# Patient Record
Sex: MALE | Race: WHITE | Employment: OTHER | URBAN - METROPOLITAN AREA
[De-identification: names, ages, dates, MRNs, and addresses within clinical notes are randomized per-mention and may not be internally consistent; named-entity substitution may affect disease eponyms.]

---

## 2017-01-25 ENCOUNTER — TRANSFERRED RECORDS (OUTPATIENT)
Dept: HEALTH INFORMATION MANAGEMENT | Facility: CLINIC | Age: 72
End: 2017-01-25

## 2017-02-24 ENCOUNTER — TRANSFERRED RECORDS (OUTPATIENT)
Dept: HEALTH INFORMATION MANAGEMENT | Facility: CLINIC | Age: 72
End: 2017-02-24

## 2017-03-07 DIAGNOSIS — G47.00 PERSISTENT DISORDER OF INITIATING OR MAINTAINING SLEEP: ICD-10-CM

## 2017-03-08 RX ORDER — ZOLPIDEM TARTRATE 10 MG/1
TABLET ORAL
Qty: 90 TABLET | Refills: 0 | Status: SHIPPED | OUTPATIENT
Start: 2017-03-08 | End: 2017-03-09

## 2017-03-08 NOTE — TELEPHONE ENCOUNTER
Zolpidem 10 mg      Last Written Prescription Date:  6/13/16  Last Fill Quantity: 90,   # refills: 1  [facility] or [facility] prescribing provider: 12/12/16  Future Office visit:       Routing refill request to provider for review/approval because:  Drug not on the [facility] or [facility] refill protocol or controlled substance

## 2017-03-09 DIAGNOSIS — G47.00 PERSISTENT DISORDER OF INITIATING OR MAINTAINING SLEEP: ICD-10-CM

## 2017-03-09 DIAGNOSIS — E03.9 HYPOTHYROIDISM, UNSPECIFIED TYPE: ICD-10-CM

## 2017-03-10 RX ORDER — LEVOTHYROXINE SODIUM 125 UG/1
125 TABLET ORAL DAILY
Qty: 90 TABLET | Refills: 3 | Status: SHIPPED | OUTPATIENT
Start: 2017-03-10 | End: 2018-03-11

## 2017-03-10 NOTE — TELEPHONE ENCOUNTER
Edi Kim is calling to let us know that he saw [facility] Cardiologist for an episode.   Heart is fine, Carotids are fine.  Patient had a CT scan of head and neck( to rule out stroke) -brain is fine.  Creatinine done - 1.0 was on 2/24/17. Dr Lucia told him to return to do f/u creatinine as 12/12/16 creatinine was 1.34.  Patient is wondering if he still needs to f/u for creatinine?   Could you also remove the Ambien Rx from the .  RN's aren't allowed. Shira refaxed the 3/8/17 Rx.    Triage: Ok to mychart Dr Lucias instructions.    He also is wondering why he only got 30 pills from latest levothyroxine Rx.  I called pharmacy and they said his insurance is mandating this. Pharmacist will look into it and call patient.   [facility]  [facility] LARA will refax now.

## 2017-03-13 RX ORDER — ZOLPIDEM TARTRATE 10 MG/1
TABLET ORAL
Qty: 90 TABLET | Refills: 1 | Status: ON HOLD | OUTPATIENT
Start: 2017-03-13 | End: 2017-11-16

## 2017-07-06 ENCOUNTER — TELEPHONE (OUTPATIENT)
Dept: FAMILY MEDICINE | Facility: CLINIC | Age: 72
End: 2017-07-06

## 2017-07-06 NOTE — TELEPHONE ENCOUNTER
Patient denies having trouble breathing.     Patient called reporting Coughing    RESPIRATORY SYMPTOMS      Duration: a couple months    Description  nasal congestion, rhinorrhea and cough    Severity: mild    Accompanying signs and symptoms: Hoarse after all     History (predisposing factors):  none    Precipitating or alleviating factors: None    Therapies tried and outcome:  Antihistamine (allegra & benadryl), albuterol (someone else's)       Recent Medication changes: none    Home Care information given: drink plenty of fluids, shower before bed  Advised: Follow up with clinic if: does not care to see any other provider besides Dr. Lucia.Writer told the patient to be seen by a provider before the 18th it does not need to be at this clinic.  Follow up with Emergent Care if: fever, trouble breathing, blue lips, feeling of suffocation.    References used: Telephone Triage Protocols for Nurses fifth edition pg 164      Please advise as appropriate with further recommendations as appropriate.    Anna Harmon, RN  Triage RN  [facility]

## 2017-07-18 ENCOUNTER — OFFICE VISIT (OUTPATIENT)
Dept: FAMILY MEDICINE | Facility: CLINIC | Age: 72
End: 2017-07-18
Payer: COMMERCIAL

## 2017-07-18 VITALS
HEART RATE: 70 BPM | DIASTOLIC BLOOD PRESSURE: 80 MMHG | SYSTOLIC BLOOD PRESSURE: 124 MMHG | TEMPERATURE: 97.9 F | WEIGHT: 215.2 LBS | HEIGHT: 75 IN | OXYGEN SATURATION: 94 % | BODY MASS INDEX: 26.76 KG/M2

## 2017-07-18 DIAGNOSIS — F41.9 ANXIETY: ICD-10-CM

## 2017-07-18 DIAGNOSIS — E78.5 HYPERLIPIDEMIA LDL GOAL <100: Primary | ICD-10-CM

## 2017-07-18 DIAGNOSIS — G89.29 CHRONIC LEFT SHOULDER PAIN: ICD-10-CM

## 2017-07-18 DIAGNOSIS — M25.512 CHRONIC LEFT SHOULDER PAIN: ICD-10-CM

## 2017-07-18 DIAGNOSIS — R05.9 COUGH: ICD-10-CM

## 2017-07-18 DIAGNOSIS — I10 HYPERTENSION GOAL BP (BLOOD PRESSURE) < 140/90: ICD-10-CM

## 2017-07-18 PROCEDURE — 99214 OFFICE O/P EST MOD 30 MIN: CPT | Performed by: FAMILY MEDICINE

## 2017-07-18 RX ORDER — ATORVASTATIN CALCIUM 40 MG/1
TABLET, FILM COATED ORAL
Qty: 90 TABLET | Refills: 1 | Status: SHIPPED | OUTPATIENT
Start: 2017-07-18 | End: 2017-12-05

## 2017-07-18 RX ORDER — LORAZEPAM 2 MG/1
TABLET ORAL
Qty: 270 TABLET | Refills: 1 | Status: ON HOLD | OUTPATIENT
Start: 2017-07-18 | End: 2017-11-16

## 2017-07-18 RX ORDER — OXYCODONE HYDROCHLORIDE 5 MG/1
5 TABLET ORAL EVERY 4 HOURS PRN
Qty: 30 TABLET | Refills: 0 | Status: SHIPPED | OUTPATIENT
Start: 2017-07-18 | End: 2017-11-11

## 2017-07-18 RX ORDER — ALBUTEROL SULFATE 90 UG/1
2 AEROSOL, METERED RESPIRATORY (INHALATION) 4 TIMES DAILY
Qty: 1 INHALER | Refills: 1 | Status: SHIPPED | OUTPATIENT
Start: 2017-07-18 | End: 2017-11-11

## 2017-07-18 NOTE — NURSING NOTE
"Chief Complaint   Patient presents with     Medication Follow-up       Initial /80 (BP Location: Left arm, Patient Position: Chair, Cuff Size: Adult Regular)  Pulse 70  Temp 97.9  F (36.6  C) (Tympanic)  Ht 6' 3\" (1.905 m)  Wt 215 lb 3.2 oz (97.6 kg)  SpO2 94%  BMI 26.9 kg/m2 Estimated body mass index is 26.9 kg/(m^2) as calculated from the following:    Height as of this encounter: 6' 3\" (1.905 m).    Weight as of this encounter: 215 lb 3.2 oz (97.6 kg).  Medication Reconciliation: complete     Ana Randolph      "

## 2017-07-18 NOTE — PROGRESS NOTES
SUBJECTIVE:                                                    Edi Villafana is a 72 year old male who presents to clinic today for the following health issues:      Medication Followup of  Lipitor and Ativan     Taking Medication as prescribed: yes    Side Effects:  None    Medication Helping Symptoms:  yes       Hyperlipidemia Follow-Up      Rate your low fat/cholesterol diet?: good    Taking statin?  Yes, no muscle aches from statin    Other lipid medications/supplements?:  none    Anxiety Follow-Up    Status since last visit: No change    Other associated symptoms:None    Complicating factors:   Significant life event: No   Current substance abuse: None  Depression symptoms: No  PRINCESS-7 SCORE 6/13/2016 7/12/2016   Total Score 9 4       GAD7                  Problem list and histories reviewed & adjusted, as indicated.  Additional history: as documented    Patient Active Problem List   Diagnosis     Hypothyroidism     Hyperlipidemia LDL goal <100     Persistent insomnia     Hypertension goal BP (blood pressure) < 140/90     Anxiety     Hypotestosteronism     Trochanteric bursitis     Depression with anxiety     Left shoulder pain     Screening for prostate cancer     Past Surgical History:   Procedure Laterality Date     ABDOMEN SURGERY  1973    large benign tumor removed with thrombophlebitis post op     APPENDECTOMY  1960     CATARACT IOL, RT/LT      bilateral     EYE SURGERY  [id number], 2007    eyelid surgery, 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, trabeculoplasty       Social History   Substance Use Topics     Smoking status: Never Smoker     Smokeless tobacco: Never Used     Alcohol use Yes     Family History   Problem Relation Age of Onset     Hypertension Mother      Lipids Mother      HEART DISEASE Mother      Psychotic Disorder Mother      severe depression     HEART DISEASE Father      CANCER Sister      ovarian     Colon Cancer Paternal Grandfather              Reviewed and updated as needed this visit by  [facility]  M[facility]       Reviewed and updated as needed this visit by Provider         ROS:  Constitutional, HEENT, cardiovascular, pulmonary, gi and gu systems are negative, except as otherwise noted.  CONSTITUTIONAL:NEGATIVE for fever, chills, change in weight  PSYCHIATRIC: NEGATIVE for changes in mood or affect  RESP: cough      OBJECTIVE:                                                    /80 (BP Location: Left arm, Patient Position: Chair, Cuff Size: Adult Regular)  Pulse 70  Temp 97.9  F (36.6  C) (Tympanic)  Ht 6' 3\" (1.905 m)  Wt 215 lb 3.2 oz (97.6 kg)  SpO2 94%  BMI 26.9 kg/m2  Body mass index is 26.9 kg/(m^2).  GENERAL APPEARANCE: healthy, alert and no distress  RESP:CTA         ASSESSMENT/PLAN:                                                        ICD-10-CM    1. Hyperlipidemia LDL goal <100 E78.5 Lipid Profile     atorvastatin (LIPITOR) 40 MG tablet   2. Hypertension goal BP (blood pressure) < 140/90 I10 UA with Microscopic     CBC with platelets differential     Comprehensive metabolic panel   3. Anxiety F41.9 LORazepam (ATIVAN) 2 MG tablet   4. Chronic left shoulder pain M25.512 oxyCODONE (ROXICODONE) 5 MG IR tablet    G89.29    5. Cough R05 albuterol (PROAIR HFA/PROVENTIL HFA/VENTOLIN HFA) 108 (90 BASE) MCG/ACT Inhaler       There are no Patient Instructions on file for this visit.    Enrique Lucia MD  [facility]   "

## 2017-07-18 NOTE — MR AVS SNAPSHOT
After Visit Summary   7/18/2017    Edi Villafana    MRN: [id number]           Patient Information     Date Of Birth          1945        Visit Information        Provider Department      7/18/2017 3:30 PM Enrique Lucia MD [facility]        Today's Diagnoses     Hyperlipidemia LDL goal <100    -  1    Hypertension goal BP (blood pressure) < 140/90        Anxiety        Chronic left shoulder pain        Cough          Care Instructions    We'll treat the patient symptomatically.  I would expect his cough to clear.  I refilled his lorazepam and albuterol.  I also refilled his oxycodone for his chronic left shoulder pain.  Lab tests were ordered.  Those will be reviewed and follow-up will be set up pending results of those tests.  His atorvastatin was also refilled.  He will follow-up at a minimum in December for his complete physical.  We will see him sooner if his labs dictate that.  Otherwise follow-up will be as needed.          Follow-ups after your visit        Who to contact     If you have questions or need follow up information about today's clinic visit or your schedule please contact [facility] directly at [phone].  Normal or non-critical lab and imaging results will be communicated to you by MyChart, letter or phone within 4 business days after the clinic has received the results. If you do not hear from us within 7 days, please contact the clinic through [employer] or phone. If you have a critical or abnormal lab result, we will notify you by phone as soon as possible.  Submit refill requests through [employer] or call your pharmacy and they will forward the refill request to us. Please allow 3 business days for your refill to be completed.          Additional Information About Your Visit        MyChart Information     [employer] gives you secure access to your electronic health record. If you see a primary care provider,  "you can also send messages to your care team and make appointments. If you have questions, please call your primary care clinic.  If you do not have a primary care provider, please call [phone] and they will assist you.        Care EveryWhere ID     This is your Care EveryWhere ID. This could be used by other organizations to access your [facility] medical records  [id number]        Your Vitals Were     Pulse Temperature Height Pulse Oximetry BMI (Body Mass Index)       70 97.9  F (36.6  C) (Tympanic) 6' 3\" (1.905 m) 94% 26.9 kg/m2        Blood Pressure from Last 3 Encounters:   07/18/17 124/80   12/12/16 110/70   10/03/16 136/84    Weight from Last 3 Encounters:   07/18/17 215 lb 3.2 oz (97.6 kg)   12/12/16 211 lb (95.7 kg)   10/03/16 214 lb (97.1 kg)                 Today's Medication Changes          These changes are accurate as of: 7/18/17 11:59 PM.  If you have any questions, ask your nurse or doctor.               Start taking these medicines.        Dose/Directions    albuterol 108 (90 BASE) MCG/ACT Inhaler   Commonly known as:  PROAIR HFA/PROVENTIL HFA/VENTOLIN HFA   Used for:  Cough   Started by:  Enrique Lucia MD        Dose:  2 puff   Inhale 2 puffs into the lungs 4 times daily   Quantity:  1 Inhaler   Refills:  1         These medicines have changed or have updated prescriptions.        Dose/Directions    atorvastatin 40 MG tablet   Commonly known as:  LIPITOR   This may have changed:  See the new instructions.   Used for:  Hyperlipidemia LDL goal <100   Changed by:  Enrique Lucia MD        TAKE 1 TABLET (40 MG) BY MOUTH DAILY   Quantity:  90 tablet   Refills:  1       LORazepam 2 MG tablet   Commonly known as:  ATIVAN   This may have changed:  See the new instructions.   Used for:  Anxiety   Changed by:  Enrique Lucia MD        TAKE 1 TABLET BY MOUTH EVERY 8 HOURS AS NEEDED FOR ANXIETY.   Quantity:  270 tablet   Refills:  1            Where to get your medicines    "   These medications were sent to [place] [id number] - [place], [place] - [address]  [address], [place] [address]     Phone:  [phone]     atorvastatin 40 MG tablet         Some of these will need a paper prescription and others can be bought over the counter.  Ask your nurse if you have questions.     Bring a paper prescription for each of these medications     albuterol 108 (90 BASE) MCG/ACT Inhaler    LORazepam 2 MG tablet    oxyCODONE 5 MG IR tablet                Primary Care Provider Office Phone # Fax #    Enrique Lucia [phone]       [facility] [address]  [place] [address]        Equal Access to Services     CONSUELO ANDERSON : Hadii aad ku hadasho Soomaali, waaxda luqadaha, qaybta kaalmada adeegyada, waxay jaimein hayjacquelyn majano . So [facility] [phone].    ATENCIÓN: Si habla español, tiene a kidd disposición servicios gratuitos de asistencia lingüística. Llame al [phone].    We comply with applicable federal civil rights laws and [place] laws. We do not discriminate on the basis of race, color, national origin, age, disability sex, sexual orientation or gender identity.            Thank you!     Thank you for choosing [facility]  for your care. Our goal is always to provide you with excellent care. Hearing back from our patients is one way we can continue to improve our services. Please take a few minutes to complete the written survey that you may receive in the mail after your visit with us. Thank you!             Your Updated Medication List - Protect others around you: Learn how to safely use, store and throw away your medicines at www.disposemymeds.org.          This list is accurate as of: 7/18/17 11:59 PM.  Always use your most recent med list.                   Brand Name Dispense Instructions for use Diagnosis    albuterol 108 (90 BASE) MCG/ACT Inhaler    PROAIR HFA/PROVENTIL  HFA/VENTOLIN HFA    1 Inhaler    Inhale 2 puffs into the lungs 4 times daily    Cough       aspirin 325 MG tablet      Take 0.5 mg by mouth daily.        atorvastatin 40 MG tablet    LIPITOR    90 tablet    TAKE 1 TABLET (40 MG) BY MOUTH DAILY    Hyperlipidemia LDL goal <100       guaiFENesin-codeine 100-10 MG/5ML Soln solution    ROBITUSSIN AC    240 mL    Take 5 mLs by mouth every 4 hours as needed for cough    Lower resp. tract infection       levothyroxine 125 MCG tablet    SYNTHROID/LEVOTHROID    90 tablet    Take 1 tablet (125 mcg) by mouth daily    Hypothyroidism, unspecified type       LORazepam 2 MG tablet    ATIVAN    270 tablet    TAKE 1 TABLET BY MOUTH EVERY 8 HOURS AS NEEDED FOR ANXIETY.    Anxiety       losartan 50 MG tablet    COZAAR    90 tablet    Take 0.5 tablets (25 mg) by mouth daily    Essential hypertension with goal blood pressure less than 140/90       MULTIVITAMIN PO      Take 1 tablet by mouth.        oxyCODONE 5 MG IR tablet    ROXICODONE    30 tablet    Take 1 tablet (5 mg) by mouth every 4 hours as needed for moderate to severe pain    Chronic left shoulder pain       TESTOSTERONE 2 MG/GM CREAM     40 g    15% cream and apply 0.5 ml topically daily    Hypotestosteronism       triamcinolone 0.1 % cream    KENALOG    45 g    Apply sparingly to affected area three times daily as needed    Intrinsic eczema       vitamin D3 2000 UNITS Caps      Take 1 capsule by mouth.        zolpidem 10 MG tablet    AMBIEN    90 tablet    TAKE 1 TAB BY MOUTH NIGHTLY AS NEEDED FOR SLEEP    Persistent disorder of initiating or maintaining sleep

## 2017-07-19 ENCOUNTER — TELEPHONE (OUTPATIENT)
Dept: FAMILY MEDICINE | Facility: CLINIC | Age: 72
End: 2017-07-19

## 2017-07-19 DIAGNOSIS — E78.5 HYPERLIPIDEMIA LDL GOAL <100: ICD-10-CM

## 2017-07-19 DIAGNOSIS — I10 HYPERTENSION GOAL BP (BLOOD PRESSURE) < 140/90: ICD-10-CM

## 2017-07-19 LAB
ALBUMIN SERPL-MCNC: 4.2 G/DL (ref 3.4–5)
ALBUMIN UR-MCNC: NEGATIVE MG/DL
ALP SERPL-CCNC: 95 U/L (ref 40–150)
ALT SERPL W P-5'-P-CCNC: 47 U/L (ref 0–70)
ANION GAP SERPL CALCULATED.3IONS-SCNC: 12 MMOL/L (ref 3–14)
APPEARANCE UR: CLEAR
AST SERPL W P-5'-P-CCNC: 48 U/L (ref 0–45)
BASOPHILS # BLD AUTO: 0 10E9/L (ref 0–0.2)
BASOPHILS NFR BLD AUTO: 0.3 %
BILIRUB SERPL-MCNC: 0.9 MG/DL (ref 0.2–1.3)
BILIRUB UR QL STRIP: NEGATIVE
BUN SERPL-MCNC: 7 MG/DL (ref 7–30)
CALCIUM SERPL-MCNC: 9.3 MG/DL (ref 8.5–10.1)
CHLORIDE SERPL-SCNC: 101 MMOL/L (ref 94–109)
CHOLEST SERPL-MCNC: 150 MG/DL
CO2 SERPL-SCNC: 22 MMOL/L (ref 20–32)
COLOR UR AUTO: YELLOW
CREAT SERPL-MCNC: 1.04 MG/DL (ref 0.66–1.25)
DIFFERENTIAL METHOD BLD: NORMAL
EOSINOPHIL # BLD AUTO: 0.1 10E9/L (ref 0–0.7)
EOSINOPHIL NFR BLD AUTO: 1.4 %
ERYTHROCYTE [DISTWIDTH] IN BLOOD BY AUTOMATED COUNT: 12.8 % (ref 10–15)
GFR SERPL CREATININE-BSD FRML MDRD: 70 ML/MIN/1.7M2
GLUCOSE SERPL-MCNC: 89 MG/DL (ref 70–99)
GLUCOSE UR STRIP-MCNC: NEGATIVE MG/DL
HCT VFR BLD AUTO: 48.6 % (ref 40–53)
HDLC SERPL-MCNC: 56 MG/DL
HGB BLD-MCNC: 17 G/DL (ref 13.3–17.7)
HGB UR QL STRIP: NEGATIVE
KETONES UR STRIP-MCNC: NEGATIVE MG/DL
LDLC SERPL CALC-MCNC: 67 MG/DL
LEUKOCYTE ESTERASE UR QL STRIP: NEGATIVE
LYMPHOCYTES # BLD AUTO: 1.6 10E9/L (ref 0.8–5.3)
LYMPHOCYTES NFR BLD AUTO: 22.3 %
MCH RBC QN AUTO: 32.8 PG (ref 26.5–33)
MCHC RBC AUTO-ENTMCNC: 35 G/DL (ref 31.5–36.5)
MCV RBC AUTO: 94 FL (ref 78–100)
MONOCYTES # BLD AUTO: 0.7 10E9/L (ref 0–1.3)
MONOCYTES NFR BLD AUTO: 9.2 %
NEUTROPHILS # BLD AUTO: 4.9 10E9/L (ref 1.6–8.3)
NEUTROPHILS NFR BLD AUTO: 66.8 %
NITRATE UR QL: NEGATIVE
NON-SQ EPI CELLS #/AREA URNS LPF: NORMAL /LPF
NONHDLC SERPL-MCNC: 94 MG/DL
PH UR STRIP: 6.5 PH (ref 5–7)
PLATELET # BLD AUTO: 228 10E9/L (ref 150–450)
POTASSIUM SERPL-SCNC: 4.3 MMOL/L (ref 3.4–5.3)
PROT SERPL-MCNC: 7.5 G/DL (ref 6.8–8.8)
RBC # BLD AUTO: 5.19 10E12/L (ref 4.4–5.9)
RBC #/AREA URNS AUTO: NORMAL /HPF (ref 0–2)
SODIUM SERPL-SCNC: 135 MMOL/L (ref 133–144)
SP GR UR STRIP: <=1.005 (ref 1–1.03)
TRIGL SERPL-MCNC: 135 MG/DL
URN SPEC COLLECT METH UR: NORMAL
UROBILINOGEN UR STRIP-ACNC: 0.2 EU/DL (ref 0.2–1)
WBC # BLD AUTO: 7.3 10E9/L (ref 4–11)
WBC #/AREA URNS AUTO: NORMAL /HPF (ref 0–2)

## 2017-07-19 PROCEDURE — 36415 COLL VENOUS BLD VENIPUNCTURE: CPT | Performed by: FAMILY MEDICINE

## 2017-07-19 PROCEDURE — 85025 COMPLETE CBC W/AUTO DIFF WBC: CPT | Performed by: FAMILY MEDICINE

## 2017-07-19 PROCEDURE — 80053 COMPREHEN METABOLIC PANEL: CPT | Performed by: FAMILY MEDICINE

## 2017-07-19 PROCEDURE — 80061 LIPID PANEL: CPT | Performed by: FAMILY MEDICINE

## 2017-07-19 PROCEDURE — 81001 URINALYSIS AUTO W/SCOPE: CPT | Performed by: FAMILY MEDICINE

## 2017-07-19 NOTE — TELEPHONE ENCOUNTER
Reason for Call:  Other     Detailed comments: Pt would like his recent lab results mailed too him from the results on 07/19/2017. Please contact him once this is available.     Phone Number Patient can be reached at: Cell number on file:    Telephone Information:   Mobile [phone]       Best Time:     Can we leave a detailed message on this number? YES    Call taken on 7/19/2017 at 11:51 AM by Maya Ortiz

## 2017-07-19 NOTE — TELEPHONE ENCOUNTER
Left voice message informing patient of normal CBC and UA. Lipid and CMP are in process. Nurse will mail lab results when reviewed by provider.

## 2017-07-19 NOTE — LETTER
[facility]  [address]  [address]  [place] [id number] [phone]                                                                                                           Edi Villafana  [address]  [place] [address]    July 20, 2017      Dear Edi,    The results of your recent tests were reviewed and are enclosed.     Results for orders placed or performed in visit on 07/19/17   UA with Microscopic   Result Value Ref Range    Color Urine Yellow     Appearance Urine Clear     Glucose Urine Negative NEG mg/dL    Bilirubin Urine Negative NEG    Ketones Urine Negative NEG mg/dL    Specific Gravity Urine <=1.005 1.003 - 1.035    pH Urine 6.5 5.0 - 7.0 pH    Protein Albumin Urine Negative NEG mg/dL    Urobilinogen Urine 0.2 0.2 - 1.0 EU/dL    Nitrite Urine Negative NEG    Blood Urine Negative NEG    Leukocyte Esterase Urine Negative NEG    Source Midstream Urine     WBC Urine O - 2 0 - 2 /HPF    RBC Urine O - 2 0 - 2 /HPF    Squamous Epithelial /LPF Urine Few FEW /LPF   CBC with platelets differential   Result Value Ref Range    WBC 7.3 4.0 - 11.0 10e9/L    RBC Count 5.19 4.4 - 5.9 10e12/L    Hemoglobin 17.0 13.3 - 17.7 g/dL    Hematocrit 48.6 40.0 - 53.0 %    MCV 94 78 - 100 fl    MCH 32.8 26.5 - 33.0 pg    MCHC 35.0 31.5 - 36.5 g/dL    RDW 12.8 10.0 - 15.0 %    Platelet Count 228 150 - 450 10e9/L    Diff Method Automated Method     % Neutrophils 66.8 %    % Lymphocytes 22.3 %    % Monocytes 9.2 %    % Eosinophils 1.4 %    % Basophils 0.3 %    Absolute Neutrophil 4.9 1.6 - 8.3 10e9/L    Absolute Lymphocytes 1.6 0.8 - 5.3 10e9/L    Absolute Monocytes 0.7 0.0 - 1.3 10e9/L    Absolute Eosinophils 0.1 0.0 - 0.7 10e9/L    Absolute Basophils 0.0 0.0 - 0.2 10e9/L   Comprehensive metabolic panel   Result Value Ref Range    Sodium 135 133 - 144 mmol/L    Potassium 4.3 3.4 - 5.3 mmol/L    Chloride 101 94 - 109 mmol/L    Carbon Dioxide 22 20 - 32  mmol/L    Anion Gap 12 3 - 14 mmol/L    Glucose 89 70 - 99 mg/dL    Urea Nitrogen 7 7 - 30 mg/dL    Creatinine 1.04 0.66 - 1.25 mg/dL    GFR Estimate 70 >60 mL/min/1.7m2    GFR Estimate If Black 85 >60 mL/min/1.7m2    Calcium 9.3 8.5 - 10.1 mg/dL    Bilirubin Total 0.9 0.2 - 1.3 mg/dL    Albumin 4.2 3.4 - 5.0 g/dL    Protein Total 7.5 6.8 - 8.8 g/dL    Alkaline Phosphatase 95 40 - 150 U/L    ALT 47 0 - 70 U/L    AST 48 (H) 0 - 45 U/L   Lipid Profile   Result Value Ref Range    Cholesterol 150 <200 mg/dL    Triglycerides 135 <150 mg/dL    HDL Cholesterol 56 >39 mg/dL    LDL Cholesterol Calculated 67 <100 mg/dL    Non HDL Cholesterol 94 <130 mg/dL         Thank you for choosing [facility].  We appreciate the opportunity to serve you and look forward to supporting your healthcare needs in the future.    If you have any questions or concerns, please call me or my staff at [phone].      Sincerely,    Enrique Lucia MD

## 2017-07-20 NOTE — PATIENT INSTRUCTIONS
We'll treat the patient symptomatically.  I would expect his cough to clear.  I refilled his lorazepam and albuterol.  I also refilled his oxycodone for his chronic left shoulder pain.  Lab tests were ordered.  Those will be reviewed and follow-up will be set up pending results of those tests.  His atorvastatin was also refilled.  He will follow-up at a minimum in December for his complete physical.  We will see him sooner if his labs dictate that.  Otherwise follow-up will be as needed.

## 2017-07-21 ENCOUNTER — TELEPHONE (OUTPATIENT)
Dept: FAMILY MEDICINE | Facility: CLINIC | Age: 72
End: 2017-07-21

## 2017-07-21 NOTE — TELEPHONE ENCOUNTER
Albuterol inhaler cost paid $ 7 before. Copay is now $42. Patient is calling to ask what his options are.     Called pharmacy, they say there is not a cheaper alternative. Reaching out to manager for prescription assistance program for patients. Will call patient Monday once I know the information to tell him.    Routing back to triage as a reminder to myself.     Anna Harmon RN  07/21/17  4:35 PM

## 2017-07-24 NOTE — TELEPHONE ENCOUNTER
Patient Contact    Attempt # 1    Was call answered?  No.  Left message on voicemail with information to call me back.    [7/24/2017 9:51 AM] Ashley Brooks:   That's what I'm here for.  Have him call [phone]. My assistant Sharda will get some basic info from him to see what we may be able to help with.     Called and left the above information for the patient and to call us back if he has any further questions.     Anna Harmon RN  07/24/17  12:37 PM

## 2017-08-17 ENCOUNTER — TELEPHONE (OUTPATIENT)
Dept: FAMILY MEDICINE | Facility: CLINIC | Age: 72
End: 2017-08-17

## 2017-08-17 NOTE — TELEPHONE ENCOUNTER
MAYELAI~ Today, Aug 17, 2017 I spoke with name, he/she is in need of financial assistance for medication.    We reviewed the Prescription Assistance Program for manfacturer brand name assistance programs, gross income, insurance and Rx list.    I will complete an application for Proventil HFA through [employer].    Ashley Brooks  Prescription

## 2017-11-08 ENCOUNTER — TELEPHONE (OUTPATIENT)
Dept: FAMILY MEDICINE | Facility: CLINIC | Age: 72
End: 2017-11-08

## 2017-11-08 NOTE — TELEPHONE ENCOUNTER
AR~ I spoke with Edi back in August to review his medications for  assistance programs.  The only active program was for his Proventil HFA/Albuterol inhaler.    Edi has been reminded multiple times via phone calls and letters to return the required documents to the Proventil HFA application.  Edi has since declined assistance stating he does not want to release his income information.    I have closed Edi's case.    Ashley Brooks  Prescription

## 2017-11-10 ENCOUNTER — TELEPHONE (OUTPATIENT)
Dept: FAMILY MEDICINE | Facility: CLINIC | Age: 72
End: 2017-11-10

## 2017-11-10 NOTE — TELEPHONE ENCOUNTER
Patient had one fall in the middle of the night a few months ago, was really scary for me, went to cardiologist at [facility] and they did a test and did a head CT, and a head MRI and it was normal.     Called patient. Could not identify the clear symptoms he is having. Says that a friend recommended he go to the emergency room. Sleeping all day. Patient is slurring while on the phone while speaking to the writer.     Not clear what he needs or what symptoms he is having. Writer advised that the patient should be seen if he feels he needs to get checked out.     Anna Harmon RN

## 2017-11-10 NOTE — TELEPHONE ENCOUNTER
Reason for call:  Patient reporting a symptom  Symptom or request: sick  Duration (how long have symptoms been present): 3 weeks do not want to see a doctor  Have you been treated for this before? No  Additional comments: please call patient  Phone Number patient can be reached at:  Home number on file [phone] (home)  Best Time:  any  Can we leave a detailed message on this number:  YES  Call taken on 11/10/2017 at 1:14 PM by ODETTE JOSHUA

## 2017-11-11 ENCOUNTER — HOSPITAL ENCOUNTER (INPATIENT)
Facility: CLINIC | Age: 72
LOS: 5 days | Discharge: NURSING, REHAB OR LONG-TERM CARE | DRG: 439 | End: 2017-11-16
Attending: EMERGENCY MEDICINE | Admitting: INTERNAL MEDICINE
Payer: MEDICARE

## 2017-11-11 ENCOUNTER — OFFICE VISIT (OUTPATIENT)
Dept: URGENT CARE | Facility: URGENT CARE | Age: 72
End: 2017-11-11
Payer: COMMERCIAL

## 2017-11-11 ENCOUNTER — APPOINTMENT (OUTPATIENT)
Dept: ULTRASOUND IMAGING | Facility: CLINIC | Age: 72
DRG: 439 | End: 2017-11-11
Attending: EMERGENCY MEDICINE
Payer: MEDICARE

## 2017-11-11 VITALS
SYSTOLIC BLOOD PRESSURE: 138 MMHG | DIASTOLIC BLOOD PRESSURE: 89 MMHG | OXYGEN SATURATION: 96 % | HEART RATE: 97 BPM | TEMPERATURE: 96.7 F

## 2017-11-11 DIAGNOSIS — G47.00 PERSISTENT DISORDER OF INITIATING OR MAINTAINING SLEEP: ICD-10-CM

## 2017-11-11 DIAGNOSIS — R63.0 ANOREXIA: ICD-10-CM

## 2017-11-11 DIAGNOSIS — F41.9 ANXIETY: ICD-10-CM

## 2017-11-11 DIAGNOSIS — R53.1 WEAKNESS: Primary | ICD-10-CM

## 2017-11-11 DIAGNOSIS — I10 HYPERTENSION GOAL BP (BLOOD PRESSURE) < 140/90: Primary | ICD-10-CM

## 2017-11-11 DIAGNOSIS — R53.81 MALAISE AND FATIGUE: ICD-10-CM

## 2017-11-11 DIAGNOSIS — R25.1 SHAKINESS: ICD-10-CM

## 2017-11-11 DIAGNOSIS — R53.83 MALAISE AND FATIGUE: ICD-10-CM

## 2017-11-11 DIAGNOSIS — F10.939 ALCOHOL WITHDRAWAL SYNDROME WITH COMPLICATION (H): ICD-10-CM

## 2017-11-11 PROBLEM — K85.90 PANCREATITIS: Status: ACTIVE | Noted: 2017-11-11

## 2017-11-11 LAB
ALBUMIN SERPL-MCNC: 4.2 G/DL (ref 3.4–5)
ALBUMIN UR-MCNC: NEGATIVE MG/DL
ALP SERPL-CCNC: 107 U/L (ref 40–150)
ALT SERPL W P-5'-P-CCNC: 111 U/L (ref 0–70)
ANION GAP SERPL CALCULATED.3IONS-SCNC: 14 MMOL/L (ref 3–14)
APPEARANCE UR: CLEAR
AST SERPL W P-5'-P-CCNC: 127 U/L (ref 0–45)
BASOPHILS # BLD AUTO: 0 10E9/L (ref 0–0.2)
BASOPHILS NFR BLD AUTO: 0.5 %
BILIRUB DIRECT SERPL-MCNC: 0.7 MG/DL (ref 0–0.2)
BILIRUB SERPL-MCNC: 1.8 MG/DL (ref 0.2–1.3)
BILIRUB UR QL STRIP: NEGATIVE
BUN SERPL-MCNC: 20 MG/DL (ref 7–30)
CALCIUM SERPL-MCNC: 9.2 MG/DL (ref 8.5–10.1)
CHLORIDE SERPL-SCNC: 89 MMOL/L (ref 94–109)
CO2 SERPL-SCNC: 22 MMOL/L (ref 20–32)
COLOR UR AUTO: YELLOW
CREAT SERPL-MCNC: 1.19 MG/DL (ref 0.66–1.25)
DIFFERENTIAL METHOD BLD: NORMAL
EOSINOPHIL # BLD AUTO: 0 10E9/L (ref 0–0.7)
EOSINOPHIL NFR BLD AUTO: 0.2 %
ERYTHROCYTE [DISTWIDTH] IN BLOOD BY AUTOMATED COUNT: 12.6 % (ref 10–15)
ETHANOL SERPL-MCNC: <0.01 G/DL
GFR SERPL CREATININE-BSD FRML MDRD: 60 ML/MIN/1.7M2
GLUCOSE BLDC GLUCOMTR-MCNC: 139 MG/DL (ref 70–99)
GLUCOSE SERPL-MCNC: 141 MG/DL (ref 70–99)
GLUCOSE UR STRIP-MCNC: 50 MG/DL
HCT VFR BLD AUTO: 47.2 % (ref 40–53)
HGB BLD-MCNC: 17 G/DL (ref 13.3–17.7)
HGB UR QL STRIP: NEGATIVE
HYALINE CASTS #/AREA URNS LPF: 7 /LPF (ref 0–2)
IMM GRANULOCYTES # BLD: 0 10E9/L (ref 0–0.4)
IMM GRANULOCYTES NFR BLD: 0.4 %
INR PPP: 1 (ref 0.86–1.14)
KETONES UR STRIP-MCNC: 20 MG/DL
LEUKOCYTE ESTERASE UR QL STRIP: NEGATIVE
LIPASE SERPL-CCNC: 2477 U/L (ref 73–393)
LYMPHOCYTES # BLD AUTO: 1 10E9/L (ref 0.8–5.3)
LYMPHOCYTES NFR BLD AUTO: 11.6 %
MAGNESIUM SERPL-MCNC: 1.7 MG/DL (ref 1.6–2.3)
MCH RBC QN AUTO: 32.2 PG (ref 26.5–33)
MCHC RBC AUTO-ENTMCNC: 36 G/DL (ref 31.5–36.5)
MCV RBC AUTO: 89 FL (ref 78–100)
MONOCYTES # BLD AUTO: 0.9 10E9/L (ref 0–1.3)
MONOCYTES NFR BLD AUTO: 10.4 %
MUCOUS THREADS #/AREA URNS LPF: PRESENT /LPF
NEUTROPHILS # BLD AUTO: 6.5 10E9/L (ref 1.6–8.3)
NEUTROPHILS NFR BLD AUTO: 76.9 %
NITRATE UR QL: NEGATIVE
NRBC # BLD AUTO: 0 10*3/UL
NRBC BLD AUTO-RTO: 0 /100
PH UR STRIP: 6 PH (ref 5–7)
PLATELET # BLD AUTO: 208 10E9/L (ref 150–450)
POTASSIUM SERPL-SCNC: 3.8 MMOL/L (ref 3.4–5.3)
PROT SERPL-MCNC: 7.9 G/DL (ref 6.8–8.8)
RBC # BLD AUTO: 5.28 10E12/L (ref 4.4–5.9)
RBC #/AREA URNS AUTO: 1 /HPF (ref 0–2)
SODIUM SERPL-SCNC: 125 MMOL/L (ref 133–144)
SODIUM SERPL-SCNC: 127 MMOL/L (ref 133–144)
SOURCE: ABNORMAL
SP GR UR STRIP: 1.02 (ref 1–1.03)
T4 FREE SERPL-MCNC: 0.82 NG/DL (ref 0.76–1.46)
TROPONIN I SERPL-MCNC: <0.015 UG/L (ref 0–0.04)
TSH SERPL DL<=0.005 MIU/L-ACNC: 47.06 MU/L (ref 0.4–4)
UROBILINOGEN UR STRIP-MCNC: 2 MG/DL (ref 0–2)
WBC # BLD AUTO: 8.4 10E9/L (ref 4–11)
WBC #/AREA URNS AUTO: 2 /HPF (ref 0–2)

## 2017-11-11 PROCEDURE — 25000128 H RX IP 250 OP 636: Performed by: INTERNAL MEDICINE

## 2017-11-11 PROCEDURE — 25000132 ZZH RX MED GY IP 250 OP 250 PS 637: Mod: GY | Performed by: EMERGENCY MEDICINE

## 2017-11-11 PROCEDURE — 80320 DRUG SCREEN QUANTALCOHOLS: CPT | Performed by: EMERGENCY MEDICINE

## 2017-11-11 PROCEDURE — 80076 HEPATIC FUNCTION PANEL: CPT | Performed by: EMERGENCY MEDICINE

## 2017-11-11 PROCEDURE — 81001 URINALYSIS AUTO W/SCOPE: CPT | Performed by: INTERNAL MEDICINE

## 2017-11-11 PROCEDURE — 85025 COMPLETE CBC W/AUTO DIFF WBC: CPT | Performed by: EMERGENCY MEDICINE

## 2017-11-11 PROCEDURE — 84484 ASSAY OF TROPONIN QUANT: CPT | Performed by: EMERGENCY MEDICINE

## 2017-11-11 PROCEDURE — 25000125 ZZHC RX 250: Performed by: INTERNAL MEDICINE

## 2017-11-11 PROCEDURE — 83690 ASSAY OF LIPASE: CPT | Performed by: EMERGENCY MEDICINE

## 2017-11-11 PROCEDURE — HZ2ZZZZ DETOXIFICATION SERVICES FOR SUBSTANCE ABUSE TREATMENT: ICD-10-PCS | Performed by: EMERGENCY MEDICINE

## 2017-11-11 PROCEDURE — 99222 1ST HOSP IP/OBS MODERATE 55: CPT | Mod: AI | Performed by: INTERNAL MEDICINE

## 2017-11-11 PROCEDURE — 99285 EMERGENCY DEPT VISIT HI MDM: CPT | Mod: 25

## 2017-11-11 PROCEDURE — 84439 ASSAY OF FREE THYROXINE: CPT | Performed by: EMERGENCY MEDICINE

## 2017-11-11 PROCEDURE — 99207 ZZC CDG-MDM COMPONENT: MEETS LOW - DOWN CODED: CPT | Performed by: INTERNAL MEDICINE

## 2017-11-11 PROCEDURE — 36415 COLL VENOUS BLD VENIPUNCTURE: CPT | Performed by: INTERNAL MEDICINE

## 2017-11-11 PROCEDURE — 25000128 H RX IP 250 OP 636: Performed by: EMERGENCY MEDICINE

## 2017-11-11 PROCEDURE — 83735 ASSAY OF MAGNESIUM: CPT | Performed by: EMERGENCY MEDICINE

## 2017-11-11 PROCEDURE — 85610 PROTHROMBIN TIME: CPT | Performed by: EMERGENCY MEDICINE

## 2017-11-11 PROCEDURE — 80048 BASIC METABOLIC PNL TOTAL CA: CPT | Performed by: EMERGENCY MEDICINE

## 2017-11-11 PROCEDURE — 76700 US EXAM ABDOM COMPLETE: CPT

## 2017-11-11 PROCEDURE — A9270 NON-COVERED ITEM OR SERVICE: HCPCS | Mod: GY | Performed by: INTERNAL MEDICINE

## 2017-11-11 PROCEDURE — 12000007 ZZH R&B INTERMEDIATE

## 2017-11-11 PROCEDURE — A9270 NON-COVERED ITEM OR SERVICE: HCPCS | Mod: GY | Performed by: EMERGENCY MEDICINE

## 2017-11-11 PROCEDURE — 25000132 ZZH RX MED GY IP 250 OP 250 PS 637: Mod: GY | Performed by: INTERNAL MEDICINE

## 2017-11-11 PROCEDURE — 93005 ELECTROCARDIOGRAM TRACING: CPT

## 2017-11-11 PROCEDURE — 99213 OFFICE O/P EST LOW 20 MIN: CPT | Performed by: PHYSICIAN ASSISTANT

## 2017-11-11 PROCEDURE — 00000146 ZZHCL STATISTIC GLUCOSE BY METER IP

## 2017-11-11 PROCEDURE — 96361 HYDRATE IV INFUSION ADD-ON: CPT

## 2017-11-11 PROCEDURE — 84443 ASSAY THYROID STIM HORMONE: CPT | Performed by: EMERGENCY MEDICINE

## 2017-11-11 PROCEDURE — 96360 HYDRATION IV INFUSION INIT: CPT

## 2017-11-11 PROCEDURE — 84295 ASSAY OF SERUM SODIUM: CPT | Performed by: INTERNAL MEDICINE

## 2017-11-11 RX ORDER — SODIUM CHLORIDE 9 MG/ML
INJECTION, SOLUTION INTRAVENOUS CONTINUOUS
Status: DISCONTINUED | OUTPATIENT
Start: 2017-11-11 | End: 2017-11-12

## 2017-11-11 RX ORDER — ONDANSETRON 4 MG/1
4 TABLET, ORALLY DISINTEGRATING ORAL EVERY 6 HOURS PRN
Status: DISCONTINUED | OUTPATIENT
Start: 2017-11-11 | End: 2017-11-16 | Stop reason: HOSPADM

## 2017-11-11 RX ORDER — NALOXONE HYDROCHLORIDE 0.4 MG/ML
.1-.4 INJECTION, SOLUTION INTRAMUSCULAR; INTRAVENOUS; SUBCUTANEOUS
Status: DISCONTINUED | OUTPATIENT
Start: 2017-11-11 | End: 2017-11-16 | Stop reason: HOSPADM

## 2017-11-11 RX ORDER — LORAZEPAM 2 MG/ML
1-2 INJECTION INTRAMUSCULAR EVERY 30 MIN PRN
Status: DISCONTINUED | OUTPATIENT
Start: 2017-11-11 | End: 2017-11-15

## 2017-11-11 RX ORDER — ONDANSETRON 2 MG/ML
4 INJECTION INTRAMUSCULAR; INTRAVENOUS EVERY 6 HOURS PRN
Status: DISCONTINUED | OUTPATIENT
Start: 2017-11-11 | End: 2017-11-16 | Stop reason: HOSPADM

## 2017-11-11 RX ORDER — LORAZEPAM 0.5 MG/1
0.5 TABLET ORAL ONCE
Status: COMPLETED | OUTPATIENT
Start: 2017-11-11 | End: 2017-11-11

## 2017-11-11 RX ORDER — ZOLPIDEM TARTRATE 5 MG/1
5 TABLET ORAL
Status: DISCONTINUED | OUTPATIENT
Start: 2017-11-11 | End: 2017-11-11

## 2017-11-11 RX ORDER — HYDRALAZINE HYDROCHLORIDE 20 MG/ML
10 INJECTION INTRAMUSCULAR; INTRAVENOUS EVERY 4 HOURS PRN
Status: DISCONTINUED | OUTPATIENT
Start: 2017-11-11 | End: 2017-11-16 | Stop reason: HOSPADM

## 2017-11-11 RX ORDER — HYDROMORPHONE HCL/0.9% NACL/PF 0.2MG/0.2
0.2 SYRINGE (ML) INTRAVENOUS
Status: DISCONTINUED | OUTPATIENT
Start: 2017-11-11 | End: 2017-11-12

## 2017-11-11 RX ORDER — LORAZEPAM 1 MG/1
1-2 TABLET ORAL EVERY 30 MIN PRN
Status: DISCONTINUED | OUTPATIENT
Start: 2017-11-11 | End: 2017-11-15

## 2017-11-11 RX ADMIN — SODIUM CHLORIDE: 9 INJECTION, SOLUTION INTRAVENOUS at 20:31

## 2017-11-11 RX ADMIN — LORAZEPAM 1 MG: 1 TABLET ORAL at 22:40

## 2017-11-11 RX ADMIN — THIAMINE HYDROCHLORIDE: 100 INJECTION, SOLUTION INTRAMUSCULAR; INTRAVENOUS at 20:31

## 2017-11-11 RX ADMIN — LORAZEPAM 0.5 MG: 0.5 TABLET ORAL at 15:39

## 2017-11-11 RX ADMIN — SODIUM CHLORIDE 1000 ML: 9 INJECTION, SOLUTION INTRAVENOUS at 15:39

## 2017-11-11 RX ADMIN — LORAZEPAM 2 MG: 1 TABLET ORAL at 20:32

## 2017-11-11 ASSESSMENT — ENCOUNTER SYMPTOMS
MYALGIAS: 0
COUGH: 1
DIZZINESS: 0
WEAKNESS: 1
FEVER: 0
APPETITE CHANGE: 1
HEADACHES: 0
TREMORS: 1
ABDOMINAL PAIN: 0

## 2017-11-11 ASSESSMENT — ACTIVITIES OF DAILY LIVING (ADL): ADLS_ACUITY_SCORE: 17

## 2017-11-11 NOTE — IP AVS SNAPSHOT
` `     [place] [id number] [facility]: [phone]            Medication Administration Report for Edi Villafana as of 11/16/17 1100   Legend:    Given Hold Not Given Due Canceled Entry Other Actions    Time Time (Time) Time  Time-Action       Inactive    Active    Linked        Medications 11/10/17 11/11/17 11/12/17 11/13/17 11/14/17 11/15/17 11/16/17    artificial saliva (BIOTENE MT) spray 1 spray  Dose: 1 spray Freq: EVERY 6 HOURS PRN Route: MT  PRN Reason: dry mouth  Start: 11/12/17 0911      1132 (1 spray)-Given       1657 (1 spray)-Given               hydrALAZINE (APRESOLINE) injection 10 mg  Dose: 10 mg Freq: EVERY 4 HOURS PRN Route: IV  PRN Reason: high blood pressure  PRN Comment: give for SBP > 180  Start: 11/11/17 1927   Admin Instructions: For ordered doses up to 40 mg, give IV Push undiluted over 1 minute.               levothyroxine (SYNTHROID/LEVOTHROID) tablet 125 mcg  Dose: 125 mcg Freq: DAILY Route: PO  Start: 11/12/17 1030   Admin Instructions: Separate oral administration of iron- or calcium-containing products and levothyroxine by at least 4 hours.       1110 (125 mcg)-Given        0813 (125 mcg)-Given        0849 (125 mcg)-Given        0754 (125 mcg)-Given               0819 (125 mcg)-Given           LORazepam (ATIVAN) tablet 0.5-1 mg  Dose: 0.5-1 mg Freq: EVERY 8 HOURS PRN Route: PO  PRN Reason: anxiety  Start: 11/15/17 1216         1447 (0.5 mg)-Given       1847 (0.5 mg)-Given [C]        0233 (1 mg)-Given       1024 (1 mg)-Given           losartan (COZAAR) tablet 25 mg  Dose: 25 mg Freq: DAILY Route: PO  Start: 11/12/17 1300      1343 (25 mg)-Given        0813 (25 mg)-Given        0848 (25 mg)-Given        0754 (25 mg)-Given               0819 (25 mg)-Given           naloxone (NARCAN) injection 0.1-0.4 mg  Dose: 0.1-0.4 mg Freq: EVERY 2 MIN PRN Route: IV  PRN Reason: opioid reversal  Start: 11/11/17 1915   Admin Instructions: For respiratory rate LESS than or EQUAL to 8.  Partial  reversal dose:  0.1 mg titrated q 2 minutes for Analgesia Side Effects Monitoring Sedation Level of 3 (frequently drowsy, arousable, drifts to sleep during conversation).Full reversal dose:  0.4 mg bolus for Analgesia Side Effects Monitoring Sedation Level of 4 (somnolent, minimal or no response to stimulation).  Give IV Push undiluted up to 2mg. Give each 0.4mg over 15 seconds in emergency situations. For non-emergent situations further dilute in 9mL of NS to facilitate titration of response.               ondansetron (ZOFRAN-ODT) ODT tab 4 mg  Dose: 4 mg Freq: EVERY 6 HOURS PRN Route: PO  PRN Reasons: nausea,vomiting  Start: 11/11/17 1917   Admin Instructions: This is Step 1 of nausea and vomiting management.  If nausea not resolved in 15 minutes, go to Step 2 prochlorperazine (COMPAZINE). Do not push through foil backing. Peel back foil and gently remove. Place on tongue immediately. Administration with liquid unnecessary              Or  ondansetron (ZOFRAN) injection 4 mg  Dose: 4 mg Freq: EVERY 6 HOURS PRN Route: IV  PRN Reasons: nausea,vomiting  Start: 11/11/17 1917   Admin Instructions: This is Step 1 of nausea and vomiting management.  If nausea not resolved in 15 minutes, go to Step 2 prochlorperazine (COMPAZINE).  Irritant. For ordered doses up to 4 mg, give IV Push undiluted over 2-5 minutes.                 Dose: 5 mg Freq: EVERY 8 HOURS PRN Route: PO  PRN Reason: moderate to severe pain  Start: 11/15/17 0945   End: 11/16/17 0700         1334 (5 mg)-Given       2155 (5 mg)-Given        0700-Med Discontinued      Completed Medications  Medications 11/10/17 11/11/17 11/12/17 11/13/17 11/14/17 11/15/17 11/16/17         Freq: EVERY 24 HOURS Route: IV  Last Dose: Stopped (11/16/17 0525)  Start: 11/11/17 1930   End: 11/16/17 0525   Admin Instructions: To avoid fluid overload, do not run maintenance IV during vitamin infusion.<br><br>FOLIC ACID NOT AVAILABLE/BACKORDERED      2031 ( )-New Bag        2023 (  )-New Bag        2025 ( )-New Bag        0006 ( )-Rate/Dose Verify       2015 ( )-New Bag        0522 ( )-Rate/Dose Verify       1948 ( )-New Bag        0525-Stopped          Discontinued Medications  Medications 11/10/17 11/11/17 11/12/17 11/13/17 11/14/17 11/15/17 11/16/17         Dose: 1-2 mg Freq: EVERY 30 MIN PRN Route: PO  PRN Reason: other  PRN Comment: per CIWA-Ar score  Start: 11/11/17 1917   End: 11/15/17 1216   Admin Instructions: Oral dosing is the preferred route of administration.  Dose according to CIWA-Ar score:    For CIWA-Ar Score LESS THAN OR EQUAL TO 7:  ~ NO LORazepam (ATIVAN) is to be administered and  ~ repeat CIWA-Ar scale in 4 hours and PRN    For CIWA-Ar Score 8-12:   ~ give LORazepam (ATIVAN) 1 mg PO or IV and  ~ repeat CIWA-Ar scale in 1 hour     For CIWA-Ar Score 13-15:  ~ give LORazepam (ATIVAN) 2 mg PO or IV   ~ and repeat CIWA-Ar scale in 1 hour    For CIWA-Ar Score GREATER THAN OR EQUAL TO 16:   ~ give LORazepam (ATIVAN) 2 mg PO or IV and   ~ repeat CIWA-Ar scale in 30 minutes    Doses should be withheld for nystagmus, sedation, ataxia, dysarthria or respiratory rate less than 12. If dose is being held more than once, provider must be notified.      2032 (2 mg)-Given [C]       2240 (1 mg)-Given [C]                      1137 (1 mg)-Given       1555 (1 mg)-Given [C]                      0622 (1 mg)-Given       1007 (1 mg)-Given       1611 (1 mg)-Given [C]       2036 (1 mg)-Given [C]       2155 (1 mg)-Given [C]        0424 (1 mg)-Given       1005 (2 mg)-Given       1914 (1 mg)-Given [C]        0027 (1 mg)-Given [C]       0419 (1 mg)-Given       1106 (1 mg)-Given       1216-Med Discontinued       Or    Dose: 1-2 mg Freq: EVERY 30 MIN PRN Route: IV  PRN Reason: other  PRN Comment: per CIWA-Ar score  Start: 11/11/17 1917   End: 11/15/17 1216   Admin Instructions: Oral dosing is the preferred route of administration.    Dose according to CIWA-Ar Score:    For CIWA-Ar Score LESS THAN OR  EQUAL TO 7:   ~ NO LORazepam (ATIVAN) is to be administered  and  ~ repeat CIWA-Ar scale in 4 hours and PRN    For CIWA-Ar Score 8-12:  ~ give LORazepam (ATIVAN) 1 mg PO or IV and  ~ repeat CIWA-AR scale in 1 hour     For CIWA-Ar Score 13-15:  ~ give LORazepam (ATIVAN) 2 mg PO or IV and   ~ repeat CIWA-Ar scale in 1 hour    For CIWA-Ar Score GREATER THAN OR EQUAL TO 16:  ~ give LORazepam (ATIVAN) 2 mg PO or IV and  ~ repeat CIWA-Ar scale in 30 minutes    Doses should be withheld for nystagmus, sedation, ataxia, dysarthria or respiratory rate less than 12. If dose is being held more than once, provider must be notified.  For IV PUSH: Dilute with equal volume of NS. For ordered doses up to 4 mg give IV Push. Administer each 2mg over 1-5 minutes.                     0125 (2 mg)-Given       0613 (1 mg)-Given                     2235 (1 mg)-Given        0201 (1 mg)-Given                                                                                      1216-Med Discontinued          Dose: 5 mg Freq: EVERY 4 HOURS PRN Route: PO  PRN Reason: moderate to severe pain  Start: 11/14/17 1447   End: 11/15/17 0942        1503 (5 mg)-Given       1911 (5 mg)-Given        0012 (5 mg)-Given       0419 (5 mg)-Given       0942-Med Discontinued          Dose: 5 mg Freq: EVERY 6 HOURS PRN Route: PO  PRN Reason: moderate to severe pain  Start: 11/14/17 1030   End: 11/14/17 1447        1447-Med Discontinued           Dose: 5 mg Freq: EVERY 4 HOURS PRN Route: PO  PRN Reason: moderate to severe pain  Start: 11/12/17 1918   End: 11/14/17 1024      2230 (5 mg)-Given        0201 (5 mg)-Given       (0617)-Not Given [C]       1007 (5 mg)-Given       1605 (5 mg)-Given       2004 (5 mg)-Given        0018 (5 mg)-Given       0424 (5 mg)-Given       1005 (5 mg)-Given       1024-Med Discontinued      Medications 11/10/17 11/11/17 11/12/17 11/13/17 11/14/17 11/15/17 11/16/17

## 2017-11-11 NOTE — IP AVS SNAPSHOT
MRN:[id number]                      After Visit Summary   11/11/2017    Edi Villafana    MRN: [id number]           Thank you!     Thank you for choosing [facility] for your care. Our goal is always to provide you with excellent care. Hearing back from our patients is one way we can continue to improve our services. Please take a few minutes to complete the written survey that you may receive in the mail after you visit. If you would like to speak to someone directly about your visit please contact Patient Relations at [phone]. Thank you!          Patient Information     Date Of Birth          1945        Designated Caregiver       Most Recent Value    Caregiver    Will someone help with your care after discharge? no      About your hospital stay     You were admitted on:  November 11, 2017 You last received care in the:  [place] [address] [facility]    You were discharged on:  November 16, 2017       Who to Call     For medical emergencies, please call 911.  For non-urgent questions about your medical care, please call your primary care provider or clinic, [phone]          Attending Provider     Provider Specialty    Nayely Short MD Emergency Medicine    Edin Kang,  Internal Medicine       Primary Care Provider Office Phone # Fax #    Enrique Lucia [phone]      After Care Instructions     Activity - Up ad yecenia           Advance Diet as Tolerated       Follow this diet upon discharge: low fat diet            General info for SNF       Length of Stay Estimate: Short Term Care: Estimated # of Days <30  Condition at Discharge: Improving  Level of care:skilled   Rehabilitation Potential: Good  Admission H&P remains valid and up-to-date: Yes  Recent Chemotherapy: N/A  Use Nursing Home Standing Orders: Yes            Mantoux instructions       Give two-step Mantoux (PPD) Per Facility Policy Yes                  Follow-up  Appointments     Follow Up and recommended labs and tests       Follow up with primary MD 1 week after leaving rehab center to discuss anxiety treatment options  Repeat your TSH (thyroid level) at your next visit with your primary MD.                  Additional Services     Occupational Therapy Adult Consult       Evaluate and treat as clinically indicated.    Reason:  weakness            Physical Therapy Adult Consult       Evaluate and treat as clinically indicated.    Reason:  weakness                  Pending Results     No orders found from 11/9/2017 to 11/12/2017.            Statement of Approval     Ordered          11/16/17 1044  I have reviewed and agree with all the recommendations and orders detailed in this document.  EFFECTIVE NOW     Approved and electronically signed by:  Emma Hazel MD             Admission Information     Date & Time Provider Department Dept. Phone    11/11/2017 Edin Kang, DO [place] [address] [facility] [phone]      Your Vitals Were     Blood Pressure Pulse Temperature Respirations Weight Pulse Oximetry    138/86 (BP Location: Left arm) 62 97.6  F (36.4  C) (Oral) 16 95.3 kg (210 lb) 92%    BMI (Body Mass Index)                   26.25 kg/m2           Data Market[employer] Information     [employer] gives you secure access to your electronic health record. If you see a primary care provider, you can also send messages to your care team and make appointments. If you have questions, please call your primary care clinic.  If you do not have a primary care provider, please call [phone] and they will assist you.        Care EveryWhere ID     This is your Care EveryWhere ID. This could be used by other organizations to access your [place] medical records  [id number]        Equal Access to Services     CONSUELO ANDERSON : Stuart Ariza, maureen acosta, abebe fan. [facility] [phone].    ATENCIÓN: Si  erica boswell, tiene a kidd disposición servicios gratuitos de asistencia lingüística. Agus isaac [phone].    We comply with applicable federal civil rights laws and [place] laws. We do not discriminate on the basis of race, color, national origin, age, disability, sex, sexual orientation, or gender identity.               Review of your medicines      CONTINUE these medicines which may have CHANGED, or have new prescriptions. If we are uncertain of the size of tablets/capsules you have at home, strength may be listed as something that might have changed.        Dose / Directions    aspirin 325 MG tablet   This may have changed:  how much to take   Used for:  Hypertension goal BP (blood pressure) < 140/90        Dose:  325 mg   Take 1 tablet (325 mg) by mouth daily   Quantity:  120 tablet   Refills:  0         CONTINUE these medicines which have NOT CHANGED        Dose / Directions    atorvastatin 40 MG tablet   Commonly known as:  LIPITOR   Used for:  Hyperlipidemia LDL goal <100        TAKE 1 TABLET (40 MG) BY MOUTH DAILY   Quantity:  90 tablet   Refills:  1       levothyroxine 125 MCG tablet   Commonly known as:  SYNTHROID/LEVOTHROID   Used for:  Hypothyroidism, unspecified type        Dose:  125 mcg   Take 1 tablet (125 mcg) by mouth daily   Quantity:  90 tablet   Refills:  3       LORazepam 2 MG tablet   Commonly known as:  ATIVAN   Used for:  Anxiety        TAKE 1 TABLET BY MOUTH EVERY 8 HOURS AS NEEDED FOR ANXIETY.   Quantity:  270 tablet   Refills:  1       losartan 50 MG tablet   Commonly known as:  COZAAR   Used for:  Essential hypertension with goal blood pressure less than 140/90        Dose:  25 mg   Take 0.5 tablets (25 mg) by mouth daily   Quantity:  90 tablet   Refills:  1       MULTIVITAMIN PO        Dose:  1 tablet   Take 1 tablet by mouth daily   Refills:  0       vitamin D3 2000 UNITS Caps        Dose:  1 capsule   Take 1 capsule by mouth daily   Refills:  0       zolpidem 10 MG tablet    Commonly known as:  AMBIEN   Used for:  Persistent disorder of initiating or maintaining sleep        TAKE 1 TAB BY MOUTH NIGHTLY AS NEEDED FOR SLEEP   Quantity:  90 tablet   Refills:  1            Where to get your medicines      Some of these will need a paper prescription and others can be bought over the counter. Ask your nurse if you have questions.     You don't need a prescription for these medications     aspirin 325 MG tablet                Protect others around you: Learn how to safely use, store and throw away your medicines at www.disposemymeds.org.             Medication List: This is a list of all your medications and when to take them. Check marks below indicate your daily home schedule. Keep this list as a reference.      Medications           Morning Afternoon Evening Bedtime As Needed    aspirin 325 MG tablet   Take 1 tablet (325 mg) by mouth daily            Next dose 11/17/2017 am                       atorvastatin 40 MG tablet   Commonly known as:  LIPITOR   TAKE 1 TABLET (40 MG) BY MOUTH DAILY                        Next dose 11/16/2017 bedtime           levothyroxine 125 MCG tablet   Commonly known as:  SYNTHROID/LEVOTHROID   Take 1 tablet (125 mcg) by mouth daily   Last time this was given:  125 mcg on 11/16/2017  8:19 AM            Next dose 11/17/2017 am                       LORazepam 2 MG tablet   Commonly known as:  ATIVAN   TAKE 1 TABLET BY MOUTH EVERY 8 HOURS AS NEEDED FOR ANXIETY.   Last time this was given:  1 mg on 11/16/2017 10:24 AM                            Pt had 1mg at 10:15 PO on 11/16/2017       losartan 50 MG tablet   Commonly known as:  COZAAR   Take 0.5 tablets (25 mg) by mouth daily   Last time this was given:  25 mg on 11/16/2017  8:19 AM            Next dose 11/17/2017 am                       MULTIVITAMIN PO   Take 1 tablet by mouth daily            Next dose 11/17/2017 am                       vitamin D3 2000 UNITS Caps   Take 1 capsule by mouth daily             Next dose 11/17/2017 am                       zolpidem 10 MG tablet   Commonly known as:  AMBIEN   TAKE 1 TAB BY MOUTH NIGHTLY AS NEEDED FOR SLEEP                            As needed for sleep                 More Information        Discharge Instructions for Acute Pancreatitis  You have been diagnosed with acute pancreatitis. Your pancreas is inflamed or swollen. The pancreas is an organ that makes digestive juices and hormones. Gallstones are a common cause of pancreatitis. These hard stones form in the gallbladder. The gallbladder shares a tube with the pancreas into the small intestine. If gallstones block this tube, fluid can t leave the pancreas. The fluid backs up and causes redness and swelling (inflammation). There are other causes of pancreatitis. Make sure you understand the cause of your pancreatitis. Then you can try to stop it from happening again.  Immediate home care    Find someone to drive you to appointments. Acute pancreatitis is a serious condition, and you should never drive if you are experiencing symptoms.    Stop drinking if your illness was caused by alcohol.    Ask your healthcare provider about alcohol abuse programs and support groups such as Alcoholics Anonymous.    Ask your provider about prescription medicines that can help you stop drinking.    Tell your provider about the alcohol withdrawal symptoms you have when you stop drinking. This is very important. You may need close medical supervision and special medicines when you stop drinking. This will depend on your alcohol withdrawal history.     Take your medicines exactly as directed. Don t skip doses.    Eat a low-fat diet. Ask your provider for menus and other diet information.    Learn to take your own pulse. Keep a record of your results. Ask your provider which readings mean that you need medical attention.  Ongoing care    Tell your provider about any medicines you are taking. Some medicines can cause this  condition.    Before starting any new medicine, ask your provider if it will harm your pancreas. This includes any new over-the-counter medicines, vitamins, or herbal supplements.      Tell your provider if you lose weight without dieting.    Be aware of symptoms that may mean your pancreatitis has come back. These symptoms include belly pain, nausea and vomiting, and fever.    Keep all follow-up appointments with your provider. Problems can often show up later.  Follow-up  Follow up with your healthcare provider, or as advised.  When to call your provider  Call your healthcare provider right away if you have any of the following:    Fever of 100.4 F (38.0 C) or higher, or as advised by your provider    Severe pain from your upper belly to your back    Nausea and vomiting    Feely dizzy or lightheaded    Yellowing of your skin or eyes (jaundice)    Bruises on your belly or back    Belly swelling and tenderness    Rapid pulse    Shallow, fast breathing   Date Last Reviewed: 8/1/2016 2000-2017 [employer]. [address] [place] [address]. All rights reserved. This information is not intended as a substitute for professional medical care. Always follow your healthcare professional's instructions.                Alcohol Abuse  Alcoholic drinks are harmful when you have too many of them. There is no set number of drinks that defines too much. Drinking that disrupts your life or your health is called alcohol abuse. Alcohol abuse can hurt your relationships with others. You may lose friends, a spouse, or even your job. You may be abusing alcohol if any of the following are true for you:    Duties at home or with  suffer because of drinking.    Duties at work or in school suffer because of drinking.    You have missed work or school because of drinking.    You use alcohol while driving or operating machinery.    You have legal problems such as arrests due to drinking.    You keep  "drinking even though it causes serious problems in your life.  Health effects  Alcohol abuse causes health problems. Sometimes this can happen after only drinking a  little.\" There is no set number of drinks or amount of alcohol that defines too much. The more you drink at one time, and the more often you drink determine both the short-term and long-term health effects. It affects all parts of your body and your health, including your:    Brain. Alcohol is a central nervous system depressant. It can damage parts of the brain that affect your balance, memory, thinking, and emotions. It can cause memory loss, blackouts, depression, agitation, sleep cycle changes, and seizures. These changes may or may not be reversible.    Heart and vascular system. Alcohol affects multiple areas. It can damage heart muscle causing cardiomyopathy, which is a weakening and stretching of the heart muscle. This can lead to trouble breathing, an irregular heartbeat, atrial fibrillation, leg swelling, and heart failure. Alcohol use makes the blood vessels stiffen causing high blood pressure. All of these problems increase your risk of having heart attacks or strokes.    Liver. Alcohol causes fat to build up in the liver, affecting its normal function. This increases the risk for hepatitis, leading to abdominal pain, appetite loss, jaundice, bleeding problems, liver fibrosis, and cirrhosis. This, in turn, can affect your ability to fight off infections, and can cause diabetes. The liver changes prevent it from removing toxins in your blood that can cause encephalopathy, which may show with confusion, altered level of consciousness, personality changes, memory loss, seizures, coma, and death.    Pancreas. Alcohol can cause inflammation of the pancreas, or pancreatitis. This can cause abdominal pain, fever, and diabetes.    Immune system. Alcohol weakens your immune system in a number of ways. It suppresses your immune system making it harder " to fight infections and colds. It also increases the chance of getting pneumonia and tuberculosis.    Cancer. Alcohol is a risk factor for developing cancer of the mouth, esophagus, pharynx, larynx, liver, and breast.    Sexual function. Alcohol can lead to sexual problems.  Home care  The following guidelines will help you deal with alcohol abuse:    Admit you have a problem with alcohol.    Ask for help from your healthcare provider and trusted family members or close friends.    Get help from people trained in dealing with alcohol abuse. This may be individual counseling or group therapy, or it may be a supervised alcohol treatment program.    Join a self-help group for alcohol abuse such as Alcoholics Anonymous (AA).    Avoid people who abuse alcohol or tempt you to drink.  Follow-up care  Follow up as advised by the healthcare provider, or as advised. Contact these groups to get help:    Alcoholics Anonymous (AA): Go to www.aa.org or check the phone book for meetings near you.    National Alcohol and Substance Abuse Information Center (NASAIC): 918.306.8729 www.addictioncare[place]ake.[employer]    National [place] on Alcoholism and Drug Dependence (NCADD): 981-YHL-PRWM (757-8592) www.ncadd.org  Call 911  Call 911 if any of these occur:    Trouble breathing or slow irregular breathing    Chest pain    Sudden weakness on one side of your body or sudden trouble speaking    Heavy bleeding or vomiting blood    Very drowsy or trouble awakening    Fainting or loss of consciousness    Rapid heart rate    Seizure  When to seek medical care  Call your healthcare provider right away if any of these occur:     Confusion    Hallucinations (seeing, hearing, or feeling things that aren t there)    Pain in your upper abdomen that gets worse    Repeated vomiting or black or tarry stools    Severe shakiness  Date Last Reviewed: 6/1/2016 2000-2017 [employer]. [address] [place] [address], [place] [address]. All rights  reserved. This information is not intended as a substitute for professional medical care. Always follow your healthcare professional's instructions.

## 2017-11-11 NOTE — ED PROVIDER NOTES
"  History     Chief Complaint:  Generalized Weakness      HPI   Edi Villafana is a 72 year old male who presents to the emergency department today for evaluation of generalized weakness. The patient reports a month of generalized weakness and feeling sick from \"head to toe\". He reports having coughing spells over the past 6 months that occur around every 2 weeks. He states that he feels he can no longer take care of himself and wants to feel safe as he no longer feels comfortable at home due to his difficulties balancing and taking care of himself. The patient reports that he does not have any appetite and had one episode of nausea and vomiting a few weeks ago. He denies any urinary or bowel symptoms. The patient reports that he drinks 2 servings of vodka daily and denies having any symptoms of withdrawal in the past. He reports that he has been tremulous this week, which is new for him. The patient denies any fevers, specific pain, abdominal pain, chest pain, headaches, leg swelling, dizziness or recent loss of consciousness. Of note, the patient last saw his primary care provider in June of 2017. He was seen at the [facility] last winter due to a fall and loss of consciousness and had no issues with his heart at that time. He had an MRI at the time and had no signs of stroke. This was attributed to his hypertension and he has not had any issues with this recently. The patient's friend reports that he is concerned that the patient has been depressed and stated that he does not want to continue living at one point. The friend believes that the patient drinks more than twice daily and the patient asked the friend to buy him alcohol recently, which has never happened in the past. The friend purchased the patient one bottle of vodka 3 days ago and the bottle is now empty. The friend also reports that the patient's girlfriend recently left him, which may be contributing to his symptoms.     Allergies:  Ace " Inhibitors  Ancef [Cefazolin Sodium]  Atenolol  Compazine  Sulfa Drugs  Tramadol     Medications:    LORazepam (ATIVAN) 2 MG tablet  atorvastatin (LIPITOR) 40 MG tablet  zolpidem (AMBIEN) 10 MG tablet  levothyroxine (SYNTHROID/LEVOTHROID) 125 MCG tablet  losartan (COZAAR) 50 MG tablet  aspirin 325 MG tablet  albuterol (PROAIR HFA/PROVENTIL HFA/VENTOLIN HFA) 108 (90 BASE) MCG/ACT Inhaler    Past Medical History:    Decreased libido   Diverticulosis of colon (without mention of hemorrhage)   Generalized anxiety disorder   Insomnia, unspecified   Other and unspecified hyperlipidemia   Other specified congenital anomaly of kidney   Other testicular hypofunction   Subjective visual disturbance, unspecified   Unspecified cataract   Unspecified essential hypertension   Unspecified hypothyroidism     Past Surgical History:    Abdomen surgery  Appendectomy  Bilateral cataract  Eye surgery    Family History:    Hypertension  Lipids  Heart disease  Severe depression  Ovarian cancer    Social History:  The patient was accompanied to the ED by his friend.  Smoking Status: Never Smoker  Smokeless Tobacco: Never Used  Alcohol Use: Yes   Marital Status:       Review of Systems   Constitutional: Positive for appetite change. Negative for fever.   Respiratory: Positive for cough.    Cardiovascular: Negative for chest pain and leg swelling.   Gastrointestinal: Negative for abdominal pain.   Musculoskeletal: Positive for gait problem. Negative for myalgias.   Neurological: Positive for tremors and weakness. Negative for dizziness and headaches.   All other systems reviewed and are negative.    Physical Exam   Patient Vitals for the past 24 hrs:   BP Temp Temp src Pulse Heart Rate Resp SpO2 Weight   11/11/17 1851 148/82 - - - - - - -   11/11/17 1847 149/76 96.7  F (35.9  C) Oral - 83 16 98 % -   11/11/17 1800 133/85 - - - - - 96 % -   11/11/17 1745 (!) 133/105 - - - 82 - 95 % -   11/11/17 1715 138/80 - - - 95 - 96 % -   11/11/17  1700 134/84 - - - - - 97 % -   11/11/17 1645 132/84 - - - - - 96 % -   11/11/17 1630 132/85 - - - 77 - 98 % -   11/11/17 1615 121/70 - - - 81 - 96 % -   11/11/17 1600 121/77 - - - - - 95 % -   11/11/17 1545 121/75 - - - - - 96 % -   11/11/17 1458 141/80 97.8  F (36.6  C) - - - - - -   11/11/17 1451 - - Oral 93 - 15 95 % 95.3 kg (210 lb)      The patient's orthostatic vitals were monitored here in the emergency department and found to be 121/77 with a pulse of 80 while lying, 117/69 with a pulse of 98 while sitting, and 93/66 with a pulse of 97 while standing.     Physical Exam  General: Patient is alert and interactive when I enter the room  Head:  The scalp, face, and head appear normal  Eyes:  Conjunctivae are normal  ENT:    The nose is normal    Pinnae are normal    External acoustic canals are normal  Neck:  Trachea midline  CV:  Pulses are normal, RRR.    Resp:  No respiratory distress, CTAB   Abdomen:      Soft, non-tender, non-distended  Musc:  Normal muscular tone    No major joint effusions    No asymmetric leg swelling    Good capillary refill noted  Skin:  No rash or lesions noted  Neuro:  Speech is normal and fluent. Face is symmetric.     Moving all extremities well. Tremulous. Tongue fasciculations.  Psych: Awake. Alert. Flat affect. Appropriate interactions.    Emergency Department Course     ECG:  Indication: Generalized Weakness  Completed at 1519.  Read at 1521.   Normal sinus rhythm  Left anterior fascicular block  Inferior infarct, age undetermined  Abnormal ECG  Rate 81 bpm. GA interval 170. QRS duration 108. QT/QTc 404/469. P-R-T axes 31 -50 11.     Imaging:  Radiology findings were communicated with the patient who voiced understanding of the findings.    Abdomen US, complete   IMPRESSION:    1. Fatty liver.  2. No acute abnormality identified. No gallstones. Extrahepatic bile  ducts not visualized in this exam.  Report per radiology      Laboratory:  Laboratory findings were communicated  with the patient who voiced understanding of the findings.    Glucose by meter: 139 (H)   CBC: AWNL. (WBC 8.4, HGB 17.0, )    BMP:  (L), Chloride 89 (L), Glucose 141 (H), GFR 60 (L) o/w WNL (Creatinine 1.19)  Magnesium: 1.7   Troponin (Collected 1452): <0.015   Hepatic Panel: Bilirubin direct 0.7 (H), Bilirubin total 1.8 (H), Albumin 4.2, Protein Total 7.9, Alkphos 107, ALT 11 (H),  (H)  Alcohol ethyl: <0.01   Lipase: 2477 (H)     Interventions:  1539: NS Bolus 1,000mL IV  1539: Ativan 0.5mg PO     Emergency Department Course:  Nursing notes and vitals reviewed.  1504: I performed an exam of the patient as documented above.   EKG obtained in the ED, see results above.    The patient was sent for a Abdomen US, complete while in the emergency department, results above.    IV was inserted and blood was drawn for laboratory testing, results above.   The patient provided a urine sample here in the emergency department. This was sent for laboratory testing, findings above.   1632: Patient rechecked and updated.    1657: I spoke with Dr. Galvan of the hospitliast service regarding patient's presentation, findings, and plan of care.   Findings and plan explained to the Patient who consents to admission. Discussed the patient with Dr. Galvan, who will admit the patient to a Cardiac Telemetry bed for further monitoring, evaluation, and treatment.   I personally reviewed the laboratory and imaging results with the patient and answered all related questions prior to admit.     Impression & Plan      Medical Decision Making:  Edi Villafana is a 72 year old male with a past medical history of hypertension who presents with generalized weaknes, fatigue and decreased PO. Vitas revealed mild hypertension but were otherwise unremarkable. Exam revealed a tremulous appearing male with dry mucous membranes but no pain on examination. Differential for him included alcohol withdrawal, infection, pancreatitis,  cholelialithis, ACS, depression, among others. I suspect a component of his symptoms is alcohol withdrawal. His friend endorses that he has been drinking more than he lets on as he recently gave him a bottle of vodka and he finished it within 48 to 72 hours. I did get him 0.5 mg of Ativan PO as he has no vomiting. Electrolytes were for the most part normal with mild hyponatremia at 125, which I suspect is hyponatremia. I did give him 1 liter of IV fluids. His lipase was quite elevated a 2500 and his bilirubin was 1.8 with elevation of his AST, which I suspect is alcoholic hepatitis, however he could have gallstones or pancreatitis, so I ordered ultrasound, which the hospitalist will follow up with. He has no pain on examination, so I think it is much less likely to be his gallbladder and more likely to be alcohol pancreatitis. Patient was comfortable being admitted. He has no active suicidal thoughts, so I do not think he needs to be on a hold. Patient will be admitted to Cardiac Tele for alcohol withdrawal and pancreatitis. Patient admitted.     Diagnosis:    ICD-10-CM    1. Alcohol withdrawal syndrome with complication (H) F10.239        Disposition:  Admitted to Cardiac Telemetry  Scribe Disclosure:  I, Priti eTrell, am serving as a scribe at 3:15 PM on 11/11/2017 to document services personally performed by Nayely Short MD based on my observations and the provider's statements to me.  11/11/2017   [facility] EMERGENCY DEPARTMENT       Nayely Short MD  11/11/17 2572

## 2017-11-11 NOTE — IP AVS SNAPSHOT
"[place] [address] MEDICAL SURGICAL: [phone]                                              INTERAGENCY TRANSFER FORM - PHYSICIAN ORDERS   2017                    Hospital Admission Date: 2017  MAURICE NORRIS   : 1945  Sex: Male        Attending Provider: Edin Kang DO     Allergies:  Ace Inhibitors, Ancef [Cefazolin Sodium], Atenolol, Compazine, Sulfa Drugs, Tramadol    Infection:  None   Service:  GENERAL MEDI    Ht:  1.905 m (6' 3\")   Wt:  95.3 kg (210 lb)   Admission Wt:  95.3 kg (210 lb)    BMI:  26.25 kg/m 2   BSA:  2.25 m 2            Patient PCP Information     Provider PCP Type    Enrique Lucia MD General      ED Clinical Impression     Diagnosis Description Comment Added By Time Added    Alcohol withdrawal syndrome with complication (H) [F10.239] Alcohol withdrawal syndrome with complication (H) [F10.239]  Nayely Short MD 2017 11:16 PM      Hospital Problems as of 2017              Priority Class Noted POA    Pancreatitis Medium  2017 Yes      Non-Hospital Problems as of 2017              Priority Class Noted    Hypothyroidism Medium  10/17/2012    Hyperlipidemia LDL goal <100 Medium  10/17/2012    Persistent insomnia Medium  10/17/2012    Hypertension goal BP (blood pressure) < 140/90 Medium  10/17/2012    Anxiety Medium  10/17/2012    Hypotestosteronism Medium  10/23/2013    Trochanteric bursitis Medium  2013    Depression with anxiety Medium  2015    Left shoulder pain Medium  2015    Screening for prostate cancer Medium  2016      Code Status History     Date Active Date Inactive Code Status Order ID Comments User Context    2017  1:28 AM  Full Code [id number]  Charles Rodarte MD Outpatient    2017  7:18 PM 2017  1:28 AM Full Code [id number]  Edin Kang DO Inpatient    2015 11:11 AM 2017  7:18 PM Full Code [id number]  Enrique Lucia MD Outpatient       "   Medication Review      CONTINUE these medications which may have CHANGED, or have new prescriptions. If we are uncertain of the size of tablets/capsules you have at home, strength may be listed as something that might have changed.        Dose / Directions Comments    aspirin 325 MG tablet   This may have changed:  how much to take   Used for:  Hypertension goal BP (blood pressure) < 140/90        Dose:  325 mg   Take 1 tablet (325 mg) by mouth daily   Quantity:  120 tablet   Refills:  0          CONTINUE these medications which have NOT CHANGED        Dose / Directions Comments    atorvastatin 40 MG tablet   Commonly known as:  LIPITOR   Used for:  Hyperlipidemia LDL goal <100        TAKE 1 TABLET (40 MG) BY MOUTH DAILY   Quantity:  90 tablet   Refills:  1        levothyroxine 125 MCG tablet   Commonly known as:  SYNTHROID/LEVOTHROID   Used for:  Hypothyroidism, unspecified type        Dose:  125 mcg   Take 1 tablet (125 mcg) by mouth daily   Quantity:  90 tablet   Refills:  3        LORazepam 2 MG tablet   Commonly known as:  ATIVAN   Used for:  Anxiety        TAKE 1 TABLET BY MOUTH EVERY 8 HOURS AS NEEDED FOR ANXIETY.   Quantity:  270 tablet   Refills:  1        losartan 50 MG tablet   Commonly known as:  COZAAR   Used for:  Essential hypertension with goal blood pressure less than 140/90        Dose:  25 mg   Take 0.5 tablets (25 mg) by mouth daily   Quantity:  90 tablet   Refills:  1        MULTIVITAMIN PO        Dose:  1 tablet   Take 1 tablet by mouth daily   Refills:  0        vitamin D3 2000 UNITS Caps        Dose:  1 capsule   Take 1 capsule by mouth daily   Refills:  0        zolpidem 10 MG tablet   Commonly known as:  AMBIEN   Used for:  Persistent disorder of initiating or maintaining sleep        TAKE 1 TAB BY MOUTH NIGHTLY AS NEEDED FOR SLEEP   Quantity:  90 tablet   Refills:  1    This request is for a new prescription for a controlled substance as required by Federal/State law..                After Care     Activity - Up ad yecenia           Advance Diet as Tolerated       Follow this diet upon discharge: low fat diet       General info for SNF       Length of Stay Estimate: Short Term Care: Estimated # of Days <30  Condition at Discharge: Improving  Level of care:skilled   Rehabilitation Potential: Good  Admission H&P remains valid and up-to-date: Yes  Recent Chemotherapy: N/A  Use Nursing Home Standing Orders: Yes       Mantoux instructions       Give two-step Mantoux (PPD) Per Facility Policy Yes             Referrals     Occupational Therapy Adult Consult       Evaluate and treat as clinically indicated.    Reason:  weakness       Physical Therapy Adult Consult       Evaluate and treat as clinically indicated.    Reason:  weakness             Follow-Up Appointment Instructions     Future Labs/Procedures    Follow Up and recommended labs and tests     Comments:    Follow up with primary MD 1 week after leaving rehab center to discuss anxiety treatment options  Repeat your TSH (thyroid level) at your next visit with your primary MD.      Follow-Up Appointment Instructions     Follow Up and recommended labs and tests       Follow up with primary MD 1 week after leaving rehab center to discuss anxiety treatment options  Repeat your TSH (thyroid level) at your next visit with your primary MD.             Statement of Approval     Ordered          11/16/17 1044  I have reviewed and agree with all the recommendations and orders detailed in this document.  EFFECTIVE NOW     Approved and electronically signed by:  Emma Hazel MD

## 2017-11-11 NOTE — IP AVS SNAPSHOT
"` `           [place] [address] MEDICAL SURGICAL: [phone]                                              INTERAGENCY TRANSFER FORM - NURSING   2017                    Hospital Admission Date: 2017  MAURICE NORRIS   : 1945  Sex: Male        Attending Provider: Edin Kang DO     Allergies:  Ace Inhibitors, Ancef [Cefazolin Sodium], Atenolol, Compazine, Sulfa Drugs, Tramadol    Infection:  None   Service:  GENERAL MEDI    Ht:  1.905 m (6' 3\")   Wt:  95.3 kg (210 lb)   Admission Wt:  95.3 kg (210 lb)    BMI:  26.25 kg/m 2   BSA:  2.25 m 2            Patient PCP Information     Provider PCP Type    Enrique Lucia MD General      Current Code Status     Date Active Code Status Order ID Comments User Context       Prior      Code Status History     Date Active Date Inactive Code Status Order ID Comments User Context    2017  1:28 AM  Full Code [id number]  Charles Rodarte MD Outpatient    2017  7:18 PM 2017  1:28 AM Full Code [id number]  Edin Kang DO Inpatient    2015 11:11 AM 2017  7:18 PM Full Code [id number]  Enrique Lucia MD Outpatient      Advance Directives        Does patient have a scanned Advance Directive/ACP document in EPIC?           No        Hospital Problems as of 2017              Priority Class Noted POA    Pancreatitis Medium  2017 Yes      Non-Hospital Problems as of 2017              Priority Class Noted    Hypothyroidism Medium  10/17/2012    Hyperlipidemia LDL goal <100 Medium  10/17/2012    Persistent insomnia Medium  10/17/2012    Hypertension goal BP (blood pressure) < 140/90 Medium  10/17/2012    Anxiety Medium  10/17/2012    Hypotestosteronism Medium  10/23/2013    Trochanteric bursitis Medium  2013    Depression with anxiety Medium  2015    Left shoulder pain Medium  2015    Screening for prostate cancer Medium  2016      Immunizations     Name Date      HEPA " 01/01/93     HepB 01/01/93     Influenza (H1N1) 12/30/09     Influenza (IIV3) 09/18/15     Influenza (IIV3) 09/01/14     Influenza (IIV3) 09/09/13     Influenza (IIV3) 09/04/12     Influenza (IIV3) 09/08/10     Influenza Vaccine IM 3yrs+ 4 Valent IIV4 10/20/16     Pneumococcal (PCV 13) 12/12/15     Pneumococcal 23 valent 01/05/11     Pneumococcal 23 valent 01/01/92     TD (ADULT, 7+) 01/01/04     TDAP Vaccine (Adacel) 05/13/14     Zoster vaccine, live 02/11/10          END      ASSESSMENT     Discharge Profile Flowsheet     EXPECTED DISCHARGE     Patient's communication style  spoken language (English or Bilingual) 11/11/17 2016    Expected Discharge Date  -- (home/alone) 11/12/17 0818   FINAL RESOURCES      DISCHARGE NEEDS ASSESSMENT     Resources List  Transitional Care 11/13/17 1422    Patient/family verbalizes understanding of discharge plan recommendations?  Yes 11/13/17 1422   PAS Number  [id number] 11/16/17 1054    Medical Team notified of plan?  yes 11/13/17 1422   SKIN      Anticipated Changes Related to Illness  inability to care for self 11/13/17 1422   Inspection of bony prominences  Full 11/16/17 1057    Equipment Currently Used at Home  none 11/13/17 1029   Inspection under devices  Full 11/16/17 1057    Transportation Available  family or friend will provide 11/13/17 1422   Skin WDL  ex 11/16/17 1057    # of Referrals Placed by CTS  Post Acute Facilities 11/13/17 1422   Skin Integrity  scab(s);scar(s) 11/16/17 1057    GASTROINTESTINAL (ADULT,PEDIATRIC,OB)     Full except areas not inspected   Buttock, left;Buttock, right;Sacrum;Coccyx 11/14/17 0804    GI WDL  WDL 11/16/17 1057   SAFETY      Last Bowel Movement  11/13/17 11/16/17 1057   Safety WDL  WDL 11/16/17 1057    Passing flatus  yes 11/16/17 1057   All Alarms  alarm(s) activated and audible 11/16/17 1057    COMMUNICATION ASSESSMENT                        Assessment WDL (Within Defined Limits) Definitions           Safety WDL     Effective:  "09/28/15    Row Information: <b>WDL Definition:</b> Bed in low position, wheels locked; call light in reach; upper side rails up x 2; ID band on<br> <font color=\"gray\"><i>Item=AS safety wdl>>List=AS safety wdl>>Version=F14</i></font>      Skin WDL     Effective: 09/28/15    Row Information: <b>WDL Definition:</b> Warm; dry; intact; elastic; without discoloration; pressure points without redness<br> <font color=\"gray\"><i>Item=AS skin wdl>>List=AS skin wdl>>Version=F14</i></font>      Vitals     Vital Signs Flowsheet     VITAL SIGNS     Response to Interventions  Decrease in pain 11/15/17 2243    Temp  97.6  F (36.4  C) 11/16/17 0818   ANALGESIA SIDE EFFECTS MONITORING      Temp src  Oral 11/16/17 0818   Side Effects Monitoring: Respiratory Quality  R 11/16/17 0818    Resp  16 11/16/17 0818   Side Effects Monitoring: Respiratory Depth  N 11/16/17 0818    Pulse  62 11/14/17 1115   Side Effects Monitoring: Sedation Level  1 11/15/17 2243    Heart Rate  68 11/16/17 0818   HEIGHT AND WEIGHT      Pulse/Heart Rate Source  Monitor 11/16/17 0818   Weight  95.3 kg (210 lb) 11/11/17 1452    BP  138/86 11/16/17 0818   LYING ORTHOSTATIC BP      BP Location  Left arm 11/16/17 0818   Lying Orthostatic BP  121/77 11/11/17 1607    OXYGEN THERAPY     Lying Orthostatic Pulse  80 bpm 11/11/17 1607    SpO2  92 % 11/16/17 0818   SITTING ORTHOSTATIC BP      O2 Device  None (Room air) 11/16/17 0818   Sitting Orthostatic BP  117/69 11/11/17 1607    PAIN/COMFORT     Sitting Orthostatic Pulse  98 bpm 11/11/17 1607    Patient Currently in Pain  yes 11/16/17 0818   STANDING ORTHOSTATIC BP      Preferred Pain Scale  number (Numeric Rating Pain Scale) 11/16/17 0818   Standing Orthostatic BP  93/66 11/11/17 1607    Patient's Stated Pain Goal  No pain 11/16/17 0818   Standing Orthostatic Pulse  97 bpm 11/11/17 1607    0-10 Pain Scale  6 11/16/17 0241   POSITIONING      Word Pain Scale  0 11/15/17 0511   Body Position  independently positioning " 11/16/17 1057    Pain Location  Abdomen 11/16/17 0818   Head of Bed (HOB)  HOB lowered 11/15/17 1005    Pain Orientation  Other (Comment) (diffuse) 11/16/17 0818   DAILY CARE      Pain Descriptors  Aching 11/16/17 0818   Activity Management  activity adjusted per tolerance;activity encouraged;ambulated in room;ambulated to bathroom 11/16/17 1057    Pain Intervention(s)  Other (Comment) (pt does not have pain meds  ordered, d/cd by MD this am) 11/16/17 0818   Activity Assistance Provided  assistance, 1 person 11/16/17 1057            Patient Lines/Drains/Airways Status    Active LINES/DRAINS/AIRWAYS     Name: Placement date: Placement time: Site: Days: Last dressing change:    Peripheral IV 11/11/17 Right Lower forearm 11/11/17   1700   Lower forearm   4             Patient Lines/Drains/Airways Status    Active PICC/CVC     None            Intake/Output Detail Report     Date Intake     Output Net    Shift P.O. I.V. IV Piggyback Total Urine Total       Noc 11/14/17 2300 - 11/15/17 0659 -- -- -- -- -- -- 0    Day 11/15/17 0700 - 11/15/17 1459 1400 -- -- 1400 -- -- 1400    Priscilla 11/15/17 1500 - 11/15/17 2259 300 -- -- 300 -- -- 300    Noc 11/15/17 2300 - 11/16/17 0659 -- -- -- -- -- -- 0    Day 11/16/17 0700 - 11/16/17 1459 -- -- -- -- -- -- 0      Last Void/BM       Most Recent Value    Urine Occurrence 1 at 11/14/2017 2151    Stool Occurrence       Case Management/Discharge Planning     Case Management/Discharge Planning Flowsheet     REFERRAL INFORMATION     Reaction To Health Status  accepting 11/13/17 1422    Did the Initial Social Work Assessment result in a Social Work Case?  Yes 11/13/17 1418   Understanding Of Condition And Treatment  distorted comprehension 11/13/17 1422    Admission Type  inpatient 11/13/17 1418   EXPECTED DISCHARGE      Arrived From  home or self-care 11/13/17 1418   Expected Discharge Date  -- (home/alone) 11/12/17 0818    Referral Source  physician 11/13/17 1418   DISCHARGE PLANNING       New Steerage to  Clinics?  No 11/13/17 1418   Patient/family verbalizes understanding of discharge plan recommendations?  Yes 11/13/17 1422    # of Referrals Placed by CTS  Post Acute Facilities 11/13/17 1422   Medical Team notified of plan?  yes 11/13/17 1422    Post Acute Facilities  TCU 11/13/17 1422   Anticipated Changes Related to Illness  inability to care for self 11/13/17 1422    Reason For Consult  discharge planning 11/13/17 1418   Transportation Available  family or friend will provide 11/13/17 1422    CTS Assigned to Case  Brenda Silva 11/13/17 1418   PATIENT PLACEMENT INFORMATION      Primary Care Clinic Name  [facility] 11/13/17 1422   Did the patient choose [facility]?  No 11/13/17 1422    Primary Care MD Name  Enrique Lucia 11/13/17 1422   Placement Choice Reason  location 11/13/17 1422    LIVING ENVIRONMENT     Facility 1 Name  [facility] 11/13/17 1422    Lives With  alone 11/13/17 1029   Facility 2 Name  [facility] 11/13/17 1422    Living Arrangements  house 11/13/17 1029   Facility 3 Name  [facility] 11/13/17 1422    Provides Primary Care For  no one 11/11/17 2016   FINAL RESOURCES      Quality Of Family Relationships  non-existent 11/13/17 1422   Equipment Currently Used at Home  none 11/13/17 1029    Able to Return to Prior Living Arrangements  yes 11/13/17 1422   Resources List  Transitional Care 11/13/17 1422    HOME SAFETY     PAS Number  [id number] 11/16/17 1054    Patient Feels Safe Living in Home?  yes 11/13/17 1422   ABUSE RISK SCREEN      ASSESSMENT OF FAMILY/SOCIAL SUPPORT     QUESTION TO PATIENT:  Has a member of your family or a partner(now or in the past) intimidated, hurt, manipulated, or controlled you in any way?  no 11/11/17 2016    Marital Status  Single 11/13/17 1422   QUESTION TO PATIENT: Do you feel safe going back to the place where you are living?  yes 11/11/17 2016    Description of Support System  Uninvolved 11/13/17 1422   OBSERVATION: Is there reason to believe there  has been maltreatment of a vulnerable adult (ie. Physical/Sexual/Emotional abuse, self neglect, lack of adequate food, shelter, medical care, or financial exploitation)?  no 11/11/17 2016    COPING/STRESS     (R) MENTAL HEALTH SUICIDE RISK      Major Change/Loss/Stressor  denies 11/11/17 2019   Are you depressed or being treated for depression?  No 11/11/17 2016    Patient Personal Strengths  expressive of needs;expressive of emotions 11/13/17 1422   HOMICIDE RISK      Sources Of Support  friend(s) 11/13/17 1422   Feels Like Hurting Others  no 11/11/17 2016

## 2017-11-11 NOTE — IP AVS SNAPSHOT
` `     [place] [address] MEDICAL SURGICAL: [phone]                 INTERAGENCY TRANSFER FORM - NOTES (H&P, Discharge Summary, Consults, Procedures, Therapies)   2017                    Hospital Admission Date: 2017  EDI NORRIS   : 1945  Sex: Male        Patient PCP Information     Provider PCP Type    Enirque Lucia MD General         History & Physicals      H&P by Edin Kang DO at 2017  7:37 PM     Author:  Edin Kang DO Service:  Hospitalist Author Type:  Physician    Filed:  2017  7:37 PM Date of Service:  2017  7:37 PM Creation Time:  2017  7:19 PM    Status:  Signed :  Edin Kang DO (Physician)         [facility]  Hospitalist Admission Note    Name: Edi Norris      MRN: [id number]  YOB: 1945    Age: 72 year old  Date of admission: 2017  Primary care provider: Enrique Lucia            Assessment and Plan:   Edi Norris is a 72 year old male with a history of HTN, Hyperlipidemia, hypothyroidism who presents with falls.    1. Acute pancreatitis.  Likely due to alcohol abuse.  NPO.  IV fluids.  Pain meds PRN.    2.  Alcohol withdrawals.  Appears to be in active withdrawals at this time.  Ativan withdrawal protocol.  IV fluids.  IV Vitamins.    3.  Alcohol abuse.  Chem dep consult.    4.  HTN.  Hold Cozaar while NPO.  IV Hydralazine PRN.    5.  Hyperlipidemia.  Lipitor.    6.  Depression.  He does not feel need to speak with Psych at this point.  Initially will be on Ativan for alcohol withdrawal protocol.    7.  Hyponatremia.  Suspect due to dehydration from poor fluid intact recently, and ongoing alcohol abuse.  Check TSH.  IV fluids.  Recheck Na this evening.      8.  Hypothyroidism.  Hold Synthroid while NPO.  Check TSH.    9.  Weakness.  PT consult.    Code status: Full  Admit to Inpatient  Prophylaxis: PCD's            Chief Complaint:   Weakness         History of  "Present Illness:   Edi Villafana is a 72 year old male who presents with weakness.  Has felt weak for the last month.  Has  no appetite.  Poor food and fluid intake.  Urinating less than usual.  Only mild occasional abdominal pain.  Mostly a \"quesy\" sensation fairly often.  Still able to drink \"a couple\" alcoholic beverages per day of distilled liquor.  Under a lot of stress recently.  Feels depressed.  No fevers or chills.  Nothing he has done has helped with abdominal pain or appetite.  Trying to eat makes symptoms worse.  No other complaints.          Past Medical History:[KR1.1]     Past Medical History:   Diagnosis Date     Decreased libido      Diverticulosis of colon (without mention of hemorrhage)      Generalized anxiety disorder      Insomnia, unspecified      Other and unspecified hyperlipidemia      Other specified congenital anomaly of kidney      Other testicular hypofunction      Subjective visual disturbance, unspecified      Unspecified cataract      Unspecified essential hypertension      Unspecified hypothyroidism[KR1.2]              Past Surgical History:[KR1.1]     Past Surgical History:   Procedure Laterality Date     ABDOMEN SURGERY  1973    large benign tumor removed with thrombophlebitis post op     APPENDECTOMY  1960     CATARACT IOL, RT/LT      bilateral     EYE SURGERY  [id number], 2007    eyelid surgery, 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 Auth Provider   LORazepam (ATIVAN) 2 MG tablet TAKE 1 TABLET BY MOUTH EVERY 8 HOURS AS NEEDED FOR ANXIETY. 11/11/2017 at 0800 Yes Enrique Lucia MD   atorvastatin (LIPITOR) 40 MG tablet TAKE 1 TABLET (40 MG) BY MOUTH DAILY 11/10/2017 at Unknown time Yes Enrique Lucia MD   zolpidem (AMBIEN) 10 MG tablet TAKE 1 TAB BY MOUTH NIGHTLY AS NEEDED FOR SLEEP 11/10/2017 at Unknown time Yes Enrique Lucia MD   levothyroxine (SYNTHROID/LEVOTHROID) 125 MCG tablet Take 1 tablet (125 mcg) by mouth daily 11/10/2017 at Unknown time Yes Enrique Lucia MD   losartan (COZAAR) 50 MG tablet Take 0.5 tablets (25 mg) by mouth daily Past Week at Unknown time Yes Enrique Lucia MD   aspirin 325 MG tablet Take 0.5 mg by mouth daily. Past Week at Unknown time Yes Reported, Patient   Cholecalciferol (VITAMIN D3) 2000 UNITS CAPS Take 1 capsule by mouth daily  Past Week at Unknown time Yes Reported, Patient   Multiple Vitamin (MULTIVITAMIN OR) Take 1 tablet by mouth daily  Past Month at Unknown time Yes Reported, Patient             Review of Systems:   A Comprehensive greater than 10 system review of systems was carried out.  Pertinent positives and negatives are noted above.  Otherwise negative for contributory information.           Physical Exam:[KR1.1]   Blood pressure 148/82, pulse 93, temperature 96.7  F (35.9  C), temperature source Oral, resp. rate 16, weight 95.3 kg (210 lb), SpO2 98 %.  Wt Readings from Last 1 Encounters:   11/11/17 95.3 kg (210 lb)[KR1.2]     Exam:  GENERAL: No apparent distress. Awake, alert, and fully oriented.  HEENT: Normocephalic, atraumatic. Extraocular movements intact.  CARDIOVASCULAR: Regular rate and rhythm without murmurs or rubs. No S3.  PULMONARY: Clear to auscultation bilaterally.  ABDOMINAL: Soft, non-tender, non-distended. Bowel sounds normoactive.   EXTREMITIES: No cyanosis or clubbing. No appreciable edema.  NEUROLOGICAL: CN 2-12  grossly intact, no focal neurological deficits.  DERMATOLOGICAL: No rash, ulcer, bruising, nor jaundice.          Data:     Laboratory:[KR1.1]    Recent Labs  Lab 11/11/17  1452   WBC 8.4   HGB 17.0   HCT 47.2   MCV 89          Recent Labs  Lab 11/11/17  1452   *   POTASSIUM 3.8   CHLORIDE 89*   CO2 22   ANIONGAP 14   *   BUN 20   CR 1.19   GFRESTIMATED 60*   GFRESTBLACK 73   CECY 9.2[KR1.3]     No results for input(s): CULT in the last 168 hours.[KR1.2]    Imaging:[KR1.1]  Recent Results (from the past 24 hour(s))   Abdomen US, complete    Narrative    US ABDOMEN COMPLETE 11/11/2017 6:27 PM    CLINICAL INFORMATION: Pain.    FINDINGS:  Liver:  Fatty infiltration. No focal liver lesions.    Biliary system:  No biliary duct dilatation in the intrahepatic ducts.  The common hepatic and common bile duct are not visualized.    Gallbladder:  Normal. No gallstones. No gallbladder wall thickening or  tenderness during scanning over the gallbladder.    Pancreas:  Visualized portions are normal.    Kidneys:  Right kidney appears normal 13.5 cm in length. 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    Spleen:  Obscured by bowel gas.    Visualized proximal aorta and IVC within normal limits.      Impression    IMPRESSION:    1. Fatty liver.  2. No acute abnormality identified. No gallstones. Extrahepatic bile  ducts not visualized in this exam.[KR1.2]            Revision History        User Key Date/Time User Provider Type Action    > KR1.3 11/11/2017  7:37 PM Edin Kang, DO Physician Sign     KR1.2 11/11/2017  7:20 PM Edin Kang, DO Physician      KR1.1 11/11/2017  7:19 PM Edin Kang, DO Physician                   Discharge Summaries     No notes of this type exist for this encounter.         Consult Notes      Consults by Brenda Silva LSW at 11/13/2017  3:11 PM     Author:  Brenda Silva LSW Service:  (none) Author Type:      Filed:  11/13/2017  3:11  "PM Date of Service:  11/13/2017  3:11 PM Creation Time:  11/13/2017  3:03 PM    Status:  Signed :  Brenda Silva LSW ()     Consult Orders:    1. Social Work IP Consult [[id number]] ordered by Charles Rodarte MD at 11/13/17 0906                Care Transition Initial Assessment -   Reason For Consult: discharge planning  Met with: Patient    Active Problems:    Pancreatitis         DATA  Lives With: alone  Living Arrangements: house  Description of Support System: Uninvolved  Resources List: Transitional Care   Quality Of Family Relationships: non-existent  Transportation Available: family or friend will provide    ASSESSMENT  Cognitive Status:  Appears alert and oriented.  Concerns to be addressed: SW consult to assist with dc planning needs.  SW met with pt and he states that he is \"scared to return home\" because he is feeling weak and sick. Pt agrees to TCU and feels that a short stay in TCU would be beneficial prior to returning home.  Pt lives in [place] and is agreeable to  sending TCU referrals to [facility], [facility], [facility], [facility]. Anticipate possible DC Tuesday 11/14/17. TCU referrals sent.       PLAN  Patient given options and choices for discharge: Yes .  Patient/family is agreeable to the plan? Yes    Patient Goals and Preferences: Home .  Patient anticipates discharging to:  TCU .[LH1.1]             Revision History        User Key Date/Time User Provider Type Action    > LH1.1 11/13/2017  3:11 PM Brenda Silva LSW  Sign            Consults by Tj Young LP at 11/13/2017  2:35 PM     Author:  Tj Young LP Service:  Health Psychology Author Type:  Psychologist    Filed:  11/13/2017  2:37 PM Date of Service:  11/13/2017  2:35 PM Creation Time:  11/13/2017  2:02 PM    Status:  Signed :  Tj Young LP (Psychologist)     Consult Orders:    1. Psychiatry IP Consult: depression, " "anxiety, ETOH abuse; Consultant may enter orders: Yes; Patient to be seen: Routine; Call back #:  [[phone]] ordered by Charles Rodarte MD at 17                Patient is a 72 year old single male admitted due to acute pancreatitis and a recent history of falls. Psychiatry consult was requested by Dr. Rodarte due to concerns regarding anxiety and depression.  Consult lasted 60 minutes and included direct meeting with patient and pre and post discussion with nursing staff.    History    Patient's chart was reviewed for history and physical exam results.  He is originally from [place] and has two sisters, one of whom  when she was 32.  His other sister still lives in [place], and he does not have a relationship with her.  Both parents are .  He was never  and has no children.  He has one nephew who lives in [place], but he does not maintain a relationship with him and and his family.  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    He indicates that there is a family history of depression and that he has suffered from depressed mood beginning in the .  He states, \"I have tried all the SSRIs an none of them seemed to help me.  He is currently on Ativan, prn, which he feels has been helpful but less so over time.  He acknowledges drinking two to three shots of alcohol daily but does not feel he has an issue with alcohol.  In fact, he appears to become agitated when this topic is broached with him. During the course of the interview, he did state, \"I am done drinking.\"  He realizes the relationship between his drinking and development of pancreatitis and indicates he does not " "wish to shorten his life.    He has a very limited support network.  He has limited contact with his family.  He belongs to a book club [facility] meets once monthly.  He states he has some contact with members of the group at other times but is not close to any of them. He lives in a house and had a roommate who moved out.  He states he likes living with another person and is hopeful that his landlord will find someone to move in with him.      Strengths include his overall intelligence and desire to make some positive changes in his life.  A major liability appears his tendency to push others away and difficulty accepting direct feedback regarding his behavior and possible consequences as a result.    Mental Status Exam    Patient was oriented in all spheres.  His memory, both recent and remote is intact.  He was able to effectively attend during the assessment and concentrated on what was being discussed.  His language and overall fund of knowledge are intact.  His mood was initially somewhat antagonistic but he seemed to warm up as the conversation continues.  He acknowledged feelings of anxiety and mild depression due to concern regarding future planning.  He indicated that he was feeling \"tense\" today and was hoping to continue the conversation at a future time.  I indicated that another consultant would be here tomorrow.  At first, he was hesitant to see someone else, but later noted it could be valuable to discuss future planning with another mental health professional.    Diagnostic Impression    Generalized Anxiety Disorder  Dysthymic Disorder    Disposition    Patient denies any desire to harm himself.  He affirmatively expressed the knowledge that he needs to stop drinking and stated that he would due to its impact on his health.    It appears that going to a transitional care facility for further rehabilitation is an appropriate recommendation.  He also is interested in resources he could avail himself of " once he returns home.  He was also mildly interested in continuing in some form of counseling/therapy once he returns home.  He did not appear interested in any trials of SSRIs but feels the Ativan has been helpful.    He also requested a follow up meeting if possible tomorrow to further explore long term options.  After his initial reluctance to share information he became more forthcoming over time.  Therefore, another opportunity to talk with someone regarding future options would be useful for him.   may also be helpful in appraising him of community resources.[HD1.1]       Revision History        User Key Date/Time User Provider Type Action    > HD1.1 11/13/2017  2:37 PM Tj Young, JOSH Psychologist Sign            Consults by Zuleyka Negron LADC at 11/12/2017  9:28 AM     Author:  Zuelyka Negron LADC Service:  Chemical Dependency Author Type:  Counselor    Filed:  11/12/2017  9:28 AM Date of Service:  11/12/2017  9:28 AM Creation Time:  11/12/2017  9:28 AM    Status:  Signed :  Zuleyka Negron LADC (Counselor)     Consult Orders:    1. Chemical Dependency IP Consult [[id number]] ordered by Edin Kang DO at 11/11/17 1926                CD consult acknowledged. Per EMR, patient has Medicare and would need to seek chem dep services from [facility] program as they are only Medicare approved facility for chem dep treatment services. Social work can assist with resources and referral.[BC1.1]     Revision History        User Key Date/Time User Provider Type Action    > BC1.1 11/12/2017  9:28 AM Zuleyka Negron LADC Counselor Sign                     Progress Notes - Physician (Notes from 11/13/17 through 11/16/17)      Progress Notes by Chaparro Quinn at 11/16/2017 10:53 AM     Author:  Chaparro Quinn Service:  (none) Author Type:      Filed:  11/16/2017 10:53 AM Date of Service:  11/16/2017 10:53 AM Creation Time:  11/16/2017 10:53 AM     Status:  Signed :  Chaparro Quinn ()         Your information has been submitted on November 16th, 2017 at 10:53:31 AM CST. The confirmation number is [id number][BB1.1]       Revision History        User Key Date/Time User Provider Type Action    > BB1.1 11/16/2017 10:53 AM Chaparro Quinn  Sign            Progress Notes by Charles Rodarte MD at 11/15/2017 12:06 PM     Author:  Charles Rodarte MD Service:  Hospitalist Author Type:  Physician    Filed:  11/16/2017  1:44 AM Date of Service:  11/15/2017 12:06 PM Creation Time:  11/15/2017 12:06 PM    Status:  Addendum :  Charles Rodarte MD (Physician)           [facility]  Hospitalist Progress Note  Charles Rodarte MD 11/15/2017    Reason for Stay (Diagnosis): ETOH pancreatitis, withdrawal         Assessment and Plan:      Summary of Stay: Edi Villafana is a 72 year old male with a history of HTN, Hyperlipidemia, hypothyroidism who presented with acute pancreatitis, ETOH withdrawals.        Symptoms continue to improve.  Less tremor today, pain improved.  titrating off oxycodone today (dose decreased to q8h prn; would stop tomorrow)      Pt appears to have a significant amount of denial revolving around his ETOH abuse and I suspect not being completely honest about his ETOH consumption, minimizing his use and the effects it is having on him.  becomes irritated when discussing with him his need to stop drinking      1. Acute pancreatitis.  Likely due to alcohol abuse.  improved.  ADAT.  Pain meds PRN; decrease to qh prn today and would stop tomrrow[SL1.1]  I told the patient we would be weaning off the oxycodone today[SL1.2].  Would not discharge on any narcotic pain meds      2.  Alcohol withdrawals. Much improved/resolved.  Being treated with CIWA - will stop and use ativan prn anxiety sx only      3.  Alcohol abuse.  Chem dep consult noted.  SW consult.  Seems to have poor  "insight/denial (or not willing to admit) the amount and effect of his alcohol use, and becomes defensive and irritated when trying to point out the labs (ETOH pancreatitis, ETOH hepatitis), exam findings (ETOH withdrawal tremors) and imaging findings (fatty liver) that all indicate alcoholism      4.  HTN.  resumed cozaar.  IV Hydralazine PRN.      5.  Hyperlipidemia.  Lipitor.      6.  Depression.  psych consult appreciated      7.  Hyponatremia.  Suspect due to dehydration from poor fluid intact recently, and ongoing alcohol abuse.          8.  Hypothyroidism.  TSH very elevated.  Pt admits to missing doses of Synthroid.  Suspect poor compliance.  Reiterated with him importance of taking meds every day.  Should have repeat TSH in 4-6 weeks      9.  Weakness.  secondary to ETOH use.  PT consult.  Pt does not feel he is able to go home after hospital stay and requesting a TCU.  SW consult placed[SL1.1]    Addendum:  Nurses note from 11/15 AM reads \"FYI. Pos blood cultures. Gram neg rods.  drawn 11/12. right arm.\".  This is incorrect.  This patient never had blood cx drawn this admission    ADDENDUM:  D/c summary and orders done for anticipated discharge to TCU on 11/16/17[SL1.3].  He will need a hard script signed for Ambien and Ativan (both chronic meds) prior to discharge[SL1.4]      Code status: Full  Admit to Inpatient  Prophylaxis: PCD's  Dispo:  To TCU when bed found        Interval History (Subjective):      abd pain improved.  Tolerating low fat diet.  Withdrawal tremor much improved                  Physical Exam:      Last Vital Signs:  /82 (BP Location: Left arm)  Pulse 62  Temp 97.7  F (36.5  C) (Oral)  Resp 18  Wt 95.3 kg (210 lb)  SpO2 96%  BMI 26.25 kg/m2[SL1.1]      Intake/Output Summary (Last 24 hours) at 11/15/17 1212  Last data filed at 11/14/17 1850   Gross per 24 hour   Intake              450 ml   Output                0 ml   Net              450 ml[SL1.5]       Constitutional: " Awake, alert, cooperative, no apparent distress.  Seen with nurse in room   Respiratory: Clear to auscultation bilaterally, no crackles or wheezing   Cardiovascular: Regular rate and rhythm, normal S1 and S2, and no murmur noted   Abdomen: Normal bowel sounds, soft, non-distended, non-tender   Skin: No rashes, no cyanosis, dry to touch   Neuro: Alert and oriented x3, no weakness, numbness, memory loss   Extremities: No edema, normal range of motion   Other(s):        All other systems: Negative          Medications:      All current medications were reviewed with changes reflected in problem list.         Data:      All new lab and imaging data was reviewed.   Labs:[SL1.1]    Recent Labs  Lab 11/12/17  0649 11/11/17  1452   WBC 5.6 8.4   HGB 14.3 17.0   HCT 40.5 47.2   MCV 92 89    208[SL1.5]      Imaging:[SL1.1]   No results found for this or any previous visit (from the past 24 hour(s)).[SL1.5]      Revision History        User Key Date/Time User Provider Type Action    > SL1.2 11/16/2017  1:44 AM Charles Rodarte MD Physician Addend     SL1.4 11/16/2017  1:29 AM Charles Rodarte MD Physician Addend     SL1.3 11/16/2017  1:24 AM Charles Rodarte MD Physician Addend     SL1.5 11/15/2017 12:12 PM Charles Rodarte MD Physician Sign     SL1.1 11/15/2017 12:06 PM Charles Rodarte MD Physician             Progress Notes by Brenda Silva LSW at 11/15/2017  2:33 PM     Author:  Brenda Silva LSW Service:  (none) Author Type:      Filed:  11/15/2017  2:36 PM Date of Service:  11/15/2017  2:33 PM Creation Time:  11/15/2017  2:33 PM    Status:  Signed :  Brenda Silva LSW ()         SWS:  SW notified that pt has been declined at [facility] but has been accepted at [facility] and can dc tomorrow. Anticipate pt will dc around 1:00pm and friend will transport.[LH1.1]       Revision History        User Key Date/Time User Provider Type Action     > LH1.1 11/15/2017  2:36 PM Brenda Silva LSW  Sign            Progress Notes by Brenda Silva LSW at 11/14/2017  2:37 PM     Author:  Brenda Silva LSW Service:  (none) Author Type:      Filed:  11/14/2017  2:40 PM Date of Service:  11/14/2017  2:37 PM Creation Time:  11/14/2017  2:37 PM    Status:  Signed :  Brenda Silva LSW ()         SWS:  SW following to assist with dc planning.  Pt declined at [facility], [facility] and [facility] and [facility].  [facility] gave referral to [facility] facility in [place] and they accepted. SW spoke with pt who declines facility in [place] and requests that SW send TCU referral to [place].  SW sent referrals to [facility] and [facility] on [address].[LH1.1]       Revision History        User Key Date/Time User Provider Type Action    > LH1.1 11/14/2017  2:40 PM Brenda Silva LSW  Sign            Progress Notes by Charles Rodarte MD at 11/14/2017 12:31 PM     Author:  Charles Rodarte MD Service:  Hospitalist Author Type:  Physician    Filed:  11/14/2017 12:35 PM Date of Service:  11/14/2017 12:31 PM Creation Time:  11/14/2017 12:31 PM    Status:  Signed :  Charles Rodarte MD (Physician)           [facility]  Hospitalist Progress Note  Charles Rodarte MD 11/14/2017    Reason for Stay (Diagnosis): ETOH withdrawal         Assessment and Plan:      Summary of Stay: Edi Villafana is a 72 year old male with a history of HTN, Hyperlipidemia, hypothyroidism who presented with acute pancreatitis, ETOH withdrawals.     Symptoms appear to be improving.  Less tremor today, pain improved.       Pt appears to have a significant amount of denial revolving around his ETOH abuse and I suspect not being completely honest about his ETOH consumption, minimizing his use and the effects it is having on him.         1. Acute pancreatitis.  Likely due to alcohol abuse.  improved.  ADAT.  Pain meds PRN; decrease to q6h prn.      2.  Alcohol withdrawals.  Continue CIWA.  Tremors without hallucinations.  receiving intermittent ativan      3.  Alcohol abuse.  Chem dep consult noted.  SW consult.  Seems to have poor insight/denial (or not willing to admit) the amount and effect of his alcohol use, and becomes defensive and irritated when trying to point out the labs (ETOH pancreatitis, ETOH hepatitis), exam findings (ETOH withdrawal tremors) and imaging findings (fatty liver) that all indicate alcoholism      4.  HTN.  resumed cozaar.  IV Hydralazine PRN.      5.  Hyperlipidemia.  Lipitor.      6.  Depression.  psych consult appreciated      7.  Hyponatremia.  Suspect due to dehydration from poor fluid intact recently, and ongoing alcohol abuse.          8.  Hypothyroidism.  TSH very elevated.  Pt admits to missing doses of Synthroid.  Suspect poor compliance.  Reiterated with him importance of taking meds every day.  Should have repeat TSH in 4-6 weeks      9.  Weakness.  secondary to ETOH use.  PT consult.  Pt does not feel he is able to go home after hospital stay and requesting a TCU.  SW consult placed      Code status: Full  Admit to Inpatient  Prophylaxis: PCD's  Dispo:  Tomorrow expected        Interval History (Subjective):      Tremors improved.  No hallucinations.  Still receiving some Ativan for withdrawal sx                  Physical Exam:      Last Vital Signs:  /90 (BP Location: Left arm)  Pulse 62  Temp 98.1  F (36.7  C) (Oral)  Resp 18  Wt 95.3 kg (210 lb)  SpO2 95%  BMI 26.25 kg/m2[SL1.1]      Intake/Output Summary (Last 24 hours) at 11/14/17 1234  Last data filed at 11/14/17 0635   Gross per 24 hour   Intake             1120 ml   Output                0 ml   Net             1120 ml[SL1.2]       Constitutional: Awake, alert, cooperative, no apparent distress.  Mild tremor, improved   Respiratory:  Clear to auscultation bilaterally, no crackles or wheezing   Cardiovascular: Regular rate and rhythm, normal S1 and S2, and no murmur noted   Abdomen: Normal bowel sounds, soft, non-distended, non-tender   Skin: No rashes, no cyanosis, dry to touch   Neuro: Alert and oriented x3, no weakness, numbness, memory loss   Extremities: No edema, normal range of motion   Other(s):        All other systems: Negative          Medications:      All current medications were reviewed with changes reflected in problem list.         Data:      All new lab and imaging data was reviewed.   Labs:[SL1.1]    Recent Labs  Lab 11/12/17  0649   WBC 5.6   HGB 14.3   HCT 40.5   MCV 92   [SL1.2]      Imaging:[SL1.1]   No results found for this or any previous visit (from the past 24 hour(s)).[SL1.3]      Revision History        User Key Date/Time User Provider Type Action    > SL1.3 11/14/2017 12:35 PM Charles Rodarte MD Physician Sign     SL1.2 11/14/2017 12:34 PM Charles Rodarte MD Physician      SL1.1 11/14/2017 12:31 PM Charles Rodarte MD Physician             Progress Notes by Charles Rodarte MD at 11/13/2017  2:34 PM     Author:  Charles Rodarte MD Service:  Hospitalist Author Type:  Physician    Filed:  11/13/2017  2:43 PM Date of Service:  11/13/2017  2:34 PM Creation Time:  11/13/2017  2:34 PM    Status:  Signed :  Charles Rodarte MD (Physician)           [facility]  Hospitalist Progress Note  Charles Rodarte MD 11/13/2017    Reason for Stay (Diagnosis): ETOH abuse, withdrawals, pancreatitis, hepatitis         Assessment and Plan:      Summary of Stay: Edi Villafana is a 72 year old male with a history of HTN, Hyperlipidemia, hypothyroidism who presented with acute pancreatitis, ETOH withdrawals.    Symptoms appear to be improving.  Less tremor today, pain improved.      Pt appears to have a significant amount of denial revolving around his ETOH abuse and I suspect  not being completely honest about his ETOH consumption, minimizing his use and the effects it is having on him.       1. Acute pancreatitis.  Likely due to alcohol abuse.  improved.  ADAT today.  Pain meds PRN.      2.  Alcohol withdrawals.  Continue CIWA.  Tremors without hallucinations.        3.  Alcohol abuse.  Chem dep consult noted.  SW consult.  Seems to have poor insight/denial (or not willing to admit) the amount and effect of his alcohol use, and becomes defensive and irritated when trying to point out the labs (ETOH pancreatitis, ETOH hepatitis), exam findings (ETOH withdrawal tremors) and imaging findings (fatty liver) that all indicate alcoholism      4.  HTN.  resume cozaar.  IV Hydralazine PRN.      5.  Hyperlipidemia.  Lipitor.      6.  Depression.  requesting psych consult.  Will order      7.  Hyponatremia.  Suspect due to dehydration from poor fluid intact recently, and ongoing alcohol abuse.          8.  Hypothyroidism.  TSH very elevated.  Pt admits to missing doses of Synthroid.  Suspect poor compliance.  Reiterated with him importance of taking meds every day.  Should have repeat TSH in 4-6 weeks      9.  Weakness.  secondary to ETOH use      Code status: Full  Admit to Inpatient  Prophylaxis: PCD's        Interval History (Subjective):      Angry with this provider - he feels I was too arrogant when talking with him about his ETOH abuse.  He does not feel that his ETOH use is a big problem for him and he that he can easily quit drinking.  Appears to be minimizing his drinking.  Tremor improved.  No hallucinations                  Physical Exam:      Last Vital Signs:  /84  Pulse 93  Temp 97.5  F (36.4  C) (Oral)  Resp 18  Wt 95.3 kg (210 lb)  SpO2 95%  BMI 26.25 kg/m2[SL1.1]      Intake/Output Summary (Last 24 hours) at 11/13/17 1443  Last data filed at 11/12/17 1740   Gross per 24 hour   Intake              287 ml   Output              425 ml   Net             -138 ml[SL1.2]        Constitutional: Awake, alert, cooperative, no apparent distress.  Nurse Nanda present during entire visit with patient today.  Tremor improved.     Respiratory: Clear to auscultation bilaterally, no crackles or wheezing   Cardiovascular: Regular rate and rhythm, normal S1 and S2, and no murmur noted   Abdomen: Normal bowel sounds, soft, non-distended, non-tender   Skin: No rashes, no cyanosis, dry to touch   Neuro: Alert and oriented x3, no weakness, numbness, memory loss.  Mild tremor   Extremities: No edema, normal range of motion   Other(s):        All other systems: Negative          Medications:      All current medications were reviewed with changes reflected in problem list.         Data:      All new lab and imaging data was reviewed.   Labs:[SL1.1]    Recent Labs  Lab 11/12/17  0649 11/11/17  1452   WBC 5.6 8.4   HGB 14.3 17.0   HCT 40.5 47.2   MCV 92 89    208[SL1.2]      Imaging:[SL1.1]   No results found for this or any previous visit (from the past 24 hour(s)).[SL1.2]      Revision History        User Key Date/Time User Provider Type Action    > SL1.2 11/13/2017  2:43 PM Charles Rodarte MD Physician Sign     SL1.1 11/13/2017  2:34 PM Charles Rodarte MD Physician             Progress Notes by Sallie Chaudhary PT at 11/13/2017 10:43 AM     Author:  Sallie Chaudhary PT Service:  Acute IP Rehab Author Type:  Physical Therapist    Filed:  11/13/2017 10:44 AM Date of Service:  11/13/2017 10:43 AM Creation Time:  11/13/2017 10:43 AM    Status:  Signed :  Sallie Chaudhary PT (Physical Therapist)            11/13/17 1000   Signing Clinician's Name / Credentials   Signing clinician's name / credentials Sallie Chaudhary DPT   [place]onson Balance Scale (ALEXANDREA CHAVEZ, MARLON S, BENY NICK, JACINDA, B: MEASURING BALANCE IN THE ELDERLY: VALIDATION OF AN INSTRUMENT. CAN. J. PUB. HEALTH, JULY/AUGUST SUPPLEMENT 2:S7-11, 1992.)   Sit To Stand 3   Standing Unsupported 3   Sitting Unsupported 4    Stand to Sit 3   Transfers 3   Standing with Eyes Closed 3   Standing Unsupported, Feet Together 3   Reach Forward With Outstretched Arm 3   Retrieve Object From Floor 3   Turning to Look Behind 3   Turn 360 Degrees 1   Placing Alternate Foot on Stool (4-6 inches) 2   Unsupported Tandem Stand (Demonstrate to Subject) 3   One Leg Stand 2   Total Score (A score of 45 or less has been correlated with an increased risk of falls)   Total Score (out of 56) 39[SW1.1]        Revision History        User Key Date/Time User Provider Type Action    > SW1.1 11/13/2017 10:44 AM Sallie Chaudhary PT Physical Therapist Sign            Progress Notes by Sallie Chaudhary PT at 11/13/2017 10:40 AM     Author:  Sallie Chaudhary PT Service:  Acute IP Rehab Author Type:  Physical Therapist    Filed:  11/13/2017 10:40 AM Date of Service:  11/13/2017 10:40 AM Creation Time:  11/13/2017 10:40 AM    Status:  Signed :  Sallie Chaudhary PT (Physical Therapist)          11/13/17 1025   Quick Adds   Type of Visit Initial PT Evaluation   Living Environment   Lives With alone   Living Arrangements house   Home Accessibility stairs to enter home;stairs within home   Number of Stairs to Enter Home 2   Number of Stairs Within Home 14   Living Environment Comment Pt states he lives alone. He states he has family in the area but no one to provide assistance or support.    Self-Care   Usual Activity Tolerance moderate   Current Activity Tolerance moderate   Regular Exercise no   Equipment Currently Used at Home none   Functional Level Prior   Ambulation 0-->independent   Transferring 0-->independent   Toileting 0-->independent   Bathing 0-->independent   Dressing 0-->independent   Eating 0-->independent   Communication 0-->understands/communicates without difficulty   Swallowing 0-->swallows foods/liquids without difficulty   Cognition 0 - no cognition issues reported   Fall history within last six months yes   Number of times patient has  fallen within last six months 4  (pt states multiple/frequent falls)   Which of the above functional risks had a recent onset or change? transferring;ambulation;fall history   General Information   Onset of Illness/Injury or Date of Surgery - Date 11/11/17   Referring Physician Dr. Guy Londono   Patient/Family Goals Statement Pt states TCU, states he doesn't feel safe going home alone   Pertinent History of Current Problem (include personal factors and/or comorbidities that impact the POC) Pt is 73 yo male admitted with complaints of generalized weakness over the past month and was found to have acute pancreatitis and alocohol withdrawls.  Pt reports history of recent frequent falls over the past month. PMH includes acute pancreatitis, hypertension, hyperlipidemia, hypothyroidism, and depression.    Precautions/Limitations fall precautions   Weight-Bearing Status - LLE full weight-bearing   Weight-Bearing Status - RLE full weight-bearing   Cognitive Status Examination   Orientation orientation to person, place and time   Level of Consciousness alert;confused   Follows Commands and Answers Questions 100% of the time;able to follow multistep instructions   Personal Safety and Judgment intact   Memory impaired   Pain Assessment   Patient Currently in Pain Yes, see Vital Sign flowsheet   Posture    Posture Forward head position;Protracted shoulders   Range of Motion (ROM)   ROM Comment Generally WFL   Strength   Strength Comments Generally 5/5   Bed Mobility   Bed Mobility Comments Pt performs with SBA   Transfer Skills   Transfer Comments Pt transfers with SBA   Gait   Gait Comments Pt ambulates 10ft in room with CGA and unsteady gait   Balance   Balance Comments good seated, fair standing   General Therapy Interventions   Planned Therapy Interventions balance training;bed mobility training;gait training;neuromuscular re-education;strengthening;transfer training;home program guidelines;progressive activity/exercise  "  Clinical Impression   Criteria for Skilled Therapeutic Intervention yes, treatment indicated   PT Diagnosis Difficulty with gait   Influenced by the following impairments Pt presents with impaired balance and history of falls, decreased functional capacity, confusion and impaired safety.    Functional limitations due to impairments Pt demonstrates increased difficulty with bed mobility, transfers, and amulation   Clinical Presentation Stable/Uncomplicated   Clinical Presentation Rationale clinical observation   Clinical Decision Making (Complexity) Low complexity   Therapy Frequency` daily   Predicted Duration of Therapy Intervention (days/wks) 3 days   Anticipated Discharge Disposition Transitional Care Facility;Other (see comments)   Risk & Benefits of therapy have been explained Yes   Patient, Family & other staff in agreement with plan of care Yes   [facility] AM-PAC TM \"6 Clicks\"   2016, Trustees of [facility], under license to [employer].  All rights reserved.   6 Clicks Short Forms Basic Mobility Inpatient Short Form   [facility] AM-PAC  \"6 Clicks\" V.2 Basic Mobility Inpatient Short Form   1. Turning from your back to your side while in a flat bed without using bedrails? 3 - A Little   2. Moving from lying on your back to sitting on the side of a flat bed without using bedrails? 3 - A Little   3. Moving to and from a bed to a chair (including a wheelchair)? 3 - A Little   4. Standing up from a chair using your arms (e.g., wheelchair, or bedside chair)? 3 - A Little   5. To walk in hospital room? 3 - A Little   6. Climbing 3-5 steps with a railing? 3 - A Little   Basic Mobility Raw Score (Score out of 24.Lower scores equate to lower levels of function) 18   Total Evaluation Time   Total Evaluation Time (Minutes) 15[SW1.1]        Revision History        User Key Date/Time User Provider Type Action    > SW1.1 11/13/2017 10:40 AM Sallie Chaudhary, PT Physical Therapist Sign                "   Procedure Notes     No notes of this type exist for this encounter.         Progress Notes - Therapies (Notes from 11/13/17 through 11/16/17)      Progress Notes by Sallie Chaudhary PT at 11/13/2017 10:43 AM     Author:  Sallie Chaudhary PT Service:  Acute IP Rehab Author Type:  Physical Therapist    Filed:  11/13/2017 10:44 AM Date of Service:  11/13/2017 10:43 AM Creation Time:  11/13/2017 10:43 AM    Status:  Signed :  Sallie Chaudhary PT (Physical Therapist)            11/13/17 1000   Signing Clinician's Name / Credentials   Signing clinician's name / credentials Sallie Chaudhary DPT   Le[place] Balance Scale (LECHUGA K, MARLON S, BNEY NICK, JACINDA, B: MEASURING BALANCE IN THE ELDERLY: VALIDATION OF AN INSTRUMENT. CAN. J. PUB. HEALTH, JULY/AUGUST SUPPLEMENT 2:S7-11, 1992.)   Sit To Stand 3   Standing Unsupported 3   Sitting Unsupported 4   Stand to Sit 3   Transfers 3   Standing with Eyes Closed 3   Standing Unsupported, Feet Together 3   Reach Forward With Outstretched Arm 3   Retrieve Object From Floor 3   Turning to Look Behind 3   Turn 360 Degrees 1   Placing Alternate Foot on Stool (4-6 inches) 2   Unsupported Tandem Stand (Demonstrate to Subject) 3   One Leg Stand 2   Total Score (A score of 45 or less has been correlated with an increased risk of falls)   Total Score (out of 56) 39[SW1.1]        Revision History        User Key Date/Time User Provider Type Action    > SW1.1 11/13/2017 10:44 AM Sallie Chaudhary PT Physical Therapist Sign            Progress Notes by Sallie Chaudhary PT at 11/13/2017 10:40 AM     Author:  Sallie Chaudhary PT Service:  Acute IP Rehab Author Type:  Physical Therapist    Filed:  11/13/2017 10:40 AM Date of Service:  11/13/2017 10:40 AM Creation Time:  11/13/2017 10:40 AM    Status:  Signed :  Slalie Chaudhary PT (Physical Therapist)          11/13/17 1025   Quick Adds   Type of Visit Initial PT Evaluation   Living Environment   Lives With alone   Living  Arrangements house   Home Accessibility stairs to enter home;stairs within home   Number of Stairs to Enter Home 2   Number of Stairs Within Home 14   Living Environment Comment Pt states he lives alone. He states he has family in the area but no one to provide assistance or support.    Self-Care   Usual Activity Tolerance moderate   Current Activity Tolerance moderate   Regular Exercise no   Equipment Currently Used at Home none   Functional Level Prior   Ambulation 0-->independent   Transferring 0-->independent   Toileting 0-->independent   Bathing 0-->independent   Dressing 0-->independent   Eating 0-->independent   Communication 0-->understands/communicates without difficulty   Swallowing 0-->swallows foods/liquids without difficulty   Cognition 0 - no cognition issues reported   Fall history within last six months yes   Number of times patient has fallen within last six months 4  (pt states multiple/frequent falls)   Which of the above functional risks had a recent onset or change? transferring;ambulation;fall history   General Information   Onset of Illness/Injury or Date of Surgery - Date 11/11/17   Referring Physician Dr. Guy Londono   Patient/Family Goals Statement Pt states TCU, states he doesn't feel safe going home alone   Pertinent History of Current Problem (include personal factors and/or comorbidities that impact the POC) Pt is 71 yo male admitted with complaints of generalized weakness over the past month and was found to have acute pancreatitis and alocohol withdrawls.  Pt reports history of recent frequent falls over the past month. PMH includes acute pancreatitis, hypertension, hyperlipidemia, hypothyroidism, and depression.    Precautions/Limitations fall precautions   Weight-Bearing Status - LLE full weight-bearing   Weight-Bearing Status - RLE full weight-bearing   Cognitive Status Examination   Orientation orientation to person, place and time   Level of Consciousness alert;confused  "  Follows Commands and Answers Questions 100% of the time;able to follow multistep instructions   Personal Safety and Judgment intact   Memory impaired   Pain Assessment   Patient Currently in Pain Yes, see Vital Sign flowsheet   Posture    Posture Forward head position;Protracted shoulders   Range of Motion (ROM)   ROM Comment Generally WFL   Strength   Strength Comments Generally 5/5   Bed Mobility   Bed Mobility Comments Pt performs with SBA   Transfer Skills   Transfer Comments Pt transfers with SBA   Gait   Gait Comments Pt ambulates 10ft in room with CGA and unsteady gait   Balance   Balance Comments good seated, fair standing   General Therapy Interventions   Planned Therapy Interventions balance training;bed mobility training;gait training;neuromuscular re-education;strengthening;transfer training;home program guidelines;progressive activity/exercise   Clinical Impression   Criteria for Skilled Therapeutic Intervention yes, treatment indicated   PT Diagnosis Difficulty with gait   Influenced by the following impairments Pt presents with impaired balance and history of falls, decreased functional capacity, confusion and impaired safety.    Functional limitations due to impairments Pt demonstrates increased difficulty with bed mobility, transfers, and amulation   Clinical Presentation Stable/Uncomplicated   Clinical Presentation Rationale clinical observation   Clinical Decision Making (Complexity) Low complexity   Therapy Frequency` daily   Predicted Duration of Therapy Intervention (days/wks) 3 days   Anticipated Discharge Disposition Transitional Care Facility;Other (see comments)   Risk & Benefits of therapy have been explained Yes   Patient, Family & other staff in agreement with plan of care Yes   [facility] AM-PAC TM \"6 Clicks\"   2016, Trustees of [facility], under license to [employer].  All rights reserved.   6 Clicks Short Forms Basic Mobility Inpatient Short Form   [facility] " "AM-PAC  \"6 Clicks\" V.2 Basic Mobility Inpatient Short Form   1. Turning from your back to your side while in a flat bed without using bedrails? 3 - A Little   2. Moving from lying on your back to sitting on the side of a flat bed without using bedrails? 3 - A Little   3. Moving to and from a bed to a chair (including a wheelchair)? 3 - A Little   4. Standing up from a chair using your arms (e.g., wheelchair, or bedside chair)? 3 - A Little   5. To walk in hospital room? 3 - A Little   6. Climbing 3-5 steps with a railing? 3 - A Little   Basic Mobility Raw Score (Score out of 24.Lower scores equate to lower levels of function) 18   Total Evaluation Time   Total Evaluation Time (Minutes) 15[SW1.1]        Revision History        User Key Date/Time User Provider Type Action    > SW1.1 11/13/2017 10:40 Sallie Barrera, PT Physical Therapist Sign            "

## 2017-11-11 NOTE — MR AVS SNAPSHOT
After Visit Summary   11/11/2017    Edi Villafana    MRN: [id number]           Patient Information     Date Of Birth          1945        Visit Information        Provider Department      11/11/2017 1:45 PM Santo Mo PA-C [facility]        Today's Diagnoses     Weakness    -  1    Shakiness        Anorexia        Malaise and fatigue           Follow-ups after your visit        Who to contact     If you have questions or need follow up information about today's clinic visit or your schedule please contact [facility] directly at [phone].  Normal or non-critical lab and imaging results will be communicated to you by [employer], letter or phone within 4 business days after the clinic has received the results. If you do not hear from us within 7 days, please contact the clinic through [employer] or phone. If you have a critical or abnormal lab result, we will notify you by phone as soon as possible.  Submit refill requests through [employer] or call your pharmacy and they will forward the refill request to us. Please allow 3 business days for your refill to be completed.          Additional Information About Your Visit        MyChart Information     [employer] gives you secure access to your electronic health record. If you see a primary care provider, you can also send messages to your care team and make appointments. If you have questions, please call your primary care clinic.  If you do not have a primary care provider, please call [phone] and they will assist you.        Care EveryWhere ID     This is your Care EveryWhere ID. This could be used by other organizations to access your [place] medical records  [id number]        Your Vitals Were     Pulse Temperature Pulse Oximetry             97 96.7  F (35.9  C) (Oral) 96%          Blood Pressure from Last 3 Encounters:   11/11/17 138/89   07/18/17 124/80   12/12/16 110/70    Weight  from Last 3 Encounters:   07/18/17 215 lb 3.2 oz (97.6 kg)   12/12/16 211 lb (95.7 kg)   10/03/16 214 lb (97.1 kg)              Today, you had the following     No orders found for display         Today's Medication Changes          These changes are accurate as of: 11/11/17  2:22 PM.  If you have any questions, ask your nurse or doctor.               Stop taking these medicines if you haven't already. Please contact your care team if you have questions.     triamcinolone 0.1 % cream   Commonly known as:  KENALOG   Stopped by:  Santo Mo, GURMEET                    Primary Care Provider Office Phone # Fax #    Enrique John Lucia [phone] [address] [facility] [address]        Equal Access to Services     [facility] : Hadii jose ramon cabrales hadasho Sofrederick, waaxda luqadaha, qaybta kaalmada adekevinyada, abebe majano . So [facility] [phone].    ATENCIÓN: Si habla español, tiene a kidd disposición servicios gratuitos de asistencia lingüística. Agus al [phone].    We comply with applicable federal civil rights laws and [place] laws. We do not discriminate on the basis of race, color, national origin, age, disability, sex, sexual orientation, or gender identity.            Thank you!     Thank you for choosing [facility]  for your care. Our goal is always to provide you with excellent care. Hearing back from our patients is one way we can continue to improve our services. Please take a few minutes to complete the written survey that you may receive in the mail after your visit with us. Thank you!             Your Updated Medication List - Protect others around you: Learn how to safely use, store and throw away your medicines at www.disposemymeds.org.          This list is accurate as of: 11/11/17  2:22 PM.  Always use your most recent med list.                   Brand Name Dispense Instructions for use Diagnosis    albuterol 108  (90 BASE) MCG/ACT Inhaler    PROAIR HFA/PROVENTIL HFA/VENTOLIN HFA    1 Inhaler    Inhale 2 puffs into the lungs 4 times daily    Cough       aspirin 325 MG tablet      Take 0.5 mg by mouth daily.        atorvastatin 40 MG tablet    LIPITOR    90 tablet    TAKE 1 TABLET (40 MG) BY MOUTH DAILY    Hyperlipidemia LDL goal <100       guaiFENesin-codeine 100-10 MG/5ML Soln solution    ROBITUSSIN AC    240 mL    Take 5 mLs by mouth every 4 hours as needed for cough    Lower resp. tract infection       levothyroxine 125 MCG tablet    SYNTHROID/LEVOTHROID    90 tablet    Take 1 tablet (125 mcg) by mouth daily    Hypothyroidism, unspecified type       LORazepam 2 MG tablet    ATIVAN    270 tablet    TAKE 1 TABLET BY MOUTH EVERY 8 HOURS AS NEEDED FOR ANXIETY.    Anxiety       losartan 50 MG tablet    COZAAR    90 tablet    Take 0.5 tablets (25 mg) by mouth daily    Essential hypertension with goal blood pressure less than 140/90       MULTIVITAMIN PO      Take 1 tablet by mouth.        oxyCODONE IR 5 MG tablet    ROXICODONE    30 tablet    Take 1 tablet (5 mg) by mouth every 4 hours as needed for moderate to severe pain    Chronic left shoulder pain       TESTOSTERONE 2 MG/GM CREAM     40 g    15% cream and apply 0.5 ml topically daily    Hypotestosteronism       vitamin D3 2000 UNITS Caps      Take 1 capsule by mouth.        zolpidem 10 MG tablet    AMBIEN    90 tablet    TAKE 1 TAB BY MOUTH NIGHTLY AS NEEDED FOR SLEEP    Persistent disorder of initiating or maintaining sleep

## 2017-11-11 NOTE — IP AVS SNAPSHOT
` ` Patient Information     Patient Name Sex     Edi Villafana ([id number]) Male 1945       Room Bed    0337 0337-01      Patient Demographics     Address Phone E-mail Address    [address] [facility] [address]  [place] [address] [phone] (Home)  [phone] (Work)  [phone] (Mobile) *Preferred* [email or web address]      Patient Ethnicity & Race     Ethnic Group Patient Race    American White      Emergency Contact(s)     Name Relation Home Work Mobile    Aryan Skinner Friend [phone]    Mikel Jones Relative   [phone]    Srinivas Vital Friend   [phone]      Documents on File        Status Date Received Description       Documents for the Patient    Insurance Card  () 06     Privacy Notice - [place] Received 06     Face Sheet  06     External Medication Information Consent Accepted 10/17/12     Patient ID Received 16     Consent for Services - Hospital/Clinic  ()      Insurance Card Received 16 medicare    Consent for Services - Hospital/Clinic Received but Refused Research () 10/17/12     Immunization Record   Influenza,  [facility],  12    Consent for EHR Access  13 Copied from existing Consent for services - C/HOD collected on 10/17/2012    [facility] Specified Other       Consent for Services - Hospital/Clinic Received () 10/23/13     Consent to Communicate   Consent to Communicate    Consent for Services - Hospital/Clinic Received () 12/15/14     Consent for Services/Privacy Notice - Hospital/Clinic Received but Refused Insurance Consent and Research () 16     Insurance Card Received 16 BCBS    Insurance Card Received 17 BCBS    Consent for Services/Privacy Notice - Hospital/Clinic Received 17     Care Everywhere Prospective Auth Received 17     Patient ID Received 17     Insurance Card Received (Deleted) 10/17/12 BCBS    Insurance Card Received (Deleted)  07/02/15 bcbs    Insurance Card Received (Deleted) 12/12/15 BCBS       Documents for the Encounter    CMS IM for Patient Signature Received 11/13/17     Discharge Attachment   ACUTE PANCREATITIS, DISCHARGE INSTRUCTIONS FOR (ENGLISH)    Discharge Attachment   ALCOHOL ABUSE (ENGLISH)    Discharge Attachment  (Deleted)  ALCOHOL ABUSE (ENGLISH)      Admission Information     Attending Provider Admitting Provider Admission Type Admission Date/Time    Edin Kang, DO Edin Kang, DO Emergency 11/11/17  1445    Discharge Date Hospital Service Auth/Cert Status Service Area     General [facility]    Unit Room/Bed Admission Status        3 MEDICAL SURGICAL 0337/0337-01 Admission (Confirmed)       Admission     Complaint    Pancreatitis      Hospital Account     Name Acct ID Class Status Primary Coverage    Edi Villafana [id number] Inpatient Open MEDICARE - MEDICARE FOR HB SUPPLEMENT            Guarantor Account (for Hospital Account #[id number])     Name Relation to Pt Service Area Active? Acct Type    Edi Villafana  FCS Yes Personal/Family    Address Phone          [address]  [place] [address] [phone](H)              Coverage Information (for Hospital Account #[id number])     1. MEDICARE/MEDICARE FOR HB SUPPLEMENT     F/O Payor/Plan Precert #    MEDICARE/MEDICARE FOR HB SUPPLEMENT     Subscriber Subscriber #    Edi Villafana [id number]    Address Phone    [employer] CLAIM[employer]  [address]  [place] [address] [phone]          2. BCBS/BCBS PLATINUM BLUE     F/O Payor/Plan Precert #    BCBS/BCBS PLATINUM BLUE     Subscriber Subscriber #    Edi Villafana [id number]    Address Phone    [address] [address]  [place], [place] [id number] [phone]

## 2017-11-11 NOTE — PHARMACY-ADMISSION MEDICATION HISTORY
Admission medication history interview status for this patient is complete. See [facility] admission navigator for allergy information, prior to admission medications and immunization status.     Medication history interview source(s):Patient  Medication history resources (including written lists, pill bottles, clinic record):None    Changes made to PTA medication list:  Added: none  Deleted: ventolin MDI, oxycodone, testosterone cream  Changed: none    Actions taken by pharmacist (provider contacted, etc):None     Additional medication history information:None    Medication reconciliation/reorder completed by provider prior to medication history? No    For patients on insulin therapy: no (Yes/No)   Lantus/levemir/NPH/Mix 70/30 dose: ___ in AM/PM or twice daily   Sliding scale Novolog Y/N   If Yes, do you have a baseline novolog pre-meal dose: ______units with meals   Patients eat three meals a day: Y/N ---  How many episodes of hypoglycemia (low blood glucose) do you have weekly: ---   How many missed doses do you have a week: ---  How many times do you check your blood glucose per day: ---  Any Barriers to therapy: cost of medications/comfortable with giving injections (if applicable)/ comfortable and confident with current diabetes regimen ---      Prior to Admission medications    Medication Sig Last Dose Taking? Auth Provider   LORazepam (ATIVAN) 2 MG tablet TAKE 1 TABLET BY MOUTH EVERY 8 HOURS AS NEEDED FOR ANXIETY. 11/11/2017 at 0800 Yes Enrique Lucia MD   atorvastatin (LIPITOR) 40 MG tablet TAKE 1 TABLET (40 MG) BY MOUTH DAILY 11/10/2017 at Unknown time Yes Enrique Lucia MD   zolpidem (AMBIEN) 10 MG tablet TAKE 1 TAB BY MOUTH NIGHTLY AS NEEDED FOR SLEEP 11/10/2017 at Unknown time Yes Enrique Lucia MD   levothyroxine (SYNTHROID/LEVOTHROID) 125 MCG tablet Take 1 tablet (125 mcg) by mouth daily 11/10/2017 at Unknown time Yes Enrique Lucia MD   losartan (COZAAR) 50 MG tablet  Take 0.5 tablets (25 mg) by mouth daily Past Week at Unknown time Yes Enrique Lucia MD   aspirin 325 MG tablet Take 0.5 mg by mouth daily. Past Week at Unknown time Yes Reported, Patient   Cholecalciferol (VITAMIN D3) 2000 UNITS CAPS Take 1 capsule by mouth daily  Past Week at Unknown time Yes Reported, Patient   Multiple Vitamin (MULTIVITAMIN OR) Take 1 tablet by mouth daily  Past Month at Unknown time Yes Reported, Patient

## 2017-11-11 NOTE — NURSING NOTE
"Chief Complaint   Patient presents with     Urgent Care     no appetite, feel off balance, fatigue, coughing fits and shaky - pt reports he tripped over a wire and fell earlier today.        Initial /89  Pulse 97  Temp 96.7  F (35.9  C) (Oral)  SpO2 96% Estimated body mass index is 26.9 kg/(m^2) as calculated from the following:    Height as of 7/18/17: 6' 3\" (1.905 m).    Weight as of 7/18/17: 215 lb 3.2 oz (97.6 kg).  Medication Reconciliation: complete    "

## 2017-11-11 NOTE — ED NOTES
"Patient presents for vague complaints. Patient reports he is weak, fatigue and shaky ongoing for about three weeks. Also reports that he can't eat because \"even looking at food makes me feel funny and sick all over\". When asked to elaborate, pt unable. Denies pain. Denies fever, nausea, vomiting, diarrhea, constipation and urinary troubles. Patient visibly shaky.   Patient also has had cough for 6 months.   "

## 2017-11-11 NOTE — ED NOTES
"[facility]  ED Nurse Handoff Report    Edi Villafana is a 72 year old male   ED Chief complaint: Generalized Weakness  . ED Diagnosis:   Final diagnoses:   None     Allergies:   Allergies   Allergen Reactions     Ace Inhibitors Cough     Ancef [Cefazolin Sodium]      Atenolol Other (See Comments)     Low BP     Compazine      Sulfa Drugs      Tramadol Fatigue     Side effects       Code Status: Full Code  Activity level - Baseline/Home:  Independent. Activity Level - Current:   Stand with Assist. Lift room needed: No. Bariatric: No   Needed: No   Isolation: No. Infection: Not Applicable.     Vital Signs:   Vitals:    11/11/17 1451 11/11/17 1458   BP:  141/80   Pulse: 93    Resp: 15    Temp:  97.8  F (36.6  C)   TempSrc: Oral    SpO2: 95%    Weight: 95.3 kg (210 lb)        Cardiac Rhythm:  ,      Pain level:    Patient confused: No. Patient Falls Risk: Yes.   Elimination Status:has not voided  Patient Report - Initial Complaint: weakness. Focused Assessment:  Edi Villafana is a 72 year old male who presents to the emergency department today for evaluation of generalized weakness. The patient reports a month of generalized weakness and feeling sick from \"head to toe\". He reports having coughing spells over the past 6 months that occur around every 2 weeks. He states that he feels he can no longer take care of himself and wants to feel safe as he no longer feels comfortable at home due to his difficulties balancing and taking care of himself. The patient reports that he does not have any appetite and had one episode of nausea and vomiting a few weeks ago. He denies any urinary or bowel symptoms. The patient reports that he drinks 2 servings of vodka daily and denies having any symptoms of withdrawal in the past. He reports that he has been tremulous this week, which is new for him. The patient denies any fevers, specific pain, abdominal pain, chest pain, headaches, leg swelling, dizziness or " recent loss of consciousness. Of note, the patient last saw his primary care provider in June of 2017. He was seen at the [facility] last winter due to a fall and loss of consciousness and had no issues with his heart at that time. He had an MRI at the time and had no signs of stroke. This was attributed to his hypertension and he has not had any issues with this recently. The patient's friend reports that he is concerned that the patient has been depressed and stated that he does not want to continue living at one point. The friend believes that the patient drinks more than twice daily and the patient asked the friend to buy him alcohol recently, which has never happened in the past. The friend purchased the patient one bottle of vodka 3 days ago and the bottle is now empty. The friend also reports that the patient's girlfriend recently left him, which may be contributing to his symptoms.      Tests Performed: lab. Abnormal Results  Hepatic panel Resulted Abnormal Result -   Bilirubin Direct: 0.7 mg/dL [Ref Range: 0.0 - 0.2]  Bilirubin Total: 1.8 mg/dL [Ref Range: 0.2 - 1.3]  Albumin: 4.2 g/dL [Ref Range: 3.4 - 5.0]  Protein Total: 7.9 g/dL [Ref Range: 6.8 - 8.8]  Alkaline Phosphatase: 107 U/L [Ref Range: 40 - 150]  ALT: 111 U/L [Ref Range: 0 - 70]  AST: 127 U/L [Ref Range: 0 - 45]  Collected: 11/11/2017 14:52  Last updated: 11/11/2017 16:31     : .   Basic metabolic panel Resulted Abnormal Result -   Sodium: 125 mmol/L [Ref Range: 133 - 144]  Potassium: 3.8 mmol/L [Ref Range: 3.4 - 5.3]  Chloride: 89 mmol/L [Ref Range: 94 - 109]  Carbon Dioxide: 22 mmol/L [Ref Range: 20 - 32]  Anion Gap: 14 mmol/L [Ref Range: 3 - 14]  Glucose: 141 mg/dL [Ref Range: 70 - 99]  Urea Nitrogen: 20 mg/dL [Ref Range: 7 - 30]  Creatinine: 1.19 mg/dL [Ref Range: 0.66 - 1.25]  GFR Estimate: 60 mL/min/1.7m2 [Ref Range: >60] (Non  GFR Calc)  GFR Estimate If Black: 73 mL/min/1.7m2 [Ref Range: >60] ( GFR  Calc)  Calcium: 9.2 mg/dL [Ref Range: 8.5 - 10.1]  Collected: 11/11/2017 14:52  Last updated: 11/11/2017 15:56 FI     15:56 Magnesium Resulted Magnesium: 1.7 mg/dL [Ref Range: 1.6 - 2.3]  Collected: 11/11/2017 14:52  Last updated: 11/11/2017 15:56 FI    15:56 Troponin I Resulted Troponin I ES: <0.015 ug/L [Ref Range: 0.000 - 0.045] (The 99th percentile for upper reference range is 0.045 ug/L. Troponin values   in the range of 0.045 - 0.120 ug/L may be associated with risks of adverse   clinical events.   )       CBC with platelets differential Resulted WBC: 8.4 10e9/L [Ref Range: 4.0 - 11.0]  RBC Count: 5.28 10e12/L [Ref Range: 4.4 - 5.9]  Hemoglobin: 17.0 g/dL [Ref Range: 13.3 - 17.7]  Hematocrit: 47.2 % [Ref Range: 40.0 - 53.0]  MCV: 89 fl [Ref Range: 78 - 100]  MCH: 32.2 pg [Ref Range: 26.5 - 33.0]  MCHC: 36.0 g/dL [Ref Range: 31.5 - 36.5]  RDW: 12.6 % [Ref Range: 10.0 - 15.0]  Platelet Count: 208 10e9/L [Ref Range: 150 - 450]  Diff Method: Automated Method  % Neutrophils: 76.9 %  % Lymphocytes: 11.6 %  % Monocytes: 10.4 %  % Eosinophils: 0.2 %  % Basophils: 0.5 %  % Immature Granulocytes: 0.4 %  Nucleated RBCs: 0 /100 [Ref Range: 0]  Absolute Neutrophil: 6.5 10e9/L [Ref Range: 1.6 - 8.3]  Absolute Lymphocytes: 1.0 10e9/L [Ref Range: 0.8 - 5.3]  Absolute Monocytes: 0.9 10e9/L [Ref Range: 0.0 - 1.3]  Absolute Eosinophils: 0.0 10e9/L [Ref Range: 0.0 - 0.7]  Absolute Basophils: 0.0 10e9/L [Ref Range: 0.0 - 0.2]  Abs Immature Granulocytes: 0.0 10e9/L [Ref Range: 0 - 0.4]  Absolute Nucleated RBC: 0.0  Collected: 11/11/2017 14:52  Last updated: 11/11/2017 15:23     Glucose by meter Resulted Abnormal Result -   Glucose: 139 mg/dL [Ref Range: 70 - 99]  Collected: 11/11/2017 14:56  Last updated: 11/11/2017 15:00       Treatments provided: ativan  Family Comments: recent breakup. Friend at bedside  OBS brochure/video discussed/provided to patient:  N/A  ED Medications:   Medications   0.9% sodium chloride BOLUS (1,000 mLs  Intravenous New Bag 11/11/17 1539)   LORazepam (ATIVAN) tablet 0.5 mg (0.5 mg Oral Given 11/11/17 1539)     Drips infusing:  No  For the majority of the shift, the patient's behavior Yellow. Interventions performed: patient gets frustrated when asked more than a few questions at once. MD finally got patient to admit he has been drinking daily. Not participating in [facility] assessment.      Severe Sepsis OR Septic Shock Diagnosis Present: No      ED Nurse Name/Phone Number: Spring Kendell,   5:15 PM    RECEIVING UNIT ED HANDOFF REVIEW    Above ED Nurse Handoff Report was reviewed: yes  Reviewed by: Danni Chatman on November 11, 2017 at 5:58 PM

## 2017-11-11 NOTE — PROGRESS NOTES
SUBJECTIVE:   Edi Villafana is a 72 year old male presenting with a chief complaint of having weakness, fatigue, dizziness.  Onset of symptoms was a week ago or more, but the last 4 days have been even worsen .  Course of illness is worsening.    Severity moderately severe  Current and Associated symptoms: fatigue, dizziness  Treatment measures tried include trying to drink water, but he is not eating at all, he feels weak, malaise and shaky.  Predisposing factors include has not fell good for a few days.    Past Medical History:   Diagnosis Date     Decreased libido      Diverticulosis of colon (without mention of hemorrhage)      Generalized anxiety disorder      Insomnia, unspecified      Other and unspecified hyperlipidemia      Other specified congenital anomaly of kidney      Other testicular hypofunction      Subjective visual disturbance, unspecified      Unspecified cataract      Unspecified essential hypertension      Unspecified hypothyroidism         Allergies   Allergen Reactions     Ace Inhibitors Cough     Ancef [Cefazolin Sodium]      Atenolol Other (See Comments)     Low BP     Compazine      Sulfa Drugs      Tramadol Fatigue     Side effects         Social History   Substance Use Topics     Smoking status: Never Smoker     Smokeless tobacco: Never Used     Alcohol use Yes       ROS:  CONSTITUTIONAL:POSITIVE  for weak, fatigue  INTEGUMENTARY/SKIN: Positive for dry skin  ENT/MOUTH: NEGATIVE for ear, mouth and throat problems  RESP:NEGATIVE for significant cough or SOB  CV: NEGATIVE for chest pain, palpitations or peripheral edema  GI: Positive for loss as appetite, not eating  : normal menstrual cycles  MUSCULOSKELETAL: NEGATIVE for significant arthralgias or myalgia  NEURO: positive for dizziness, lightheadedness, weakness    OBJECTIVE  :/89  Pulse 97  Temp 96.7  F (35.9  C) (Oral)  SpO2 96%  GENERAL APPEARANCE: healthy, alert and no distress  EYES: EOMI,  PERRL, conjunctiva clear  HENT:  Positive for dry mouth  NECK: supple, nontender, no lymphadenopathy  RESP: lungs clear to auscultation - no rales, rhonchi or wheezes  CV: regular rates and rhythm, normal S1 S2, no murmur noted  ABDOMEN:  soft, nontender, no HSM or masses and bowel sounds normal  MS: Positive for shakiness upper and lower extremities  NEURO: Positive for shakiness, weakness, lightheadedness  SKIN: no suspicious lesions or rashes    ASSESSMENT/PLAN:      ICD-10-CM    1. Weakness R53.1    2. Shakiness R25.1    3. Anorexia R63.0    4. Malaise and fatigue R53.81     R53.83      Patient warrants being seen in the ED  He feels like hes very sick and does not feel like he can go home  He may need admission  See orders in Epic

## 2017-11-11 NOTE — IP AVS SNAPSHOT
[place] [address] [facility]    [address]    [facility] [address]    Phone:  [phone]    Fax:  [phone]                                       After Visit Summary   11/11/2017    Edi Villafana    MRN: [id number]           After Visit Summary Signature Page     I have received my discharge instructions, and my questions have been answered. I have discussed any challenges I see with this plan with the nurse or doctor.    ..........................................................................................................................................  Patient/Patient Representative Signature      ..........................................................................................................................................  Patient Representative Print Name and Relationship to Patient    ..................................................               ................................................  Date                                            Time    ..........................................................................................................................................  Reviewed by Signature/Title    ...................................................              ..............................................  Date                                                            Time

## 2017-11-11 NOTE — IP AVS SNAPSHOT
"          [place] [address] [facility]: [phone]                                              INTERAGENCY TRANSFER FORM - LAB / IMAGING / EKG / EMG RESULTS   2017                    Hospital Admission Date: 2017  MAURICE RODRIGUEZVENKATESH   : 1945  Sex: Male        Attending Provider: Edin Kang DO     Allergies:  Ace Inhibitors, Ancef [Cefazolin Sodium], Atenolol, Compazine, Sulfa Drugs, Tramadol    Infection:  None   Service:  GENERAL MEDI    Ht:  1.905 m (6' 3\")   Wt:  95.3 kg (210 lb)   Admission Wt:  95.3 kg (210 lb)    BMI:  26.25 kg/m 2   BSA:  2.25 m 2            Patient PCP Information     Provider PCP Type    Enrique Lucia MD General      Unresulted Labs     None      Encounter-Level Documents:     There are no encounter-level documents.      Order-Level Documents:     There are no order-level documents.      "

## 2017-11-12 LAB
ALBUMIN SERPL-MCNC: 3.3 G/DL (ref 3.4–5)
ALP SERPL-CCNC: 79 U/L (ref 40–150)
ALT SERPL W P-5'-P-CCNC: 79 U/L (ref 0–70)
ANION GAP SERPL CALCULATED.3IONS-SCNC: 8 MMOL/L (ref 3–14)
AST SERPL W P-5'-P-CCNC: 82 U/L (ref 0–45)
BILIRUB SERPL-MCNC: 1.7 MG/DL (ref 0.2–1.3)
BUN SERPL-MCNC: 16 MG/DL (ref 7–30)
CALCIUM SERPL-MCNC: 8 MG/DL (ref 8.5–10.1)
CHLORIDE SERPL-SCNC: 96 MMOL/L (ref 94–109)
CO2 SERPL-SCNC: 26 MMOL/L (ref 20–32)
CREAT SERPL-MCNC: 1.14 MG/DL (ref 0.66–1.25)
ERYTHROCYTE [DISTWIDTH] IN BLOOD BY AUTOMATED COUNT: 12.9 % (ref 10–15)
GFR SERPL CREATININE-BSD FRML MDRD: 63 ML/MIN/1.7M2
GLUCOSE SERPL-MCNC: 83 MG/DL (ref 70–99)
HCT VFR BLD AUTO: 40.5 % (ref 40–53)
HGB BLD-MCNC: 14.3 G/DL (ref 13.3–17.7)
LIPASE SERPL-CCNC: 2767 U/L (ref 73–393)
MCH RBC QN AUTO: 32.5 PG (ref 26.5–33)
MCHC RBC AUTO-ENTMCNC: 35.3 G/DL (ref 31.5–36.5)
MCV RBC AUTO: 92 FL (ref 78–100)
PLATELET # BLD AUTO: 156 10E9/L (ref 150–450)
POTASSIUM SERPL-SCNC: 3.7 MMOL/L (ref 3.4–5.3)
PROT SERPL-MCNC: 6 G/DL (ref 6.8–8.8)
RBC # BLD AUTO: 4.4 10E12/L (ref 4.4–5.9)
SODIUM SERPL-SCNC: 130 MMOL/L (ref 133–144)
WBC # BLD AUTO: 5.6 10E9/L (ref 4–11)

## 2017-11-12 PROCEDURE — 83690 ASSAY OF LIPASE: CPT | Performed by: INTERNAL MEDICINE

## 2017-11-12 PROCEDURE — 99207 ZZC CDG-MDM COMPONENT: MEETS LOW - DOWN CODED: CPT | Performed by: INTERNAL MEDICINE

## 2017-11-12 PROCEDURE — 25000128 H RX IP 250 OP 636: Performed by: INTERNAL MEDICINE

## 2017-11-12 PROCEDURE — 25000125 ZZHC RX 250: Performed by: INTERNAL MEDICINE

## 2017-11-12 PROCEDURE — A9270 NON-COVERED ITEM OR SERVICE: HCPCS | Mod: GY | Performed by: INTERNAL MEDICINE

## 2017-11-12 PROCEDURE — 36415 COLL VENOUS BLD VENIPUNCTURE: CPT | Performed by: INTERNAL MEDICINE

## 2017-11-12 PROCEDURE — 25000132 ZZH RX MED GY IP 250 OP 250 PS 637: Mod: GY | Performed by: INTERNAL MEDICINE

## 2017-11-12 PROCEDURE — 85027 COMPLETE CBC AUTOMATED: CPT | Performed by: INTERNAL MEDICINE

## 2017-11-12 PROCEDURE — 80053 COMPREHEN METABOLIC PANEL: CPT | Performed by: INTERNAL MEDICINE

## 2017-11-12 PROCEDURE — 99232 SBSQ HOSP IP/OBS MODERATE 35: CPT | Performed by: INTERNAL MEDICINE

## 2017-11-12 PROCEDURE — 12000000 ZZH R&B MED SURG/OB

## 2017-11-12 RX ORDER — OXYCODONE HYDROCHLORIDE 5 MG/1
5 TABLET ORAL EVERY 4 HOURS PRN
Status: DISCONTINUED | OUTPATIENT
Start: 2017-11-12 | End: 2017-11-14

## 2017-11-12 RX ORDER — OXYCODONE HYDROCHLORIDE 5 MG/1
5-10 TABLET ORAL
Status: DISCONTINUED | OUTPATIENT
Start: 2017-11-12 | End: 2017-11-12

## 2017-11-12 RX ORDER — LEVOTHYROXINE SODIUM 125 UG/1
125 TABLET ORAL DAILY
Status: DISCONTINUED | OUTPATIENT
Start: 2017-11-12 | End: 2017-11-16 | Stop reason: HOSPADM

## 2017-11-12 RX ORDER — LOSARTAN POTASSIUM 25 MG/1
25 TABLET ORAL DAILY
Status: DISCONTINUED | OUTPATIENT
Start: 2017-11-12 | End: 2017-11-16 | Stop reason: HOSPADM

## 2017-11-12 RX ORDER — SODIUM CHLORIDE 9 MG/ML
INJECTION, SOLUTION INTRAVENOUS CONTINUOUS
Status: ACTIVE | OUTPATIENT
Start: 2017-11-12 | End: 2017-11-13

## 2017-11-12 RX ORDER — TRAMADOL HYDROCHLORIDE 50 MG/1
50 TABLET ORAL EVERY 6 HOURS PRN
Status: DISCONTINUED | OUTPATIENT
Start: 2017-11-12 | End: 2017-11-12

## 2017-11-12 RX ADMIN — THIAMINE HYDROCHLORIDE: 100 INJECTION, SOLUTION INTRAMUSCULAR; INTRAVENOUS at 20:23

## 2017-11-12 RX ADMIN — SODIUM CHLORIDE: 9 INJECTION, SOLUTION INTRAVENOUS at 05:56

## 2017-11-12 RX ADMIN — SODIUM CHLORIDE: 9 INJECTION, SOLUTION INTRAVENOUS at 15:04

## 2017-11-12 RX ADMIN — LOSARTAN POTASSIUM 25 MG: 25 TABLET, FILM COATED ORAL at 13:43

## 2017-11-12 RX ADMIN — Medication 1 SPRAY: at 16:57

## 2017-11-12 RX ADMIN — SODIUM CHLORIDE: 9 INJECTION, SOLUTION INTRAVENOUS at 11:11

## 2017-11-12 RX ADMIN — LORAZEPAM 1 MG: 2 INJECTION INTRAMUSCULAR at 06:13

## 2017-11-12 RX ADMIN — LORAZEPAM 1 MG: 2 INJECTION INTRAMUSCULAR at 22:35

## 2017-11-12 RX ADMIN — LORAZEPAM 1 MG: 1 TABLET ORAL at 15:55

## 2017-11-12 RX ADMIN — Medication 1 SPRAY: at 11:32

## 2017-11-12 RX ADMIN — LEVOTHYROXINE SODIUM 125 MCG: 125 TABLET ORAL at 11:10

## 2017-11-12 RX ADMIN — LORAZEPAM 2 MG: 2 INJECTION INTRAMUSCULAR at 01:25

## 2017-11-12 RX ADMIN — LORAZEPAM 1 MG: 1 TABLET ORAL at 11:37

## 2017-11-12 RX ADMIN — OXYCODONE HYDROCHLORIDE 5 MG: 5 TABLET ORAL at 22:30

## 2017-11-12 ASSESSMENT — ACTIVITIES OF DAILY LIVING (ADL)
ADLS_ACUITY_SCORE: 18
ADLS_ACUITY_SCORE: 17

## 2017-11-12 NOTE — H&P
"[facility]  Hospitalist Admission Note    Name: Edi Villafana      MRN: [id number]  YOB: 1945    Age: 72 year old  Date of admission: 11/11/2017  Primary care provider: Enrique Lucia            Assessment and Plan:   Edi Villafana is a 72 year old male with a history of HTN, Hyperlipidemia, hypothyroidism who presents with falls.    1. Acute pancreatitis.  Likely due to alcohol abuse.  NPO.  IV fluids.  Pain meds PRN.    2.  Alcohol withdrawals.  Appears to be in active withdrawals at this time.  Ativan withdrawal protocol.  IV fluids.  IV Vitamins.    3.  Alcohol abuse.  Chem dep consult.    4.  HTN.  Hold Cozaar while NPO.  IV Hydralazine PRN.    5.  Hyperlipidemia.  Lipitor.    6.  Depression.  He does not feel need to speak with Psych at this point.  Initially will be on Ativan for alcohol withdrawal protocol.    7.  Hyponatremia.  Suspect due to dehydration from poor fluid intact recently, and ongoing alcohol abuse.  Check TSH.  IV fluids.  Recheck Na this evening.      8.  Hypothyroidism.  Hold Synthroid while NPO.  Check TSH.    9.  Weakness.  PT consult.    Code status: Full  Admit to Inpatient  Prophylaxis: PCD's            Chief Complaint:   Weakness         History of Present Illness:   Edi Villafana is a 72 year old male who presents with weakness.  Has felt weak for the last month.  Has  no appetite.  Poor food and fluid intake.  Urinating less than usual.  Only mild occasional abdominal pain.  Mostly a \"quesy\" sensation fairly often.  Still able to drink \"a couple\" alcoholic beverages per day of distilled liquor.  Under a lot of stress recently.  Feels depressed.  No fevers or chills.  Nothing he has done has helped with abdominal pain or appetite.  Trying to eat makes symptoms worse.  No other complaints.          Past Medical History:     Past Medical History:   Diagnosis Date     Decreased libido      Diverticulosis of colon (without mention of hemorrhage)  "     Generalized anxiety disorder      Insomnia, unspecified      Other and unspecified hyperlipidemia      Other specified congenital anomaly of kidney      Other testicular hypofunction      Subjective visual disturbance, unspecified      Unspecified cataract      Unspecified essential hypertension      Unspecified hypothyroidism              Past Surgical History:     Past Surgical History:   Procedure Laterality Date     ABDOMEN SURGERY  1973    large benign tumor removed with thrombophlebitis post op     APPENDECTOMY  1960     CATARACT IOL, RT/LT      bilateral     EYE SURGERY  [id number], 2007    eyelid surgery, 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 Auth Provider   LORazepam (ATIVAN) 2 MG tablet TAKE 1 TABLET BY MOUTH EVERY 8 HOURS AS NEEDED FOR ANXIETY. 11/11/2017 at 0800 Yes Enrique Lucia MD   atorvastatin (LIPITOR) 40 MG tablet TAKE 1 TABLET (40 MG) BY MOUTH DAILY 11/10/2017 at Unknown time Yes Enrique Lucia MD   zolpidem (AMBIEN) 10 MG tablet TAKE 1 TAB BY MOUTH NIGHTLY AS NEEDED FOR SLEEP 11/10/2017 at Unknown time Yes Enrique Lucia MD   levothyroxine (SYNTHROID/LEVOTHROID) 125 MCG tablet Take 1 tablet (125 mcg) by mouth daily 11/10/2017 at Unknown time Yes Enrique Lucia MD   losartan (COZAAR) 50 MG tablet Take 0.5 tablets (25 mg) by mouth daily Past Week at Unknown time Yes Enrique Lucia MD   aspirin 325 MG tablet Take 0.5 mg  by mouth daily. Past Week at Unknown time Yes Reported, Patient   Cholecalciferol (VITAMIN D3) 2000 UNITS CAPS Take 1 capsule by mouth daily  Past Week at Unknown time Yes Reported, Patient   Multiple Vitamin (MULTIVITAMIN OR) Take 1 tablet by mouth daily  Past Month at Unknown time Yes Reported, Patient             Review of Systems:   A Comprehensive greater than 10 system review of systems was carried out.  Pertinent positives and negatives are noted above.  Otherwise negative for contributory information.           Physical Exam:   Blood pressure 148/82, pulse 93, temperature 96.7  F (35.9  C), temperature source Oral, resp. rate 16, weight 95.3 kg (210 lb), SpO2 98 %.  Wt Readings from Last 1 Encounters:   11/11/17 95.3 kg (210 lb)     Exam:  GENERAL: No apparent distress. Awake, alert, and fully oriented.  HEENT: Normocephalic, atraumatic. Extraocular movements intact.  CARDIOVASCULAR: Regular rate and rhythm without murmurs or rubs. No S3.  PULMONARY: Clear to auscultation bilaterally.  ABDOMINAL: Soft, non-tender, non-distended. Bowel sounds normoactive.   EXTREMITIES: No cyanosis or clubbing. No appreciable edema.  NEUROLOGICAL: CN 2-12 grossly intact, no focal neurological deficits.  DERMATOLOGICAL: No rash, ulcer, bruising, nor jaundice.          Data:     Laboratory:    Recent Labs  Lab 11/11/17  1452   WBC 8.4   HGB 17.0   HCT 47.2   MCV 89          Recent Labs  Lab 11/11/17  1452   *   POTASSIUM 3.8   CHLORIDE 89*   CO2 22   ANIONGAP 14   *   BUN 20   CR 1.19   GFRESTIMATED 60*   GFRESTBLACK 73   CECY 9.2     No results for input(s): CULT in the last 168 hours.    Imaging:  Recent Results (from the past 24 hour(s))   Abdomen US, complete    Narrative    US ABDOMEN COMPLETE 11/11/2017 6:27 PM    CLINICAL INFORMATION: Pain.    FINDINGS:  Liver:  Fatty infiltration. No focal liver lesions.    Biliary system:  No biliary duct dilatation in the intrahepatic ducts.  The common hepatic and  common bile duct are not visualized.    Gallbladder:  Normal. No gallstones. No gallbladder wall thickening or  tenderness during scanning over the gallbladder.    Pancreas:  Visualized portions are normal.    Kidneys:  Right kidney appears normal 13.5 cm in length. 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    Spleen:  Obscured by bowel gas.    Visualized proximal aorta and IVC within normal limits.      Impression    IMPRESSION:    1. Fatty liver.  2. No acute abnormality identified. No gallstones. Extrahepatic bile  ducts not visualized in this exam.

## 2017-11-12 NOTE — CONSULTS
CD consult acknowledged. Per EMR, patient has Medicare and would need to seek chem dep services from [facility] program as they are only Medicare approved facility for chem dep treatment services. Social work can assist with resources and referral.

## 2017-11-12 NOTE — PLAN OF CARE
VSS. Denies pain or any other issue. Anxious, on CIWA. Scoring 6-13. 5 mg given this shift. Strict NPO maintained per MD order for pancreatitis. Infuvite at 100 mls/hour.

## 2017-11-12 NOTE — PLAN OF CARE
Problem: Patient Care Overview  Goal: Plan of Care/Patient Progress Review  Outcome: Improving     VSS, afebrile. Pt denies pain. A/Ox4, but forgetful. Alarms on for safety. A1, weak. Up to chair in afternoon. Voiding once this shift, no BM this shift. On CIWA, PRN Ativan x1 per protocol. IVF infusing at 75ml/hr. Diet changed to clears, tolerating well. Hoarse voice, ordered magic mouth spray, rec'd once PRN.

## 2017-11-12 NOTE — PROGRESS NOTES
[facility]  Hospitalist Progress Note  Charles Rodarte MD 11/12/2017    Reason for Stay (Diagnosis): pancreatitis, etoh withdrawal         Assessment and Plan:      Summary of Stay: Edi Villafana is a 72 year old male with a history of HTN, Hyperlipidemia, hypothyroidism who presents with falls.     1. Acute pancreatitis.  Likely due to alcohol abuse.  improved.  Start clears.  IV fluids.  Pain meds PRN.     2.  Alcohol withdrawals.  Continue CIWA.  Tremors without hallucinations.  IV fluids.  IV Vitamins.     3.  Alcohol abuse.  Chem dep consult.  Seems to have poor insight/denial (or not willing to admit) the amount and effect of his alcohol use     4.  HTN.  resume cozaar.  IV Hydralazine PRN.     5.  Hyperlipidemia.  Lipitor.     6.  Depression.  He does not feel need to speak with Psych at this point.  Initially will be on Ativan for alcohol withdrawal protocol.     7.  Hyponatremia.  Suspect due to dehydration from poor fluid intact recently, and ongoing alcohol abuse.         8.  Hypothyroidism.  TSH very elevated.  Pt admits to missing doses of Synthroid.  Suspect poor compliance.  Reiterated with him importance of taking meds every day.  Should have repeat TSH in 4-6 weeks     9.  Weakness.  secondary to ETOH use     Code status: Full  Admit to Inpatient  Prophylaxis: PCD's        Interval History (Subjective):      + tremors.  Seems to be minimizing ETOH use.  Hoarse voice - started yesterday.  No abd pain                  Physical Exam:      Last Vital Signs:  /84 (BP Location: Left arm)  Pulse 93  Temp 97.6  F (36.4  C) (Oral)  Resp 16  Wt 95.3 kg (210 lb)  SpO2 97%  BMI 26.25 kg/m2      Intake/Output Summary (Last 24 hours) at 11/12/17 1302  Last data filed at 11/12/17 1111   Gross per 24 hour   Intake             1603 ml   Output                0 ml   Net             1603 ml       Constitutional: Awake, alert, cooperative, no apparent distress.  Voice hoarse.  + tremor    Respiratory: Clear to auscultation bilaterally, no crackles or wheezing   Cardiovascular: Regular rate and rhythm, normal S1 and S2, and no murmur noted   Abdomen: Normal bowel sounds, soft, non-distended, non-tender   Skin: No rashes, no cyanosis, dry to touch   Neuro: Alert and oriented x3, no weakness, numbness, memory loss   Extremities: No edema, normal range of motion   Other(s):        All other systems: Negative          Medications:      All current medications were reviewed with changes reflected in problem list.         Data:      All new lab and imaging data was reviewed.   Labs:    Recent Labs  Lab 11/12/17  0649   WBC 5.6   HGB 14.3   HCT 40.5   MCV 92         Imaging:   Recent Results (from the past 24 hour(s))   Abdomen US, complete    Narrative    US ABDOMEN COMPLETE 11/11/2017 6:27 PM    CLINICAL INFORMATION: Pain.    FINDINGS:  Liver:  Fatty infiltration. No focal liver lesions.    Biliary system:  No biliary duct dilatation in the intrahepatic ducts.  The common hepatic and common bile duct are not visualized.    Gallbladder:  Normal. No gallstones. No gallbladder wall thickening or  tenderness during scanning over the gallbladder.    Pancreas:  Visualized portions are normal.    Kidneys:  Right kidney appears normal 13.5 cm in length. 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    Spleen:  Obscured by bowel gas.    Visualized proximal aorta and IVC within normal limits.      Impression    IMPRESSION:    1. Fatty liver.  2. No acute abnormality identified. No gallstones. Extrahepatic bile  ducts not visualized in this exam.    ERI ARAUJO MD

## 2017-11-13 ENCOUNTER — APPOINTMENT (OUTPATIENT)
Dept: PHYSICAL THERAPY | Facility: CLINIC | Age: 72
DRG: 439 | End: 2017-11-13
Payer: MEDICARE

## 2017-11-13 LAB — INTERPRETATION ECG - MUSE: NORMAL

## 2017-11-13 PROCEDURE — 12000000 ZZH R&B MED SURG/OB

## 2017-11-13 PROCEDURE — 25000132 ZZH RX MED GY IP 250 OP 250 PS 637: Mod: GY | Performed by: INTERNAL MEDICINE

## 2017-11-13 PROCEDURE — 25000125 ZZHC RX 250: Performed by: INTERNAL MEDICINE

## 2017-11-13 PROCEDURE — 99207 ZZC CDG-MDM COMPONENT: MEETS LOW - DOWN CODED: CPT | Performed by: INTERNAL MEDICINE

## 2017-11-13 PROCEDURE — 40000193 ZZH STATISTIC PT WARD VISIT

## 2017-11-13 PROCEDURE — 90791 PSYCH DIAGNOSTIC EVALUATION: CPT | Performed by: PSYCHOLOGIST

## 2017-11-13 PROCEDURE — 97116 GAIT TRAINING THERAPY: CPT | Mod: GP

## 2017-11-13 PROCEDURE — 97161 PT EVAL LOW COMPLEX 20 MIN: CPT | Mod: GP

## 2017-11-13 PROCEDURE — 99232 SBSQ HOSP IP/OBS MODERATE 35: CPT | Performed by: INTERNAL MEDICINE

## 2017-11-13 PROCEDURE — 25000128 H RX IP 250 OP 636: Performed by: INTERNAL MEDICINE

## 2017-11-13 PROCEDURE — 97112 NEUROMUSCULAR REEDUCATION: CPT | Mod: GP

## 2017-11-13 PROCEDURE — A9270 NON-COVERED ITEM OR SERVICE: HCPCS | Mod: GY | Performed by: INTERNAL MEDICINE

## 2017-11-13 PROCEDURE — 97750 PHYSICAL PERFORMANCE TEST: CPT | Mod: GP

## 2017-11-13 RX ADMIN — LORAZEPAM 1 MG: 1 TABLET ORAL at 21:55

## 2017-11-13 RX ADMIN — LOSARTAN POTASSIUM 25 MG: 25 TABLET, FILM COATED ORAL at 08:13

## 2017-11-13 RX ADMIN — LEVOTHYROXINE SODIUM 125 MCG: 125 TABLET ORAL at 08:13

## 2017-11-13 RX ADMIN — LORAZEPAM 1 MG: 1 TABLET ORAL at 20:36

## 2017-11-13 RX ADMIN — OXYCODONE HYDROCHLORIDE 5 MG: 5 TABLET ORAL at 20:04

## 2017-11-13 RX ADMIN — THIAMINE HYDROCHLORIDE: 100 INJECTION, SOLUTION INTRAMUSCULAR; INTRAVENOUS at 20:25

## 2017-11-13 RX ADMIN — LORAZEPAM 1 MG: 1 TABLET ORAL at 16:11

## 2017-11-13 RX ADMIN — LORAZEPAM 1 MG: 1 TABLET ORAL at 10:07

## 2017-11-13 RX ADMIN — OXYCODONE HYDROCHLORIDE 5 MG: 5 TABLET ORAL at 16:05

## 2017-11-13 RX ADMIN — OXYCODONE HYDROCHLORIDE 5 MG: 5 TABLET ORAL at 10:07

## 2017-11-13 RX ADMIN — LORAZEPAM 1 MG: 1 TABLET ORAL at 06:22

## 2017-11-13 RX ADMIN — OXYCODONE HYDROCHLORIDE 5 MG: 5 TABLET ORAL at 02:01

## 2017-11-13 RX ADMIN — LORAZEPAM 1 MG: 2 INJECTION INTRAMUSCULAR at 02:01

## 2017-11-13 ASSESSMENT — ACTIVITIES OF DAILY LIVING (ADL)
ADLS_ACUITY_SCORE: 11
ADLS_ACUITY_SCORE: 11
ADLS_ACUITY_SCORE: 17
ADLS_ACUITY_SCORE: 17
ADLS_ACUITY_SCORE: 11
ADLS_ACUITY_SCORE: 17

## 2017-11-13 ASSESSMENT — PAIN DESCRIPTION - DESCRIPTORS
DESCRIPTORS: ACHING
DESCRIPTORS: ACHING

## 2017-11-13 NOTE — PROGRESS NOTES
11/13/17 1000   Signing Clinician's Name / Credentials   Signing clinician's name / credentials Sallie Chaudhary DPT   Monson Balance Scale (ALEXANDREA CHAVEZ, MARLON S, BENY NICK, MARCO ANTONIO MONACO: MEASURING BALANCE IN THE ELDERLY: VALIDATION OF AN INSTRUMENT. CAN. J. PUB. HEALTH, JULY/AUGUST SUPPLEMENT 2:S7-11, 1992.)   Sit To Stand 3   Standing Unsupported 3   Sitting Unsupported 4   Stand to Sit 3   Transfers 3   Standing with Eyes Closed 3   Standing Unsupported, Feet Together 3   Reach Forward With Outstretched Arm 3   Retrieve Object From Floor 3   Turning to Look Behind 3   Turn 360 Degrees 1   Placing Alternate Foot on Stool (4-6 inches) 2   Unsupported Tandem Stand (Demonstrate to Subject) 3   One Leg Stand 2   Total Score (A score of 45 or less has been correlated with an increased risk of falls)   Total Score (out of 56) 39

## 2017-11-13 NOTE — PLAN OF CARE
Problem: Patient Care Overview  Goal: Plan of Care/Patient Progress Review  Outcome: Improving     VSS, afebrile. Pt denies pain. A/Ox4, but forgetful. Alarms on for safety. A1, weak. Up to chair. Ambulated in halls with PT. Voiding well, last BM yesterday. On CIWA, PRN Ativan x1 per protocol. C/o abdominal pain 5/10, rec'd Oxycodone x1 PRN with relief. Pt sleeping after with little motivation for activity/ambulation. PIV saline locked. Diet changed to fulls, continues to request clears. Hoarse voice. Psych and PT consulted.

## 2017-11-13 NOTE — PLAN OF CARE
Problem: Patient Care Overview  Goal: Plan of Care/Patient Progress Review  Discharge Planner PT      PT: Orders received, chart reviewed, eval completed, and treatment initiated. Pt is 73 yo male admitted with complaints of generalized weakness over the past month and was found to have acute pancreatitis and alocohol withdrawls.  Pt reports history of recent frequent falls over the past month. PMH includes acute pancreatitis, hypertension, hyperlipidemia, hypothyroidism, and depression. Pt reports living alone in a two story home and states he does not feel safe returning home without assistance and reports none having any family/friends available/willing to assist.      Patient plan for discharge: Pt states TCU  Current status: Pt requires CGA/SBA for bed mobility and sit to/from stand transfers. Pt ambulates 300ft with close CGA and demonstrating scissoring and path deviations with frequent small losses of balance in busier areas and when changing direction. Pt has one large loss of balance requiring mod/min A to correct while walking straight. LECHUGA score of 39/56 indicating increased risk of falls.   Barriers to return to prior living situation: Pt lives alone, recent history of frequent falls, high falls risk, decreased endurance  Recommendations for discharge: TCU   Rationale for recommendations: Pt would benefit from continued PT intervention to address functional limitations and restrictions to improve safety and independence prior to returning home.        Entered by: Sallie Chaudhary 11/13/2017 10:53 AM

## 2017-11-13 NOTE — CONSULTS
"Patient is a 72 year old single male admitted due to acute pancreatitis and a recent history of falls. Psychiatry consult was requested by Dr. Rodarte due to concerns regarding anxiety and depression.  Consult lasted 60 minutes and included direct meeting with patient and pre and post discussion with nursing staff.    History    Patient's chart was reviewed for history and physical exam results.  He is originally from [place] and has two sisters, one of whom  when she was 32.  His other sister still lives in [place], and he does not have a relationship with her.  Both parents are .  He was never  and has no children.  He has one nephew who lives in [place], but he does not maintain a relationship with him and and his family.  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    He indicates that there is a family history of depression and that he has suffered from depressed mood beginning in the .  He states, \"I have tried all the SSRIs an none of them seemed to help me.  He is currently on Ativan, prn, which he feels has been helpful but less so over time.  He acknowledges drinking two to three shots of alcohol daily but does not feel he has an issue with alcohol.  In fact, he appears to become agitated when this topic is broached with him. During the course of the interview, he did state, \"I am done drinking.\"  He realizes the relationship between his drinking and development of pancreatitis and indicates he does not wish to shorten his life.    He has a very limited support network.  He has limited contact with his family.  He belongs to a book club [facility] meets once monthly.  He states he has some " "contact with members of the group at other times but is not close to any of them. He lives in a house and had a roommate who moved out.  He states he likes living with another person and is hopeful that his landlord will find someone to move in with him.      Strengths include his overall intelligence and desire to make some positive changes in his life.  A major liability appears his tendency to push others away and difficulty accepting direct feedback regarding his behavior and possible consequences as a result.    Mental Status Exam    Patient was oriented in all spheres.  His memory, both recent and remote is intact.  He was able to effectively attend during the assessment and concentrated on what was being discussed.  His language and overall fund of knowledge are intact.  His mood was initially somewhat antagonistic but he seemed to warm up as the conversation continues.  He acknowledged feelings of anxiety and mild depression due to concern regarding future planning.  He indicated that he was feeling \"tense\" today and was hoping to continue the conversation at a future time.  I indicated that another consultant would be here tomorrow.  At first, he was hesitant to see someone else, but later noted it could be valuable to discuss future planning with another mental health professional.    Diagnostic Impression    Generalized Anxiety Disorder  Dysthymic Disorder    Disposition    Patient denies any desire to harm himself.  He affirmatively expressed the knowledge that he needs to stop drinking and stated that he would due to its impact on his health.    It appears that going to a transitional care facility for further rehabilitation is an appropriate recommendation.  He also is interested in resources he could avail himself of once he returns home.  He was also mildly interested in continuing in some form of counseling/therapy once he returns home.  He did not appear interested in any trials of SSRIs but feels " the Ativan has been helpful.    He also requested a follow up meeting if possible tomorrow to further explore long term options.  After his initial reluctance to share information he became more forthcoming over time.  Therefore, another opportunity to talk with someone regarding future options would be useful for him.   may also be helpful in appraising him of community resources.

## 2017-11-13 NOTE — PROGRESS NOTES
11/13/17 1025   Quick Adds   Type of Visit Initial PT Evaluation   Living Environment   Lives With alone   Living Arrangements house   Home Accessibility stairs to enter home;stairs within home   Number of Stairs to Enter Home 2   Number of Stairs Within Home 14   Living Environment Comment Pt states he lives alone. He states he has family in the area but no one to provide assistance or support.    Self-Care   Usual Activity Tolerance moderate   Current Activity Tolerance moderate   Regular Exercise no   Equipment Currently Used at Home none   Functional Level Prior   Ambulation 0-->independent   Transferring 0-->independent   Toileting 0-->independent   Bathing 0-->independent   Dressing 0-->independent   Eating 0-->independent   Communication 0-->understands/communicates without difficulty   Swallowing 0-->swallows foods/liquids without difficulty   Cognition 0 - no cognition issues reported   Fall history within last six months yes   Number of times patient has fallen within last six months 4  (pt states multiple/frequent falls)   Which of the above functional risks had a recent onset or change? transferring;ambulation;fall history   General Information   Onset of Illness/Injury or Date of Surgery - Date 11/11/17   Referring Physician Dr. Guy Londono   Patient/Family Goals Statement Pt states TCU, states he doesn't feel safe going home alone   Pertinent History of Current Problem (include personal factors and/or comorbidities that impact the POC) Pt is 71 yo male admitted with complaints of generalized weakness over the past month and was found to have acute pancreatitis and alocohol withdrawls.  Pt reports history of recent frequent falls over the past month. PMH includes acute pancreatitis, hypertension, hyperlipidemia, hypothyroidism, and depression.    Precautions/Limitations fall precautions   Weight-Bearing Status - LLE full weight-bearing   Weight-Bearing Status - RLE full weight-bearing   Cognitive  "Status Examination   Orientation orientation to person, place and time   Level of Consciousness alert;confused   Follows Commands and Answers Questions 100% of the time;able to follow multistep instructions   Personal Safety and Judgment intact   Memory impaired   Pain Assessment   Patient Currently in Pain Yes, see Vital Sign flowsheet   Posture    Posture Forward head position;Protracted shoulders   Range of Motion (ROM)   ROM Comment Generally WFL   Strength   Strength Comments Generally 5/5   Bed Mobility   Bed Mobility Comments Pt performs with SBA   Transfer Skills   Transfer Comments Pt transfers with SBA   Gait   Gait Comments Pt ambulates 10ft in room with CGA and unsteady gait   Balance   Balance Comments good seated, fair standing   General Therapy Interventions   Planned Therapy Interventions balance training;bed mobility training;gait training;neuromuscular re-education;strengthening;transfer training;home program guidelines;progressive activity/exercise   Clinical Impression   Criteria for Skilled Therapeutic Intervention yes, treatment indicated   PT Diagnosis Difficulty with gait   Influenced by the following impairments Pt presents with impaired balance and history of falls, decreased functional capacity, confusion and impaired safety.    Functional limitations due to impairments Pt demonstrates increased difficulty with bed mobility, transfers, and amulation   Clinical Presentation Stable/Uncomplicated   Clinical Presentation Rationale clinical observation   Clinical Decision Making (Complexity) Low complexity   Therapy Frequency` daily   Predicted Duration of Therapy Intervention (days/wks) 3 days   Anticipated Discharge Disposition Transitional Care Facility;Other (see comments)   Risk & Benefits of therapy have been explained Yes   Patient, Family & other staff in agreement with plan of care Yes   [facility] AM-PAC TM \"6 Clicks\"   2016, Trustees of [facility], under license to " "[employer].  All rights reserved.   6 Clicks Short Forms Basic Mobility Inpatient Short Form   [facility] AM-PAC  \"6 Clicks\" V.2 Basic Mobility Inpatient Short Form   1. Turning from your back to your side while in a flat bed without using bedrails? 3 - A Little   2. Moving from lying on your back to sitting on the side of a flat bed without using bedrails? 3 - A Little   3. Moving to and from a bed to a chair (including a wheelchair)? 3 - A Little   4. Standing up from a chair using your arms (e.g., wheelchair, or bedside chair)? 3 - A Little   5. To walk in hospital room? 3 - A Little   6. Climbing 3-5 steps with a railing? 3 - A Little   Basic Mobility Raw Score (Score out of 24.Lower scores equate to lower levels of function) 18   Total Evaluation Time   Total Evaluation Time (Minutes) 15     "

## 2017-11-13 NOTE — CONSULTS
"Care Transition Initial Assessment - SW  Reason For Consult: discharge planning  Met with: Patient    Active Problems:    Pancreatitis         DATA  Lives With: alone  Living Arrangements: house  Description of Support System: Uninvolved  Resources List: Transitional Care   Quality Of Family Relationships: non-existent  Transportation Available: family or friend will provide    ASSESSMENT  Cognitive Status:  Appears alert and oriented.  Concerns to be addressed: SW consult to assist with dc planning needs.  SW met with pt and he states that he is \"scared to return home\" because he is feeling weak and sick. Pt agrees to TCU and feels that a short stay in TCU would be beneficial prior to returning home.  Pt lives in [place] and is agreeable to  sending TCU referrals to [facility], [facility], [facility], [facility]. Anticipate possible DC Tuesday 11/14/17. TCU referrals sent.       PLAN  Patient given options and choices for discharge: Yes .  Patient/family is agreeable to the plan? Yes    Patient Goals and Preferences: Home .  Patient anticipates discharging to:  TCU .          "

## 2017-11-13 NOTE — PLAN OF CARE
VSS. Denies pain or any other issue. Anxious, on CIWA. Scoring 5-8. 3 mg given this shift. Infuvite at 100 mls/hour. Tolerating clear diet well. Pain in left shoulder controlled with po oxycodone. Spoke to patient about anxiety and fear of going home, as well as life style modification and alcohol abstinence. Placed a sticky note to MD to have Psych eval, and SW matthieual.

## 2017-11-13 NOTE — PROGRESS NOTES
Paged re patient with pain and requesting alternative to tramadol as it previously has made him very queasy.  Has tolerated hydrocodone before.  Has acute pancreatitis and opiates indicated.  -oxycodone 5mg prn ordered, monitor

## 2017-11-13 NOTE — PROGRESS NOTES
[facility]  Hospitalist Progress Note  Charles Rodarte MD 11/13/2017    Reason for Stay (Diagnosis): ETOH abuse, withdrawals, pancreatitis, hepatitis         Assessment and Plan:      Summary of Stay: Edi Villafana is a 72 year old male with a history of HTN, Hyperlipidemia, hypothyroidism who presented with acute pancreatitis, ETOH withdrawals.    Symptoms appear to be improving.  Less tremor today, pain improved.      Pt appears to have a significant amount of denial revolving around his ETOH abuse and I suspect not being completely honest about his ETOH consumption, minimizing his use and the effects it is having on him.       1. Acute pancreatitis.  Likely due to alcohol abuse.  improved.  ADAT today.  Pain meds PRN.      2.  Alcohol withdrawals.  Continue CIWA.  Tremors without hallucinations.        3.  Alcohol abuse.  Chem dep consult noted.  SW consult.  Seems to have poor insight/denial (or not willing to admit) the amount and effect of his alcohol use, and becomes defensive and irritated when trying to point out the labs (ETOH pancreatitis, ETOH hepatitis), exam findings (ETOH withdrawal tremors) and imaging findings (fatty liver) that all indicate alcoholism      4.  HTN.  resume cozaar.  IV Hydralazine PRN.      5.  Hyperlipidemia.  Lipitor.      6.  Depression.  requesting psych consult.  Will order      7.  Hyponatremia.  Suspect due to dehydration from poor fluid intact recently, and ongoing alcohol abuse.          8.  Hypothyroidism.  TSH very elevated.  Pt admits to missing doses of Synthroid.  Suspect poor compliance.  Reiterated with him importance of taking meds every day.  Should have repeat TSH in 4-6 weeks      9.  Weakness.  secondary to ETOH use      Code status: Full  Admit to Inpatient  Prophylaxis: PCD's        Interval History (Subjective):      Angry with this provider - he feels I was too arrogant when talking with him about his ETOH abuse.  He does not feel that  his ETOH use is a big problem for him and he that he can easily quit drinking.  Appears to be minimizing his drinking.  Tremor improved.  No hallucinations                  Physical Exam:      Last Vital Signs:  /84  Pulse 93  Temp 97.5  F (36.4  C) (Oral)  Resp 18  Wt 95.3 kg (210 lb)  SpO2 95%  BMI 26.25 kg/m2      Intake/Output Summary (Last 24 hours) at 11/13/17 1443  Last data filed at 11/12/17 1740   Gross per 24 hour   Intake              287 ml   Output              425 ml   Net             -138 ml       Constitutional: Awake, alert, cooperative, no apparent distress.  Nurse Nanda present during entire visit with patient today.  Tremor improved.     Respiratory: Clear to auscultation bilaterally, no crackles or wheezing   Cardiovascular: Regular rate and rhythm, normal S1 and S2, and no murmur noted   Abdomen: Normal bowel sounds, soft, non-distended, non-tender   Skin: No rashes, no cyanosis, dry to touch   Neuro: Alert and oriented x3, no weakness, numbness, memory loss.  Mild tremor   Extremities: No edema, normal range of motion   Other(s):        All other systems: Negative          Medications:      All current medications were reviewed with changes reflected in problem list.         Data:      All new lab and imaging data was reviewed.   Labs:    Recent Labs  Lab 11/12/17  0649 11/11/17  1452   WBC 5.6 8.4   HGB 14.3 17.0   HCT 40.5 47.2   MCV 92 89    208      Imaging:   No results found for this or any previous visit (from the past 24 hour(s)).

## 2017-11-14 ENCOUNTER — APPOINTMENT (OUTPATIENT)
Dept: PHYSICAL THERAPY | Facility: CLINIC | Age: 72
DRG: 439 | End: 2017-11-14
Payer: MEDICARE

## 2017-11-14 PROCEDURE — 25000128 H RX IP 250 OP 636: Performed by: INTERNAL MEDICINE

## 2017-11-14 PROCEDURE — 99232 SBSQ HOSP IP/OBS MODERATE 35: CPT | Performed by: INTERNAL MEDICINE

## 2017-11-14 PROCEDURE — 25000132 ZZH RX MED GY IP 250 OP 250 PS 637: Mod: GY | Performed by: INTERNAL MEDICINE

## 2017-11-14 PROCEDURE — 97530 THERAPEUTIC ACTIVITIES: CPT | Mod: GP | Performed by: PHYSICAL THERAPY ASSISTANT

## 2017-11-14 PROCEDURE — 12000000 ZZH R&B MED SURG/OB

## 2017-11-14 PROCEDURE — A9270 NON-COVERED ITEM OR SERVICE: HCPCS | Mod: GY | Performed by: INTERNAL MEDICINE

## 2017-11-14 PROCEDURE — 25000125 ZZHC RX 250: Performed by: INTERNAL MEDICINE

## 2017-11-14 PROCEDURE — 99207 ZZC CDG-MDM COMPONENT: MEETS LOW - DOWN CODED: CPT | Performed by: INTERNAL MEDICINE

## 2017-11-14 PROCEDURE — 40000193 ZZH STATISTIC PT WARD VISIT: Performed by: PHYSICAL THERAPY ASSISTANT

## 2017-11-14 PROCEDURE — 97116 GAIT TRAINING THERAPY: CPT | Mod: GP | Performed by: PHYSICAL THERAPY ASSISTANT

## 2017-11-14 RX ORDER — OXYCODONE HYDROCHLORIDE 5 MG/1
5 TABLET ORAL EVERY 4 HOURS PRN
Status: DISCONTINUED | OUTPATIENT
Start: 2017-11-14 | End: 2017-11-15

## 2017-11-14 RX ORDER — OXYCODONE HYDROCHLORIDE 5 MG/1
5 TABLET ORAL EVERY 6 HOURS PRN
Status: DISCONTINUED | OUTPATIENT
Start: 2017-11-14 | End: 2017-11-14

## 2017-11-14 RX ADMIN — OXYCODONE HYDROCHLORIDE 5 MG: 5 TABLET ORAL at 19:11

## 2017-11-14 RX ADMIN — OXYCODONE HYDROCHLORIDE 5 MG: 5 TABLET ORAL at 00:18

## 2017-11-14 RX ADMIN — LORAZEPAM 1 MG: 1 TABLET ORAL at 19:14

## 2017-11-14 RX ADMIN — LOSARTAN POTASSIUM 25 MG: 25 TABLET, FILM COATED ORAL at 08:48

## 2017-11-14 RX ADMIN — LORAZEPAM 2 MG: 1 TABLET ORAL at 10:05

## 2017-11-14 RX ADMIN — OXYCODONE HYDROCHLORIDE 5 MG: 5 TABLET ORAL at 10:05

## 2017-11-14 RX ADMIN — THIAMINE HYDROCHLORIDE: 100 INJECTION, SOLUTION INTRAMUSCULAR; INTRAVENOUS at 20:15

## 2017-11-14 RX ADMIN — LEVOTHYROXINE SODIUM 125 MCG: 125 TABLET ORAL at 08:49

## 2017-11-14 RX ADMIN — OXYCODONE HYDROCHLORIDE 5 MG: 5 TABLET ORAL at 15:03

## 2017-11-14 RX ADMIN — OXYCODONE HYDROCHLORIDE 5 MG: 5 TABLET ORAL at 04:24

## 2017-11-14 RX ADMIN — LORAZEPAM 1 MG: 1 TABLET ORAL at 04:24

## 2017-11-14 ASSESSMENT — ACTIVITIES OF DAILY LIVING (ADL)
ADLS_ACUITY_SCORE: 11
ADLS_ACUITY_SCORE: 10
ADLS_ACUITY_SCORE: 11
ADLS_ACUITY_SCORE: 11

## 2017-11-14 NOTE — PROGRESS NOTES
SWS:  SW following to assist with dc planning.  Pt declined at [facility], [facility], [facility] and [facility] and [facility].  [facility] gave referral to sister facility in [place] and they accepted. SW spoke with pt who declines facility in [place] and requests that SW send TCU referral to [place].  SW sent referrals to [facility] and [facility] [address].

## 2017-11-14 NOTE — PLAN OF CARE
Problem: Pain, Acute (Adult)  Goal: Identify Related Risk Factors and Signs and Symptoms  Related risk factors and signs and symptoms are identified upon initiation of [employer] Clinical Practice Guideline (CPG).  Outcome: Improving  Pt admitted for pancreatitis. ETOH w/d.   CIWA 6,8 on shift.   Orientation: Alert and oriented x4.   VSS.   IVF: Vitamin bag  Diet: full liquid, pt reported feeling full after supper and nauseous.  Will hold off on advancing diet.   Tele:  none  LS: clear and equal bilaterally.   GI: Passing gas. LBM 11/12/2017  : Denies urinary symptoms. No apparent problems  Skin: warm, dry and intact  Activity: SBA1  Pain: Moderate pain to abd controlled with oxycodone and repositioning.

## 2017-11-14 NOTE — PLAN OF CARE
Problem: Patient Care Overview  Goal: Plan of Care/Patient Progress Review  A&Ox4, up with SBA, CIWA scores 9,5,9,8, with 3 mg of Ativan per protocol, IVF  infuvite infusing @100ml/hr, prn Oxycodone x2 for abdominal pain, full liquid diet, ambulated at hallway, Psych  And Chem dep following, will continue with POC.

## 2017-11-14 NOTE — PROGRESS NOTES
[facility]  Hospitalist Progress Note  Charles Rodarte MD 11/14/2017    Reason for Stay (Diagnosis): ETOH withdrawal         Assessment and Plan:      Summary of Stay: Edi Villafana is a 72 year old male with a history of HTN, Hyperlipidemia, hypothyroidism who presented with acute pancreatitis, ETOH withdrawals.     Symptoms appear to be improving.  Less tremor today, pain improved.       Pt appears to have a significant amount of denial revolving around his ETOH abuse and I suspect not being completely honest about his ETOH consumption, minimizing his use and the effects it is having on him.        1. Acute pancreatitis.  Likely due to alcohol abuse.  improved.  ADAT.  Pain meds PRN; decrease to q6h prn.      2.  Alcohol withdrawals.  Continue CIWA.  Tremors without hallucinations.  receiving intermittent ativan      3.  Alcohol abuse.  Chem dep consult noted.  SW consult.  Seems to have poor insight/denial (or not willing to admit) the amount and effect of his alcohol use, and becomes defensive and irritated when trying to point out the labs (ETOH pancreatitis, ETOH hepatitis), exam findings (ETOH withdrawal tremors) and imaging findings (fatty liver) that all indicate alcoholism      4.  HTN.  resumed cozaar.  IV Hydralazine PRN.      5.  Hyperlipidemia.  Lipitor.      6.  Depression.  psych consult appreciated      7.  Hyponatremia.  Suspect due to dehydration from poor fluid intact recently, and ongoing alcohol abuse.          8.  Hypothyroidism.  TSH very elevated.  Pt admits to missing doses of Synthroid.  Suspect poor compliance.  Reiterated with him importance of taking meds every day.  Should have repeat TSH in 4-6 weeks      9.  Weakness.  secondary to ETOH use.  PT consult.  Pt does not feel he is able to go home after hospital stay and requesting a TCU.  SW consult placed      Code status: Full  Admit to Inpatient  Prophylaxis: PCD's  Dispo:  Tomorrow expected        Interval  History (Subjective):      Tremors improved.  No hallucinations.  Still receiving some Ativan for withdrawal sx                  Physical Exam:      Last Vital Signs:  /90 (BP Location: Left arm)  Pulse 62  Temp 98.1  F (36.7  C) (Oral)  Resp 18  Wt 95.3 kg (210 lb)  SpO2 95%  BMI 26.25 kg/m2      Intake/Output Summary (Last 24 hours) at 11/14/17 1234  Last data filed at 11/14/17 0635   Gross per 24 hour   Intake             1120 ml   Output                0 ml   Net             1120 ml       Constitutional: Awake, alert, cooperative, no apparent distress.  Mild tremor, improved   Respiratory: Clear to auscultation bilaterally, no crackles or wheezing   Cardiovascular: Regular rate and rhythm, normal S1 and S2, and no murmur noted   Abdomen: Normal bowel sounds, soft, non-distended, non-tender   Skin: No rashes, no cyanosis, dry to touch   Neuro: Alert and oriented x3, no weakness, numbness, memory loss   Extremities: No edema, normal range of motion   Other(s):        All other systems: Negative          Medications:      All current medications were reviewed with changes reflected in problem list.         Data:      All new lab and imaging data was reviewed.   Labs:    Recent Labs  Lab 11/12/17  0649   WBC 5.6   HGB 14.3   HCT 40.5   MCV 92         Imaging:   No results found for this or any previous visit (from the past 24 hour(s)).

## 2017-11-15 ENCOUNTER — APPOINTMENT (OUTPATIENT)
Dept: PHYSICAL THERAPY | Facility: CLINIC | Age: 72
DRG: 439 | End: 2017-11-15
Payer: MEDICARE

## 2017-11-15 PROCEDURE — 99232 SBSQ HOSP IP/OBS MODERATE 35: CPT | Performed by: INTERNAL MEDICINE

## 2017-11-15 PROCEDURE — A9270 NON-COVERED ITEM OR SERVICE: HCPCS | Mod: GY | Performed by: INTERNAL MEDICINE

## 2017-11-15 PROCEDURE — 97530 THERAPEUTIC ACTIVITIES: CPT | Mod: GP | Performed by: PHYSICAL THERAPY ASSISTANT

## 2017-11-15 PROCEDURE — 25000125 ZZHC RX 250: Performed by: INTERNAL MEDICINE

## 2017-11-15 PROCEDURE — 25000128 H RX IP 250 OP 636: Performed by: INTERNAL MEDICINE

## 2017-11-15 PROCEDURE — 12000000 ZZH R&B MED SURG/OB

## 2017-11-15 PROCEDURE — 97116 GAIT TRAINING THERAPY: CPT | Mod: GP | Performed by: PHYSICAL THERAPY ASSISTANT

## 2017-11-15 PROCEDURE — 40000193 ZZH STATISTIC PT WARD VISIT: Performed by: PHYSICAL THERAPY ASSISTANT

## 2017-11-15 PROCEDURE — 25000132 ZZH RX MED GY IP 250 OP 250 PS 637: Mod: GY | Performed by: INTERNAL MEDICINE

## 2017-11-15 RX ORDER — OXYCODONE HYDROCHLORIDE 5 MG/1
5 TABLET ORAL EVERY 8 HOURS PRN
Status: DISPENSED | OUTPATIENT
Start: 2017-11-15 | End: 2017-11-16

## 2017-11-15 RX ORDER — LORAZEPAM 0.5 MG/1
.5-1 TABLET ORAL EVERY 8 HOURS PRN
Status: DISCONTINUED | OUTPATIENT
Start: 2017-11-15 | End: 2017-11-16 | Stop reason: HOSPADM

## 2017-11-15 RX ADMIN — LEVOTHYROXINE SODIUM 125 MCG: 125 TABLET ORAL at 07:54

## 2017-11-15 RX ADMIN — LORAZEPAM 1 MG: 1 TABLET ORAL at 11:06

## 2017-11-15 RX ADMIN — THIAMINE HYDROCHLORIDE: 100 INJECTION, SOLUTION INTRAMUSCULAR; INTRAVENOUS at 19:48

## 2017-11-15 RX ADMIN — OXYCODONE HYDROCHLORIDE 5 MG: 5 TABLET ORAL at 21:55

## 2017-11-15 RX ADMIN — LORAZEPAM 0.5 MG: 0.5 TABLET ORAL at 18:47

## 2017-11-15 RX ADMIN — LORAZEPAM 1 MG: 1 TABLET ORAL at 00:27

## 2017-11-15 RX ADMIN — OXYCODONE HYDROCHLORIDE 5 MG: 5 TABLET ORAL at 00:12

## 2017-11-15 RX ADMIN — OXYCODONE HYDROCHLORIDE 5 MG: 5 TABLET ORAL at 04:19

## 2017-11-15 RX ADMIN — LORAZEPAM 0.5 MG: 0.5 TABLET ORAL at 14:47

## 2017-11-15 RX ADMIN — OXYCODONE HYDROCHLORIDE 5 MG: 5 TABLET ORAL at 13:34

## 2017-11-15 RX ADMIN — LOSARTAN POTASSIUM 25 MG: 25 TABLET, FILM COATED ORAL at 07:54

## 2017-11-15 RX ADMIN — LORAZEPAM 1 MG: 1 TABLET ORAL at 04:19

## 2017-11-15 ASSESSMENT — ACTIVITIES OF DAILY LIVING (ADL)
ADLS_ACUITY_SCORE: 11

## 2017-11-15 NOTE — PLAN OF CARE
Problem: Patient Care Overview  Goal: Plan of Care/Patient Progress Review  Discharge Planner PT   Patient plan for discharge: going to [place] tomorrow  Current status: needs gait belt  And close SBA at all times based on his LECHUGA balance test and his 5 LOB with PT this date, 1 required Mod A to prevent falling. Is not fully aware of his balance deficits.  Would benefit from AD but refusing.  Barriers to return to prior living situation: safety  Recommendations for discharge: .PT- Per plan established by the Physical Therapist, according to functional mobility the discharge recommendation is TCU    Rationale for recommendations: may regain mobilty and safety with extra time TCU level Pt will provide.       Entered by: Erica Santamaria 11/15/2017 3:30 PM

## 2017-11-15 NOTE — PROGRESS NOTES
SWS:  TESHA notified that pt has been declined at [facility] but has been accepted at [facility] and can dc tomorrow. Anticipate pt will dc around 1:00pm and friend will transport.

## 2017-11-15 NOTE — PROGRESS NOTES
[facility]  Hospitalist Progress Note  Charles Rodarte MD 11/15/2017    Reason for Stay (Diagnosis): ETOH pancreatitis, withdrawal         Assessment and Plan:      Summary of Stay: Edi Villafana is a 72 year old male with a history of HTN, Hyperlipidemia, hypothyroidism who presented with acute pancreatitis, ETOH withdrawals.        Symptoms continue to improve.  Less tremor today, pain improved.  titrating off oxycodone today (dose decreased to q8h prn; would stop tomorrow)      Pt appears to have a significant amount of denial revolving around his ETOH abuse and I suspect not being completely honest about his ETOH consumption, minimizing his use and the effects it is having on him.  becomes irritated when discussing with him his need to stop drinking      1. Acute pancreatitis.  Likely due to alcohol abuse.  improved.  ADAT.  Pain meds PRN; decrease to qh prn today and would stop tomrrow  I told the patient we would be weaning off the oxycodone today.  Would not discharge on any narcotic pain meds      2.  Alcohol withdrawals. Much improved/resolved.  Being treated with CIWA - will stop and use ativan prn anxiety sx only      3.  Alcohol abuse.  Chem dep consult noted.  SW consult.  Seems to have poor insight/denial (or not willing to admit) the amount and effect of his alcohol use, and becomes defensive and irritated when trying to point out the labs (ETOH pancreatitis, ETOH hepatitis), exam findings (ETOH withdrawal tremors) and imaging findings (fatty liver) that all indicate alcoholism      4.  HTN.  resumed cozaar.  IV Hydralazine PRN.      5.  Hyperlipidemia.  Lipitor.      6.  Depression.  psych consult appreciated      7.  Hyponatremia.  Suspect due to dehydration from poor fluid intact recently, and ongoing alcohol abuse.          8.  Hypothyroidism.  TSH very elevated.  Pt admits to missing doses of Synthroid.  Suspect poor compliance.  Reiterated with him importance of taking meds  "every day.  Should have repeat TSH in 4-6 weeks      9.  Weakness.  secondary to ETOH use.  PT consult.  Pt does not feel he is able to go home after hospital stay and requesting a TCU.  SW consult placed    Addendum:  Nurses note from 11/15 AM reads \"FYI. Pos blood cultures. Gram neg rods.  drawn 11/12. right arm.\".  This is incorrect.  This patient never had blood cx drawn this admission    ADDENDUM:  D/c summary and orders done for anticipated discharge to TCU on 11/16/17.  He will need a hard script signed for Ambien and Ativan (both chronic meds) prior to discharge      Code status: Full  Admit to Inpatient  Prophylaxis: PCD's  Dispo:  To TCU when bed found        Interval History (Subjective):      abd pain improved.  Tolerating low fat diet.  Withdrawal tremor much improved                  Physical Exam:      Last Vital Signs:  /82 (BP Location: Left arm)  Pulse 62  Temp 97.7  F (36.5  C) (Oral)  Resp 18  Wt 95.3 kg (210 lb)  SpO2 96%  BMI 26.25 kg/m2      Intake/Output Summary (Last 24 hours) at 11/15/17 1212  Last data filed at 11/14/17 1850   Gross per 24 hour   Intake              450 ml   Output                0 ml   Net              450 ml       Constitutional: Awake, alert, cooperative, no apparent distress.  Seen with nurse in room   Respiratory: Clear to auscultation bilaterally, no crackles or wheezing   Cardiovascular: Regular rate and rhythm, normal S1 and S2, and no murmur noted   Abdomen: Normal bowel sounds, soft, non-distended, non-tender   Skin: No rashes, no cyanosis, dry to touch   Neuro: Alert and oriented x3, no weakness, numbness, memory loss   Extremities: No edema, normal range of motion   Other(s):        All other systems: Negative          Medications:      All current medications were reviewed with changes reflected in problem list.         Data:      All new lab and imaging data was reviewed.   Labs:    Recent Labs  Lab 11/12/17  0649 11/11/17  1452   WBC 5.6 8.4 "   HGB 14.3 17.0   HCT 40.5 47.2   MCV 92 89    208      Imaging:   No results found for this or any previous visit (from the past 24 hour(s)).

## 2017-11-15 NOTE — PLAN OF CARE
Problem: Alcohol Withdrawal Acute, Risk/Actual (Adult)  Goal: Signs and Symptoms of Listed Potential Problems Will be Absent, Minimized or Managed (Alcohol Withdrawal Acute, Risk/Actual)  Signs and symptoms of listed potential problems will be absent, minimized or managed by discharge/transition of care (reference Alcohol Withdrawal Acute, Risk/Actual (Adult) CPG).   Outcome: Improving  Orientation: Alert and oriented x 4.   VS: Stable  IVF: Banana bag (vitamin bag) infusing at 100 ml/hr  Tele:  None at this time  LS: Clear and equal bilaterally  GI: Denies symptoms  : Denies symptoms  Skin: Wamr, dry and intact.  Activity: SBA  Pain: Moderate pain.  Controlled with roxicodone.  CIWA 6 and 8.

## 2017-11-15 NOTE — PLAN OF CARE
"Problem: Patient Care Overview  Goal: Plan of Care/Patient Progress Review  Pt a/o x4, c/o of pain 5 out of 10 in LUQ, pain increases with palpitation. Pt refused pain meds. Pt stated small brown BM 2 days ago. Pt on reg diet, states he did not have pain yesterday after eating, stated  \"I just felt full\". Score on CIWA 3 secondary to tremors.     11:07 Pt scoring 9 on ciwa 1mg ativan. Score due to tremors and anxious. Pt states does not want to eat, no hungry.     12:02 pt rescore on ciwa 3. Pt able to walk to shower with SBA, stood at sink for 5 minutes and shaved.   "

## 2017-11-15 NOTE — PLAN OF CARE
Problem: Alcohol Withdrawal Acute, Risk/Actual (Adult)  Goal: Signs and Symptoms of Listed Potential Problems Will be Absent, Minimized or Managed (Alcohol Withdrawal Acute, Risk/Actual)  Signs and symptoms of listed potential problems will be absent, minimized or managed by discharge/transition of care (reference Alcohol Withdrawal Acute, Risk/Actual (Adult) CPG).   A&Ox4, VSS, prn Oxycodone for abdominal pain and effective, tolerating fat diet, up with SBA, Infuvite @100ml/hr, LS clear, plan to d/c to TCU, SW following.

## 2017-11-16 VITALS
HEART RATE: 62 BPM | BODY MASS INDEX: 26.25 KG/M2 | TEMPERATURE: 97.6 F | OXYGEN SATURATION: 92 % | RESPIRATION RATE: 16 BRPM | DIASTOLIC BLOOD PRESSURE: 86 MMHG | WEIGHT: 210 LBS | SYSTOLIC BLOOD PRESSURE: 138 MMHG

## 2017-11-16 PROCEDURE — A9270 NON-COVERED ITEM OR SERVICE: HCPCS | Mod: GY | Performed by: INTERNAL MEDICINE

## 2017-11-16 PROCEDURE — 25000132 ZZH RX MED GY IP 250 OP 250 PS 637: Mod: GY | Performed by: INTERNAL MEDICINE

## 2017-11-16 PROCEDURE — 99239 HOSP IP/OBS DSCHRG MGMT >30: CPT | Performed by: INTERNAL MEDICINE

## 2017-11-16 RX ORDER — LORAZEPAM 2 MG/1
TABLET ORAL
Qty: 60 TABLET | Refills: 0 | Status: SHIPPED | DISCHARGE
Start: 2017-11-16 | End: 2018-02-08

## 2017-11-16 RX ORDER — ZOLPIDEM TARTRATE 10 MG/1
10 TABLET ORAL
Qty: 30 TABLET | Refills: 0 | Status: SHIPPED | DISCHARGE
Start: 2017-11-16 | End: 2017-12-13

## 2017-11-16 RX ORDER — ASPIRIN 325 MG
325 TABLET ORAL DAILY
Qty: 120 TABLET | DISCHARGE
Start: 2017-11-16 | End: 2017-11-27

## 2017-11-16 RX ADMIN — LOSARTAN POTASSIUM 25 MG: 25 TABLET, FILM COATED ORAL at 08:19

## 2017-11-16 RX ADMIN — LORAZEPAM 1 MG: 0.5 TABLET ORAL at 10:24

## 2017-11-16 RX ADMIN — LORAZEPAM 1 MG: 0.5 TABLET ORAL at 02:33

## 2017-11-16 RX ADMIN — LEVOTHYROXINE SODIUM 125 MCG: 125 TABLET ORAL at 08:19

## 2017-11-16 ASSESSMENT — ACTIVITIES OF DAILY LIVING (ADL)
ADLS_ACUITY_SCORE: 11

## 2017-11-16 NOTE — PLAN OF CARE
Problem: Alcohol Withdrawal Acute, Risk/Actual (Adult)  Goal: Signs and Symptoms of Listed Potential Problems Will be Absent, Minimized or Managed (Alcohol Withdrawal Acute, Risk/Actual)  Signs and symptoms of listed potential problems will be absent, minimized or managed by discharge/transition of care (reference Alcohol Withdrawal Acute, Risk/Actual (Adult) CPG).   Outcome: Improving  Orientation: Alert and oriented x 4.   VS: Stable  IVF: Banana bag infusing at 100 ml/hr  Tele:  None at this time  LS: Clear throughout.   GI: Denies symptoms  : Void spontaneously, denies s/s.   Skin: Wamr, dry and intact.  Activity: SBA1  Pain: Moderate pain.  Controlled with roxicodone PRN Q8hrs and repositioning.   Ativan given q8hrs PRN.   [facility]  [facility] See SW note.

## 2017-11-16 NOTE — DISCHARGE SUMMARY
Discharge Summary  Hospitalist Service      Edi Villafana MRN# [id number]   YOB: 1945 Age: 72 year old     Date of Admission:  11/11/2017  Date of Discharge:  11/16/2017  Admitting Physician:  Edin Kang DO  Discharge Physician: Emma Hazel MD   Discharging Service: Hospitalist Service     Primary Provider: Enrique Lucia  Primary Care Physician Phone Number: [phone]         Discharge Diagnoses/Problem Oriented Hospital Course (Providers):    Edi Villafana was admitted on 11/11/2017 by Edin Kang DO and I would refer you to their history and physical.  The following problems were addressed during his hospitalization:  1.  Alcohol withdrawal.   2.  Alcoholic pancreatitis.   3.  Alcoholic hepatitis.   4.  Alcohol dependence, possible underlying alcohol abuse.  Edi Villafana denies a diagnosis of alcohol abuse, but I do suspect a significant amount of minimizing of his use as well as denial of this underlying problem for him.   5.  Anxiety disorder.  The patient was seen by Psychology this admission.  He is on chronic Ativan for this.  He was not interested in SSRI use, but would encourage him on discharge to follow up with his primary care provider to discuss different treatment options including buspirone and potentially seeing a therapist as an outpatient.   6.  History of hypothyroidism, with markedly elevated TSH this admission which measured near 40.  The patient admitted poor compliance with levothyroxine, and I suspect in large part this is why his TSH is so elevated.  Free T4 was low normal.  He was resumed on his previous dose of levothyroxine, and I encouraged him to follow up with his primary care provider within the next several weeks to get his TSH repeated.     HOSPITAL COURSE:  Alcoholic hepatitis, alcoholic pancreatitis, alcohol withdrawal:  The patient's course was one of gradual improvement while hospitalized.  He was initially kept n.p.o., given his  abdominal pain.  He was also treated with the [facility] protocol with intermittent Ativan dosing for alcohol withdrawal symptoms.  Symptoms gradually improved while hospitalized.  By day of discharge, he was tolerating a low-fat diet.  He was weaned off narcotic pain medication prior to discharge from the hospital.  He is on chronic Ativan that he takes on a p.r.n. basis for anxiety symptoms.       The patient was concerned about going directly home from the hospital, as he felt he was too weak, and he became quite anxious about this.  Physical therapy saw the patient and recommended a TCU.  He is being discharged to a transitional care unit, 11/16/2017.       In regards to his alcohol use, the patient appears to be in significant denial about the amount of alcohol he was using.  He became irritated and angry when his alcohol abuse issues were discussed with him.  He has a tough time admitting that he drinks too much alcohol, and denies the diagnosis of alcohol abuse altogether.  He states he will quit drinking, and he thinks this will be fairly easy for him to do.  He was counseled on alcohol cessation during his hospitalization, although again he became irritated when this was done.       The patient was seen by a psychologist while hospitalized regarding his underlying psychiatric issues including depression and anxiety.  Anxiety seems to be the most pressing issue for him at this time.  He is on p.r.n. Ativan dosing that he takes at home, which is a chronic medication for him.  He was not interested in SSRI this admission.  I encouraged him to follow up with his primary care provider after discharge to further discuss treatment options for his anxiety, as I suspect this may be contributing to his alcohol issues.             Code Status:      Full Code         Important Results:      As noted below           Pending Results:        Unresulted Labs Ordered in the Past 30 Days of this Admission     No orders found from  9/12/2017 to 11/12/2017.               Discharge Instructions and Follow-Up:      Follow-up Appointments     Follow Up and recommended labs and tests       Follow up with primary MD 1 week after leaving rehab center to discuss   anxiety treatment options  Repeat your TSH (thyroid level) at your next visit with your primary MD.                           Discharge Disposition:      Discharged to home          Discharge Medications:        Current Discharge Medication List      CONTINUE these medications which have CHANGED    Details   aspirin 325 MG tablet Take 1 tablet (325 mg) by mouth daily  Qty: 120 tablet    Associated Diagnoses: Hypertension goal BP (blood pressure) < 140/90      LORazepam (ATIVAN) 2 MG tablet TAKE 1 TABLET BY MOUTH EVERY 8 HOURS AS NEEDED FOR ANXIETY.  Qty: 60 tablet, Refills: 0    Associated Diagnoses: Anxiety      zolpidem (AMBIEN) 10 MG tablet Take 1 tablet (10 mg) by mouth nightly as needed for sleep  Qty: 30 tablet, Refills: 0    Associated Diagnoses: Persistent disorder of initiating or maintaining sleep         CONTINUE these medications which have NOT CHANGED    Details   atorvastatin (LIPITOR) 40 MG tablet TAKE 1 TABLET (40 MG) BY MOUTH DAILY  Qty: 90 tablet, Refills: 1    Associated Diagnoses: Hyperlipidemia LDL goal <100      levothyroxine (SYNTHROID/LEVOTHROID) 125 MCG tablet Take 1 tablet (125 mcg) by mouth daily  Qty: 90 tablet, Refills: 3    Associated Diagnoses: Hypothyroidism, unspecified type      losartan (COZAAR) 50 MG tablet Take 0.5 tablets (25 mg) by mouth daily  Qty: 90 tablet, Refills: 1    Associated Diagnoses: Essential hypertension with goal blood pressure less than 140/90      Cholecalciferol (VITAMIN D3) 2000 UNITS CAPS Take 1 capsule by mouth daily       Multiple Vitamin (MULTIVITAMIN OR) Take 1 tablet by mouth daily                   Allergies:         Allergies   Allergen Reactions     Ace Inhibitors Cough     Ancef [Cefazolin Sodium]      Atenolol Other (See  Comments)     Low BP     Compazine      Sulfa Drugs      Tramadol Fatigue     Side effects            Consultations This Hospital Stay:      Consultation during this admission received from psychology          Condition and Physical Exam on Discharge:      Discharge condition: Stable   Discharge vitals: Blood pressure 138/86, pulse 62, temperature 97.6  F (36.4  C), temperature source Oral, resp. rate 16, weight 95.3 kg (210 lb), SpO2 92 %.     Constitutional: In bed nad looks stated age head nc/at sclera clear    Lungs: ctab nl effort    Cardiovascular: rrr no mrg no le edema   Abdomen: Soft, slight ttp diffusely without r/g, non-distendign   Skin: W/d no c/c   Other: Affect is anxious, alert and oriented            Discharge Orders for Skilled Facility (from Discharge Orders):        After Care Instructions     Activity - Up ad yecenia           Advance Diet as Tolerated       Follow this diet upon discharge: low fat diet            General info for SNF       Length of Stay Estimate: Short Term Care: Estimated # of Days <30  Condition at Discharge: Improving  Level of care:skilled   Rehabilitation Potential: Good  Admission H&P remains valid and up-to-date: Yes  Recent Chemotherapy: N/A  Use Nursing Home Standing Orders: Yes            Mantoux instructions       Give two-step Mantoux (PPD) Per Facility Policy Yes                           Rehab orders for Skilled Facility (from Discharge Orders):      Referrals     Future Labs/Procedures    Occupational Therapy Adult Consult     Comments:    Evaluate and treat as clinically indicated.    Reason:  weakness    Physical Therapy Adult Consult     Comments:    Evaluate and treat as clinically indicated.    Reason:  weakness             Discharge Time:      Greater than 30 minutes.        Image Results From This Hospital Stay (For Non-EPIC Providers):        Results for orders placed or performed during the hospital encounter of 11/11/17   Abdomen US, complete    Narrative     US ABDOMEN COMPLETE 11/11/2017 6:27 PM    CLINICAL INFORMATION: Pain.    FINDINGS:  Liver:  Fatty infiltration. No focal liver lesions.    Biliary system:  No biliary duct dilatation in the intrahepatic ducts.  The common hepatic and common bile duct are not visualized.    Gallbladder:  Normal. No gallstones. No gallbladder wall thickening or  tenderness during scanning over the gallbladder.    Pancreas:  Visualized portions are normal.    Kidneys:  Right kidney appears normal 13.5 cm in length. 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    Spleen:  Obscured by bowel gas.    Visualized proximal aorta and IVC within normal limits.      Impression    IMPRESSION:    1. Fatty liver.  2. No acute abnormality identified. No gallstones. Extrahepatic bile  ducts not visualized in this exam.    ERI ARAUJO MD           Most Recent Lab Results In EPIC (For Non-EPIC Providers):    Most Recent 3 CBC's:  Recent Labs   Lab Test  11/12/17   0649  11/11/17   1452  07/19/17   1134   WBC  5.6  8.4  7.3   HGB  14.3  17.0  17.0   MCV  92  89  94   PLT  156  208  228      Most Recent 3 BMP's:  Recent Labs   Lab Test  11/12/17   0649  11/11/17   2213  11/11/17   1452  07/19/17   1134   NA  130*  127*  125*  135   POTASSIUM  3.7   --   3.8  4.3   CHLORIDE  96   --   89*  101   CO2  26   --   22  22   BUN  16   --   20  7   CR  1.14   --   1.19  1.04   ANIONGAP  8   --   14  12   CECY  8.0*   --   9.2  9.3   GLC  83   --   141*  89     Most Recent 3 Troponin's:No lab results found.  Most Recent 3 INR's:  Recent Labs   Lab Test  11/11/17   1452   INR  1.00     Most Recent 2 LFT's:  Recent Labs   Lab Test  11/12/17   0649  11/11/17   1452   AST  82*  127*   ALT  79*  111*   ALKPHOS  79  107   BILITOTAL  1.7*  1.8*     Most Recent Cholesterol Panel:  Recent Labs   Lab Test  07/19/17   1134   CHOL  150   LDL  67   HDL  56   TRIG  135     Most Recent 6 Bacteria Isolates From Any Culture (See EPIC Reports for Culture Details):No  lab results found.  Most Recent TSH, T4 and HgbA1c:  Recent Labs   Lab Test  11/11/17   1452   TSH  47.06*   T4  0.82

## 2017-11-16 NOTE — PLAN OF CARE
Problem: Patient Care Overview  Goal: Plan of Care/Patient Progress Review  Pt a/o x4, calm/cooperative, VSS. Pt c/o of pain in abd/shoulder 6/10. Last available pain med was 6:00 am today, Per Dr. Lim   (order from yesterday) pt refused meds at that time.  Pt tolerated 2 meals yesterday / 100% of meals eaten. No c/o of nausea or increased pain at that time. Pt abd soft/tender to palpitation, BS present all four quadrants.  Pt a little groggy from  and ativan given overnight (2:00 am). Plan is for pt to d/c to [facility] in [place] today. Pt updated on plan. He states his nephew will transport him.     14:00 d/c instructions reviewed with pt. All questions answered. All belongings with pt at d/c. Nephew transported pt to [facility] in [place],.

## 2017-11-16 NOTE — PROGRESS NOTES
Your information has been submitted on November 16th, 2017 at 10:53:31 AM [facility]. The confirmation number is [id number]

## 2017-11-16 NOTE — PLAN OF CARE
Problem: Pain, Acute (Adult)  Goal: Identify Related Risk Factors and Signs and Symptoms  Related risk factors and signs and symptoms are identified upon initiation of Human Response Clinical Practice Guideline (CPG).   A&Ox4, VSS, prn Oxycodone for shoulder pain, tolerating regular diet, IVF Infuvite infusing, non Tele, plan to dc to TCU tomorrow, will continue with POC.

## 2017-11-16 NOTE — DISCHARGE SUMMARY
DATE OF ADMISSION:  11/11/2017      DATE OF DISCHARGE:  11/16/2017      PRINCIPAL FINAL DIAGNOSES:   1.  Alcohol withdrawal.   2.  Alcoholic pancreatitis.   3.  Alcoholic hepatitis.   4.  Alcohol dependence, possible underlying alcohol abuse.  Edi Villafana denies a diagnosis of alcohol abuse, but I do suspect a significant amount of minimizing of his use as well as denial of this underlying problem for him.   5.  Anxiety disorder.  The patient was seen by Psychology this admission.  He is on chronic Ativan for this.  He was not interested in SSRI use, but would encourage him on discharge to follow up with his primary care provider to discuss different treatment options including potentially seeing a therapist as an outpatient.   6.  History of hypothyroidism, with markedly elevated TSH this admission which measured near 40.  The patient admitted poor compliance with levothyroxine, and I suspect in large part this is why his TSH is so elevated.  Free T4 was low normal.  He was resumed on his previous dose of levothyroxine, and I encouraged him to follow up with his primary care provider within the next several weeks to get his TSH repeated.      PAST MEDICAL HISTORY:   1.  Hypertension.   2.  Hyperlipidemia.   3.  Depression.   4.  Hyponatremia.   5.  Generalized weakness and deconditioning.  The patient is being discharged to a transitional care unit.      PRINCIPAL PROCEDURES THIS ADMISSION:   1.  Abdominal ultrasound demonstrated fatty liver.   2.  CIWA protocol with intermittent Ativan dosing for alcohol withdrawal symptoms.      REASON FOR ADMISSION:  Please see dictated history and physical.  In brief, Mr. Villafana is a 72-year-old male with history of hypertension, hyperlipidemia, hypothyroidism, who presented with falls.  On presentation to the ER, the patient was noted to have pancreatitis, hepatitis, and evidence of alcohol withdrawal.      HOSPITAL COURSE:  Alcoholic hepatitis, alcoholic pancreatitis,  alcohol withdrawal:  The patient's course was one of gradual improvement while hospitalized.  He was initially kept n.p.o., given his abdominal pain.  He was also treated with the [facility] protocol with intermittent Ativan dosing for alcohol withdrawal symptoms.  Symptoms gradually improved while hospitalized.  By day of discharge, he was tolerating a low-fat diet.  He was weaned off narcotic pain medication prior to discharge from the hospital.  He is on chronic Ativan that he takes on a p.r.n. basis for anxiety symptoms.      The patient was concerned about going directly home from the hospital, as he felt he was too weak, and he became quite anxious about this.  Physical therapy saw the patient and recommended a TCU.  He is being discharged to a transitional care unit, 11/16/2017.      In regards to his alcohol use, the patient appears to be in significant denial about the amount of alcohol he was using.  He became irritated and angry when his alcohol abuse issues were discussed with him.  He has a tough time admitting that he drinks too much alcohol, and denies the diagnosis of alcohol abuse altogether.  He states he will quit drinking, and he thinks this will be fairly easy for him to do.  He was counseled on alcohol cessation during his hospitalization, although again he became irritated when this was done.      The patient was seen by a psychologist while hospitalized regarding his underlying psychiatric issues including depression and anxiety.  Anxiety seems to be the most pressing issue for him at this time.  He is on p.r.n. Ativan dosing that he takes at home, which is a chronic medication for him.  He was not interested in SSRI this admission.  I encouraged him to follow up with his primary care provider after discharge to further discuss treatment options for his anxiety, as I suspect this may be contributing to his alcohol issues.      DISCHARGE MEDICATIONS:   1.  Aspirin 324 mg daily.   2.  Atorvastatin 40  mg daily.   3.  Levothyroxine 125 mcg daily.   4.  Lorazepam 2 mg every 8 hours as needed for anxiety.   5.  Losartan 25 mg daily.   6.  Multivitamin daily.   7.  Vitamin D daily.   8.  Zolpidem 10 mg nightly as needed for sleep.      FOLLOW-UP INSTRUCTIONS:   1.  To follow up with primary MD one week after leaving rehabilitation center to discuss treatment for anxiety.   2.  Repeat TSH at next clinic visit with primary MD.         ASHVIN GILL MD             D: 2017 01:44   T: 2017 03:41   MT: [id number]      Name:     MAURICE NORRIS   MRN:      [id number]        Account:        [id number]   :      1945           Admit Date:                                       Discharge Date:       Document: [id number]       cc: Enrique Lucia MD

## 2017-11-16 NOTE — PLAN OF CARE
Problem: Patient Care Overview  Goal: Plan of Care/Patient Progress Review  PT- unable to see prior to planned discharge to TCU. Goals were not met is walking without AD to 300 feet but with many LOB a few are major, would recommend use of AD for mobility.    Physical Therapy Discharge Summary    Reason for therapy discharge:    Discharged to transitional care facility.    Progress towards therapy goal(s). See goals on Care Plan in [facility] health record for goal details.  Goals not met.  Barriers to achieving goals:   discharge from facility.    Therapy recommendation(s):    Continued therapy is recommended.  Rationale/Recommendations:  TCU to maximize return to PLOF.

## 2017-11-17 ENCOUNTER — NURSING HOME VISIT (OUTPATIENT)
Dept: GERIATRICS | Facility: CLINIC | Age: 72
End: 2017-11-17
Payer: COMMERCIAL

## 2017-11-17 VITALS
DIASTOLIC BLOOD PRESSURE: 87 MMHG | WEIGHT: 207.7 LBS | HEART RATE: 67 BPM | RESPIRATION RATE: 16 BRPM | TEMPERATURE: 97.3 F | SYSTOLIC BLOOD PRESSURE: 138 MMHG | BODY MASS INDEX: 25.96 KG/M2 | OXYGEN SATURATION: 97 %

## 2017-11-17 DIAGNOSIS — K85.20 ALCOHOL-INDUCED ACUTE PANCREATITIS, UNSPECIFIED COMPLICATION STATUS: Primary | ICD-10-CM

## 2017-11-17 DIAGNOSIS — I10 HYPERTENSION GOAL BP (BLOOD PRESSURE) < 140/90: ICD-10-CM

## 2017-11-17 DIAGNOSIS — E87.1 HYPONATREMIA: ICD-10-CM

## 2017-11-17 DIAGNOSIS — F10.20 ALCOHOLISM (H): ICD-10-CM

## 2017-11-17 DIAGNOSIS — F41.8 DEPRESSION WITH ANXIETY: ICD-10-CM

## 2017-11-17 DIAGNOSIS — E03.9 ACQUIRED HYPOTHYROIDISM: ICD-10-CM

## 2017-11-17 DIAGNOSIS — K75.9 HEPATITIS: ICD-10-CM

## 2017-11-17 DIAGNOSIS — E78.5 HYPERLIPIDEMIA LDL GOAL <100: ICD-10-CM

## 2017-11-17 DIAGNOSIS — R53.81 PHYSICAL DECONDITIONING: ICD-10-CM

## 2017-11-17 PROCEDURE — 99310 SBSQ NF CARE HIGH MDM 45: CPT | Performed by: NURSE PRACTITIONER

## 2017-11-17 NOTE — PROGRESS NOTES
[facility]  PRIMARY CARE PROVIDER AND CLINIC:  Enrique Lucia [address] [facility] [place]\[facility]\"" / [place] [id number]  Chief Complaint   Patient presents with     Hospital F/U       HPI:    Edi Villafana is a 72 year old  (1945),admitted to the [facility] TCU from [facility].  Hospital stay 11/11/17 through 11/16/2017.  Admitted to this facility for  rehab, medical management and nursing care.  HPI information obtained from: facility chart records, facility staff, patient report and [facility] chart review.  Current issues are:      Alcohol-induced acute pancreatitis, unspecified complication status  Hepatitis  Patient transferred to TCU following hospitalization due to weakness and falls. He was found to have acute pancreatitis and alcohol withdrawal. Prior to adm to hospital he had been feeling poorly for a month, not eating and drinking much, and c/o abd pain. Labs in ED showed Na 125,Bilirubin direct 0.7 (H), Bilirubin total 1.8 (H), Albumin 4.2, Protein Total 7.9, Alkphos 107, ALT 11 (H),  (H)  Alcohol ethyl: <0.01 , Lipase: 2477 (H).   Abd US showed fatty liver.   During hospital stay diet was slowly advanced. He did have tremors and was treated with ativan for withdrawal symptoms. He did meet with chem dep but denied problem with drinking .   Alcoholism- Chem dep did evaluate in hospital but patient denied any problems with drinking with hospitalist.   Acquired hypothyroidism  TSH was quite elevated in hospital. He was likely not taking his meds. He was started back on his PTA dose of levothyroxine.   Hyponatremia - na was low initially but did improve by time of transfer to TCU  Hyperlipidemia LDL goal <100  Cholesterol   Date Value Ref Range Status   07/19/2017 150 <200 mg/dL Final   ]    Hypertension goal BP (blood pressure) < 140/90  BPs in /83, 158/95, 138/87.   He continues on losartan  Anxiety/depression  H/o depression. He has tried SSRIs  in past but he does not feel that they are helpful. He does have prn ativan. He was started on ambien in hospital  Today he was talkative. He plans to get stronger so he can get back home to his work.     Physical deconditioning  Has been independent. Post graduate education. Single. No close relatives that he is in touch with.       CODE STATUS/ADVANCE DIRECTIVES DISCUSSION:   CPR/Full code   Patient's living condition: lives alone    ALLERGIES:Ace inhibitors; Ancef [cefazolin sodium]; Atenolol; Compazine; Sulfa drugs; and Tramadol  PAST MEDICAL HISTORY:  has a past medical history of Decreased libido; Diverticulosis of colon (without mention of hemorrhage); Generalized anxiety disorder; Insomnia, unspecified; Other and unspecified hyperlipidemia; Other specified congenital anomaly of kidney; Other testicular hypofunction; Subjective visual disturbance, unspecified; Unspecified cataract; Unspecified essential hypertension; and Unspecified hypothyroidism.  PAST SURGICAL HISTORY:  has a past surgical history that includes appendectomy (1960); Abdomen surgery (1973); Eye surgery ([id number], 2007); and cataract iol, rt/lt.  FAMILY HISTORY: family history includes CANCER in his sister; Colon Cancer in his paternal grandfather; HEART DISEASE in his father and mother; Hypertension in his mother; Lipids in his mother; Psychotic Disorder in his mother.  SOCIAL HISTORY:  reports that he has never smoked. He has never used smokeless tobacco. He reports that he drinks alcohol. He reports that he does not use illicit drugs.    Post Discharge Medication Reconciliation Status: discharge medications reconciled, continue medications without change.  Current Outpatient Prescriptions   Medication Sig Dispense Refill     aspirin 325 MG tablet Take 1 tablet (325 mg) by mouth daily 120 tablet      LORazepam (ATIVAN) 2 MG tablet TAKE 1 TABLET BY MOUTH EVERY 8 HOURS AS NEEDED FOR ANXIETY. 60 tablet 0     zolpidem (AMBIEN) 10 MG tablet  Take 1 tablet (10 mg) by mouth nightly as needed for sleep 30 tablet 0     atorvastatin (LIPITOR) 40 MG tablet TAKE 1 TABLET (40 MG) BY MOUTH DAILY 90 tablet 1     levothyroxine (SYNTHROID/LEVOTHROID) 125 MCG tablet Take 1 tablet (125 mcg) by mouth daily 90 tablet 3     losartan (COZAAR) 50 MG tablet Take 0.5 tablets (25 mg) by mouth daily 90 tablet 1     Cholecalciferol (VITAMIN D3) 2000 UNITS CAPS Take 1 capsule by mouth daily        Multiple Vitamin (MULTIVITAMIN OR) Take 1 tablet by mouth daily        [DISCONTINUED] TESTOSTERONE 2 MG/GM CREAM 15% cream and apply 0.5 ml topically daily 40 g 5       ROS:  4 point ROS including Respiratory, CV, GI and , other than that noted in the HPI,  is negative    Exam:  /87  Pulse 67  Temp 97.3  F (36.3  C)  Resp 16  Wt 207 lb 11.2 oz (94.2 kg)  SpO2 97%  BMI 25.96 kg/m2  GENERAL APPEARANCE:  Alert, in no distress  ENT:  Mouth and posterior oropharynx normal, moist mucous membranes, hearing acuity adequate   EYES:  EOM, conjunctivae, lids, pupils and irises normal  NECK:  No adenopathy,masses or thyromegaly  RESP:  respiratory effort and palpation of chest normal, no respiratory distress, Lung sounds clear  CV:  Palpation and auscultation of heart done , rate and rhythm reg, no murmur, no rub or gallop, Edema none  ABDOMEN:  normal bowel sounds, soft, nontender, no hepatosplenomegaly or other masses  M/S:   Gait and station slightly unsteady, Digits and nails normal   SKIN:  Inspection/Palpation of skin and subcutaneous tissue no rash  NEURO: 2-12 in normal limits and at patient's baseline  PSYCH:  insight and judgement, memory intat , affect and mood normal      Lab/Diagnostic data:  CBC RESULTS:   Recent Labs   Lab Test  11/12/17   0649  11/11/17   1452   WBC  5.6  8.4   RBC  4.40  5.28   HGB  14.3  17.0   HCT  40.5  47.2   MCV  92  89   MCH  32.5  32.2   MCHC  35.3  36.0   RDW  12.9  12.6   PLT  156  208       Last Basic Metabolic Panel:  Recent Labs   Lab  Test  11/12/17   0649  11/11/17   2213  11/11/17   1452   NA  130*  127*  125*   POTASSIUM  3.7   --   3.8   CHLORIDE  96   --   89*   CECY  8.0*   --   9.2   CO2  26   --   22   BUN  16   --   20   CR  1.14   --   1.19   GLC  83   --   141*       Liver Function Studies -   Recent Labs   Lab Test  11/12/17   0649  11/11/17   1452   PROTTOTAL  6.0*  7.9   ALBUMIN  3.3*  4.2   BILITOTAL  1.7*  1.8*   ALKPHOS  79  107   AST  82*  127*   ALT  79*  111*       TSH   Date Value Ref Range Status   11/11/2017 47.06 (H) 0.40 - 4.00 mU/L Final   12/12/2016 2.25 0.40 - 5.00 mU/L Final       ASSESSMENT/PLAN:  (K85.20) Alcohol-induced acute pancreatitis, unspecified complication status  (primary encounter diagnosis)  (K75.9) Hepatitis  (F10.20) Alcoholism (H)  Comment: patient recently in hospital due to weakness. Found to have pancreatitis and elevated liver enzymes. He continues to have some abd pain. He is worried about lab work returning to normal   Plan: ACP to see. [facility] 11/20  Hyponatremia  Comment: thought due to poor intake PTA.   Plan: [facility] 11/20  (E03.9) Acquired hypothyroidism  Comment: TSH was elevated likely not taking his medication  Plan: levothyroxine 125mcg daily. Recheck TSH in 4 weeks    (E78.5) Hyperlipidemia LDL goal <100  Comment: lipids checked in July. WNL. He has been taking atorvastatin.   Plan: will hold atorvastatin until LFT return to normal     (I10) Hypertension goal BP (blood pressure) < 140/90  Comment: adequate control  Plan: monitor    (F41.9) Anxiety/depression with insomnia  Comment: h/o. Has tried antidepressants. He did meet with counselor in hospital  Plan: ACP to see    (R53.81) Physical deconditioning  Comment: due to bout of pancreatitis and hepatitis  Plan: physical therapy and OCCUPATIONAL THERAPY     Orders:  [facility] 11/20  HOLD lipitor for now  Orthostatic BP x 3  ACP to see.     Total time spent with patient visit at the skilled nursing facility was 36 minutes including patient visit and  review of past records. Greater than 50% of total time spent with counseling and coordinating care due to review of medical issues and plan in TCU    Electronically signed by:  ANDREWS Gutierrez CNP

## 2017-11-17 NOTE — PROGRESS NOTES
"Edi Villafana is a 72 year old  male patient with a history of HTN, Hyperlipidemia, and hypothyroidism. Patient taken to urgent care by his friend on 11/11/2017 secondary to c/c of generalized weakness. The urgent care provider recommended him to go to the ER for further evaluation. Patient reported feeling weak and fatigue for three weeks prior to ER visit. He indicated that he tripped several times and \"partially fall\" in his house. He denies lightheadedness or becoming unconscious before he partially fall. Denies sustaining any injury from falling. Patient was admitted to the hospital on 11/11/2017 for further evaluation. The patient reported that he stopped eating due to lack of appetite for about three weeks prior to hospital admission. He states, \"I lost weight and strength.\"   [facility].  Hospital stay 11/11/17 through 11/16/2017.  Admitted to this facility for  rehab, medical management and nursing care.  HPI information obtained from EPIC and patient.  CODE STATUS/ADVANCE DIRECTIVES DISCUSSION:   CPR/Full code   History of Present Illness (HPI):  Alcohol withdrawal    Up on visit to ER the patient reports that he drinks 2 servings of vodka daily and denies having any symptoms of withdrawal in the past per ER provider note. Patient was monitored in the hospital with clinical institute withdrawal assessment (CIWA). Patient reported mild clinical manifestations of alcohol withdrawal like loss of appetite, anxiety, and minor tremor in the hospital. According to the patient, the anxiety disorder runs in his family.   Alcoholic pancreatitis   Patient c/o abdominal pain, rated 3/10. He characterized the pain as dull. Oxycodone helps with the pain per patient. Patient reported that he cannot take NSAIDs due to having only one kidney. Acute pancreatitis is likely due to alcohol abuse. Patient was on IV fluid in the hospital. Lipase level " "checked in the hospital on 11/12/2017 was 2767.    Alcoholic hepatitis    Abdominal US done in the hospital showed fatty liver. Likely secondary to alcohol use.   Hyponatremia  Hospital provider suspected that the hyponatremia is due to dehydration from poor fluid intact recently, and ongoing alcohol abuse. Sodium level on 11/11/17 was 125 and 130 on 11/12/17.       Anxiety disorder    The patient was seen by Psychologist in the hospital.  He is on chronic Ativan for this.  He was not interested in SSRI use. Patient states, \"SSRI does not do anything for me, I stopped taking SSRI in the 90's.\"   Hypothyroidism  TSH and T4 free was 47.06 and 0.82 respectively on 11/11/2017. Per hospital provider note, the patient admitted poor compliance with levothyroxine. Patient was resumed on levothyroxine 125 mcg PO daily in the hospital.  Hypertension  BP today 138/87, 148/88, 158/95. Patient is on Losartan 25 mg daily.   PAST MEDICAL HISTORY:   Patient has a past medical history of Decreased libido; Diverticulosis of colon (without mention of hemorrhage); Generalized anxiety disorder; Insomnia, unspecified; Other and unspecified hyperlipidemia; Other specified congenital anomaly of kidney; Other testicular hypofunction; Subjective visual disturbance, unspecified; Unspecified cataract; Unspecified essential hypertension; and Unspecified hypothyroidism.  PAST SURGICAL HISTORY:   Patient has a past surgical history that includes appendectomy (1960); Abdomen surgery (1973); Eye surgery ([id number], 2007); and cataract iol, rt/lt.  ALLERGIES: Ace inhibitors; Ancef [cefazolin sodium]; Atenolol; Compazine; Sulfa drugs; and Tramadol.  FAMILY HISTORY:   Family history includes CANCER in his sister; Colon Cancer in his paternal grandfather; HEART DISEASE in his father and mother; Hypertension in his mother; Lipids in his mother; Psychotic Disorder in his mother.  SOCIAL HISTORY:   Living situation: He lives alone in a three bedroom " house, used to have a roommate. His roommate stopped paying the rent and removed by the landlord from the house.   Employment: The patient is self-employed. 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  Education: undergraduate degree with philosophy and psychology. Graduate degree with clinical psychology.   Marital status: single.  Reports that he has never smoked. He has never used smokeless tobacco. He reports that he drinks alcohol. He reports that he does not use illicit drugs.   Review of Systems (ROS):  HEENT: denies headache, history of head trauma, dizziness, lightheadedness. No changes in hearing. No neck tenderness or stiffness.  Respiratory: denies history of asthma, pneumonia, and bronchitis. No breathing difficulties were reported. However, he reported that he had issues with allergies and used to use inhaler. He stopped using inhaler around 2000 per patient.  Cardiac: denies chest pain and tenderness.   Gastrointestinal: patient reported dull midabdominal pain, rated 3/10. Reported decreased appetite. Denies nausea or vomiting. No BM during hospital stay. He does not remember the last BM. Patient was on NPO in the hospital for three days per patient.   Musculoskeletal: denies restriction of movement. Patient reported having left shoulder osteoarthritis. He is getting steroid injection every three months. Overdue for injection for a month and half per patient. He follow up at [facility].   Neurological: no difficulties walking. Denies difficulties with cognitive functioning.     Physical Exam  VS: /87  Pulse 67  Temp 97.3  F (36.3  C)  Resp 16  Wt 207 lb 11.2 oz (94.2 kg)  SpO2 97%  BMI 25.96 kg/m2  General:  Patient is pleasant and in no apparent distress. Patient provides all details and is a reliable historian.  Psychological: alert and oriented x4. Cooperative, pleasant demeanor. Remote and recent memory  intact.  Head: Scalp with no lesion or tenderness. Hair well distributed  Eye: conjunctivae pink. Scleral white. No redness or discharge noted. Puple DARIO.  Ear: size equal bilaterally. Auricle without lesion. TM not assessed.  Nose/sinuse: no flaring of nares. No discharge from the nose. Frontal and maxillary sinuse without tenderness or swelling.   Neck: ROM intact. Trachea midline.  Respiratory: lung sound clear to auscultate bilaterally. No chest tenderness. Chest expansion symmetric.  Cardiac: S1 and S2 auscultated without gallop, rubs, or murmur. No edema.  Abdomen: bowel sound present in all four quadrants. No guarding. No tenderness to light and deep palpation.   Musculoskeletal: no edema or localized erythema noted. Upper and lower extermity strength +5/5 bilaterally. Gait smooth, with equal stride and good base of support.   Neuro: CN 5 and 7 intact, the rest not tested.    Labs:  CBC RESULTS:        Recent Labs   Lab Test  11/12/17   0649  11/11/17   1452   WBC  5.6  8.4   RBC  4.40  5.28   HGB  14.3  17.0   HCT  40.5  47.2   MCV  92  89   MCH  32.5  32.2   MCHC  35.3  36.0   RDW  12.9  12.6   PLT  156  208        Assessment/Plan  Alcohol withdrawal    Comment: patient continue to complain loss of appetite. CIWA discontinued before admission to the TCU.   Plan: Lorazepam 2 mg every 8 hours as needed for anxiety.   Alcoholic pancreatitis   Comment: high level of lipase in the hospital. Abdominal pain getting much better per patient compared to when he was first admitted to the hospital.  Plan:  Encourage cessation of alcohol after discharge from TCU   Encourage fluid intake/hydration to prevent flares of pancreatitis (no over hydration)   Patient to discuss rechecking Lipase with primary provider   Alcoholic hepatitis   Comment: Likely secondary to alcohol use.   Plan:  Nutritional support (adequate calories and protein)   Multivitamin 1 tab daily   Monitor for sign of infection (fever, worsening mental  status)   Ensure appropriate hydration (no over hydration)   Encourage patient to abstain from alcohol  Hyponatremia  Comment: Patient denies nausea, headache. Suspect due to hypothyroidism or due to ongoing alcohol abuse before hospitalization. Sodium level on 11/11/17 was 125 and 130 on 11/12/17. Patient was on IV fluid while in the hospital.  Plan:  Recheck comprehensive metabolic panel (CMP) on Monday 11/20/2017  Anxiety disorder    Comment: The patient was seen by Psychologist in the hospital.   Plan: Ativan 2 mg every 8 hours as needed for anxiety.   Hypothyroidism  Comment: No weight gain, patient reported losing weight due to lack of appetite.   Plan:  Continue levothyroxine 125 mcg daily.   Recheck TSH in 4 weeks  Hypertension  Comment: BP today 138/87, 148/88, 158/95 lying and left arm. Yesterday 11/16/2017 at 1548 BP was 107/67 sitting, right arm. Patient is on Losartan 25 mg daily.  Plan:  Check orthostatic BP   Continue Losartan 25 mg daily    Monitor BP  Patient indicated that taking ativan and lipitor is important for him. He is getting ativan 2 mg Q 8 hrs PRN in the TCU. However, lipitor will be withheld while he is in TCU secondary to acute pancreatitis. Discussed with the patient and he is in agreement with the recommendation.    Sven Morales, Student NP

## 2017-11-17 NOTE — LETTER
11/17/2017        RE: Edi Villafana  [address]  [facility] [id number]        [facility]  PRIMARY CARE PROVIDER AND CLINIC:  Enrique Lucia [address] / [facility] [id number]  Chief Complaint   Patient presents with     Hospital F/U       HPI:    Edi Villafana is a 72 year old  (1945),admitted to the [facility] from [facility].  Hospital stay 11/11/17 through 11/16/2017.  Admitted to this facility for  rehab, medical management and nursing care.  HPI information obtained from: facility chart records, facility staff, patient report and [facility] chart review.  Current issues are:      Alcohol-induced acute pancreatitis, unspecified complication status  Hepatitis  Patient transferred to TCU following hospitalization due to weakness and falls. He was found to have acute pancreatitis and alcohol withdrawal. Prior to adm to hospital he had been feeling poorly for a month, not eating and drinking much, and c/o abd pain. Labs in ED showed Na 125,Bilirubin direct 0.7 (H), Bilirubin total 1.8 (H), Albumin 4.2, Protein Total 7.9, Alkphos 107, ALT 11 (H),  (H)  Alcohol ethyl: <0.01 , Lipase: 2477 (H).   Abd US showed fatty liver.   During hospital stay diet was slowly advanced. He did have tremors and was treated with ativan for withdrawal symptoms. He did meet with chem dep but denied problem with drinking .   Alcoholism- Chem dep did evaluate in hospital but patient denied any problems with drinking with hospitalist.   Acquired hypothyroidism  TSH was quite elevated in hospital. He was likely not taking his meds. He was started back on his PTA dose of levothyroxine.   Hyponatremia - na was low initially but did improve by time of transfer to TCU  Hyperlipidemia LDL goal <100  Cholesterol   Date Value Ref Range Status   07/19/2017 150 <200 mg/dL Final   ]    Hypertension goal BP (blood pressure) < 140/90  BPs in /83,  158/95, 138/87.   He continues on losartan  Anxiety/depression  H/o depression. He has tried SSRIs in past but he does not feel that they are helpful. He does have prn ativan. He was started on ambien in hospital  Today he was talkative. He plans to get stronger so he can get back home to his work.     Physical deconditioning  Has been independent. Post graduate education. Single. No close relatives that he is in touch with.       CODE STATUS/ADVANCE DIRECTIVES DISCUSSION:   CPR/Full code   Patient's living condition: lives alone    ALLERGIES:Ace inhibitors; Ancef [cefazolin sodium]; Atenolol; Compazine; Sulfa drugs; and Tramadol  PAST MEDICAL HISTORY:  has a past medical history of Decreased libido; Diverticulosis of colon (without mention of hemorrhage); Generalized anxiety disorder; Insomnia, unspecified; Other and unspecified hyperlipidemia; Other specified congenital anomaly of kidney; Other testicular hypofunction; Subjective visual disturbance, unspecified; Unspecified cataract; Unspecified essential hypertension; and Unspecified hypothyroidism.  PAST SURGICAL HISTORY:  has a past surgical history that includes appendectomy (1960); Abdomen surgery (1973); Eye surgery ([id number], 2007); and cataract iol, rt/lt.  FAMILY HISTORY: family history includes CANCER in his sister; Colon Cancer in his paternal grandfather; HEART DISEASE in his father and mother; Hypertension in his mother; Lipids in his mother; Psychotic Disorder in his mother.  SOCIAL HISTORY:  reports that he has never smoked. He has never used smokeless tobacco. He reports that he drinks alcohol. He reports that he does not use illicit drugs.    Post Discharge Medication Reconciliation Status: discharge medications reconciled, continue medications without change.  Current Outpatient Prescriptions   Medication Sig Dispense Refill     aspirin 325 MG tablet Take 1 tablet (325 mg) by mouth daily 120 tablet      LORazepam (ATIVAN) 2 MG tablet TAKE 1  TABLET BY MOUTH EVERY 8 HOURS AS NEEDED FOR ANXIETY. 60 tablet 0     zolpidem (AMBIEN) 10 MG tablet Take 1 tablet (10 mg) by mouth nightly as needed for sleep 30 tablet 0     atorvastatin (LIPITOR) 40 MG tablet TAKE 1 TABLET (40 MG) BY MOUTH DAILY 90 tablet 1     levothyroxine (SYNTHROID/LEVOTHROID) 125 MCG tablet Take 1 tablet (125 mcg) by mouth daily 90 tablet 3     losartan (COZAAR) 50 MG tablet Take 0.5 tablets (25 mg) by mouth daily 90 tablet 1     Cholecalciferol (VITAMIN D3) 2000 UNITS CAPS Take 1 capsule by mouth daily        Multiple Vitamin (MULTIVITAMIN OR) Take 1 tablet by mouth daily        [DISCONTINUED] TESTOSTERONE 2 MG/GM CREAM 15% cream and apply 0.5 ml topically daily 40 g 5       ROS:  4 point ROS including Respiratory, CV, GI and , other than that noted in the HPI,  is negative    Exam:  /87  Pulse 67  Temp 97.3  F (36.3  C)  Resp 16  Wt 207 lb 11.2 oz (94.2 kg)  SpO2 97%  BMI 25.96 kg/m2  GENERAL APPEARANCE:  Alert, in no distress  ENT:  Mouth and posterior oropharynx normal, moist mucous membranes, hearing acuity adequate   EYES:  EOM, conjunctivae, lids, pupils and irises normal  NECK:  No adenopathy,masses or thyromegaly  RESP:  respiratory effort and palpation of chest normal, no respiratory distress, Lung sounds clear  CV:  Palpation and auscultation of heart done , rate and rhythm reg, no murmur, no rub or gallop, Edema none  ABDOMEN:  normal bowel sounds, soft, nontender, no hepatosplenomegaly or other masses  M/S:   Gait and station slightly unsteady, Digits and nails normal   SKIN:  Inspection/Palpation of skin and subcutaneous tissue no rash  NEURO: 2-12 in normal limits and at patient's baseline  PSYCH:  insight and judgement, memory intat , affect and mood normal      Lab/Diagnostic data:  CBC RESULTS:   Recent Labs   Lab Test  11/12/17   0649  11/11/17   1452   WBC  5.6  8.4   RBC  4.40  5.28   HGB  14.3  17.0   HCT  40.5  47.2   MCV  92  89   MCH  32.5  32.2   MCHC   35.3  36.0   RDW  12.9  12.6   PLT  156  208       Last Basic Metabolic Panel:  Recent Labs   Lab Test  11/12/17   0649  11/11/17   2213  11/11/17   1452   NA  130*  127*  125*   POTASSIUM  3.7   --   3.8   CHLORIDE  96   --   89*   CECY  8.0*   --   9.2   CO2  26   --   22   BUN  16   --   20   CR  1.14   --   1.19   GLC  83   --   141*       Liver Function Studies -   Recent Labs   Lab Test  11/12/17   0649  11/11/17   1452   PROTTOTAL  6.0*  7.9   ALBUMIN  3.3*  4.2   BILITOTAL  1.7*  1.8*   ALKPHOS  79  107   AST  82*  127*   ALT  79*  111*       TSH   Date Value Ref Range Status   11/11/2017 47.06 (H) 0.40 - 4.00 mU/L Final   12/12/2016 2.25 0.40 - 5.00 mU/L Final       ASSESSMENT/PLAN:  (K85.20) Alcohol-induced acute pancreatitis, unspecified complication status  (primary encounter diagnosis)  (K75.9) Hepatitis  (F10.20) Alcoholism (H)  Comment: patient recently in hospital due to weakness. Found to have pancreatitis and elevated liver enzymes. He continues to have some abd pain. He is worried about lab work returning to normal   Plan: ACP to see. [facility] 11/20  Hyponatremia  Comment: thought due to poor intake PTA.   Plan: [facility] 11/20  (E03.9) Acquired hypothyroidism  Comment: TSH was elevated likely not taking his medication  Plan: levothyroxine 125mcg daily. Recheck TSH in 4 weeks    (E78.5) Hyperlipidemia LDL goal <100  Comment: lipids checked in July. WNL. He has been taking atorvastatin.   Plan: will hold atorvastatin until LFT return to normal     (I10) Hypertension goal BP (blood pressure) < 140/90  Comment: adequate control  Plan: monitor    (F41.9) Anxiety/depression with insomnia  Comment: h/o. Has tried antidepressants. He did meet with counselor in hospital  Plan: ACP to see    (R53.81) Physical deconditioning  Comment: due to bout of pancreatitis and hepatitis  Plan: physical therapy and OCCUPATIONAL THERAPY     Orders:  [facility] 11/20  HOLD lipitor for now  Orthostatic BP x 3  ACP to see.     Total time  "spent with patient visit at the skilled nursing facility was 36 minutes including patient visit and review of past records. Greater than 50% of total time spent with counseling and coordinating care due to review of medical issues and plan in TCU    Electronically signed by:  ANDREWS Gutierrez CNP                  Edi Villafana is a 72 year old  male patient with a history of HTN, Hyperlipidemia, and hypothyroidism. Patient taken to urgent care by his friend on 11/11/2017 secondary to c/c of generalized weakness. The urgent care provider recommended him to go to the ER for further evaluation. Patient reported feeling weak and fatigue for three weeks prior to ER visit. He indicated that he tripped several times and \"partially fall\" in his house. He denies lightheadedness or becoming unconscious before he partially fall. Denies sustaining any injury from falling. Patient was admitted to the hospital on 11/11/2017 for further evaluation. The patient reported that he stopped eating due to lack of appetite for about three weeks prior to hospital admission. He states, \"I lost weight and strength.\"   Patient admitted to the [facility] TCU from [facility].  Hospital stay 11/11/17 through 11/16/2017.  Admitted to this facility for  rehab, medical management and nursing care.  HPI information obtained from EPIC and patient.  CODE STATUS/ADVANCE DIRECTIVES DISCUSSION:   CPR/Full code   History of Present Illness (HPI):  Alcohol withdrawal    Up on visit to ER the patient reports that he drinks 2 servings of vodka daily and denies having any symptoms of withdrawal in the past per ER provider note. Patient was monitored in the hospital with clinical institute withdrawal assessment (CIWA). Patient reported mild clinical manifestations of alcohol withdrawal like loss of appetite, anxiety, and minor tremor in the hospital. According to the patient, the anxiety disorder runs in his family. " "  Alcoholic pancreatitis   Patient c/o abdominal pain, rated 3/10. He characterized the pain as dull. Oxycodone helps with the pain per patient. Patient reported that he cannot take NSAIDs due to having only one kidney. Acute pancreatitis is likely due to alcohol abuse. Patient was on IV fluid in the hospital. Lipase level checked in the hospital on 11/12/2017 was 2767.    Alcoholic hepatitis    Abdominal US done in the hospital showed fatty liver. Likely secondary to alcohol use.   Hyponatremia  Hospital provider suspected that the hyponatremia is due to dehydration from poor fluid intact recently, and ongoing alcohol abuse. Sodium level on 11/11/17 was 125 and 130 on 11/12/17.       Anxiety disorder    The patient was seen by Psychologist in the hospital.  He is on chronic Ativan for this.  He was not interested in SSRI use. Patient states, \"SSRI does not do anything for me, I stopped taking SSRI in the 90's.\"   Hypothyroidism  TSH and T4 free was 47.06 and 0.82 respectively on 11/11/2017. Per hospital provider note, the patient admitted poor compliance with levothyroxine. Patient  was resumed on levothyroxine 125 mcg PO daily in the hospital.  Hypertension  BP today 138/87, 148/88, 158/95. Patient is on Losartan 25 mg daily.   PAST MEDICAL HISTORY:   Patient has a past medical history of Decreased libido; Diverticulosis of colon (without mention of hemorrhage); Generalized anxiety disorder; Insomnia, unspecified; Other and unspecified hyperlipidemia; Other specified congenital anomaly of kidney; Other testicular hypofunction; Subjective visual disturbance, unspecified; Unspecified cataract; Unspecified essential hypertension; and Unspecified hypothyroidism.  PAST SURGICAL HISTORY:   Patient has a past surgical history that includes appendectomy (1960); Abdomen surgery (1973); Eye surgery ([id number], 2007); and cataract iol, rt/lt.  ALLERGIES: Ace inhibitors; Ancef [cefazolin sodium]; Atenolol; Compazine; Sulfa " drugs; and Tramadol.  FAMILY HISTORY:   Family history includes CANCER in his sister; Colon Cancer in his paternal grandfather; HEART DISEASE in his father and mother; Hypertension in his mother; Lipids in his mother; Psychotic Disorder in his mother.  SOCIAL HISTORY:   Living situation: He lives alone in a three bedroom house, used to have a roommate. His roommate stopped paying the rent and removed by the landlord from the house.   Employment: The patient is self-employed. 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  Education: undergraduate degree with philosophy and psychology. Graduate degree with clinical psychology.   Marital status: single.  Reports that he has never smoked. He has never used smokeless tobacco. He reports that he drinks alcohol. He reports that he does not use illicit drugs.   Review of Systems (ROS):  HEENT: denies headache, history of head trauma, dizziness, lightheadedness. No changes in hearing. No neck tenderness or stiffness.  Respiratory: denies history of asthma, pneumonia, and bronchitis. No breathing difficulties were reported. However, he reported that he had issues with allergies and used to use inhaler. He stopped using inhaler around 2000 per patient.  Cardiac: denies chest pain and tenderness.   Gastrointestinal: patient reported dull midabdominal pain, rated 3/10. Reported decreased appetite. Denies nausea or vomiting. No BM during hospital stay. He does not remember the last BM. Patient was on NPO in the hospital for three days per patient.   Musculoskeletal: denies restriction of movement. Patient reported having left shoulder osteoarthritis. He is getting steroid injection every three months. Overdue for injection for a month and half per patient. He follow up at [facility].   Neurological: no difficulties walking. Denies difficulties with cognitive functioning.     Physical Exam  VS: /87   Pulse 67  Temp 97.3  F (36.3  C)  Resp 16  Wt 207 lb 11.2 oz (94.2 kg)  SpO2 97%  BMI 25.96 kg/m2  General:  Patient is pleasant and in no apparent distress. Patient provides all details and is a reliable historian.  Psychological: alert and oriented x4. Cooperative, pleasant demeanor. Remote and recent memory intact.  Head: Scalp with no lesion or tenderness. Hair well distributed  Eye: conjunctivae pink. Scleral white. No redness or discharge noted. Puple DARIO.  Ear: size equal bilaterally. Auricle without lesion. TM not assessed.  Nose/sinuse: no flaring of nares. No discharge from the nose. Frontal and maxillary sinuse without tenderness or swelling.   Neck: ROM intact. Trachea midline.  Respiratory: lung sound clear to auscultate bilaterally. No chest tenderness. Chest expansion symmetric.  Cardiac: S1 and S2 auscultated without gallop, rubs, or murmur. No edema.  Abdomen: bowel sound present in all four quadrants. No guarding. No tenderness to light and deep palpation.   Musculoskeletal: no edema or localized erythema noted. Upper and lower extermity strength +5/5 bilaterally. Gait smooth, with equal stride and good base of support.   Neuro: CN 5 and 7 intact, the rest not tested.    Labs:  CBC RESULTS:        Recent Labs   Lab Test  11/12/17   0649  11/11/17   1452   WBC  5.6  8.4   RBC  4.40  5.28   HGB  14.3  17.0   HCT  40.5  47.2   MCV  92  89   MCH  32.5  32.2   MCHC  35.3  36.0   RDW  12.9  12.6   PLT  156  208        Assessment/Plan  Alcohol withdrawal    Comment: patient continue to complain loss of appetite. CIWA discontinued before admission to the TCU.   Plan: Lorazepam 2 mg every 8 hours as needed for anxiety.   Alcoholic pancreatitis   Comment: high level of lipase in the hospital. Abdominal pain getting much better per patient compared to when he was first admitted to the hospital.  Plan:  Encourage cessation of alcohol after discharge from TCU   Encourage fluid intake/hydration to  prevent flares of pancreatitis (no over hydration)   Patient to discuss rechecking Lipase with primary provider   Alcoholic hepatitis   Comment: Likely secondary to alcohol use.   Plan:  Nutritional support (adequate calories and protein)   Multivitamin 1 tab daily   Monitor for sign of infection (fever, worsening mental status)   Ensure appropriate hydration (no over hydration)   Encourage patient to abstain from alcohol  Hyponatremia  Comment: Patient denies nausea, headache. Suspect due to hypothyroidism or due to ongoing alcohol abuse before hospitalization. Sodium level on 11/11/17 was 125 and 130 on 11/12/17. Patient was on IV fluid while in the hospital.  Plan:  Recheck comprehensive metabolic panel (CMP) on Monday 11/20/2017  Anxiety disorder    Comment: The patient was seen by Psychologist in the hospital.   Plan: Ativan 2 mg every 8 hours as needed for anxiety.   Hypothyroidism  Comment: No weight gain, patient reported losing weight due to lack of appetite.   Plan:  Continue levothyroxine 125 mcg daily.   Recheck TSH in 4 weeks  Hypertension  Comment: BP today 138/87, 148/88, 158/95 lying and left arm. Yesterday 11/16/2017 at 1548 BP was 107/67 sitting, right arm. Patient is on Losartan 25 mg daily.  Plan:  Check orthostatic BP   Continue Losartan 25 mg daily    Monitor BP  Patient indicated that taking ativan and lipitor is important for him. He is getting ativan 2 mg Q 8 hrs PRN in the TCU. However, lipitor will be withheld while he is in TCU secondary to acute pancreatitis. Discussed with the patient and he is in agreement with the recommendation.    Sven Morales, Student NP                Sincerely,        ANDREWS Gutierrez CNP

## 2017-11-20 ENCOUNTER — NURSING HOME VISIT (OUTPATIENT)
Dept: GERIATRICS | Facility: CLINIC | Age: 72
End: 2017-11-20
Payer: COMMERCIAL

## 2017-11-20 ENCOUNTER — HOSPITAL LABORATORY (OUTPATIENT)
Dept: OTHER | Facility: CLINIC | Age: 72
End: 2017-11-20

## 2017-11-20 VITALS
RESPIRATION RATE: 18 BRPM | DIASTOLIC BLOOD PRESSURE: 86 MMHG | SYSTOLIC BLOOD PRESSURE: 141 MMHG | OXYGEN SATURATION: 98 % | BODY MASS INDEX: 24.84 KG/M2 | TEMPERATURE: 96.2 F | HEART RATE: 72 BPM | WEIGHT: 198.7 LBS

## 2017-11-20 DIAGNOSIS — F41.9 ANXIETY: ICD-10-CM

## 2017-11-20 DIAGNOSIS — F10.20 ALCOHOLISM (H): ICD-10-CM

## 2017-11-20 DIAGNOSIS — M25.512 CHRONIC LEFT SHOULDER PAIN: ICD-10-CM

## 2017-11-20 DIAGNOSIS — Z71.89 ADVANCED DIRECTIVES, COUNSELING/DISCUSSION: ICD-10-CM

## 2017-11-20 DIAGNOSIS — E87.1 HYPONATREMIA: ICD-10-CM

## 2017-11-20 DIAGNOSIS — R53.81 PHYSICAL DECONDITIONING: ICD-10-CM

## 2017-11-20 DIAGNOSIS — G89.29 CHRONIC LEFT SHOULDER PAIN: ICD-10-CM

## 2017-11-20 DIAGNOSIS — K70.10 ALCOHOLIC HEPATITIS WITHOUT ASCITES (H): ICD-10-CM

## 2017-11-20 DIAGNOSIS — I10 HYPERTENSION GOAL BP (BLOOD PRESSURE) < 140/90: ICD-10-CM

## 2017-11-20 DIAGNOSIS — K85.20 ALCOHOL-INDUCED ACUTE PANCREATITIS, UNSPECIFIED COMPLICATION STATUS: Primary | ICD-10-CM

## 2017-11-20 LAB
ALBUMIN SERPL-MCNC: 4 G/DL (ref 3.4–5)
ALP SERPL-CCNC: 92 U/L (ref 40–150)
ALT SERPL W P-5'-P-CCNC: 69 U/L (ref 0–70)
ANION GAP SERPL CALCULATED.3IONS-SCNC: 9 MMOL/L (ref 3–14)
AST SERPL W P-5'-P-CCNC: 55 U/L (ref 0–45)
BILIRUB SERPL-MCNC: 0.9 MG/DL (ref 0.2–1.3)
BUN SERPL-MCNC: 16 MG/DL (ref 7–30)
CALCIUM SERPL-MCNC: 9.4 MG/DL (ref 8.5–10.1)
CHLORIDE SERPL-SCNC: 101 MMOL/L (ref 94–109)
CO2 SERPL-SCNC: 22 MMOL/L (ref 20–32)
CREAT SERPL-MCNC: 1.13 MG/DL (ref 0.66–1.25)
GFR SERPL CREATININE-BSD FRML MDRD: 64 ML/MIN/1.7M2
GLUCOSE SERPL-MCNC: 108 MG/DL (ref 70–99)
POTASSIUM SERPL-SCNC: 4 MMOL/L (ref 3.4–5.3)
PROT SERPL-MCNC: 7.5 G/DL (ref 6.8–8.8)
SODIUM SERPL-SCNC: 132 MMOL/L (ref 133–144)

## 2017-11-20 PROCEDURE — 99306 1ST NF CARE HIGH MDM 50: CPT | Performed by: FAMILY MEDICINE

## 2017-11-20 NOTE — PROGRESS NOTES
"[place] [facility]  PRIMARY CARE PROVIDER AND CLINIC:  Enrique Lucia [address] [facility]"" / [facility] [id number]  Chief Complaint   Patient presents with     Hospital F/U       HPI:    Edi Villafana is a 72 year old  (1945),admitted to the [facility] TCU from [facility].  Hospital stay 11/11/2017 through 11/16/2017.  Admitted to this facility for  rehab, medical management and nursing care.  HPI information obtained from: facility chart records, facility staff, patient report and [facility] chart review.  Patient taken to urgent care by his friend on 11/11/2017 secondary to c/c of generalized weakness. The urgent care provider recommended him to go to the ER for further evaluation. Patient reported feeling weak and fatigue for three weeks prior to ER visit. He indicated that he tripped several times and \"partially fall\" in his house. He denies lightheadedness or becoming unconscious before he partially fall. Denies sustaining any injury from falling. Patient was admitted to the hospital on 11/11/2017 for further evaluation. The patient reported that he stopped eating due to lack of appetite for about three weeks prior to hospital admission. He states, \"I lost weight and strength.\"   Current issues are:      1. Alcohol-induced acute pancreatitis, unspecified complication status - improving, lipase still elevated, tolerating po w/o exacerbation of pain   2. Alcoholic hepatitis without ascites - no symptoms at present   3. Alcoholism (H) - in denial of seriousness. Claims he has quit before and can again. Says \"not to worry\" he will do fine    4. Anxiety - has long standing anxiety, under tx with ativan 2mg 1-3/day chronically. Says SSRIs don't work for him   5. Physical deconditioning - very weak but improving   6. Chronic left shoulder pain - has sig DJD, has been told need replacement- flaring up now in therapy   7. Hyponatremia - correcting now   8. Hypertension " goal BP (blood pressure) < 140/90 - controlled, tx   9. Advanced directives, counseling/discussion - confirmed full code, POLST in process. No AD. Friend is listed as emergency contact. Recommended AD to specify his preferred [facility]       CODE STATUS/ADVANCE DIRECTIVES DISCUSSION:   CPR/Full code   Patient's living condition: lives alone    ALLERGIES:Ace inhibitors; Ancef [cefazolin sodium]; Atenolol; Compazine; Sulfa drugs; and Tramadol  PAST MEDICAL HISTORY:  has a past medical history of Decreased libido; Diverticulosis of colon (without mention of hemorrhage); Generalized anxiety disorder; Insomnia, unspecified; Other and unspecified hyperlipidemia; Other specified congenital anomaly of kidney; Other testicular hypofunction; Subjective visual disturbance, unspecified; Unspecified cataract; Unspecified essential hypertension; and Unspecified hypothyroidism.  PAST SURGICAL HISTORY:  has a past surgical history that includes appendectomy (1960); Abdomen surgery (1973); Eye surgery ([id number], 2007); and cataract iol, rt/lt.  FAMILY HISTORY: family history includes CANCER in his sister; Colon Cancer in his paternal grandfather; HEART DISEASE in his father and mother; Hypertension in his mother; Lipids in his mother; Psychotic Disorder in his mother.  SOCIAL HISTORY:  reports that he has never smoked. He has never used smokeless tobacco. He reports that he drinks alcohol. He reports that he does not use illicit drugs.    Post Discharge Medication Reconciliation Status: discharge medications reconciled, continue medications without change.  Current Outpatient Prescriptions   Medication Sig Dispense Refill     aspirin 325 MG tablet Take 1 tablet (325 mg) by mouth daily 120 tablet      LORazepam (ATIVAN) 2 MG tablet TAKE 1 TABLET BY MOUTH EVERY 8 HOURS AS NEEDED FOR ANXIETY. 60 tablet 0     zolpidem (AMBIEN) 10 MG tablet Take 1 tablet (10 mg) by mouth nightly as needed for sleep 30 tablet 0     levothyroxine  (SYNTHROID/LEVOTHROID) 125 MCG tablet Take 1 tablet (125 mcg) by mouth daily 90 tablet 3     losartan (COZAAR) 50 MG tablet Take 0.5 tablets (25 mg) by mouth daily 90 tablet 1     Cholecalciferol (VITAMIN D3) 2000 UNITS CAPS Take 1 capsule by mouth daily        Multiple Vitamin (MULTIVITAMIN OR) Take 1 tablet by mouth daily        atorvastatin (LIPITOR) 40 MG tablet TAKE 1 TABLET (40 MG) BY MOUTH DAILY (Patient not taking: Reported on 11/20/2017) 90 tablet 1     [DISCONTINUED] TESTOSTERONE 2 MG/GM CREAM 15% cream and apply 0.5 ml topically daily 40 g 5       ROS:  10 point ROS of systems including Constitutional, Eyes, Respiratory, Cardiovascular, Gastroenterology, Genitourinary, Integumentary, Muscularskeletal, Psychiatric were all negative except for pertinent positives noted in my HPI.    Exam:  /86  Pulse 72  Temp 96.2  F (35.7  C)  Resp 18  Wt 198 lb 11.2 oz (90.1 kg)  SpO2 98%  BMI 24.84 kg/m2  GENERAL APPEARANCE:  Alert, in no distress, oriented, cooperative  ENT:  Mouth and posterior oropharynx normal, moist mucous membranes, normal hearing acuity  EYES:  EOM, conjunctivae, lids, pupils and irises normal  NECK:  No adenopathy,masses or thyromegaly  RESP:  respiratory effort and palpation of chest normal, lungs clear to auscultation , no respiratory distress  CV:  Palpation and auscultation of heart done , regular rate and rhythm, no murmur, rub, or gallop, no edema  ABDOMEN:  normal bowel sounds, soft, nontender, no hepatosplenomegaly or other masses  M/S:   Gait and station abnormal unsteady but improving  SKIN:  Inspection of skin and subcutaneous tissue baseline  NEURO:   no focal neuro deficits, no tremors noted  PSYCH:  oriented X 3, normal insight, judgement and memory    Lab/Diagnostic data:  CBC RESULTS:   Recent Labs   Lab Test  11/12/17   0649  11/11/17   1452   WBC  5.6  8.4   RBC  4.40  5.28   HGB  14.3  17.0   HCT  40.5  47.2   MCV  92  89   MCH  32.5  32.2   MCHC  35.3  36.0   RDW   "12.9  12.6   PLT  156  208       Last Basic Metabolic Panel:  Recent Labs   Lab Test  11/12/17   0649  11/11/17   2213  11/11/17   1452   NA  130*  127*  125*   POTASSIUM  3.7   --   3.8   CHLORIDE  96   --   89*   CECY  8.0*   --   9.2   CO2  26   --   22   BUN  16   --   20   CR  1.14   --   1.19   GLC  83   --   141*       Liver Function Studies -   Recent Labs   Lab Test  11/12/17   0649  11/11/17   1452   PROTTOTAL  6.0*  7.9   ALBUMIN  3.3*  4.2   BILITOTAL  1.7*  1.8*   ALKPHOS  79  107   AST  82*  127*   ALT  79*  111*       TSH   Date Value Ref Range Status   11/11/2017 47.06 (H) 0.40 - 4.00 mU/L Final   12/12/2016 2.25 0.40 - 5.00 mU/L Final       ASSESSMENT/PLAN:  1. Alcohol-induced acute pancreatitis, unspecified complication status - improving, lipase still elevated, tolerating po w/o exacerbation of pain- cont monitoring labs and po tolerance   2. Alcoholic hepatitis without ascites - no symptoms at present   3. Alcoholism (H) - in denial of seriousness. Claims he has quit before and can again. Says \"not to worry\" he will do fine , recommend f/u support like AA   4. Anxiety - has long standing anxiety, under tx with ativan 2mg 1-3/day chronically. Says SSRIs don't work for him, will continue   5. Physical deconditioning - very weak but improving, cont therapy   6. Chronic left shoulder pain - has sig DJD, has been told need replacement- flaring up now in therapy, usually gets injection by [facility], rec f/u there   7. Hyponatremia - correcting now, monitor   8. Hypertension goal BP (blood pressure) < 140/90 - controlled, tx, monitor   9. Advanced directives, counseling/discussion - confirmed full code, POLST in process. No AD. Friend is listed as emergency contact. Recommended AD to specify his preferred [facility]             Electronically signed by:  Antoni Lucia MD                "

## 2017-11-21 PROBLEM — Z71.89 ADVANCED DIRECTIVES, COUNSELING/DISCUSSION: Status: ACTIVE | Noted: 2017-11-21

## 2017-11-21 PROBLEM — K85.20 ALCOHOL-INDUCED ACUTE PANCREATITIS, UNSPECIFIED COMPLICATION STATUS: Status: ACTIVE | Noted: 2017-11-21

## 2017-11-21 PROBLEM — K70.10 ALCOHOLIC HEPATITIS WITHOUT ASCITES (H): Status: ACTIVE | Noted: 2017-11-21

## 2017-11-24 ENCOUNTER — TRANSFERRED RECORDS (OUTPATIENT)
Dept: HEALTH INFORMATION MANAGEMENT | Facility: CLINIC | Age: 72
End: 2017-11-24

## 2017-11-24 ENCOUNTER — HOSPITAL LABORATORY (OUTPATIENT)
Dept: OTHER | Facility: CLINIC | Age: 72
End: 2017-11-24

## 2017-11-24 ENCOUNTER — NURSING HOME VISIT (OUTPATIENT)
Dept: GERIATRICS | Facility: CLINIC | Age: 72
End: 2017-11-24
Payer: COMMERCIAL

## 2017-11-24 VITALS
TEMPERATURE: 97.2 F | HEART RATE: 61 BPM | HEIGHT: 73 IN | DIASTOLIC BLOOD PRESSURE: 81 MMHG | BODY MASS INDEX: 26.11 KG/M2 | RESPIRATION RATE: 18 BRPM | SYSTOLIC BLOOD PRESSURE: 114 MMHG | OXYGEN SATURATION: 93 % | WEIGHT: 197 LBS

## 2017-11-24 DIAGNOSIS — M79.662 PAIN OF LEFT CALF: Primary | ICD-10-CM

## 2017-11-24 LAB — MAGNESIUM SERPL-MCNC: 2.4 MG/DL (ref 1.6–2.3)

## 2017-11-24 PROCEDURE — 99309 SBSQ NF CARE MODERATE MDM 30: CPT | Performed by: NURSE PRACTITIONER

## 2017-11-24 NOTE — PROGRESS NOTES
"[place] [facility]    Chief Complaint   Patient presents with     RECHECK       HPI:    Edi Villafana is a 72 year old  (1945), who is being seen today for an episodic care visit at [place] [facility].    HPI information obtained from: facility chart records, facility staff, patient report and [facility] chart review. Patient is in TCU recovering from pancreatitis.   Today's concern is:  Pain of left calf  Patient reports sudden onset of left calf pain two days ago. He states h/o PULMONARY EMBOLISM and DVT. Recent hospital stay due to pancreatitis. He has been active in TCU, working with therapy.       REVIEW OF SYSTEMS:  4 point ROS including Respiratory, CV, GI and , other than that noted in the HPI,  is negative    /81  Pulse 61  Temp 97.2  F (36.2  C)  Resp 18  Ht 6' 1\" (1.854 m)  Wt 197 lb (89.4 kg)  SpO2 93%  BMI 25.99 kg/m2  GENERAL APPEARANCE:  Alert, in no distress  Sitting in chair.  MS: normal strength. Antalgic gait No swelling in LEs    ASSESSMENT/PLAN:  Pain of left calf- h/o LLE DVT.  US of LLE today.   Results indicate DVT in left distal femoral artery.  Plan: lovenox 90mg sq daily  Coumadin 5mg q day  Check INR 11/26.   DISCONTINUE lovenox when INR 2-3        ANDREWS Gutierrez CNP  "

## 2017-11-26 ENCOUNTER — HOSPITAL LABORATORY (OUTPATIENT)
Dept: OTHER | Facility: CLINIC | Age: 72
End: 2017-11-26

## 2017-11-26 LAB — INR PPP: 1 (ref 0.86–1.14)

## 2017-11-27 ENCOUNTER — NURSING HOME VISIT (OUTPATIENT)
Dept: GERIATRICS | Facility: CLINIC | Age: 72
End: 2017-11-27
Payer: COMMERCIAL

## 2017-11-27 VITALS
WEIGHT: 200.2 LBS | TEMPERATURE: 97.7 F | DIASTOLIC BLOOD PRESSURE: 84 MMHG | HEIGHT: 73 IN | RESPIRATION RATE: 24 BRPM | BODY MASS INDEX: 26.53 KG/M2 | HEART RATE: 70 BPM | SYSTOLIC BLOOD PRESSURE: 125 MMHG | OXYGEN SATURATION: 97 %

## 2017-11-27 DIAGNOSIS — K85.20 ALCOHOL-INDUCED ACUTE PANCREATITIS, UNSPECIFIED COMPLICATION STATUS: Primary | ICD-10-CM

## 2017-11-27 DIAGNOSIS — R53.81 PHYSICAL DECONDITIONING: ICD-10-CM

## 2017-11-27 DIAGNOSIS — I10 HYPERTENSION GOAL BP (BLOOD PRESSURE) < 140/90: ICD-10-CM

## 2017-11-27 DIAGNOSIS — K75.9 HEPATITIS: ICD-10-CM

## 2017-11-27 DIAGNOSIS — I82.412 ACUTE DEEP VEIN THROMBOSIS (DVT) OF FEMORAL VEIN OF LEFT LOWER EXTREMITY (H): ICD-10-CM

## 2017-11-27 DIAGNOSIS — E03.9 ACQUIRED HYPOTHYROIDISM: ICD-10-CM

## 2017-11-27 DIAGNOSIS — F10.20 ALCOHOLISM (H): ICD-10-CM

## 2017-11-27 DIAGNOSIS — F41.9 ANXIETY: ICD-10-CM

## 2017-11-27 DIAGNOSIS — Z79.01 ANTICOAGULATION MANAGEMENT ENCOUNTER: ICD-10-CM

## 2017-11-27 DIAGNOSIS — Z51.81 ANTICOAGULATION MANAGEMENT ENCOUNTER: ICD-10-CM

## 2017-11-27 PROBLEM — I82.4Z2 ACUTE DEEP VEIN THROMBOSIS (DVT) OF DISTAL VEIN OF LEFT LOWER EXTREMITY (H): Status: ACTIVE | Noted: 2017-11-27

## 2017-11-27 PROCEDURE — 99309 SBSQ NF CARE MODERATE MDM 30: CPT | Performed by: NURSE PRACTITIONER

## 2017-11-27 RX ORDER — WARFARIN SODIUM 5 MG/1
5 TABLET ORAL DAILY
Qty: 30 TABLET | Refills: 0 | Status: SHIPPED | OUTPATIENT
Start: 2017-11-27 | End: 2017-12-01

## 2017-11-27 RX ORDER — LOPERAMIDE HCL 2 MG
2 CAPSULE ORAL DAILY PRN
COMMUNITY
End: 2017-12-05

## 2017-11-27 NOTE — LETTER
11/27/2017        RE: Edi Villafana  [address]  [facility] [address]        [place] [facility]    Chief Complaint   Patient presents with     RECHECK       HPI:    Edi Villafana is a 72 year old  (1945), who is being seen today for an episodic care visit at [place] [facility].  HPI information obtained from: facility chart records, facility staff, patient report and [facility] chart review.  Current issues are:        Alcohol-induced acute pancreatitis, unspecified complication status  Hepatitis  Patient transferred to TCU following hospitalization due to weakness and falls. He was found to have acute pancreatitis and alcohol withdrawal. Prior to adm to hospital he had been feeling poorly for a month, not eating and drinking much, and c/o abd pain. Labs in ED showed Na 125,Bilirubin direct 0.7 (H), Bilirubin total 1.8 (H), Albumin 4.2, Protein Total 7.9, Alkphos 107, ALT 11 (H),  (H)  Alcohol ethyl: <0.01 , Lipase: 2477 (H).   Abd US showed fatty liver.   During hospital stay diet was slowly advanced. He did have tremors and was treated with ativan for withdrawal symptoms. He did meet with chem dep but denied problem with drinking   Acute LLE DVT- venous US done on 11/24 due to LLE pain. Results indicate thrombus in LLE- femoral vein. lovenox and warfarin started.   INR today 1.0  Alcoholism  Chem dep did evaluate in hospital but patient denied any problems with drinking with hospitalist.     Acquired hypothyroidism  TSH was quite elevated in hospital. He was likely not taking his meds. He was started back on his PTA dose of levothyroxine.     Hyperlipidemia LDL goal <100  Cholesterol   Date Value Ref Range Status   07/19/2017 150 <200 mg/dL Final     Hypertension goal BP (blood pressure) < 140/90  BPs in /84, 123/88, 128/83  He continues on losartan    Anxiety/depression  H/o depression. He has tried SSRIs in past but he does not feel that they are helpful.  He does have prn ativan. He was started on ambien in hospital  Today he was talkative. He plans to get stronger so he can get back home to his work.     Physical deconditioning  Has been independent. Post graduate education. Single. No close relatives that he is in touch with.     ALLERGIES: Ace inhibitors; Ancef [cefazolin sodium]; Atenolol; Compazine; Sulfa drugs; and Tramadol  Past Medical, Surgical, Family and Social History reviewed and updated in [facility].    Current Outpatient Prescriptions   Medication Sig Dispense Refill     loperamide (IMODIUM) 2 MG capsule Take 2 mg by mouth daily as needed for diarrhea Do not exceed 16mg in 24 hours       ENOXAPARIN SODIUM IJ Inject 90 mg as directed 2 times daily Until INR greater than 2.0       aspirin 325 MG tablet Take 1 tablet (325 mg) by mouth daily 120 tablet      LORazepam (ATIVAN) 2 MG tablet TAKE 1 TABLET BY MOUTH EVERY 8 HOURS AS NEEDED FOR ANXIETY. 60 tablet 0     zolpidem (AMBIEN) 10 MG tablet Take 1 tablet (10 mg) by mouth nightly as needed for sleep 30 tablet 0     levothyroxine (SYNTHROID/LEVOTHROID) 125 MCG tablet Take 1 tablet (125 mcg) by mouth daily 90 tablet 3     losartan (COZAAR) 50 MG tablet Take 0.5 tablets (25 mg) by mouth daily 90 tablet 1     Cholecalciferol (VITAMIN D3) 2000 UNITS CAPS Take 1 capsule by mouth daily        Multiple Vitamin (MULTIVITAMIN OR) Take 1 tablet by mouth daily        atorvastatin (LIPITOR) 40 MG tablet TAKE 1 TABLET (40 MG) BY MOUTH DAILY (Patient not taking: Reported on 11/20/2017) 90 tablet 1     [DISCONTINUED] TESTOSTERONE 2 MG/GM CREAM 15% cream and apply 0.5 ml topically daily 40 g 5     Medications reviewed:  Medications reconciled to facility chart and changes were made to reflect current medications as identified as above med list. Below are the changes that were made:   Medications stopped since last EPIC medication reconciliation:   There are no discontinued medications.    Medications started since last EPIC  "medication reconciliation:  Orders Placed This Encounter   Medications     loperamide (IMODIUM) 2 MG capsule     Sig: Take 2 mg by mouth daily as needed for diarrhea Do not exceed 16mg in 24 hours     ENOXAPARIN SODIUM IJ     Sig: Inject 90 mg as directed 2 times daily Until INR greater than 2.0         REVIEW OF SYSTEMS:  4 point ROS including Respiratory, CV, GI and , other than that noted in the HPI,  is negative    Physical Exam:  /84  Pulse 70  Temp 97.7  F (36.5  C)  Resp 24  Ht 6' 1\" (1.854 m)  Wt 200 lb 3.2 oz (90.8 kg)  SpO2 97%  BMI 26.41 kg/m2  GENERAL APPEARANCE:  Alert, in no distress  ENT:  Mouth and posterior oropharynx normal, moist mucous membranes, hearing acuity adequate   EYES:  EOM, conjunctivae, lids, pupils and irises normal  RESP:  respiratory effort and palpation of chest normal, no respiratory distress, Lung sounds clear  CV:  Palpation and auscultation of heart done , rate and rhythm reg, no murmur, no rub or gallop, Edema none  ABDOMEN:  normal bowel sounds, soft, nontender, no hepatosplenomegaly or other masses  M/S:   Gait and station slightly unsteady, Digits and nails normal   SKIN:  Inspection/Palpation of skin and subcutaneous tissue no rash  NEURO: 2-12 in normal limits and at patient's baseline  PSYCH:  insight and judgement, memory intat , affect and mood normal    Recent Labs:    CBC RESULTS:   Recent Labs   Lab Test  11/12/17   0649  11/11/17   1452   WBC  5.6  8.4   RBC  4.40  5.28   HGB  14.3  17.0   HCT  40.5  47.2   MCV  92  89   MCH  32.5  32.2   MCHC  35.3  36.0   RDW  12.9  12.6   PLT  156  208       Last Basic Metabolic Panel:  Recent Labs   Lab Test  11/20/17   1024  11/12/17   0649   NA  132*  130*   POTASSIUM  4.0  3.7   CHLORIDE  101  96   CECY  9.4  8.0*   CO2  22  26   BUN  16  16   CR  1.13  1.14   GLC  108*  83       Liver Function Studies -   Recent Labs   Lab Test  11/20/17   1024  11/12/17   0649   PROTTOTAL  7.5  6.0*   ALBUMIN  4.0  3.3* "   BILITOTAL  0.9  1.7*   ALKPHOS  92  79   AST  55*  82*   ALT  69  79*       TSH   Date Value Ref Range Status   11/11/2017 47.06 (H) 0.40 - 4.00 mU/L Final   12/12/2016 2.25 0.40 - 5.00 mU/L Final       Assessment/Plan:  (K85.20) Alcohol-induced acute pancreatitis, unspecified complication status  (primary encounter diagnosis)  (K75.9) Hepatitis  (F10.20) Alcoholism (H)  Comment: patient recently in hospital due to weakness. Found to have pancreatitis and elevated liver enzymes. HE is working with physical therapy and OCCUPATIONAL THERAPY.   Plan: ACP to see. CMP 11/29    (E03.9) Acquired hypothyroidism  Comment: TSH was elevated likely not taking his medication  Plan: levothyroxine 125mcg daily. Recheck TSH in 3 weeks    (I82.412) Acute deep vein thrombosis (DVT) of femoral vein of left lower extremity (H)  (Z51.81,  Z79.01) Anticoagulation management encounter  Comment: INR subtherapeutic.   Plan: increase coumadin 10mg today and 7.5mg on 11/28. Recheck INR 11/29. Continue lovenox 90mg SQ BID until INR >2  DISCONTINUE  ASA     (I10) Hypertension goal BP (blood pressure) < 140/90  Comment: adequate control  Plan: no change    (F41.9) Anxiety  Comment: h/o. Has tried antidepressants. He did meet with counselor in hospital  Plan: ACP to see  (R53.81) Physical deconditioning  Comment: due to acute pancreatitis.   Plan: continue with physical therapy and OCCUPATIONAL THERAPY         Electronically signed by  ANDREWS Gutierrez CNP                      Sincerely,        ANDREWS Gutierrez CNP

## 2017-11-27 NOTE — PROGRESS NOTES
[place] [facility]    Chief Complaint   Patient presents with     RECHECK       HPI:    Edi Villafana is a 72 year old  (1945), who is being seen today for an episodic [facility] [place] [facility].  HPI information obtained from: facility chart records, facility staff, patient report and [facility] chart review.  Current issues are:        Alcohol-induced acute pancreatitis, unspecified complication status  Hepatitis  Patient transferred to TCU following hospitalization due to weakness and falls. He was found to have acute pancreatitis and alcohol withdrawal. Prior to adm to hospital he had been feeling poorly for a month, not eating and drinking much, and c/o abd pain. Labs in ED showed Na 125,Bilirubin direct 0.7 (H), Bilirubin total 1.8 (H), Albumin 4.2, Protein Total 7.9, Alkphos 107, ALT 11 (H),  (H)  Alcohol ethyl: <0.01 , Lipase: 2477 (H).   Abd US showed fatty liver.   During hospital stay diet was slowly advanced. He did have tremors and was treated with ativan for withdrawal symptoms. He did meet with chem dep but denied problem with drinking   Acute LLE DVT- venous US done on 11/24 due to LLE pain. Results indicate thrombus in LLE- femoral vein. lovenox and warfarin started.   INR today 1.0  Alcoholism  Chem dep did evaluate in hospital but patient denied any problems with drinking with hospitalist.     Acquired hypothyroidism  TSH was quite elevated in hospital. He was likely not taking his meds. He was started back on his PTA dose of levothyroxine.     Hyperlipidemia LDL goal <100  Cholesterol   Date Value Ref Range Status   07/19/2017 150 <200 mg/dL Final     Hypertension goal BP (blood pressure) < 140/90  BPs in /84, 123/88, 128/83  He continues on losartan    Anxiety/depression  H/o depression. He has tried SSRIs in past but he does not feel that they are helpful. He does have prn ativan. He was started on ambien in hospital  Today he was talkative. He plans to  get stronger so he can get back home to his work.     Physical deconditioning  Has been independent. Post graduate education. Single. No close relatives that he is in touch with.     ALLERGIES: Ace inhibitors; Ancef [cefazolin sodium]; Atenolol; Compazine; Sulfa drugs; and Tramadol  Past Medical, Surgical, Family and Social History reviewed and updated in [facility].    Current Outpatient Prescriptions   Medication Sig Dispense Refill     loperamide (IMODIUM) 2 MG capsule Take 2 mg by mouth daily as needed for diarrhea Do not exceed 16mg in 24 hours       ENOXAPARIN SODIUM IJ Inject 90 mg as directed 2 times daily Until INR greater than 2.0       aspirin 325 MG tablet Take 1 tablet (325 mg) by mouth daily 120 tablet      LORazepam (ATIVAN) 2 MG tablet TAKE 1 TABLET BY MOUTH EVERY 8 HOURS AS NEEDED FOR ANXIETY. 60 tablet 0     zolpidem (AMBIEN) 10 MG tablet Take 1 tablet (10 mg) by mouth nightly as needed for sleep 30 tablet 0     levothyroxine (SYNTHROID/LEVOTHROID) 125 MCG tablet Take 1 tablet (125 mcg) by mouth daily 90 tablet 3     losartan (COZAAR) 50 MG tablet Take 0.5 tablets (25 mg) by mouth daily 90 tablet 1     Cholecalciferol (VITAMIN D3) 2000 UNITS CAPS Take 1 capsule by mouth daily        Multiple Vitamin (MULTIVITAMIN OR) Take 1 tablet by mouth daily        atorvastatin (LIPITOR) 40 MG tablet TAKE 1 TABLET (40 MG) BY MOUTH DAILY (Patient not taking: Reported on 11/20/2017) 90 tablet 1     [DISCONTINUED] TESTOSTERONE 2 MG/GM CREAM 15% cream and apply 0.5 ml topically daily 40 g 5     Medications reviewed:  Medications reconciled to facility chart and changes were made to reflect current medications as identified as above med list. Below are the changes that were made:   Medications stopped since last EPIC medication reconciliation:   There are no discontinued medications.    Medications started since last [facility] medication reconciliation:  Orders Placed This Encounter   Medications     loperamide (IMODIUM) 2 MG  "capsule     Sig: Take 2 mg by mouth daily as needed for diarrhea Do not exceed 16mg in 24 hours     ENOXAPARIN SODIUM IJ     Sig: Inject 90 mg as directed 2 times daily Until INR greater than 2.0         REVIEW OF SYSTEMS:  4 point ROS including Respiratory, CV, GI and , other than that noted in the HPI,  is negative    Physical Exam:  /84  Pulse 70  Temp 97.7  F (36.5  C)  Resp 24  Ht 6' 1\" (1.854 m)  Wt 200 lb 3.2 oz (90.8 kg)  SpO2 97%  BMI 26.41 kg/m2  GENERAL APPEARANCE:  Alert, in no distress  ENT:  Mouth and posterior oropharynx normal, moist mucous membranes, hearing acuity adequate   EYES:  EOM, conjunctivae, lids, pupils and irises normal  RESP:  respiratory effort and palpation of chest normal, no respiratory distress, Lung sounds clear  CV:  Palpation and auscultation of heart done , rate and rhythm reg, no murmur, no rub or gallop, Edema none  ABDOMEN:  normal bowel sounds, soft, nontender, no hepatosplenomegaly or other masses  M/S:   Gait and station slightly unsteady, Digits and nails normal   SKIN:  Inspection/Palpation of skin and subcutaneous tissue no rash  NEURO: 2-12 in normal limits and at patient's baseline  PSYCH:  insight and judgement, memory intat , affect and mood normal    Recent Labs:    CBC RESULTS:   Recent Labs   Lab Test  11/12/17   0649  11/11/17   1452   WBC  5.6  8.4   RBC  4.40  5.28   HGB  14.3  17.0   HCT  40.5  47.2   MCV  92  89   MCH  32.5  32.2   MCHC  35.3  36.0   RDW  12.9  12.6   PLT  156  208       Last Basic Metabolic Panel:  Recent Labs   Lab Test  11/20/17   1024  11/12/17   0649   NA  132*  130*   POTASSIUM  4.0  3.7   CHLORIDE  101  96   CECY  9.4  8.0*   CO2  22  26   BUN  16  16   CR  1.13  1.14   GLC  108*  83       Liver Function Studies -   Recent Labs   Lab Test  11/20/17   1024  11/12/17   0649   PROTTOTAL  7.5  6.0*   ALBUMIN  4.0  3.3*   BILITOTAL  0.9  1.7*   ALKPHOS  92  79   AST  55*  82*   ALT  69  79*       TSH   Date Value Ref Range " Status   11/11/2017 47.06 (H) 0.40 - 4.00 mU/L Final   12/12/2016 2.25 0.40 - 5.00 mU/L Final       Assessment/Plan:  (K85.20) Alcohol-induced acute pancreatitis, unspecified complication status  (primary encounter diagnosis)  (K75.9) Hepatitis  (F10.20) Alcoholism (H)  Comment: patient recently in hospital due to weakness. Found to have pancreatitis and elevated liver enzymes. HE is working with physical therapy and OCCUPATIONAL THERAPY.   Plan: ACP to see. CMP 11/29    (E03.9) Acquired hypothyroidism  Comment: TSH was elevated likely not taking his medication  Plan: levothyroxine 125mcg daily. Recheck TSH in 3 weeks    (I82.412) Acute deep vein thrombosis (DVT) of femoral vein of left lower extremity (H)  (Z51.81,  Z79.01) Anticoagulation management encounter  Comment: INR subtherapeutic.   Plan: increase coumadin 10mg today and 7.5mg on 11/28. Recheck INR 11/29. Continue lovenox 90mg SQ BID until INR >2  DISCONTINUE  ASA     (I10) Hypertension goal BP (blood pressure) < 140/90  Comment: adequate control  Plan: no change    (F41.9) Anxiety  Comment: h/o. Has tried antidepressants. He did meet with counselor in hospital  Plan: ACP to see  (R53.81) Physical deconditioning  Comment: due to acute pancreatitis.   Plan: continue with physical therapy and OCCUPATIONAL THERAPY         Electronically signed by  ANDREWS Gutierrez CNP

## 2017-11-29 ENCOUNTER — HOSPITAL LABORATORY (OUTPATIENT)
Dept: OTHER | Facility: CLINIC | Age: 72
End: 2017-11-29

## 2017-11-29 ENCOUNTER — NURSING HOME VISIT (OUTPATIENT)
Dept: GERIATRICS | Facility: CLINIC | Age: 72
End: 2017-11-29
Payer: COMMERCIAL

## 2017-11-29 ENCOUNTER — TRANSFERRED RECORDS (OUTPATIENT)
Dept: HEALTH INFORMATION MANAGEMENT | Facility: CLINIC | Age: 72
End: 2017-11-29

## 2017-11-29 VITALS
BODY MASS INDEX: 26.53 KG/M2 | RESPIRATION RATE: 16 BRPM | OXYGEN SATURATION: 95 % | WEIGHT: 200.2 LBS | DIASTOLIC BLOOD PRESSURE: 81 MMHG | SYSTOLIC BLOOD PRESSURE: 134 MMHG | TEMPERATURE: 97.3 F | HEART RATE: 62 BPM | HEIGHT: 73 IN

## 2017-11-29 DIAGNOSIS — Z51.81 ENCOUNTER FOR THERAPEUTIC DRUG MONITORING: ICD-10-CM

## 2017-11-29 DIAGNOSIS — Z79.01 LONG-TERM (CURRENT) USE OF ANTICOAGULANTS: ICD-10-CM

## 2017-11-29 DIAGNOSIS — I82.4Z2 ACUTE DEEP VEIN THROMBOSIS (DVT) OF DISTAL VEIN OF LEFT LOWER EXTREMITY (H): Primary | ICD-10-CM

## 2017-11-29 LAB
ALBUMIN SERPL-MCNC: 3.6 G/DL (ref 3.4–5)
ALP SERPL-CCNC: 86 U/L (ref 40–150)
ALT SERPL W P-5'-P-CCNC: 90 U/L (ref 0–70)
ANION GAP SERPL CALCULATED.3IONS-SCNC: 8 MMOL/L (ref 3–14)
AST SERPL W P-5'-P-CCNC: 76 U/L (ref 0–45)
BILIRUB SERPL-MCNC: 0.5 MG/DL (ref 0.2–1.3)
BUN SERPL-MCNC: 14 MG/DL (ref 7–30)
CALCIUM SERPL-MCNC: 8.8 MG/DL (ref 8.5–10.1)
CHLORIDE SERPL-SCNC: 98 MMOL/L (ref 94–109)
CO2 SERPL-SCNC: 24 MMOL/L (ref 20–32)
CREAT SERPL-MCNC: 0.95 MG/DL (ref 0.66–1.25)
GFR SERPL CREATININE-BSD FRML MDRD: 78 ML/MIN/1.7M2
GLUCOSE SERPL-MCNC: 93 MG/DL (ref 70–99)
INR PPP: 1.25 (ref 0.86–1.14)
POTASSIUM SERPL-SCNC: 4 MMOL/L (ref 3.4–5.3)
PROT SERPL-MCNC: 6.9 G/DL (ref 6.8–8.8)
SODIUM SERPL-SCNC: 130 MMOL/L (ref 133–144)

## 2017-11-29 PROCEDURE — 99308 SBSQ NF CARE LOW MDM 20: CPT | Performed by: NURSE PRACTITIONER

## 2017-11-29 NOTE — PROGRESS NOTES
[place] [facility]    HPI:    Edi Villafana is a 72 year old  (1945), who is being seen today for an episodic care visit at [facility].   HPI information obtained from: facility chart records, facility staff, patient report and [facility] chart review. Today's concern is INR/Coumadin management for DVT    Bleeding Signs/Symptoms:  None  Thromboembolic Signs/Symptoms:  None    Medication Changes:  No  Dietary Changes:  No  Activity Changes: No  Bacterial/Viral Infection:  No    Missed Coumadin Doses:  None    On ASA: No lovenox 90mg SQ BID until INR therapeutic    Other Concerns:  No    OBJECTIVE:    INR Today:  1.4  Current Dose:  7.5mg q day    ASSESSMENT:     Acute deep vein thrombosis (DVT) of distal vein of left lower extremity (H)  Long-term (current) use of anticoagulants  Encounter for therapeutic drug monitoring  Subtherapeutic INR for goal of 2-3    PLAN:    New Dose: 10mg q day      Next INR: 12/1/17        ANDREWS Gutierrez CNP

## 2017-12-01 ENCOUNTER — NURSING HOME VISIT (OUTPATIENT)
Dept: GERIATRICS | Facility: CLINIC | Age: 72
End: 2017-12-01
Payer: COMMERCIAL

## 2017-12-01 VITALS
OXYGEN SATURATION: 96 % | SYSTOLIC BLOOD PRESSURE: 143 MMHG | DIASTOLIC BLOOD PRESSURE: 80 MMHG | HEART RATE: 57 BPM | TEMPERATURE: 97.5 F

## 2017-12-01 DIAGNOSIS — Z79.01 LONG-TERM (CURRENT) USE OF ANTICOAGULANTS: ICD-10-CM

## 2017-12-01 DIAGNOSIS — I82.4Z2 ACUTE DEEP VEIN THROMBOSIS (DVT) OF DISTAL VEIN OF LEFT LOWER EXTREMITY (H): Primary | ICD-10-CM

## 2017-12-01 DIAGNOSIS — Z51.81 ENCOUNTER FOR THERAPEUTIC DRUG MONITORING: ICD-10-CM

## 2017-12-01 PROCEDURE — 99308 SBSQ NF CARE LOW MDM 20: CPT | Performed by: NURSE PRACTITIONER

## 2017-12-01 NOTE — PROGRESS NOTES
[place] [facility]    HPI:    Edi Villafana is a 72 year old  (1945), who is being seen today for an episodic care visit at [facility].   HPI information obtained from: facility chart records, facility staff, patient report and [facility] chart review. Today's concern is INR/Coumadin management for DVT    Bleeding Signs/Symptoms:  None  Thromboembolic Signs/Symptoms:  None    Medication Changes:  No  Dietary Changes:  No  Activity Changes: No  Bacterial/Viral Infection:  No    Missed Coumadin Doses:  None    On ASA: No lovenox 90mg SQ BID until INR therapeutic    Other Concerns:  No    OBJECTIVE:    INR Today:  1.6  Current Dose:  10mg QD    ASSESSMENT:     Acute deep vein thrombosis (DVT) of distal vein of left lower extremity (H)  Long-term (current) use of anticoagulants  Encounter for therapeutic drug monitoring  Subtherapeutic INR for goal of 2-3    PLAN:    New Dose: 12.5mg QD    Next INR: 12/4/17        ANDREWS Gutierrez CNP

## 2017-12-04 ENCOUNTER — NURSING HOME VISIT (OUTPATIENT)
Dept: GERIATRICS | Facility: CLINIC | Age: 72
End: 2017-12-04
Payer: COMMERCIAL

## 2017-12-04 VITALS
OXYGEN SATURATION: 94 % | WEIGHT: 199.2 LBS | DIASTOLIC BLOOD PRESSURE: 85 MMHG | BODY MASS INDEX: 26.28 KG/M2 | HEART RATE: 61 BPM | RESPIRATION RATE: 16 BRPM | SYSTOLIC BLOOD PRESSURE: 133 MMHG | TEMPERATURE: 96.3 F

## 2017-12-04 DIAGNOSIS — Z51.81 ANTICOAGULATION MANAGEMENT ENCOUNTER: ICD-10-CM

## 2017-12-04 DIAGNOSIS — F10.20 ALCOHOLISM (H): ICD-10-CM

## 2017-12-04 DIAGNOSIS — K85.20 ALCOHOL-INDUCED ACUTE PANCREATITIS, UNSPECIFIED COMPLICATION STATUS: Primary | ICD-10-CM

## 2017-12-04 DIAGNOSIS — F41.8 DEPRESSION WITH ANXIETY: ICD-10-CM

## 2017-12-04 DIAGNOSIS — I82.4Z2 ACUTE DEEP VEIN THROMBOSIS (DVT) OF DISTAL VEIN OF LEFT LOWER EXTREMITY (H): ICD-10-CM

## 2017-12-04 DIAGNOSIS — E03.9 ACQUIRED HYPOTHYROIDISM: ICD-10-CM

## 2017-12-04 DIAGNOSIS — R53.81 PHYSICAL DECONDITIONING: ICD-10-CM

## 2017-12-04 DIAGNOSIS — I10 HYPERTENSION GOAL BP (BLOOD PRESSURE) < 140/90: ICD-10-CM

## 2017-12-04 DIAGNOSIS — Z79.01 ANTICOAGULATION MANAGEMENT ENCOUNTER: ICD-10-CM

## 2017-12-04 PROCEDURE — 99309 SBSQ NF CARE MODERATE MDM 30: CPT | Performed by: NURSE PRACTITIONER

## 2017-12-04 NOTE — PROGRESS NOTES
[place] [facility]    Chief Complaint   Patient presents with     Nursing Home Acute       HPI:    Edi Villafana is a 72 year old  (1945), who is being seen today for an episodic care visit at [place] [facility].  HPI information obtained from: facility chart records, facility staff, patient report and [facility] chart review.  Current issues are:        Alcohol-induced acute pancreatitis, unspecified complication status  Hepatitis  Patient transferred to TCU following hospitalization due to weakness and falls. He was found to have acute pancreatitis and alcohol withdrawal. Prior to adm to hospital he had been feeling poorly for a month, not eating and drinking much, and c/o abd pain. Labs in ED showed Na 125,Bilirubin direct 0.7 (H), Bilirubin total 1.8 (H), Albumin 4.2, Protein Total 7.9, Alkphos 107, ALT 11 (H),  (H)  Alcohol ethyl: <0.01 , Lipase: 2477 (H).   Abd US showed fatty liver.   During hospital stay diet was slowly advanced. He did have tremors and was treated with ativan for withdrawal symptoms. He did meet with chem dep but denied problem with drinking. Since in TCU no evidence of alcohol ingestion.   Acute LLE DVT- venous US done on 11/24 due to LLE pain. Results indicate thrombus in LLE- femoral vein. lovenox and warfarin started.   INR today 3.3. Current coumadin does 12.5mg q day  Alcoholism  Chem dep did evaluate in hospital but patient denied any problems with drinking with hospitalist.     Acquired hypothyroidism  TSH was quite elevated in hospital. He was likely not taking his meds. He was started back on his PTA dose of levothyroxine.     Hyperlipidemia LDL goal <100  Cholesterol   Date Value Ref Range Status   07/19/2017 150 <200 mg/dL Final     Hypertension goal BP (blood pressure) < 140/90  BPs in /75, 113/75, 117/79  He continues on losartan    Anxiety/depression  H/o depression. He has tried SSRIs in past but he does not feel that they are helpful. He  does have prn ativan. He was started on ambien in hospital      Physical deconditioning  Has been independent. Post graduate education. Single. No close relatives that he is in touch with. Lives alone in house.     ALLERGIES: Ace inhibitors; Ancef [cefazolin sodium]; Atenolol; Compazine; Sulfa drugs; and Tramadol  Past Medical, Surgical, Family and Social History reviewed and updated in [facility].    Current Outpatient Prescriptions   Medication Sig Dispense Refill     loperamide (IMODIUM) 2 MG capsule Take 2 mg by mouth daily as needed for diarrhea Do not exceed 16mg in 24 hours       ENOXAPARIN SODIUM IJ Inject 90 mg as directed 2 times daily Until INR greater than 2.0       LORazepam (ATIVAN) 2 MG tablet TAKE 1 TABLET BY MOUTH EVERY 8 HOURS AS NEEDED FOR ANXIETY. 60 tablet 0     zolpidem (AMBIEN) 10 MG tablet Take 1 tablet (10 mg) by mouth nightly as needed for sleep 30 tablet 0     atorvastatin (LIPITOR) 40 MG tablet TAKE 1 TABLET (40 MG) BY MOUTH DAILY 90 tablet 1     levothyroxine (SYNTHROID/LEVOTHROID) 125 MCG tablet Take 1 tablet (125 mcg) by mouth daily 90 tablet 3     losartan (COZAAR) 50 MG tablet Take 0.5 tablets (25 mg) by mouth daily 90 tablet 1     Cholecalciferol (VITAMIN D3) 2000 UNITS CAPS Take 1 capsule by mouth daily        Multiple Vitamin (MULTIVITAMIN OR) Take 1 tablet by mouth daily        Warfarin Sodium (COUMADIN PO) Take 12.5 mg by mouth daily Recheck INR 12/4       [DISCONTINUED] TESTOSTERONE 2 MG/GM CREAM 15% cream and apply 0.5 ml topically daily 40 g 5     Medications reviewed:  Medications reconciled to facility chart and changes were made to reflect current medications as identified as above med list. Below are the changes that were made:   Medications stopped since last EPIC medication reconciliation:   There are no discontinued medications.    Medications started since last [facility] medication reconciliation:  Orders Placed This Encounter   Medications     loperamide (IMODIUM) 2 MG capsule      Sig: Take 2 mg by mouth daily as needed for diarrhea Do not exceed 16mg in 24 hours     ENOXAPARIN SODIUM IJ     Sig: Inject 90 mg as directed 2 times daily Until INR greater than 2.0         REVIEW OF SYSTEMS:  4 point ROS including Respiratory, CV, GI and , other than that noted in the HPI,  is negative    BP: 117/79, 113/75, 133/85  Wt Readings from Last 5 Encounters:   12/04/17 199 lb 3.2 oz (90.4 kg)   11/29/17 200 lb 3.2 oz (90.8 kg)   11/27/17 200 lb 3.2 oz (90.8 kg)   11/24/17 197 lb (89.4 kg)   11/20/17 198 lb 11.2 oz (90.1 kg)       Physical Exam:  /85  Pulse 61  Temp 96.3  F (35.7  C)  Resp 16  Wt 199 lb 3.2 oz (90.4 kg)  SpO2 94%  BMI 26.28 kg/m2  GENERAL APPEARANCE:  Alert, in no distress  ENT:  Mouth and posterior oropharynx normal, moist mucous membranes, hearing acuity adequate   EYES:  EOM, conjunctivae, lids, pupils and irises normal  RESP:  respiratory effort and palpation of chest normal, no respiratory distress, Lung sounds clear  CV:  Palpation and auscultation of heart done , rate and rhythm reg, no murmur, no rub or gallop, Edema none  ABDOMEN:  normal bowel sounds, soft, nontender, no hepatosplenomegaly or other masses  M/S:   Gait and station slightly unsteady, Digits and nails normal   SKIN:  Inspection/Palpation of skin and subcutaneous tissue no rash  NEURO: 2-12 in normal limits and at patient's baseline  PSYCH:  insight and judgement, memory intat , affect and mood normal    Recent Labs:    CBC RESULTS:   Recent Labs   Lab Test  11/12/17   0649  11/11/17   1452   WBC  5.6  8.4   RBC  4.40  5.28   HGB  14.3  17.0   HCT  40.5  47.2   MCV  92  89   MCH  32.5  32.2   MCHC  35.3  36.0   RDW  12.9  12.6   PLT  156  208       Last Basic Metabolic Panel:  Recent Labs   Lab Test  11/20/17   1024  11/12/17   0649   NA  132*  130*   POTASSIUM  4.0  3.7   CHLORIDE  101  96   CECY  9.4  8.0*   CO2  22  26   BUN  16  16   CR  1.13  1.14   GLC  108*  83       Liver Function Studies -    Recent Labs   Lab Test  11/20/17   1024  11/12/17   0649   PROTTOTAL  7.5  6.0*   ALBUMIN  4.0  3.3*   BILITOTAL  0.9  1.7*   ALKPHOS  92  79   AST  55*  82*   ALT  69  79*       TSH   Date Value Ref Range Status   11/11/2017 47.06 (H) 0.40 - 4.00 mU/L Final   12/12/2016 2.25 0.40 - 5.00 mU/L Final       Assessment/Plan:  (K85.20) Alcohol-induced acute pancreatitis, unspecified complication status  (primary encounter diagnosis)  (K75.9) Hepatitis  (F10.20) Alcoholism (H)  Comment: patient recently in hospital due to weakness. Found to have pancreatitis and elevated liver enzymes. HE is working with physical therapy and OCCUPATIONAL THERAPY.   Plan: continue plan of care    (E03.9) Acquired hypothyroidism  Comment: TSH was elevated likely not taking his medication  Plan: levothyroxine 125mcg daily. Recheck TSH next week    (I82.412) Acute deep vein thrombosis (DVT) of femoral vein of left lower extremity (H)  (Z51.81,  Z79.01) Anticoagulation management encounter  Comment: INR supratherapeutic.   Plan:hold coumadin today. DISCONTINUE lovenox. INR in am    (I10) Hypertension goal BP (blood pressure) < 140/90  Comment: adequate control  Plan: no change    (F41.9) Anxiety  Comment: h/o. Has tried antidepressants. He did meet with counselor in hospital  Plan: ACP to see  (R53.81) Physical deconditioning  Comment: due to acute pancreatitis. Likely discharge soon.   Plan: continue with physical therapy and OCCUPATIONAL THERAPY         Electronically signed by  ANDREWS Gutierrez CNP

## 2017-12-05 ENCOUNTER — DISCHARGE SUMMARY NURSING HOME (OUTPATIENT)
Dept: GERIATRICS | Facility: CLINIC | Age: 72
End: 2017-12-05
Payer: COMMERCIAL

## 2017-12-05 VITALS
OXYGEN SATURATION: 97 % | HEART RATE: 75 BPM | BODY MASS INDEX: 26.28 KG/M2 | WEIGHT: 199.2 LBS | TEMPERATURE: 98.4 F | SYSTOLIC BLOOD PRESSURE: 131 MMHG | DIASTOLIC BLOOD PRESSURE: 87 MMHG

## 2017-12-05 DIAGNOSIS — F41.8 DEPRESSION WITH ANXIETY: ICD-10-CM

## 2017-12-05 DIAGNOSIS — F10.20 ALCOHOLISM (H): ICD-10-CM

## 2017-12-05 DIAGNOSIS — R53.81 PHYSICAL DECONDITIONING: ICD-10-CM

## 2017-12-05 DIAGNOSIS — E78.5 HYPERLIPIDEMIA LDL GOAL <100: ICD-10-CM

## 2017-12-05 DIAGNOSIS — K85.20 ALCOHOL-INDUCED ACUTE PANCREATITIS, UNSPECIFIED COMPLICATION STATUS: Primary | ICD-10-CM

## 2017-12-05 DIAGNOSIS — I10 HYPERTENSION GOAL BP (BLOOD PRESSURE) < 140/90: ICD-10-CM

## 2017-12-05 DIAGNOSIS — I82.4Z2 ACUTE DEEP VEIN THROMBOSIS (DVT) OF DISTAL VEIN OF LEFT LOWER EXTREMITY (H): ICD-10-CM

## 2017-12-05 DIAGNOSIS — E03.9 ACQUIRED HYPOTHYROIDISM: ICD-10-CM

## 2017-12-05 PROCEDURE — 99316 NF DSCHRG MGMT 30 MIN+: CPT | Performed by: NURSE PRACTITIONER

## 2017-12-05 RX ORDER — ATORVASTATIN CALCIUM 40 MG/1
TABLET, FILM COATED ORAL
Qty: 90 TABLET | Refills: 1 | Status: SHIPPED | OUTPATIENT
Start: 2017-12-05 | End: 2017-12-13

## 2017-12-05 RX ORDER — MENTHOL AND METHYL SALICYLATE 10; 30 G/100G; G/100G
CREAM TOPICAL EVERY 4 HOURS PRN
Status: ON HOLD | COMMUNITY
End: 2018-08-09

## 2017-12-05 NOTE — PROGRESS NOTES
[place] [facility] DISCHARGE SUMMARY    PATIENT'S NAME: Edi Villafana  YOB: 1945  MEDICAL RECORD NUMBER:  [id number]    PRIMARY CARE PROVIDER AND CLINIC RESPONSIBLE AFTER TRANSFER: Enrique Lucia [id number] / [facility] [id number]     CODE STATUS/ADVANCE DIRECTIVES DISCUSSION:   CPR/Full code        Allergies   Allergen Reactions     Ace Inhibitors Cough     Ancef [Cefazolin Sodium]      Atenolol Other (See Comments)     Low BP     Compazine      Sulfa Drugs      Tramadol Fatigue     Side effects       TRANSFERRING PROVIDERS: ANDREWS Gutierrez CNP, Antoni Lucia MD  DATE OF SNF ADMISSION:  November / 16 / 2017  DATE OF SNF (anticipated) DISCHARGE: December / 07 / 2017  DISCHARGE DISPOSITION: [facility] Provider   Nursing Facility: [facility] stay 11/11/2017 to 11/16/2017.     Condition on Discharge:  Stable.  Function:  independent  Cognitive Scores: congitively intact    Equipment: no aids    DISCHARGE DIAGNOSIS:   1. Alcohol-induced acute pancreatitis, unspecified complication status    2. Acute deep vein thrombosis (DVT) of distal vein of left lower extremity (H)    3. Alcoholism (H)    4. Acquired hypothyroidism    5. Hyperlipidemia LDL goal <100    6. Hypertension goal BP (blood pressure) < 140/90    7. Depression with anxiety    8. Physical deconditioning        HPI Nursing Facility Course:  HPI information obtained from: facility chart records, facility staff, patient report and [facility] chart review.         Alcohol-induced acute pancreatitis, unspecified complication status  Hepatitis  Patient transferred to TCU following hospitalization due to weakness and falls. He was found to have acute pancreatitis and alcohol withdrawal. Prior to adm to hospital he had been feeling poorly for a month, not eating and drinking much, and c/o abd pain. Labs in ED showed Na 125,Bilirubin direct 0.7 (H), Bilirubin total 1.8 (H),  Albumin 4.2, Protein Total 7.9, Alkphos 107, ALT 11 (H),  (H)  Alcohol ethyl: <0.01 , Lipase: 2477 (H).   Abd US showed fatty liver.   During hospital stay diet was slowly advanced. He did have tremors and was treated with ativan for withdrawal symptoms. He did meet with chem dep but denied problem with drinking. Since in TCU no evidence of alcohol ingestion. No abdominal pain.     Acute LLE DVT  venous US done on 11/24 due to LLE pain. Results indicate thrombus in LLE- femoral vein. lovenox and warfarin started.   INR today 2.9. Current coumadin does 7.5mg q day    Alcoholism  Chem dep did evaluate in hospital but patient denied any problems with drinking with hospitalist.     Acquired hypothyroidism  TSH was quite elevated in hospital. He was likely not taking his meds. He was started back on his PTA dose of levothyroxine.     Hyperlipidemia LDL goal <100  Cholesterol   Date Value Ref Range Status   07/19/2017 150 <200 mg/dL Final     Hypertension goal BP (blood pressure) < 140/90  BPs in TCU: 131/87, 133/85, 113/75  He continues on losartan    Anxiety/depression  H/o depression. He has tried SSRIs in past but he does not feel that they are helpful. He does have prn ativan. He was started on ambien in hospital      Physical deconditioning  Has been independent. Post graduate education. Single. No close relatives that he is in touch with. Lives alone in house.     PAST MEDICAL HISTORY:  has a past medical history of Decreased libido; Diverticulosis of colon (without mention of hemorrhage); Generalized anxiety disorder; Insomnia, unspecified; Other and unspecified hyperlipidemia; Other specified congenital anomaly of kidney; Other testicular hypofunction; Subjective visual disturbance, unspecified; Unspecified cataract; Unspecified essential hypertension; and Unspecified hypothyroidism.    DISCHARGE MEDICATIONS:  Current Outpatient Prescriptions   Medication Sig Dispense Refill     Menthol-Methyl Salicylate  (ICY HOT EXTRA STRENGTH) 10-30 % CREA Apply topically every 4 hours as needed       atorvastatin (LIPITOR) 40 MG tablet TAKE 1 TABLET (40 MG) BY MOUTH DAILY 90 tablet 1     Warfarin Sodium (COUMADIN PO) Take 7.5 mg by mouth daily Recheck INR 12/6       LORazepam (ATIVAN) 2 MG tablet TAKE 1 TABLET BY MOUTH EVERY 8 HOURS AS NEEDED FOR ANXIETY. 60 tablet 0     zolpidem (AMBIEN) 10 MG tablet Take 1 tablet (10 mg) by mouth nightly as needed for sleep 30 tablet 0     levothyroxine (SYNTHROID/LEVOTHROID) 125 MCG tablet Take 1 tablet (125 mcg) by mouth daily 90 tablet 3     losartan (COZAAR) 50 MG tablet Take 0.5 tablets (25 mg) by mouth daily 90 tablet 1     Cholecalciferol (VITAMIN D3) 2000 UNITS CAPS Take 1 capsule by mouth daily        Multiple Vitamin (MULTIVITAMIN OR) Take 1 tablet by mouth daily        [DISCONTINUED] atorvastatin (LIPITOR) 40 MG tablet TAKE 1 TABLET (40 MG) BY MOUTH DAILY (Patient not taking: Reported on 12/5/2017) 90 tablet 1     [DISCONTINUED] TESTOSTERONE 2 MG/GM CREAM 15% cream and apply 0.5 ml topically daily 40 g 5       MEDICATION CHANGES/RATIONALE:   11/17/17 hold atorvastatin  11.24.17 start lovenox 90mg SQ BID and warfarin 5mg q day- DVT in LLE  11/27/17 DISCONTINUE ASA   12/4/17 DISCONTINUE lovenox  12/5/17 restart atorvastatin 40mg q day, DISCONTINUE imodium     Controlled medications sent with patient: not applicable/none- patient has supply of ativan and zolpidem at home.      ROS:    4 point ROS including Respiratory, CV, GI and , other than that noted in the HPI,  is negative    Physical Exam:   Vitals: /87  Pulse 75  Temp 98.4  F (36.9  C)  Wt 199 lb 3.2 oz (90.4 kg)  SpO2 97%  BMI 26.28 kg/m2  BMI= Body mass index is 26.28 kg/(m^2).    GENERAL APPEARANCE:  Alert, in no distress  ENT:  Mouth and posterior oropharynx normal, moist mucous membranes, hearing acuity adequate   EYES:  EOM, conjunctivae, lids, pupils and irises normal  RESP:  respiratory effort and palpation  of chest normal, no respiratory distress, Lung sounds clear  CV:  Palpation and auscultation of heart done , rate and rhythm reg, no murmur, no rub or gallop, Edema none  ABDOMEN:  normal bowel sounds, soft, nontender, no hepatosplenomegaly or other masses  M/S:   Gait and station slightly unsteady, Digits and nails normal   SKIN:  Inspection/Palpation of skin and subcutaneous tissue no rash  NEURO: 2-12 in normal limits and at patient's baseline  PSYCH:  insight and judgement, memory intat , affect and mood normal    DISCHARGE PLAN:  Physical Therapy- outpatient  Patient instructed to follow-up with:  PCP in 7 days      [facility] scheduled appointments:  No future appointments.    MTM referral needed and placed by this provider: No    Pending labs: INR 12/7  [facility] labs   CBC RESULTS:   Recent Labs   Lab Test  11/12/17   0649  11/11/17   1452   WBC  5.6  8.4   RBC  4.40  5.28   HGB  14.3  17.0   HCT  40.5  47.2   MCV  92  89   MCH  32.5  32.2   MCHC  35.3  36.0   RDW  12.9  12.6   PLT  156  208       Last Basic Metabolic Panel:  Recent Labs   Lab Test  11/29/17   0915  11/20/17   1024   NA  130*  132*   POTASSIUM  4.0  4.0   CHLORIDE  98  101   CECY  8.8  9.4   CO2  24  22   BUN  14  16   CR  0.95  1.13   GLC  93  108*       Liver Function Studies -   Recent Labs   Lab Test  11/29/17   0915  11/20/17   1024   PROTTOTAL  6.9  7.5   ALBUMIN  3.6  4.0   BILITOTAL  0.5  0.9   ALKPHOS  86  92   AST  76*  55*   ALT  90*  69       TSH   Date Value Ref Range Status   11/11/2017 47.06 (H) 0.40 - 4.00 mU/L Final   12/12/2016 2.25 0.40 - 5.00 mU/L Final       Discharge Treatments: TEDs during the day.     TOTAL DISCHARGE TIME:   Greater than 30 minutes  Electronically signed by:  ANDREWS Gutierrez CNP

## 2017-12-05 NOTE — LETTER
12/5/2017        RE: Edi Villafana  [address]  [facility] [address]          [place] [facility] DISCHARGE SUMMARY    PATIENT'S NAME: Edi Villafana  YOB: 1945  MEDICAL RECORD NUMBER:  [id number]    PRIMARY CARE PROVIDER AND CLINIC RESPONSIBLE AFTER TRANSFER: Rea Enrique Lopez [address] / [facility] [address]     CODE STATUS/ADVANCE DIRECTIVES DISCUSSION:   CPR/Full code        Allergies   Allergen Reactions     Ace Inhibitors Cough     Ancef [Cefazolin Sodium]      Atenolol Other (See Comments)     Low BP     Compazine      Sulfa Drugs      Tramadol Fatigue     Side effects       TRANSFERRING PROVIDERS: ANDREWS Gutierrez CNP, Antoni Lucia MD  DATE OF SNF ADMISSION:  November / 16 / 2017  DATE OF SNF (anticipated) DISCHARGE: December / 07 / 2017  DISCHARGE DISPOSITION: [facility] Provider   Nursing Facility: [facility] stay 11/11/2017 to 11/16/2017.     Condition on Discharge:  Stable.  Function:  independent  Cognitive Scores: congitively intact    Equipment: no aids    DISCHARGE DIAGNOSIS:   1. Alcohol-induced acute pancreatitis, unspecified complication status    2. Acute deep vein thrombosis (DVT) of distal vein of left lower extremity (H)    3. Alcoholism (H)    4. Acquired hypothyroidism    5. Hyperlipidemia LDL goal <100    6. Hypertension goal BP (blood pressure) < 140/90    7. Depression with anxiety    8. Physical deconditioning        HPI Nursing Facility Course:  HPI information obtained from: facility chart records, facility staff, patient report and [facility] chart review.         Alcohol-induced acute pancreatitis, unspecified complication status  Hepatitis  Patient transferred to TCU following hospitalization due to weakness and falls. He was found to have acute pancreatitis and alcohol withdrawal. Prior to adm to hospital he had been feeling poorly for a month, not eating and drinking  much, and c/o abd pain. Labs in ED showed Na 125,Bilirubin direct 0.7 (H), Bilirubin total 1.8 (H), Albumin 4.2, Protein Total 7.9, Alkphos 107, ALT 11 (H),  (H)  Alcohol ethyl: <0.01 , Lipase: 2477 (H).   Abd US showed fatty liver.   During hospital stay diet was slowly advanced. He did have tremors and was treated with ativan for withdrawal symptoms. He did meet with chem dep but denied problem with drinking. Since in TCU no evidence of alcohol ingestion. No abdominal pain.     Acute LLE DVT  venous US done on 11/24 due to LLE pain. Results indicate thrombus in LLE- femoral vein. lovenox and warfarin started.   INR today 2.9. Current coumadin does 7.5mg q day    Alcoholism  Chem dep did evaluate in hospital but patient denied any problems with drinking with hospitalist.     Acquired hypothyroidism  TSH was quite elevated in hospital. He was likely not taking his meds. He was started back on his PTA dose of levothyroxine.     Hyperlipidemia LDL goal <100  Cholesterol   Date Value Ref Range Status   07/19/2017 150 <200 mg/dL Final     Hypertension goal BP (blood pressure) < 140/90  BPs in TCU: 131/87, 133/85, 113/75  He continues on losartan    Anxiety/depression  H/o depression. He has tried SSRIs in past but he does not feel that they are helpful. He does have prn ativan. He was started on ambien in hospital      Physical deconditioning  Has been independent. Post graduate education. Single. No close relatives that he is in touch with. Lives alone in house.     PAST MEDICAL HISTORY:  has a past medical history of Decreased libido; Diverticulosis of colon (without mention of hemorrhage); Generalized anxiety disorder; Insomnia, unspecified; Other and unspecified hyperlipidemia; Other specified congenital anomaly of kidney; Other testicular hypofunction; Subjective visual disturbance, unspecified; Unspecified cataract; Unspecified essential hypertension; and Unspecified hypothyroidism.    DISCHARGE  MEDICATIONS:  Current Outpatient Prescriptions   Medication Sig Dispense Refill     Menthol-Methyl Salicylate (ICY HOT EXTRA STRENGTH) 10-30 % CREA Apply topically every 4 hours as needed       atorvastatin (LIPITOR) 40 MG tablet TAKE 1 TABLET (40 MG) BY MOUTH DAILY 90 tablet 1     Warfarin Sodium (COUMADIN PO) Take 7.5 mg by mouth daily Recheck INR 12/6       LORazepam (ATIVAN) 2 MG tablet TAKE 1 TABLET BY MOUTH EVERY 8 HOURS AS NEEDED FOR ANXIETY. 60 tablet 0     zolpidem (AMBIEN) 10 MG tablet Take 1 tablet (10 mg) by mouth nightly as needed for sleep 30 tablet 0     levothyroxine (SYNTHROID/LEVOTHROID) 125 MCG tablet Take 1 tablet (125 mcg) by mouth daily 90 tablet 3     losartan (COZAAR) 50 MG tablet Take 0.5 tablets (25 mg) by mouth daily 90 tablet 1     Cholecalciferol (VITAMIN D3) 2000 UNITS CAPS Take 1 capsule by mouth daily        Multiple Vitamin (MULTIVITAMIN OR) Take 1 tablet by mouth daily        [DISCONTINUED] atorvastatin (LIPITOR) 40 MG tablet TAKE 1 TABLET (40 MG) BY MOUTH DAILY (Patient not taking: Reported on 12/5/2017) 90 tablet 1     [DISCONTINUED] TESTOSTERONE 2 MG/GM CREAM 15% cream and apply 0.5 ml topically daily 40 g 5       MEDICATION CHANGES/RATIONALE:   11/17/17 hold atorvastatin  11.24.17 start lovenox 90mg SQ BID and warfarin 5mg q day- DVT in LLE  11/27/17 DISCONTINUE ASA   12/4/17 DISCONTINUE lovenox  12/5/17 restart atorvastatin 40mg q day, DISCONTINUE imodium     Controlled medications sent with patient: not applicable/none- patient has supply of ativan and zolpidem at home.      ROS:    4 point ROS including Respiratory, CV, GI and , other than that noted in the HPI,  is negative    Physical Exam:   Vitals: /87  Pulse 75  Temp 98.4  F (36.9  C)  Wt 199 lb 3.2 oz (90.4 kg)  SpO2 97%  BMI 26.28 kg/m2  BMI= Body mass index is 26.28 kg/(m^2).    GENERAL APPEARANCE:  Alert, in no distress  ENT:  Mouth and posterior oropharynx normal, moist mucous membranes, hearing acuity  adequate   EYES:  EOM, conjunctivae, lids, pupils and irises normal  RESP:  respiratory effort and palpation of chest normal, no respiratory distress, Lung sounds clear  CV:  Palpation and auscultation of heart done , rate and rhythm reg, no murmur, no rub or gallop, Edema none  ABDOMEN:  normal bowel sounds, soft, nontender, no hepatosplenomegaly or other masses  M/S:   Gait and station slightly unsteady, Digits and nails normal   SKIN:  Inspection/Palpation of skin and subcutaneous tissue no rash  NEURO: 2-12 in normal limits and at patient's baseline  PSYCH:  insight and judgement, memory intat , affect and mood normal    DISCHARGE PLAN:  Physical Therapy- outpatient  Patient instructed to follow-up with:  PCP in 7 days      [facility] scheduled appointments:  No future appointments.    MTM referral needed and placed by this provider: No    Pending labs: INR 12/7  [facility] labs   CBC RESULTS:   Recent Labs   Lab Test  11/12/17   0649  11/11/17   1452   WBC  5.6  8.4   RBC  4.40  5.28   HGB  14.3  17.0   HCT  40.5  47.2   MCV  92  89   MCH  32.5  32.2   MCHC  35.3  36.0   RDW  12.9  12.6   PLT  156  208       Last Basic Metabolic Panel:  Recent Labs   Lab Test  11/29/17   0915  11/20/17   1024   NA  130*  132*   POTASSIUM  4.0  4.0   CHLORIDE  98  101   CECY  8.8  9.4   CO2  24  22   BUN  14  16   CR  0.95  1.13   GLC  93  108*       Liver Function Studies -   Recent Labs   Lab Test  11/29/17   0915  11/20/17   1024   PROTTOTAL  6.9  7.5   ALBUMIN  3.6  4.0   BILITOTAL  0.5  0.9   ALKPHOS  86  92   AST  76*  55*   ALT  90*  69       TSH   Date Value Ref Range Status   11/11/2017 47.06 (H) 0.40 - 4.00 mU/L Final   12/12/2016 2.25 0.40 - 5.00 mU/L Final       Discharge Treatments: TEDs during the day.     TOTAL DISCHARGE TIME:   Greater than 30 minutes  Electronically signed by:  ANDREWS Gutierrez CNP      Sincerely,        ANDREWS Gutierrez CNP

## 2017-12-07 ENCOUNTER — NURSING HOME VISIT (OUTPATIENT)
Dept: GERIATRICS | Facility: CLINIC | Age: 72
End: 2017-12-07
Payer: COMMERCIAL

## 2017-12-07 VITALS
RESPIRATION RATE: 18 BRPM | BODY MASS INDEX: 26.28 KG/M2 | TEMPERATURE: 97.4 F | SYSTOLIC BLOOD PRESSURE: 127 MMHG | OXYGEN SATURATION: 97 % | DIASTOLIC BLOOD PRESSURE: 83 MMHG | WEIGHT: 199.2 LBS | HEART RATE: 69 BPM

## 2017-12-07 DIAGNOSIS — Z79.01 LONG-TERM (CURRENT) USE OF ANTICOAGULANTS: ICD-10-CM

## 2017-12-07 DIAGNOSIS — I82.4Z2 ACUTE DEEP VEIN THROMBOSIS (DVT) OF DISTAL VEIN OF LEFT LOWER EXTREMITY (H): Primary | ICD-10-CM

## 2017-12-07 DIAGNOSIS — Z51.81 ENCOUNTER FOR THERAPEUTIC DRUG MONITORING: ICD-10-CM

## 2017-12-07 PROCEDURE — 99308 SBSQ NF CARE LOW MDM 20: CPT | Performed by: NURSE PRACTITIONER

## 2017-12-07 NOTE — PROGRESS NOTES
[place] [facility]    HPI:    Edi Villafana is a 72 year old  (1945), who is being seen today for an episodic care visit at [facility].   HPI information obtained from: facility chart records, facility staff, patient report and [facility] chart review. Today's concern is INR/Coumadin management for DVT    Bleeding Signs/Symptoms:  None  Thromboembolic Signs/Symptoms:  None    Medication Changes:  No  Dietary Changes:  No  Activity Changes: No  Bacterial/Viral Infection:  No    Missed Coumadin Doses:  None    On ASA: No l    Other Concerns:  No    OBJECTIVE:    INR Today:  2.5  Current Dose:  Coumadin 7.5mg q day    ASSESSMENT:     Acute deep vein thrombosis (DVT) of distal vein of left lower extremity (H)  Long-term (current) use of anticoagulants  Encounter for therapeutic drug monitoring  Subtherapeutic INR for goal of 2-3    PLAN:    New Dose: no change    Next INR: 12/11 by PCP        ANDREWS Gutierrez CNP

## 2017-12-11 ENCOUNTER — ANTICOAGULATION THERAPY VISIT (OUTPATIENT)
Dept: NURSING | Facility: CLINIC | Age: 72
End: 2017-12-11
Payer: COMMERCIAL

## 2017-12-11 DIAGNOSIS — I82.4Z2 ACUTE DEEP VEIN THROMBOSIS (DVT) OF DISTAL VEIN OF LEFT LOWER EXTREMITY (H): Primary | ICD-10-CM

## 2017-12-11 LAB — INR POINT OF CARE: 2.5 (ref 0.86–1.14)

## 2017-12-11 PROCEDURE — 36416 COLLJ CAPILLARY BLOOD SPEC: CPT

## 2017-12-11 PROCEDURE — 99207 ZZC NO CHARGE NURSE ONLY: CPT

## 2017-12-11 PROCEDURE — 85610 PROTHROMBIN TIME: CPT | Mod: QW

## 2017-12-11 RX ORDER — WARFARIN SODIUM 7.5 MG/1
7.5 TABLET ORAL DAILY
Qty: 90 TABLET | Refills: 1 | Status: SHIPPED | OUTPATIENT
Start: 2017-12-11 | End: 2018-06-06

## 2017-12-11 NOTE — PROGRESS NOTES
ANTICOAGULATION INITIAL CLINIC VISIT    Patient Name:  Edi Villafana  Date:  12/11/2017  Referred by: gray Oliver  Contact Type:  Face to Face    SUBJECTIVE:  Coumadin education was completed today.  Topics covered include:  -Introduction to coumadin  -Proper Administration  -INR Testing  -Sign/Symptoms of Bleeding  -Signs/Symptoms of Clot Formation or Stroke  -Dietary Intake of Vitamin K  -Drug Interactions  -Anticoagulation Identification (bracelet, necklace or wallet card)  -Future Surgery  -Effects of Alcohol, Tobacco, and Exercise on Coumadin    Coumadin Education Booklet and Coumadin Identification Wallet Card were given to the patient.       Patient Findings     Positives No Problem Findings          OBJECTIVE    INR Protime   Date Value Ref Range Status   12/11/2017 2.5 (A) 0.86 - 1.14 Final       ASSESSMENT / PLAN  INR assessment THER    Recheck INR In: 2 WEEKS    INR Location Clinic      Anticoagulation Summary as of 12/11/2017     INR goal 2.0-3.0   Today's INR 2.5   Maintenance plan 7.5 mg (7.5 mg x 1) every day   Full instructions 7.5 mg every day   Weekly total 52.5 mg   Plan last modified Anna Harmon RN (12/11/2017)   Next INR check 12/27/2017   Target end date     Indications   Acute deep vein thrombosis (DVT) of distal vein of left lower extremity (H) [I82.4Z2]         Anticoagulation Episode Summary     INR check location Clinic Lab    Preferred lab     Send INR reminders to [facility] INR/PROTIME    Comments             See the Encounter Report to view Anticoagulation Flowsheet and Dosing Calendar (Go to Encounters tab in chart review, and find the Anticoagulation Therapy Visit)    Continue current dose, recheck in 2 weeks      Anna Harmon RN

## 2017-12-11 NOTE — MR AVS SNAPSHOT
Edi Villafana   12/11/2017 3:00 PM   Anticoagulation Therapy Visit    Description:  72 year old male   Provider:  [facility]   Department:  Bx Nurse           INR as of 12/11/2017     Today's INR 2.5      Anticoagulation Summary as of 12/11/2017     INR goal 2.0-3.0   Today's INR 2.5   Full instructions 7.5 mg every day   Next INR check 12/27/2017    Indications   Acute deep vein thrombosis (DVT) of distal vein of left lower extremity (H) [I82.4Z2]         Your next Anticoagulation Clinic appointment(s)     Dec 27, 2017  3:00 PM CST   [facility]ticoagulation Visit with [facility]   [facility]    [address] [place] [id number] [phone]              Contact Numbers     [facility]  Please call  [phone] to cancel and/or reschedule your appointment   The direct line to the anticoagulant nurse is [phone] on Monday, Wednesday, and Friday. On Thursday, the anticoagulant nurse can be reached directly at [phone].         December 2017 Details    Sun Mon Tue Wed Thu Fri Sat          1               2                 3               4               5               6               7               8               9                 10               11      7.5 mg   See details      12      7.5 mg         13      7.5 mg         14      7.5 mg         15      7.5 mg         16      7.5 mg           17      7.5 mg         18      7.5 mg         19      7.5 mg         20      7.5 mg         21      7.5 mg         22      7.5 mg         23      7.5 mg           24      7.5 mg         25      7.5 mg         26      7.5 mg         27            28               29               30                 31                      Date Details   12/11 This INR check       Date of next INR:  12/27/2017         How to take your warfarin dose     To take:  7.5 mg Take 1 of the 7.5 mg  tablets.

## 2017-12-13 ENCOUNTER — TELEPHONE (OUTPATIENT)
Dept: FAMILY MEDICINE | Facility: CLINIC | Age: 72
End: 2017-12-13

## 2017-12-13 ENCOUNTER — ANTICOAGULATION THERAPY VISIT (OUTPATIENT)
Dept: NURSING | Facility: CLINIC | Age: 72
End: 2017-12-13
Payer: COMMERCIAL

## 2017-12-13 ENCOUNTER — OFFICE VISIT (OUTPATIENT)
Dept: FAMILY MEDICINE | Facility: CLINIC | Age: 72
End: 2017-12-13
Payer: COMMERCIAL

## 2017-12-13 VITALS
SYSTOLIC BLOOD PRESSURE: 120 MMHG | HEIGHT: 73 IN | DIASTOLIC BLOOD PRESSURE: 78 MMHG | BODY MASS INDEX: 27.3 KG/M2 | WEIGHT: 206 LBS | RESPIRATION RATE: 16 BRPM | TEMPERATURE: 97 F | HEART RATE: 73 BPM

## 2017-12-13 DIAGNOSIS — G47.00 PERSISTENT DISORDER OF INITIATING OR MAINTAINING SLEEP: ICD-10-CM

## 2017-12-13 DIAGNOSIS — E78.5 HYPERLIPIDEMIA LDL GOAL <100: ICD-10-CM

## 2017-12-13 DIAGNOSIS — E03.9 ACQUIRED HYPOTHYROIDISM: ICD-10-CM

## 2017-12-13 DIAGNOSIS — Z51.81 ANTICOAGULATION MANAGEMENT ENCOUNTER: ICD-10-CM

## 2017-12-13 DIAGNOSIS — Z79.01 ANTICOAGULATION MANAGEMENT ENCOUNTER: ICD-10-CM

## 2017-12-13 DIAGNOSIS — Z00.00 ROUTINE MEDICAL EXAM: Primary | ICD-10-CM

## 2017-12-13 DIAGNOSIS — I82.4Z2 ACUTE DEEP VEIN THROMBOSIS (DVT) OF DISTAL VEIN OF LEFT LOWER EXTREMITY (H): ICD-10-CM

## 2017-12-13 DIAGNOSIS — R53.81 PHYSICAL DECONDITIONING: ICD-10-CM

## 2017-12-13 DIAGNOSIS — F41.9 ANXIETY: ICD-10-CM

## 2017-12-13 DIAGNOSIS — G89.29 CHRONIC LEFT SHOULDER PAIN: ICD-10-CM

## 2017-12-13 DIAGNOSIS — K85.00 IDIOPATHIC ACUTE PANCREATITIS, UNSPECIFIED COMPLICATION STATUS: ICD-10-CM

## 2017-12-13 DIAGNOSIS — I10 HYPERTENSION GOAL BP (BLOOD PRESSURE) < 140/90: ICD-10-CM

## 2017-12-13 DIAGNOSIS — M25.512 CHRONIC LEFT SHOULDER PAIN: ICD-10-CM

## 2017-12-13 LAB
BASOPHILS # BLD AUTO: 0 10E9/L (ref 0–0.2)
BASOPHILS NFR BLD AUTO: 0.4 %
DIFFERENTIAL METHOD BLD: NORMAL
EOSINOPHIL # BLD AUTO: 0.2 10E9/L (ref 0–0.7)
EOSINOPHIL NFR BLD AUTO: 2.9 %
ERYTHROCYTE [DISTWIDTH] IN BLOOD BY AUTOMATED COUNT: 13.4 % (ref 10–15)
HCT VFR BLD AUTO: 44.2 % (ref 40–53)
HGB BLD-MCNC: 15.1 G/DL (ref 13.3–17.7)
INR POINT OF CARE: 2.3 (ref 0.86–1.14)
LIPASE SERPL-CCNC: 400 U/L (ref 73–393)
LYMPHOCYTES # BLD AUTO: 1.5 10E9/L (ref 0.8–5.3)
LYMPHOCYTES NFR BLD AUTO: 21.1 %
MCH RBC QN AUTO: 32.1 PG (ref 26.5–33)
MCHC RBC AUTO-ENTMCNC: 34.2 G/DL (ref 31.5–36.5)
MCV RBC AUTO: 94 FL (ref 78–100)
MONOCYTES # BLD AUTO: 0.8 10E9/L (ref 0–1.3)
MONOCYTES NFR BLD AUTO: 10.3 %
NEUTROPHILS # BLD AUTO: 4.8 10E9/L (ref 1.6–8.3)
NEUTROPHILS NFR BLD AUTO: 65.3 %
PLATELET # BLD AUTO: 261 10E9/L (ref 150–450)
RBC # BLD AUTO: 4.71 10E12/L (ref 4.4–5.9)
WBC # BLD AUTO: 7.3 10E9/L (ref 4–11)

## 2017-12-13 PROCEDURE — 85610 PROTHROMBIN TIME: CPT | Mod: QW

## 2017-12-13 PROCEDURE — 83690 ASSAY OF LIPASE: CPT | Performed by: FAMILY MEDICINE

## 2017-12-13 PROCEDURE — 82150 ASSAY OF AMYLASE: CPT | Performed by: FAMILY MEDICINE

## 2017-12-13 PROCEDURE — 36416 COLLJ CAPILLARY BLOOD SPEC: CPT

## 2017-12-13 PROCEDURE — 85025 COMPLETE CBC W/AUTO DIFF WBC: CPT | Performed by: FAMILY MEDICINE

## 2017-12-13 PROCEDURE — 99397 PER PM REEVAL EST PAT 65+ YR: CPT | Performed by: FAMILY MEDICINE

## 2017-12-13 PROCEDURE — 80076 HEPATIC FUNCTION PANEL: CPT | Performed by: FAMILY MEDICINE

## 2017-12-13 PROCEDURE — 99207 ZZC NO CHARGE NURSE ONLY: CPT

## 2017-12-13 RX ORDER — ATORVASTATIN CALCIUM 40 MG/1
TABLET, FILM COATED ORAL
Qty: 90 TABLET | Refills: 1 | Status: SHIPPED | OUTPATIENT
Start: 2017-12-13 | End: 2018-09-30

## 2017-12-13 RX ORDER — ZOLPIDEM TARTRATE 10 MG/1
10 TABLET ORAL
Qty: 30 TABLET | Refills: 0 | Status: SHIPPED | OUTPATIENT
Start: 2017-12-13 | End: 2018-01-02

## 2017-12-13 RX ORDER — OXYCODONE HYDROCHLORIDE 5 MG/1
5 TABLET ORAL EVERY 8 HOURS PRN
Qty: 30 TABLET | Refills: 0 | Status: SHIPPED | OUTPATIENT
Start: 2017-12-13 | End: 2018-01-03

## 2017-12-13 ASSESSMENT — ANXIETY QUESTIONNAIRES
IF YOU CHECKED OFF ANY PROBLEMS ON THIS QUESTIONNAIRE, HOW DIFFICULT HAVE THESE PROBLEMS MADE IT FOR YOU TO DO YOUR WORK, TAKE CARE OF THINGS AT HOME, OR GET ALONG WITH OTHER PEOPLE: NOT DIFFICULT AT ALL
2. NOT BEING ABLE TO STOP OR CONTROL WORRYING: NOT AT ALL
3. WORRYING TOO MUCH ABOUT DIFFERENT THINGS: SEVERAL DAYS
7. FEELING AFRAID AS IF SOMETHING AWFUL MIGHT HAPPEN: NOT AT ALL
6. BECOMING EASILY ANNOYED OR IRRITABLE: NOT AT ALL
5. BEING SO RESTLESS THAT IT IS HARD TO SIT STILL: NOT AT ALL
GAD7 TOTAL SCORE: 3
1. FEELING NERVOUS, ANXIOUS, OR ON EDGE: SEVERAL DAYS

## 2017-12-13 ASSESSMENT — PATIENT HEALTH QUESTIONNAIRE - PHQ9
5. POOR APPETITE OR OVEREATING: SEVERAL DAYS
SUM OF ALL RESPONSES TO PHQ QUESTIONS 1-9: 1

## 2017-12-13 ASSESSMENT — ACTIVITIES OF DAILY LIVING (ADL)
CURRENT_FUNCTION: NO ASSISTANCE NEEDED
I_NEED_ASSISTANCE_FOR_THE_FOLLOWING_DAILY_ACTIVITIES:: NO ASSISTANCE IS NEEDED

## 2017-12-13 NOTE — LETTER
December 14, 2017      Edi Villafana  [address]  [facility] [address]        Dear Edi,    We are writing to inform you of your test results.    Your tests are almost back to normal. I should see you after the new year to repeat your tests. Please make an appointment.    Resulted Orders   Amylase   Result Value Ref Range    Amylase 114 (H) 30 - 110 U/L   Lipase   Result Value Ref Range    Lipase 400 (H) 73 - 393 U/L   CBC with platelets differential   Result Value Ref Range    WBC 7.3 4.0 - 11.0 10e9/L    RBC Count 4.71 4.4 - 5.9 10e12/L    Hemoglobin 15.1 13.3 - 17.7 g/dL    Hematocrit 44.2 40.0 - 53.0 %    MCV 94 78 - 100 fl    MCH 32.1 26.5 - 33.0 pg    MCHC 34.2 31.5 - 36.5 g/dL    RDW 13.4 10.0 - 15.0 %    Platelet Count 261 150 - 450 10e9/L    Diff Method Automated Method     % Neutrophils 65.3 %    % Lymphocytes 21.1 %    % Monocytes 10.3 %    % Eosinophils 2.9 %    % Basophils 0.4 %    Absolute Neutrophil 4.8 1.6 - 8.3 10e9/L    Absolute Lymphocytes 1.5 0.8 - 5.3 10e9/L    Absolute Monocytes 0.8 0.0 - 1.3 10e9/L    Absolute Eosinophils 0.2 0.0 - 0.7 10e9/L    Absolute Basophils 0.0 0.0 - 0.2 10e9/L   Hepatic panel (Albumin, ALT, AST, Bili, Alk Phos, TP)   Result Value Ref Range    Bilirubin Direct 0.2 0.0 - 0.2 mg/dL    Bilirubin Total 0.6 0.2 - 1.3 mg/dL    Albumin 3.9 3.4 - 5.0 g/dL    Protein Total 7.2 6.8 - 8.8 g/dL    Alkaline Phosphatase 93 40 - 150 U/L    ALT 37 0 - 70 U/L    AST 24 0 - 45 U/L       If you have any questions or concerns, please call the clinic at the number listed above.       Sincerely,        Enrique Lucia MD

## 2017-12-13 NOTE — TELEPHONE ENCOUNTER
Patient is at the pharmacy now. He was given Rx for Ambien today but did not realized it was for 30 days supply and he would like 90 days supply. Pharmacy can be called with providers approval or new scrip can be faxed to [facility] pharmacy. Please advice.

## 2017-12-13 NOTE — PATIENT INSTRUCTIONS
Preventive Health Recommendations:   Male Ages 65 and over    Yearly exam:             See your health care provider every year in order to  o   Review health changes.   o   Discuss preventive care.    o   Review your medicines if your doctor has prescribed any.    Talk with your health care provider about whether you should have a test to screen for prostate cancer (PSA).    Every 3 years, have a diabetes test (fasting glucose). If you are at risk for diabetes, you should have this test more often.    Every 5 years, have a cholesterol test. Have this test more often if you are at risk for high cholesterol or heart disease.     Every 10 years, have a colonoscopy. Or, have a yearly FIT test (stool test). These exams will check for colon cancer.    Talk to with your health care provider about screening for Abdominal Aortic Aneurysm if you have a family history of AAA or have a history of smoking.    Shots:     Get a flu shot each year.     Get a tetanus shot every 10 years.     Talk to your doctor about your pneumonia vaccines. There are now two you should receive - Pneumovax (PPSV 23) and Prevnar (PCV 13).     Talk to your doctor about a shingles vaccine.     Talk to your doctor about the hepatitis B vaccine.  Nutrition:     Eat at least 5 servings of fruits and vegetables each day.     Eat whole-grain bread, whole-wheat pasta and brown rice instead of white grains and rice.     Talk to your provider about Calcium and Vitamin D.   Lifestyle    Exercise for at least 150 minutes a week (30 minutes a day, 5 days a week). This will help you control your weight and prevent disease.     Limit alcohol to one drink per day.     No smoking.     Wear sunscreen to prevent skin cancer.     See your dentist every six months for an exam and cleaning.     The patient has stopped drinking any alcohol but does take issue with all of blurry references to him being alcoholic.  He says he was only ever drinking 2 drinks a day.  He  also pointed out another set of discrepancies in his dictations, eg they said he was  and he has never been .      I referred him for physical therapy for his deconditioning.    I also gave him prescriptions for atorvastatin, oxycodone and zolpidem.  We will check labs as noted.  I will mail him his test results.    See your eye doctor every 1 to 2 years to screen for conditions such as glaucoma, macular degeneration, cataracts, etc

## 2017-12-13 NOTE — NURSING NOTE
"Chief Complaint   Patient presents with     Physical     Hospital F/U       Initial /78  Pulse 73  Temp 97  F (36.1  C) (Tympanic)  Resp 16  Ht 6' 1\" (1.854 m)  Wt 206 lb (93.4 kg)  BMI 27.18 kg/m2 Estimated body mass index is 27.18 kg/(m^2) as calculated from the following:    Height as of this encounter: 6' 1\" (1.854 m).    Weight as of this encounter: 206 lb (93.4 kg).  Medication Reconciliation: complete       Cydney Quintanilla CMA      "

## 2017-12-13 NOTE — PROGRESS NOTES
SUBJECTIVE:   Edi Villafana is a 72 year old male who presents for Preventive Visit.  click delete button to remove this line now  click delete button to remove this line now  Are you in the first 12 months of your Medicare coverage?  No    Physical   Annual:     Getting at least 3 servings of Calcium per day::  Yes    Bi-annual eye exam::  Yes    Dental care twice a year::  NO    Sleep apnea or symptoms of sleep apnea::  None    Diet::  Regular (no restrictions)    Taking medications regularly::  Yes    Medication side effects::  None    Additional concerns today::  No    Ability to successfully perform activities of daily living: no assistance needed  Home Safety:  No safety concerns identified  Hearing Impairment: no hearing concerns      Fall Risk Assessment not completed.  click delete button to remove this line now  COGNITIVE SCREEN  1) Repeat 3 items (Banana, Sunrise, Chair)    2) Clock draw: Pt refused  3) 3 item recall: Refused  Results: Refused    Mini-CogTM Copyright [employer]. Licensed by the author for use in [facility]; reprinted with permission (rosalina@[facility]). All rights reserved.          Reviewed and updated as needed this visit by clinical staff  Tobacco  Allergies  Meds  Med Hx  Surg Hx  Fam Hx  Soc Hx        Reviewed and updated as needed this visit by Provider        Social History   Substance Use Topics     Smoking status: Never Smoker     Smokeless tobacco: Never Used     Alcohol use Yes       Alcohol Use 12/13/2017   If you drink alcohol, do you typically have greater than 3 drinks per day OR greater than 7 drinks per week?   Not applicable           Today's PHQ-2 Score:   PHQ-2 ( 1999 [employer]) 12/13/2017   Q1: Little interest or pleasure in doing things 0   Q2: Feeling down, depressed or hopeless 1   PHQ-2 Score 1   Q1: Little interest or pleasure in doing things Not at all   Q2: Feeling down, depressed or hopeless Several days   PHQ-2 Score 1       Do you feel safe in your  "environment - Yes    Do you have a Health Care Directive?: Yes: Advance Directive has been received and scanned.    Current providers sharing in care for this patient include:   Patient Care Team:  Enrique Lucia MD as PCP - General (Family Practice)    The following health maintenance items are reviewed in Epic and correct as of today:  Health Maintenance   Topic Date Due     URINE DRUG SCREEN Q1 YR  [birth year]/06/1960     AORTIC ANEURYSM SCREENING (SYSTEM ASSIGNED)  03/06/2010     PRINCESS QUESTIONNAIRE 1 YEAR  07/12/2017     INFLUENZA VACCINE (SYSTEM ASSIGNED)  09/01/2017     FALL RISK ASSESSMENT  12/12/2017     PHQ-9 Q6 MONTHS  06/13/2018     DEPRESSION ACTION PLAN Q1 YR  12/13/2018     LIPID SCREEN Q5 YR MALE (SYSTEM ASSIGNED)  07/19/2022     ADVANCE DIRECTIVE PLANNING Q5 YRS  11/21/2022     TETANUS IMMUNIZATION (SYSTEM ASSIGNED)  05/13/2024     COLON CANCER SCREEN (SYSTEM ASSIGNED)  10/25/2026     PNEUMOCOCCAL  Completed     HEPATITIS C SCREENING  Completed             Review of Systems  C: NEGATIVE for fever, chills, change in weight  I: NEGATIVE for worrisome rashes, moles or lesions  E: NEGATIVE for vision changes or irritation  E/M: NEGATIVE for ear, mouth and throat problems  R: NEGATIVE for significant cough or SOB  B: NEGATIVE for masses, tenderness or discharge  CV: NEGATIVE for chest pain, palpitations or peripheral edema  GI: NEGATIVE for nausea, abdominal pain, heartburn, or change in bowel habits  : NEGATIVE for frequency, dysuria, or hematuria  M: NEGATIVE for significant arthralgias or myalgia  N: NEGATIVE for weakness, dizziness or paresthesias  E: NEGATIVE for temperature intolerance, skin/hair changes  H: NEGATIVE for bleeding problems  P: NEGATIVE for changes in mood or affect    OBJECTIVE:   /78  Pulse 73  Temp 97  F (36.1  C) (Tympanic)  Resp 16  Ht 6' 1\" (1.854 m)  Wt 206 lb (93.4 kg)  BMI 27.18 kg/m2 Estimated body mass index is 27.18 kg/(m^2) as calculated from the following:   " " Height as of this encounter: 6' 1\" (1.854 m).    Weight as of this encounter: 206 lb (93.4 kg).  Physical Exam  GENERAL: healthy, alert and no distress  EYES: Eyes grossly normal to inspection, PERRL and conjunctivae and sclerae normal  NECK: no adenopathy, no asymmetry, masses, or scars and thyroid normal to palpation  RESP: lungs clear to auscultation - no rales, rhonchi or wheezes  CV: regular rate and rhythm, normal S1 S2, no S3 or S4, no murmur, click or rub, no peripheral edema and peripheral pulses strong  ABDOMEN: soft, nontender, no hepatosplenomegaly, no masses and bowel sounds normal   (male): normal male genitalia without lesions or urethral discharge, no hernia  RECTAL: normal sphincter tone, no rectal masses, prostate normal size, smooth, nontender without nodules or masses  MS: no gross musculoskeletal defects noted, no edema  SKIN: no suspicious lesions or rashes  NEURO: Normal strength and tone, mentation intact and speech normal  PSYCH: mentation appears normal, affect normal/bright  LYMPH: no cervical, supraclavicular, axillary, or inguinal adenopathy    ASSESSMENT / PLAN:       ICD-10-CM    1. Routine medical exam Z00.00    2. Idiopathic acute pancreatitis, unspecified complication status K85.00 Amylase     Lipase     CBC with platelets differential     Hepatic panel (Albumin, ALT, AST, Bili, Alk Phos, TP)   3. Persistent disorder of initiating or maintaining sleep G47.00 zolpidem (AMBIEN) 10 MG tablet   4. Hyperlipidemia LDL goal <100 E78.5 atorvastatin (LIPITOR) 40 MG tablet   5. Chronic left shoulder pain M25.512 oxyCODONE IR (ROXICODONE) 5 MG tablet    G89.29    6. Physical deconditioning R53.81 PHYSICAL THERAPY REFERRAL   7. Acquired hypothyroidism E03.9    8. Hypertension goal BP (blood pressure) < 140/90 I10    9. Anxiety F41.9    10. Acute deep vein thrombosis (DVT) of distal vein of left lower extremity (H) I82.4Z2    11. Anticoagulation management encounter Z51.81     Z79.01  " "      End of Life Planning:  Patient currently has an advanced directive: No.  I have verified the patient's ablity to prepare an advanced directive/make health care decisions.  Literature was provided to assist patient in preparing an advanced directive.    COUNSELING:  Reviewed preventive health counseling, as reflected in patient instructions       Regular exercise       Prostate cancer screening        Estimated body mass index is 27.18 kg/(m^2) as calculated from the following:    Height as of this encounter: 6' 1\" (1.854 m).    Weight as of this encounter: 206 lb (93.4 kg).     reports that he has never smoked. He has never used smokeless tobacco.        Appropriate preventive services were discussed with this patient, including applicable screening as appropriate for cardiovascular disease, diabetes, osteopenia/osteoporosis, and glaucoma.  As appropriate for age/gender, discussed screening for colorectal cancer, prostate cancer, breast cancer, and cervical cancer. Checklist reviewing preventive services available has been given to the patient.    Reviewed patients plan of care and provided an AVS. The Basic Care Plan (routine screening as documented in Health Maintenance) for Edi meets the Care Plan requirement. This Care Plan has been established and reviewed with the Patient.    Counseling Resources:  ATP IV Guidelines  Pooled Cohorts Equation Calculator  Breast Cancer Risk Calculator  FRAX Risk Assessment  ICSI Preventive Guidelines  Dietary Guidelines for Americans, 2010  [employer] MyPlate  ASA Prophylaxis  Lung CA Screening    Enrique Lucia MD  [facility] XERXESAnswers for HPI/ROS submitted by the patient on 12/13/2017   PHQ-2 Score: 1      SUBJECTIVE:   Edi Villafana is a 72 year old male who presents to clinic today for the following health issues:          Hospital Follow-up Visit:    Hospital/Nursing Home/IP Rehab Facility: [facility]  Date of Admission: " 11/16/17  Date of Discharge: 12/7/17  Reason(s) for Admission: pancreatitis            Problems taking medications regularly:  None       Medication changes since discharge: None       Problems adhering to non-medication therapy:  None      Summary of hospitalization:  [facility] discharge summary reviewed  Diagnostic Tests/Treatments reviewed.  Follow up needed: labs  Other Healthcare Providers Involved in Patient s Care:         Physical Therapy  Update since discharge: improved.     Post Discharge Medication Reconciliation: discharge medications reconciled, continue medications without change.  Plan of care communicated with patient     Coding guidelines for this visit:  Type of Medical   Decision Making Face-to-Face Visit       within 7 Days of discharge Face-to-Face Visit        within 14 days of discharge   Moderate Complexity [id number]   High Complexity [id number]                  Problem list and histories reviewed & adjusted, as indicated.  Additional history: as documented    Patient Active Problem List   Diagnosis     Hypothyroidism     Hyperlipidemia LDL goal <100     Persistent insomnia     Hypertension goal BP (blood pressure) < 140/90     Anxiety     Hypotestosteronism     Trochanteric bursitis     Depression with anxiety     Left shoulder pain     Screening for prostate cancer     Pancreatitis     Hepatitis     Physical deconditioning     Alcoholism (H)     Hyponatremia     Alcohol-induced acute pancreatitis, unspecified complication status     Alcoholic hepatitis without ascites     Chronic left shoulder pain     Advanced directives, counseling/discussion     Pain of left calf     Acute deep vein thrombosis (DVT) of distal vein of left lower extremity (H)     Anticoagulation management encounter     Past Surgical History:   Procedure Laterality Date     ABDOMEN SURGERY  1973    large benign tumor removed with thrombophlebitis post op     APPENDECTOMY  1960     CATARACT IOL, RT/LT       "bilateral     EYE SURGERY  [id number], 2007    eyelid surgery, 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, trabeculoplasty       Social History   Substance Use Topics     Smoking status: Never Smoker     Smokeless tobacco: Never Used     Alcohol use Yes     Family History   Problem Relation Age of Onset     Hypertension Mother      Lipids Mother      HEART DISEASE Mother      Psychotic Disorder Mother      severe depression     HEART DISEASE Father      CANCER Sister      ovarian     Colon Cancer Paternal Grandfather              Reviewed and updated as needed this visit by clinical staffTobacco  Allergies  Meds  Med Hx  Surg Hx  Fam Hx  Soc Hx      Reviewed and updated as needed this visit by Provider         ROS:  Constitutional, HEENT, cardiovascular, pulmonary, gi and gu systems are negative, except as otherwise noted.      OBJECTIVE:                                                    /78  Pulse 73  Temp 97  F (36.1  C) (Tympanic)  Resp 16  Ht 6' 1\" (1.854 m)  Wt 206 lb (93.4 kg)  BMI 27.18 kg/m2  Body mass index is 27.18 kg/(m^2).  GENERAL APPEARANCE: healthy, alert and no distress         ASSESSMENT/PLAN:                                                        ICD-10-CM    1. Routine medical exam Z00.00    2. Idiopathic acute pancreatitis, unspecified complication status K85.00 Amylase     Lipase     CBC with platelets differential     Hepatic panel (Albumin, ALT, AST, Bili, Alk Phos, TP)   3. Persistent disorder of initiating or maintaining sleep G47.00 zolpidem (AMBIEN) 10 MG tablet   4. Hyperlipidemia LDL goal <100 E78.5 atorvastatin (LIPITOR) 40 MG tablet   5. Chronic left shoulder pain M25.512 oxyCODONE IR (ROXICODONE) 5 MG tablet    G89.29    6. Physical deconditioning R53.81 PHYSICAL THERAPY REFERRAL   7. Acquired hypothyroidism E03.9    8. Hypertension goal BP (blood pressure) < 140/90 I10    9. Anxiety F41.9    10. Acute deep vein thrombosis (DVT) of distal vein of left lower extremity (H) I82.4Z2  "   11. Anticoagulation management encounter Z51.81     Z79.01        Patient Instructions     Preventive Health Recommendations:   Male Ages 65 and over    Yearly exam:             See your health care provider every year in order to  o   Review health changes.   o   Discuss preventive care.    o   Review your medicines if your doctor has prescribed any.    Talk with your health care provider about whether you should have a test to screen for prostate cancer (PSA).    Every 3 years, have a diabetes test (fasting glucose). If you are at risk for diabetes, you should have this test more often.    Every 5 years, have a cholesterol test. Have this test more often if you are at risk for high cholesterol or heart disease.     Every 10 years, have a colonoscopy. Or, have a yearly FIT test (stool test). These exams will check for colon cancer.    Talk to with your health care provider about screening for Abdominal Aortic Aneurysm if you have a family history of AAA or have a history of smoking.    Shots:     Get a flu shot each year.     Get a tetanus shot every 10 years.     Talk to your doctor about your pneumonia vaccines. There are now two you should receive - Pneumovax (PPSV 23) and Prevnar (PCV 13).     Talk to your doctor about a shingles vaccine.     Talk to your doctor about the hepatitis B vaccine.  Nutrition:     Eat at least 5 servings of fruits and vegetables each day.     Eat whole-grain bread, whole-wheat pasta and brown rice instead of white grains and rice.     Talk to your provider about Calcium and Vitamin D.   Lifestyle    Exercise for at least 150 minutes a week (30 minutes a day, 5 days a week). This will help you control your weight and prevent disease.     Limit alcohol to one drink per day.     No smoking.     Wear sunscreen to prevent skin cancer.     See your dentist every six months for an exam and cleaning.     The patient has stopped drinking any alcohol but does take issue with all of blurry  references to him being alcoholic.  He says he was only ever drinking 2 drinks a day.  He also pointed out another set of discrepancies in his dictations, eg they said he was  and he has never been .      I referred him for physical therapy for his deconditioning.    I also gave him prescriptions for atorvastatin, oxycodone and zolpidem.  We will check labs as noted.  I will mail him his test results.    See your eye doctor every 1 to 2 years to screen for conditions such as glaucoma, macular degeneration, cataracts, etc        Enrique Lucia MD  [facility]

## 2017-12-13 NOTE — LETTER
December 15, 2017      Edi Villafana  [address]  [facility] [address]        Dear Edi,    We are writing to inform you of your test results.    Your test results fall within the expected range(s) or remain unchanged from previous results.  Please continue with current treatment plan.  The little tiny bit of elevation in your amylase and your lipase are still the aftermath of your pancreatitis.  I do not think we need to check this again as they are pretty much completely normal.  If you have questions please feel free to call me.  Have a great holiday season.    Resulted Orders   Amylase   Result Value Ref Range    Amylase 114 (H) 30 - 110 U/L   Lipase   Result Value Ref Range    Lipase 400 (H) 73 - 393 U/L   CBC with platelets differential   Result Value Ref Range    WBC 7.3 4.0 - 11.0 10e9/L    RBC Count 4.71 4.4 - 5.9 10e12/L    Hemoglobin 15.1 13.3 - 17.7 g/dL    Hematocrit 44.2 40.0 - 53.0 %    MCV 94 78 - 100 fl    MCH 32.1 26.5 - 33.0 pg    MCHC 34.2 31.5 - 36.5 g/dL    RDW 13.4 10.0 - 15.0 %    Platelet Count 261 150 - 450 10e9/L    Diff Method Automated Method     % Neutrophils 65.3 %    % Lymphocytes 21.1 %    % Monocytes 10.3 %    % Eosinophils 2.9 %    % Basophils 0.4 %    Absolute Neutrophil 4.8 1.6 - 8.3 10e9/L    Absolute Lymphocytes 1.5 0.8 - 5.3 10e9/L    Absolute Monocytes 0.8 0.0 - 1.3 10e9/L    Absolute Eosinophils 0.2 0.0 - 0.7 10e9/L    Absolute Basophils 0.0 0.0 - 0.2 10e9/L   Hepatic panel (Albumin, ALT, AST, Bili, Alk Phos, TP)   Result Value Ref Range    Bilirubin Direct 0.2 0.0 - 0.2 mg/dL    Bilirubin Total 0.6 0.2 - 1.3 mg/dL    Albumin 3.9 3.4 - 5.0 g/dL    Protein Total 7.2 6.8 - 8.8 g/dL    Alkaline Phosphatase 93 40 - 150 U/L    ALT 37 0 - 70 U/L    AST 24 0 - 45 U/L       If you have any questions or concerns, please call the clinic at the number listed above.       Sincerely,        Enrique Lucia MD

## 2017-12-13 NOTE — MR AVS SNAPSHOT
Edi Villafana   12/13/2017 3:00 PM   Anticoagulation Therapy Visit    Description:  72 year old male   Provider:   ANTICOAGULATION CLINIC   Department:  Bx Nurse           INR as of 12/13/2017     Today's INR 2.3      Anticoagulation Summary as of 12/13/2017     INR goal 2.0-3.0   Today's INR 2.3   Full instructions 7.5 mg every day   Next INR check 12/18/2017    Indications   Acute deep vein thrombosis (DVT) of distal vein of left lower extremity (H) [I82.4Z2]         Your next Anticoagulation Clinic appointment(s)     Dec 13, 2017  3:00 PM CST   Anticoagulation Visit with [facility]   [facility]    [address] [place] [address] [address]  [facility] [id number] [phone]            Dec 18, 2017  2:00 PM CST   Anticoagulation Visit with  ANTICOAGULATION CLINIC   [facility] ([facility])    [address] [place] [address] [address]  [facility] [id number] [phone]              Contact Numbers     [facility]  Please call  [phone] to cancel and/or reschedule your appointment   The direct line to the anticoagulant nurse is [phone] on Monday, Wednesday, and Friday. On Thursday, the anticoagulant nurse can be reached directly at [phone].         December 2017 Details    Sun Mon Tue Wed Thu Fri Sat          1               2                 3               4               5               6               7               8               9                 10               11               12               13      7.5 mg   See details      14      7.5 mg         15      7.5 mg         16      7.5 mg           17      7.5 mg         18            19               20               21               22               23                 24               25               26               27               28               29               30                  31                      Date Details   12/13 This INR check       Date of next INR:  12/18/2017         How to take your warfarin dose     To take:  7.5 mg Take 1 of the 7.5 mg tablets.

## 2017-12-13 NOTE — MR AVS SNAPSHOT
After Visit Summary   12/13/2017    Edi Villafana    MRN: [id number]           Patient Information     Date Of Birth          1945        Visit Information        Provider Department      12/13/2017 2:30 PM Enrique Lucia MD [facility]        Today's Diagnoses     Routine medical exam    -  1    Idiopathic acute pancreatitis, unspecified complication status        Persistent disorder of initiating or maintaining sleep        Hyperlipidemia LDL goal <100        Chronic left shoulder pain        Physical deconditioning        Acquired hypothyroidism        Hypertension goal BP (blood pressure) < 140/90        Anxiety        Acute deep vein thrombosis (DVT) of distal vein of left lower extremity (H)        Anticoagulation management encounter          Care Instructions      Preventive Health Recommendations:   Male Ages 65 and over    Yearly exam:             See your health care provider every year in order to  o   Review health changes.   o   Discuss preventive care.    o   Review your medicines if your doctor has prescribed any.    Talk with your health care provider about whether you should have a test to screen for prostate cancer (PSA).    Every 3 years, have a diabetes test (fasting glucose). If you are at risk for diabetes, you should have this test more often.    Every 5 years, have a cholesterol test. Have this test more often if you are at risk for high cholesterol or heart disease.     Every 10 years, have a colonoscopy. Or, have a yearly FIT test (stool test). These exams will check for colon cancer.    Talk to with your health care provider about screening for Abdominal Aortic Aneurysm if you have a family history of AAA or have a history of smoking.    Shots:     Get a flu shot each year.     Get a tetanus shot every 10 years.     Talk to your doctor about your pneumonia vaccines. There are now two you should receive - Pneumovax (PPSV 23) and  Prevnar (PCV 13).     Talk to your doctor about a shingles vaccine.     Talk to your doctor about the hepatitis B vaccine.  Nutrition:     Eat at least 5 servings of fruits and vegetables each day.     Eat whole-grain bread, whole-wheat pasta and brown rice instead of white grains and rice.     Talk to your provider about Calcium and Vitamin D.   Lifestyle    Exercise for at least 150 minutes a week (30 minutes a day, 5 days a week). This will help you control your weight and prevent disease.     Limit alcohol to one drink per day.     No smoking.     Wear sunscreen to prevent skin cancer.     See your dentist every six months for an exam and cleaning.     The patient has stopped drinking any alcohol but does take issue with all of blurry references to him being alcoholic.  He says he was only ever drinking 2 drinks a day.  He also pointed out another set of discrepancies in his dictations, eg they said he was  and he has never been .      I referred him for physical therapy for his deconditioning.    I also gave him prescriptions for atorvastatin, oxycodone and zolpidem.  We will check labs as noted.  I will mail him his test results.    See your eye doctor every 1 to 2 years to screen for conditions such as glaucoma, macular degeneration, cataracts, etc            Follow-ups after your visit        Additional Services     PHYSICAL THERAPY REFERRAL       *This therapy referral will be filtered to a centralized scheduling office at [facility] and the patient will receive a call to schedule an appointment at a [facility] location most convenient for them. *     [facility] provides Physical Therapy evaluation and treatment and many specialty services across the [facility] system.  If requesting a specialty program, please choose from the list below.    If you have not heard from the scheduling office within 2 business days, please call [phone] for all  "locations, with the exception of [place], please call [phone].  Treatment: Evaluation & Treatment  Special Instructions/Modalities: none  Special Programs: program for reversal of deconditioning    Please be aware that coverage of these services is subject to the terms and limitations of your health insurance plan.  Call member services at your health plan with any benefit or coverage questions.      **Note to Provider:  If you are referring outside of [place] for the therapy appointment, please list the name of the location in the \"special instructions\" above, print the referral and give to the patient to schedule the appointment.                  Your next 10 appointments already scheduled     Dec 18, 2017  2:00 PM CST   [facility]   [facility]    [address] [place] [address] [id number] [phone]            Dec 27, 2017  3:00 PM CST   Anticoagulation Visit with [facility]   [facility]    [address] [place] [address] [id number] [phone]              Who to contact     If you have questions or need follow up information about today's clinic visit or your schedule please contact [facility] directly at [phone].  Normal or non-critical lab and imaging results will be communicated to you by MyChart, letter or phone within 4 business days after the clinic has received the results. If you do not hear from us within 7 days, please contact the clinic through MyChart or phone. If you have a critical or abnormal lab result, we will notify you by phone as soon as possible.  Submit refill requests through Mult[employer] or call your pharmacy and they will forward the refill request to us. Please allow 3 business days for your refill to be " "completed.          Additional Information About Your Visit        Nanophotonicahart Information     Leo gives you secure access to your electronic health record. If you see a primary care provider, you can also send messages to your care team and make appointments. If you have questions, please call your primary care clinic.  If you do not have a primary care provider, please call [phone] and they will assist you.        Care EveryWhere ID     This is your Care EveryWhere ID. This could be used by other organizations to access your [facility] medical records  [id number]        Your Vitals Were     Pulse Temperature Respirations Height BMI (Body Mass Index)       73 97  F (36.1  C) (Tympanic) 16 6' 1\" (1.854 m) 27.18 kg/m2        Blood Pressure from Last 3 Encounters:   12/13/17 120/78   12/07/17 127/83   12/05/17 131/87    Weight from Last 3 Encounters:   12/13/17 206 lb (93.4 kg)   12/07/17 199 lb 3.2 oz (90.4 kg)   12/05/17 199 lb 3.2 oz (90.4 kg)              We Performed the Following     Amylase     CBC with platelets differential     DEPRESSION ACTION PLAN (DAP)     Hepatic panel (Albumin, ALT, AST, Bili, Alk Phos, TP)     Lipase     PHYSICAL THERAPY REFERRAL          Today's Medication Changes          These changes are accurate as of: 12/13/17 11:59 PM.  If you have any questions, ask your nurse or doctor.               Start taking these medicines.        Dose/Directions    oxyCODONE IR 5 MG tablet   Commonly known as:  ROXICODONE   Used for:  Chronic left shoulder pain   Started by:  Enrique Lucia MD        Dose:  5 mg   Take 1 tablet (5 mg) by mouth every 8 hours as needed for moderate to severe pain   Quantity:  30 tablet   Refills:  0            Where to get your medicines      These medications were sent to [facility] [address] [place], [place] [address]  [address] [facility] [address]     Phone:  [phone]     atorvastatin 40 MG tablet         Some of these " will need a paper prescription and others can be bought over the counter.  Ask your nurse if you have questions.     Bring a paper prescription for each of these medications     oxyCODONE IR 5 MG tablet    zolpidem 10 MG tablet                Primary Care Provider Office Phone # Fax #    Enrique Lucia [phone] [address] [facility] [address]        Equal Access to Services     CONSUELO ANDERSON : Hadii aad ku hadasho Soomaali, waaxda luqadaha, qaybta kaalmada adeegyada, waxay idiin hayaan adeeg khogsh la'arunan . So [facility] [phone].    ATENCIÓN: Si habla espmagi, tiene a kidd disposición servicios gratuitos de asistencia lingüística. Llame al [phone].    We comply with applicable federal civil rights laws and [place] laws. We do not discriminate on the basis of race, color, national origin, age, disability, sex, sexual orientation, or gender identity.            Thank you!     Thank you for choosing [facility]  for your care. Our goal is always to provide you with excellent care. Hearing back from our patients is one way we can continue to improve our services. Please take a few minutes to complete the written survey that you may receive in the mail after your visit with us. Thank you!             Your Updated Medication List - Protect others around you: Learn how to safely use, store and throw away your medicines at www.disposemymeds.org.          This list is accurate as of: 12/13/17 11:59 PM.  Always use your most recent med list.                   Brand Name Dispense Instructions for use Diagnosis    atorvastatin 40 MG tablet    LIPITOR    90 tablet    TAKE 1 TABLET (40 MG) BY MOUTH DAILY    Hyperlipidemia LDL goal <100       ICY HOT EXTRA STRENGTH 10-30 % Crea      Apply topically every 4 hours as needed        levothyroxine 125 MCG tablet    SYNTHROID/LEVOTHROID    90 tablet    Take 1 tablet (125 mcg) by mouth daily    Hypothyroidism,  unspecified type       LORazepam 2 MG tablet    ATIVAN    60 tablet    TAKE 1 TABLET BY MOUTH EVERY 8 HOURS AS NEEDED FOR ANXIETY.    Anxiety       losartan 50 MG tablet    COZAAR    90 tablet    Take 0.5 tablets (25 mg) by mouth daily    Essential hypertension with goal blood pressure less than 140/90       MULTIVITAMIN PO      Take 1 tablet by mouth daily        oxyCODONE IR 5 MG tablet    ROXICODONE    30 tablet    Take 1 tablet (5 mg) by mouth every 8 hours as needed for moderate to severe pain    Chronic left shoulder pain       vitamin D3 2000 UNITS Caps      Take 1 capsule by mouth daily        warfarin 7.5 MG tablet    COUMADIN    90 tablet    Take 1 tablet (7.5 mg) by mouth daily Recheck INR 12/11    Acute deep vein thrombosis (DVT) of distal vein of left lower extremity (H)       zolpidem 10 MG tablet    AMBIEN    30 tablet    Take 1 tablet (10 mg) by mouth nightly as needed for sleep    Persistent disorder of initiating or maintaining sleep

## 2017-12-13 NOTE — PROGRESS NOTES
ANTICOAGULATION FOLLOW-UP CLINIC VISIT    Patient Name:  Edi Villafana  Date:  12/13/2017  Contact Type:  Face to Face    SUBJECTIVE:     Patient Findings     Positives No Problem Findings           OBJECTIVE    INR Protime   Date Value Ref [place] Status   12/13/2017 2.3 (A) 0.86 - 1.14 Final       ASSESSMENT / PLAN  INR assessment THER    Recheck INR In: 5 DAYS    INR Location Clinic      Anticoagulation Summary as of 12/13/2017     INR goal 2.0-3.0   Today's INR 2.3   Maintenance plan 7.5 mg (7.5 mg x 1) every day   Full instructions 7.5 mg every day   Weekly total 52.5 mg   No change documented Tracy Young RN   Plan last modified Anna Harmon RN (12/11/2017)   Next INR check 12/18/2017   Priority INR   Target end date     Indications   Acute deep vein thrombosis (DVT) of distal vein of left lower extremity (H) [I82.4Z2]         Anticoagulation Episode Summary     INR check location Clinic Lab    Preferred lab     Send INR reminders to [facility] INR/PROTIME    Comments             See the Encounter Report to view Anticoagulation Flowsheet and Dosing Calendar (Go to Encounters tab in chart review, and find the Anticoagulation Therapy Visit)    Dosage adjustment made based on physician directed care plan. No changes made. Recheck INR on Monday. Pt is leaving to [place] sometime around 12/20/17 and not sure when he will come back. Will discuss the plan further on Monday.     Tracy Young RN

## 2017-12-14 LAB
ALBUMIN SERPL-MCNC: 3.9 G/DL (ref 3.4–5)
ALP SERPL-CCNC: 93 U/L (ref 40–150)
ALT SERPL W P-5'-P-CCNC: 37 U/L (ref 0–70)
AMYLASE SERPL-CCNC: 114 U/L (ref 30–110)
AST SERPL W P-5'-P-CCNC: 24 U/L (ref 0–45)
BILIRUB DIRECT SERPL-MCNC: 0.2 MG/DL (ref 0–0.2)
BILIRUB SERPL-MCNC: 0.6 MG/DL (ref 0.2–1.3)
PROT SERPL-MCNC: 7.2 G/DL (ref 6.8–8.8)

## 2017-12-14 ASSESSMENT — ANXIETY QUESTIONNAIRES: GAD7 TOTAL SCORE: 3

## 2017-12-18 ENCOUNTER — ANTICOAGULATION THERAPY VISIT (OUTPATIENT)
Dept: NURSING | Facility: CLINIC | Age: 72
End: 2017-12-18
Payer: COMMERCIAL

## 2017-12-18 LAB — INR POINT OF CARE: 3 (ref 2–3)

## 2017-12-18 PROCEDURE — 36416 COLLJ CAPILLARY BLOOD SPEC: CPT

## 2017-12-18 PROCEDURE — 85610 PROTHROMBIN TIME: CPT | Mod: QW

## 2017-12-18 PROCEDURE — 99207 ZZC NO CHARGE NURSE ONLY: CPT

## 2017-12-18 NOTE — PROGRESS NOTES
ANTICOAGULATION FOLLOW-UP CLINIC VISIT    Patient Name:  Edi Villafana  Date:  12/18/2017  Contact Type:  Face to Face    SUBJECTIVE:     Patient Findings     Positives No Problem Findings           OBJECTIVE    INR Protime   Date Value Ref [place] Status   12/18/2017 3.0 2 - 3 Final       ASSESSMENT / PLAN  INR assessment THER    Recheck INR In: 2 WEEKS    INR Location Clinic      Anticoagulation Summary as of 12/18/2017     INR goal 2.0-3.0   Today's INR 3.0   Maintenance plan 7.5 mg (7.5 mg x 1) every day   Full instructions 7.5 mg every day   Weekly total 52.5 mg   Plan last modified Anna Harmon RN (12/11/2017)   Next INR check 1/3/2018   Priority INR   Target end date     Indications   Acute deep vein thrombosis (DVT) of distal vein of left lower extremity (H) [I82.4Z2]         Anticoagulation Episode Summary     INR check location Clinic Lab    Preferred lab     Send INR reminders to [facility] INR/PROTIME    Comments             See the Encounter Report to view Anticoagulation Flowsheet and Dosing Calendar (Go to Encounters tab in chart review, and find the Anticoagulation Therapy Visit)    patient aware if signs of clotting (pain, tenderness, swelling, color change in leg or arm, SOB) and bleeding occur (blood in stool, urine, large bruising, bleeding gums, nosebleeds) to have INR check sooner. If sx severe report to ER or concerned for stroke call 911. If general questions or concerns arise, call clinic.    Patient is unable to come into clinic on the week of 12/25/17, will be potentially traveling. Scheduled for next available time for him. Therapeutic on maintenance plan.         Anna Harmon RN

## 2017-12-18 NOTE — MR AVS SNAPSHOT
Edi Villafana   12/18/2017 2:00 PM   Anticoagulation Therapy Visit    Description:  72 year old male   Provider:  [facility]   Department:  Bx Nurse           INR as of 12/18/2017     Today's INR 3.0      Anticoagulation Summary as of 12/18/2017     INR goal 2.0-3.0   Today's INR 3.0   Full instructions 7.5 mg every day   Next INR check 1/3/2018    Indications   Acute deep vein thrombosis (DVT) of distal vein of left lower extremity (H) [I82.4Z2]         Your next Anticoagulation Clinic appointment(s)     Jan 03, 2018  2:00 PM CST   Anticoagulation Visit with [facility]   [facility]    [address] [place] [id number] [phone]              Contact Numbers     [facility]  Please call  [phone] to cancel and/or reschedule your appointment   The direct line to the anticoagulant nurse is [phone] on Monday, Wednesday, and Friday. On Thursday, the anticoagulant nurse can be reached directly at [phone].         December 2017 Details    Sun Mon Tue Wed Thu Fri Sat          1               2                 3               4               5               6               7               8               9                 10               11               12               13               14               15               16                 17               18      7.5 mg   See details      19      7.5 mg         20      7.5 mg         21      7.5 mg         22      7.5 mg         23      7.5 mg           24      7.5 mg         25      7.5 mg         26      7.5 mg         27      7.5 mg         28      7.5 mg         29      7.5 mg         30      7.5 mg           31      7.5 mg                Date Details   12/18 This INR check               How to take your warfarin dose     To take:  7.5 mg Take 1 of the 7.5 mg tablets.           January 2018 Details    [place]  Mon Tue Wed Thu Fri Sat      1      7.5 mg         2      7.5 mg         3            4               5               6                 7               8               9               10               11               12               13                 14               15               16               17               18               19               20                 21               22               23               24               25               26               27                 28               29               30               31                   Date Details   No additional details    Date of next INR:  1/3/2018         How to take your warfarin dose     To take:  7.5 mg Take 1 of the 7.5 mg tablets.

## 2018-01-02 DIAGNOSIS — G47.00 PERSISTENT DISORDER OF INITIATING OR MAINTAINING SLEEP: ICD-10-CM

## 2018-01-02 NOTE — TELEPHONE ENCOUNTER
Controlled Substance Refill Request for zolpidem (AMBIEN) 10 MG tablet  Problem List Complete:  No     PROVIDER TO CONSIDER COMPLETION OF PROBLEM LIST AND OVERVIEW/CONTROLLED SUBSTANCE AGREEMENT    Last Written Prescription Date:  12/13/17  Last Fill Quantity: 30,   # refills: 0    Last Office Visit with [facility] primary care provider: 12/13/2017      Future Office visit:     Controlled substance agreement on file: No.     Processing:  Patient will  in clinic     checked in past 6 months?  No, route to RN

## 2018-01-03 ENCOUNTER — TELEPHONE (OUTPATIENT)
Dept: FAMILY MEDICINE | Facility: CLINIC | Age: 73
End: 2018-01-03

## 2018-01-03 ENCOUNTER — ANTICOAGULATION THERAPY VISIT (OUTPATIENT)
Dept: NURSING | Facility: CLINIC | Age: 73
End: 2018-01-03
Payer: COMMERCIAL

## 2018-01-03 ENCOUNTER — TRANSFERRED RECORDS (OUTPATIENT)
Dept: HEALTH INFORMATION MANAGEMENT | Facility: CLINIC | Age: 73
End: 2018-01-03

## 2018-01-03 DIAGNOSIS — M25.512 CHRONIC LEFT SHOULDER PAIN: ICD-10-CM

## 2018-01-03 DIAGNOSIS — K85.20 ALCOHOL-INDUCED ACUTE PANCREATITIS, UNSPECIFIED COMPLICATION STATUS: ICD-10-CM

## 2018-01-03 DIAGNOSIS — M79.89 LEFT LEG SWELLING: ICD-10-CM

## 2018-01-03 DIAGNOSIS — K85.20 ALCOHOL-INDUCED ACUTE PANCREATITIS, UNSPECIFIED COMPLICATION STATUS: Primary | ICD-10-CM

## 2018-01-03 DIAGNOSIS — I82.4Z2 ACUTE DEEP VEIN THROMBOSIS (DVT) OF DISTAL VEIN OF LEFT LOWER EXTREMITY (H): Primary | ICD-10-CM

## 2018-01-03 DIAGNOSIS — G89.29 CHRONIC LEFT SHOULDER PAIN: ICD-10-CM

## 2018-01-03 DIAGNOSIS — Z79.01 ANTICOAGULATION MANAGEMENT ENCOUNTER: ICD-10-CM

## 2018-01-03 DIAGNOSIS — Z51.81 ANTICOAGULATION MANAGEMENT ENCOUNTER: ICD-10-CM

## 2018-01-03 LAB
ERYTHROCYTE [DISTWIDTH] IN BLOOD BY AUTOMATED COUNT: 12.8 % (ref 10–15)
HCT VFR BLD AUTO: 43.7 % (ref 40–53)
HGB BLD-MCNC: 14.9 G/DL (ref 13.3–17.7)
INR POINT OF CARE: 2.6 (ref 0.86–1.14)
LIPASE SERPL-CCNC: 161 U/L (ref 73–393)
MCH RBC QN AUTO: 31.7 PG (ref 26.5–33)
MCHC RBC AUTO-ENTMCNC: 34.1 G/DL (ref 31.5–36.5)
MCV RBC AUTO: 93 FL (ref 78–100)
PLATELET # BLD AUTO: 289 10E9/L (ref 150–450)
RBC # BLD AUTO: 4.7 10E12/L (ref 4.4–5.9)
WBC # BLD AUTO: 6.7 10E9/L (ref 4–11)

## 2018-01-03 PROCEDURE — 80053 COMPREHEN METABOLIC PANEL: CPT | Performed by: PHYSICIAN ASSISTANT

## 2018-01-03 PROCEDURE — 85610 PROTHROMBIN TIME: CPT | Mod: QW

## 2018-01-03 PROCEDURE — 85027 COMPLETE CBC AUTOMATED: CPT | Performed by: PHYSICIAN ASSISTANT

## 2018-01-03 PROCEDURE — 36416 COLLJ CAPILLARY BLOOD SPEC: CPT

## 2018-01-03 PROCEDURE — 82150 ASSAY OF AMYLASE: CPT | Performed by: PHYSICIAN ASSISTANT

## 2018-01-03 PROCEDURE — 99207 ZZC NO CHARGE NURSE ONLY: CPT

## 2018-01-03 PROCEDURE — 83690 ASSAY OF LIPASE: CPT | Performed by: PHYSICIAN ASSISTANT

## 2018-01-03 RX ORDER — OXYCODONE HYDROCHLORIDE 5 MG/1
5 TABLET ORAL EVERY 8 HOURS PRN
Qty: 30 TABLET | Refills: 0 | Status: SHIPPED | OUTPATIENT
Start: 2018-01-03 | End: 2018-02-02

## 2018-01-03 RX ORDER — OXYCODONE HYDROCHLORIDE 5 MG/1
5 TABLET ORAL EVERY 8 HOURS PRN
Qty: 30 TABLET | Refills: 0 | Status: CANCELLED | OUTPATIENT
Start: 2018-01-03

## 2018-01-03 NOTE — PROGRESS NOTES
ANTICOAGULATION FOLLOW-UP CLINIC VISIT    Patient Name:  Edi Villafana  Date:  1/3/2018  Contact Type:  Face to Face    SUBJECTIVE:     Patient Findings     Positives Other complaints (left shoulder pain has been 8/10, taking oxycodone which is helping), Inflammation (for the last week or two left leg has been getting more red, swollen, warm, and achy.  )           OBJECTIVE    INR Protime   Date Value Ref [place] Status   01/03/2018 2.6 (A) 0.86 - 1.14 Final       ASSESSMENT / PLAN  INR assessment THER    Recheck INR In: 3 WEEKS    INR Location Clinic      Anticoagulation Summary as of 1/3/2018     INR goal 2.0-3.0   Today's INR 2.6   Maintenance plan 7.5 mg (7.5 mg x 1) every day   Full instructions 7.5 mg every day   Weekly total 52.5 mg   No change documented Tracy Young RN   Plan last modified Anna Harmon RN (12/11/2017)   Next INR check 1/24/2018   Priority INR   Target end date     Indications   Acute deep vein thrombosis (DVT) of distal vein of left lower extremity (H) [I82.4Z2]         Anticoagulation Episode Summary     INR check location Clinic Lab    Preferred lab     Send INR reminders to [facility] INR/PROTIME    Comments             See the Encounter Report to view Anticoagulation Flowsheet and Dosing Calendar (Go to Encounters tab in chart review, and find the Anticoagulation Therapy Visit)    Dosage adjustment made based on physician directed care plan. Patient was assessed by JAIME Marley who ordered a repeat in left leg ultrasound and labs. Refill for oxycodone done by Dr. Lucia.     Tracy Young RN

## 2018-01-03 NOTE — PROGRESS NOTES
Pt seen after INR visit with concern for left leg swelling.  Had mild erythema but no significant signs of cellulitis, markedly more swollen at the left calf when compared to right.  Homans' negative.

## 2018-01-03 NOTE — MR AVS SNAPSHOT
Edi Villafana   1/3/2018 2:00 PM   Anticoagulation Therapy Visit    Description:  72 year old male   Provider:  [facility]   Department:  Bx Nurse           INR as of 1/3/2018     Today's INR 2.6      Anticoagulation Summary as of 1/3/2018     INR goal 2.0-3.0   Today's INR 2.6   Full instructions 7.5 mg every day   Next INR check 1/24/2018    Indications   Acute deep vein thrombosis (DVT) of distal vein of left lower extremity (H) [I82.4Z2]         Your next Anticoagulation Clinic appointment(s)     Jan 24, 2018  2:00 PM CST   Anticoagulation Visit with [facility]   [facility]    [address] [place] [id number] [phone]              Contact Numbers     [facility]  Please call  [phone] to cancel and/or reschedule your appointment   The direct line to the anticoagulant nurse is [phone] on Monday, Wednesday, and Friday. On Thursday, the anticoagulant nurse can be reached directly at [phone].         January 2018 Details    Sun Mon Tue Wed Thu Fri Sat      1               2               3      7.5 mg   See details      4      7.5 mg         5      7.5 mg         6      7.5 mg           7      7.5 mg         8      7.5 mg         9      7.5 mg         10      7.5 mg         11      7.5 mg         12      7.5 mg         13      7.5 mg           14      7.5 mg         15      7.5 mg         16      7.5 mg         17      7.5 mg         18      7.5 mg         19      7.5 mg         20      7.5 mg           21      7.5 mg         22      7.5 mg         23      7.5 mg         24            25               26               27                 28               29               30               31                   Date Details   01/03 This INR check       Date of next INR:  1/24/2018         How to take your warfarin dose     To take:  7.5 mg Take 1 of the  7.5 mg tablets.

## 2018-01-03 NOTE — TELEPHONE ENCOUNTER
Patient here for INR check, requested a refill on Oxycodone 5mg, it has been helping with 8/10 pain in left shoulder. He sees his surgeon on 1/19/18 and plans to have imaging done at [facility]. He needs enough to last him at least until then.     Controlled Substance Refill Request for oxyCODONE IR (ROXICODONE) 5 MG tablet  Problem List Complete:  No     PROVIDER TO CONSIDER COMPLETION OF PROBLEM LIST AND OVERVIEW/CONTROLLED SUBSTANCE AGREEMENT    Last Written Prescription Date:  12/13/17  Last Fill Quantity: 30,   # refills: 0    Last Office Visit with [facility] primary care provider: 12/13/17    Future Office visit:     Controlled substance agreement on file: No.     Processing:  Patient will  in clinic     checked in past 6 months?  No, route to RN     [place] reviewed:  reviewed- no concerns    [place]  [email or web address]

## 2018-01-04 LAB
ALBUMIN SERPL-MCNC: 4.1 G/DL (ref 3.4–5)
ALP SERPL-CCNC: 81 U/L (ref 40–150)
ALT SERPL W P-5'-P-CCNC: 24 U/L (ref 0–70)
AMYLASE SERPL-CCNC: 74 U/L (ref 30–110)
ANION GAP SERPL CALCULATED.3IONS-SCNC: 12 MMOL/L (ref 3–14)
AST SERPL W P-5'-P-CCNC: 24 U/L (ref 0–45)
BILIRUB SERPL-MCNC: 0.7 MG/DL (ref 0.2–1.3)
BUN SERPL-MCNC: 12 MG/DL (ref 7–30)
CALCIUM SERPL-MCNC: 9.1 MG/DL (ref 8.5–10.1)
CHLORIDE SERPL-SCNC: 106 MMOL/L (ref 94–109)
CO2 SERPL-SCNC: 21 MMOL/L (ref 20–32)
CREAT SERPL-MCNC: 0.94 MG/DL (ref 0.66–1.25)
GFR SERPL CREATININE-BSD FRML MDRD: 79 ML/MIN/1.7M2
GLUCOSE SERPL-MCNC: 99 MG/DL (ref 70–99)
POTASSIUM SERPL-SCNC: 4.1 MMOL/L (ref 3.4–5.3)
PROT SERPL-MCNC: 7.1 G/DL (ref 6.8–8.8)
SODIUM SERPL-SCNC: 139 MMOL/L (ref 133–144)

## 2018-01-04 RX ORDER — ZOLPIDEM TARTRATE 10 MG/1
10 TABLET ORAL
Qty: 30 TABLET | Refills: 0 | Status: SHIPPED | OUTPATIENT
Start: 2018-01-12 | End: 2018-02-14

## 2018-01-04 NOTE — PROGRESS NOTES
Dear Edi,    Your tests were all normal. A copy of your tests are available in My Chart.    Glad to see you at your appointment.  If you have any questions feel free to call.      Sincerely,      CANDELARIO Nunez.

## 2018-01-05 ENCOUNTER — TELEPHONE (OUTPATIENT)
Dept: FAMILY MEDICINE | Facility: CLINIC | Age: 73
End: 2018-01-05

## 2018-01-05 NOTE — TELEPHONE ENCOUNTER
Ultrasound was ordered on 1/3/18 for a rule out DVT for this patient. He is wondering what the results are.    Huddled with Cathy, per her conversation with the radiologist this AM, the clot has not gotten bigger and nothing was occluding his leg more.     If the patient is showing more signs of infection (possible early cellulitis), (warm to the touch, red in color) he should be seen for an appointment with the provider to be treated for cellulitis.       Patient Contact    Attempt # 1    Was call answered?  No.  Left message on voicemail with information to call me back.

## 2018-01-19 ENCOUNTER — TRANSFERRED RECORDS (OUTPATIENT)
Dept: HEALTH INFORMATION MANAGEMENT | Facility: CLINIC | Age: 73
End: 2018-01-19

## 2018-01-26 ENCOUNTER — ANTICOAGULATION THERAPY VISIT (OUTPATIENT)
Dept: NURSING | Facility: CLINIC | Age: 73
End: 2018-01-26
Payer: COMMERCIAL

## 2018-01-26 DIAGNOSIS — I82.4Z2 ACUTE DEEP VEIN THROMBOSIS (DVT) OF DISTAL VEIN OF LEFT LOWER EXTREMITY (H): ICD-10-CM

## 2018-01-26 LAB — INR POINT OF CARE: 4 (ref 0.86–1.14)

## 2018-01-26 PROCEDURE — 36416 COLLJ CAPILLARY BLOOD SPEC: CPT

## 2018-01-26 PROCEDURE — 85610 PROTHROMBIN TIME: CPT | Mod: QW

## 2018-01-26 PROCEDURE — 99207 ZZC NO CHARGE NURSE ONLY: CPT

## 2018-01-26 NOTE — MR AVS SNAPSHOT
Edi Villafana   1/26/2018 2:00 PM   Anticoagulation Therapy Visit    Description:  72 year old male   Provider:  [facility]   Department:  Bx Nurse           INR as of 1/26/2018     Today's INR 4.0!      Anticoagulation Summary as of 1/26/2018     INR goal 2.0-3.0   Today's INR 4.0!   Full instructions 1/26: 3.75 mg; Otherwise 7.5 mg every day   Next INR check 2/2/2018    Indications   Acute deep vein thrombosis (DVT) of distal vein of left lower extremity (H) [I82.4Z2]         Your next Anticoagulation Clinic appointment(s)     Feb 02, 2018  2:30 PM CST   Anticoagulation Visit with  [facility] CLINIC   [facility]    [address] [place] [id number] [phone]              Contact Numbers     [facility]  Please call  [phone] to cancel and/or reschedule your appointment   The direct line to the anticoagulant nurse is [phone] on Monday, Wednesday, and Friday. On Thursday, the anticoagulant nurse can be reached directly at [phone].         January 2018 Details    Sun Mon Tue Wed Thu Fri Sat      1               2               3               4               5               6                 7               8               9               10               11               12               13                 14               15               16               17               18               19               20                 21               22               23               24               25               26      3.75 mg   See details      27      7.5 mg           28      7.5 mg         29      7.5 mg         30      7.5 mg         31      7.5 mg             Date Details   01/26 This INR check               How to take your warfarin dose     To take:  3.75 mg Take 0.5 of a 7.5 mg tablet.    To take:  7.5 mg Take 1 of the 7.5 mg tablets.           February  2018 Details    Sun Mon Tue Wed Thu Fri Sat         1      7.5 mg         2            3                 4               5               6               7               8               9               10                 11               12               13               14               15               16               17                 18               19               20               21               22               23               24                 25               26               27               28                   Date Details   No additional details    Date of next INR:  2/2/2018         How to take your warfarin dose     To take:  7.5 mg Take 1 of the 7.5 mg tablets.

## 2018-01-26 NOTE — PROGRESS NOTES
ANTICOAGULATION FOLLOW-UP CLINIC VISIT    Patient Name:  Edi Villafana  Date:  1/26/2018  Contact Type:  Face to Face    SUBJECTIVE:        OBJECTIVE    INR Protime   Date Value Ref Range Status   01/26/2018 4.0 (A) 0.86 - 1.14 Final       ASSESSMENT / PLAN  INR assessment SUPRA    Recheck INR In: 1 WEEK    INR Location Clinic      Anticoagulation Summary as of 1/26/2018     INR goal 2.0-3.0   Today's INR 4.0!   Maintenance plan 7.5 mg (7.5 mg x 1) every day   Full instructions 1/26: 3.75 mg; Otherwise 7.5 mg every day   Weekly total 52.5 mg   Plan last modified Anna Harmon RN (12/11/2017)   Next INR check 2/2/2018   Priority INR   Target end date     Indications   Acute deep vein thrombosis (DVT) of distal vein of left lower extremity (H) [I82.4Z2]         Anticoagulation Episode Summary     INR check location Clinic Lab    Preferred lab     Send INR reminders to [facility] INR/PROTIME    Comments             See the Encounter Report to view Anticoagulation Flowsheet and Dosing Calendar (Go to Encounters tab in chart review, and find the Anticoagulation Therapy Visit)        Lacey Phan, RN

## 2018-01-30 ENCOUNTER — TELEPHONE (OUTPATIENT)
Dept: FAMILY MEDICINE | Facility: CLINIC | Age: 73
End: 2018-01-30

## 2018-01-30 NOTE — TELEPHONE ENCOUNTER
Attempted to call Anne who said the message should go to Danni. CHRIS left for Danni with this message.

## 2018-01-30 NOTE — TELEPHONE ENCOUNTER
"Re: referral to [facility] for \"physical deconditioning\"--rec'd call from [facility]; they need more information about a specific area of the body patient may need work on.  They don't do general deconditioning.  Call Anne back.  thanks  "

## 2018-01-31 NOTE — TELEPHONE ENCOUNTER
Anne called, patient is being managed by Dr. Salinas and this message should go to his nurse Danni (the one VM was left for already). Per charts Anne sees that pt has chronic left shoulder pain. She will e-mail Danni to see if she needs anything else.

## 2018-01-31 NOTE — TELEPHONE ENCOUNTER
"Left msg with  asking Anne to call triage. Didn't know \"which Danni, several here at [facility]\" to speak with.  "

## 2018-02-02 ENCOUNTER — ANTICOAGULATION THERAPY VISIT (OUTPATIENT)
Dept: NURSING | Facility: CLINIC | Age: 73
End: 2018-02-02
Payer: COMMERCIAL

## 2018-02-02 DIAGNOSIS — M25.512 CHRONIC LEFT SHOULDER PAIN: ICD-10-CM

## 2018-02-02 DIAGNOSIS — I82.4Z2 ACUTE DEEP VEIN THROMBOSIS (DVT) OF DISTAL VEIN OF LEFT LOWER EXTREMITY (H): ICD-10-CM

## 2018-02-02 DIAGNOSIS — G89.29 CHRONIC LEFT SHOULDER PAIN: ICD-10-CM

## 2018-02-02 LAB — INR POINT OF CARE: 2.8 (ref 0.86–1.14)

## 2018-02-02 PROCEDURE — 99207 ZZC NO CHARGE NURSE ONLY: CPT

## 2018-02-02 PROCEDURE — 85610 PROTHROMBIN TIME: CPT | Mod: QW

## 2018-02-02 PROCEDURE — 36416 COLLJ CAPILLARY BLOOD SPEC: CPT

## 2018-02-02 RX ORDER — OXYCODONE HYDROCHLORIDE 5 MG/1
5 TABLET ORAL EVERY 8 HOURS PRN
Qty: 30 TABLET | Refills: 0 | Status: SHIPPED | OUTPATIENT
Start: 2018-02-02 | End: 2018-02-14

## 2018-02-02 NOTE — TELEPHONE ENCOUNTER
Reason for Call:  Medication or medication refill:    Do you use a [facility]?  Name of the pharmacy and phone number for the current request:   at [facility]    Name of the medication requested: oxyCODONE IR (ROXICODONE) 5 MG tablet    Other request: hip pain    Can we leave a detailed message on this number? YES    Phone number patient can be reached at: Cell number on file:    Telephone Information:   Mobile [phone]       Best Time: any    Call taken on 2/2/2018 at 2:30 PM by ANNIE SELLERS

## 2018-02-02 NOTE — TELEPHONE ENCOUNTER
Called patient to tell them that their Rx is ready for  at [facility] location.

## 2018-02-02 NOTE — MR AVS SNAPSHOT
Edi Villafana   2/2/2018 2:30 PM   Anticoagulation Therapy Visit    Description:  72 year old male   Provider:  [facility]   Department:  Bx Nurse           INR as of 2/2/2018     Today's INR 2.8      Anticoagulation Summary as of 2/2/2018     INR goal 2.0-3.0   Today's INR 2.8   Full instructions 7.5 mg every day   Next INR check 2/16/2018    Indications   Acute deep vein thrombosis (DVT) of distal vein of left lower extremity (H) [I82.4Z2]         Your next Anticoagulation Clinic appointment(s)     Feb 16, 2018  1:30 PM CST   Anticoagulation Visit with  ANTICOAGULATION CLINIC   [facility]    [address] [place] [id number] [phone]              Contact Numbers     [facility]  Please call  [phone] to cancel and/or reschedule your appointment   The direct line to the anticoagulant nurse is [phone] on Monday, Wednesday, and Friday. On Thursday, the anticoagulant nurse can be reached directly at [phone].         February 2018 Details    Sun Mon Tue Wed Thu Fri Sat         1               2      7.5 mg   See details      3      7.5 mg           4      7.5 mg         5      7.5 mg         6      7.5 mg         7      7.5 mg         8      7.5 mg         9      7.5 mg         10      7.5 mg           11      7.5 mg         12      7.5 mg         13      7.5 mg         14      7.5 mg         15      7.5 mg         16            17                 18               19               20               21               22               23               24                 25               26               27               28                   Date Details   02/02 This INR check       Date of next INR:  2/16/2018         How to take your warfarin dose     To take:  7.5 mg Take 1 of the 7.5 mg tablets.

## 2018-02-02 NOTE — PROGRESS NOTES
ANTICOAGULATION FOLLOW-UP CLINIC VISIT    Patient Name:  Edi Villafana  Date:  2/2/2018  Contact Type:  Face to Face    SUBJECTIVE:     Patient Findings     Positives No Problem Findings           OBJECTIVE    INR Protime   Date Value Ref Range Status   02/02/2018 2.8 (A) 0.86 - 1.14 Final       ASSESSMENT / PLAN  INR assessment THER    Recheck INR In: 2 WEEKS    INR Location Clinic      Anticoagulation Summary as of 2/2/2018     INR goal 2.0-3.0   Today's INR 2.8   Maintenance plan 7.5 mg (7.5 mg x 1) every day   Full instructions 7.5 mg every day   Weekly total 52.5 mg   No change documented Lacey Phan, RN   Plan last modified Anna Harmon RN (12/11/2017)   Next INR check 2/16/2018   Priority INR   Target end date     Indications   Acute deep vein thrombosis (DVT) of distal vein of left lower extremity (H) [I82.4Z2]         Anticoagulation Episode Summary     INR check location Clinic Lab    Preferred lab     Send INR reminders to [facility] INR/PROTIME    Comments             See the Encounter Report to view Anticoagulation Flowsheet and Dosing Calendar (Go to Encounters tab in chart review, and find the Anticoagulation Therapy Visit)        Lacey Phan, RN

## 2018-02-06 ENCOUNTER — HOSPITAL ENCOUNTER (OUTPATIENT)
Dept: PHYSICAL THERAPY | Facility: CLINIC | Age: 73
Setting detail: THERAPIES SERIES
End: 2018-02-06
Attending: FAMILY MEDICINE
Payer: MEDICARE

## 2018-02-06 PROCEDURE — G8979 MOBILITY GOAL STATUS: HCPCS | Mod: GP,CI | Performed by: PHYSICAL THERAPIST

## 2018-02-06 PROCEDURE — 97110 THERAPEUTIC EXERCISES: CPT | Mod: GP | Performed by: PHYSICAL THERAPIST

## 2018-02-06 PROCEDURE — 97162 PT EVAL MOD COMPLEX 30 MIN: CPT | Mod: GP | Performed by: PHYSICAL THERAPIST

## 2018-02-06 PROCEDURE — 40000185 ZZHC STATISTIC PT OUTPT VISIT: Performed by: PHYSICAL THERAPIST

## 2018-02-06 PROCEDURE — G8978 MOBILITY CURRENT STATUS: HCPCS | Mod: GP,CJ | Performed by: PHYSICAL THERAPIST

## 2018-02-06 ASSESSMENT — 6 MINUTE WALK TEST (6MWT): TOTAL DISTANCE WALKED (FT): 1735

## 2018-02-06 NOTE — PROGRESS NOTES
02/06/18 1500   Signing Clinician's Name / Credentials   Signing clinician's name / credentials Maine Garland MPT   Functional Gait Assessment (MARILYN Rolle., Stephen GMary F., et al. (2004))   1. GAIT LEVEL SURFACE 2   2. CHANGE IN GAIT SPEED 3   3. GAIT WITH HORIZONTAL HEAD TURNS 1   4. GAIT WITH VERTICAL HEAD TURNS 2   5. GAIT AND PIVOT TURN 3   6. STEP OVER OBSTACLE 3   7. GAIT WITH NARROW BASE OF SUPPORT 0   8. GAIT WITH EYES CLOSED 2   9. AMBULATING BACKWARDS 2   10. STEPS 3   Total Functional Gait Assessment Score   TOTAL SCORE: (MAXIMUM SCORE 30) 21

## 2018-02-06 NOTE — PROGRESS NOTES
[facility]        OUTPATIENT PHYSICAL THERAPY FUNCTIONAL EVALUATION  PLAN OF TREATMENT FOR OUTPATIENT REHABILITATION  (COMPLETE FOR INITIAL CLAIMS ONLY)  Patient's Last Name, First Name, M.I.  YOB: 1945  Edi Villafana  DAVE     Provider's Name   [facility]   Medical Record No.  [id number]     Start of Care Date:  02/06/18   Onset Date:  11/11/17   Type:     _X__PT   ____OT  ____SLP Medical Diagnosis:   PHysical Deconditioning     PT Diagnosis:  Impaired functional mobility, Decreased standing balance Visits from SOC:  1                              __________________________________________________________________________________  Plan of Treatment/Functional Goals:  balance training, neuromuscular re-education, strengthening           GOALS  6MWT  Client will improve 150 ft on 6MWT to demonstrate improved gait speed and actiity tolerance  04/06/18    FGA  Client will improve score to at least a 27/30 or better to be at decreased risk for falls  04/06/18    subjective  Client subjectively will report improved energy and ability to take care of house inside and outside.  04/06/18    HEP  Client will be independent [facility] HEP to continue strengthening and balance on his own  04/06/18                Therapy Frequency:      Predicted Duration of Therapy Intervention:  2x/week for 30 days and then 1x/week for 30 days    Maine Harrington, PT                                    I CERTIFY THE NEED FOR THESE SERVICES FURNISHED UNDER        THIS PLAN OF TREATMENT AND WHILE UNDER MY CARE     (Physician co-signature of this document indicates review and certification of the therapy plan).                Certification Date From:    2/6/18  Certification Date To:   4/6/18    Referring Provider:  Dr Enrique Lucia    Initial Assessment  See Epic Evaluation- Start of Care Date:  02/06/18

## 2018-02-06 NOTE — PROGRESS NOTES
02/06/18 1400   Quick Adds   Type of Visit Initial OP PT Evaluation   General Information   Start of Care Date 02/06/18   Referring Physician Dr Enrique Lucia   Orders Evaluate and Treat as Indicated   Order Date 12/13/17   Medical Diagnosis Physical Decondtioning   Onset of illness/injury or Date of Surgery 11/11/17   Surgical/Medical history reviewed Yes   Pertinent history of current problem (include personal factors and/or comorbidities that impact the POC) Admitted to the hospital 11/11/17 to 11/16/17 with pancreatitis.Lost 24 lbs in 2 weeks and became very weak. Discharged to [facility]  from 11/16/17 to 12/7/17. Now has returned home and still is generally weak, deconditioned. Difficulty managing house things like shovelling driveways. Now he has to hire that out.  LIfting is difficult. Balance is not as good as it used to be. Really wants to get back to PLOF. Has a lot of orthopedic problems and sent over Dr. Salinas's last appt with him Jan. 2018. MRI shows advance OA in B shoulders. More pain in L shoulder s/p recent CSI. Plans to follow up with different ortho MD to talk about  R hip which is really bothering him too. Can bother him on level surfaces at times but really bothers him on stairs. Hx of Depression and sees a psychologist. Does not like the idea of working out alone.    Prior level of function comment Independent with all functional mobility. Does his own lawn work and shovelling. Does belong to a club but has not gone in years.    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 2 story   Patient/Family Goals Statement return to PLOF   Fall Risk Screen   Fall screen completed by PT   Have you fallen 2 or more times in the past year? Yes   Have you fallen and had an injury in the past year? No   Timed Up and Go score (seconds) 7.66   Fall screen comments fall were right before he was dx  with pancreatitis and 1x at [facility] when he tripped over cords.    Pain   Pain comments R hip pain. Reports needing to get hip checked our further from ortho.    Cognitive Status Examination   Level of Consciousness alert   Strength   Strength Comments B UEs 4 to 4+/5 with pain especially in L shoulder with shoulder strength testing.  B biceps and triceps 5/5. R hip flexion 4+/5 with pain, L hip flexion 4+/5,  B knee extension 5/5 and B dorsiflexion 5/5.  R hip abduction 4/5 with pain L hip abdiuction 4+/5.   Bed Mobility   Bed Mobility Comments independent but painful with changing positions due to orthospiedic problems in shoudlers and  R hip   Transfer Skills   Transfer Comments independent   Gait   Gait Comments Ambulates wtihot an AD Increased path deviation with fatigue. Grimacing at times from R hip pain.    Gait Special Tests   Gait Special Tests FUNCTIONAL GAIT ASSESSMENT;SIX MINUTE WALK TEST   Gait Special Tests Functional Gait Assessment Score out of 30   Score out of 30 21   Comments indicates at increased risk for falls   Gait Special Tests Six Minute Walk Test   Feet 1735 Feet   Comments Scored well but progressively demonstrated decreased unsteadiness. Very SOB but  bpm.  Reports that was extremely fatigueing    Balance Special Tests   Balance Special Tests Sit to stand reps   Balance Special Tests Sit to Stand Reps in 30 Seconds   Reps in 30 seconds 20   Comments pain after completing this in R hip   Planned Therapy Interventions   Planned Therapy Interventions balance training;neuromuscular re-education;strengthening   Clinical Impression   Criteria for Skilled Therapeutic Interventions Met yes, treatment indicated   PT Diagnosis Impaired functional mobility, Decreased standing balance   Influenced by the following impairments weakness, pain, impaired standing balance   Functional limitations due to impairments at increased risk for falls, unable to maintain house and yard now without help,  decreased activity tolerance for work and exercise   Clinical Presentation Evolving/Changing   Clinical Presentation Rationale Depression could be a barrier, FGA, pain, MMT, 6MWT   Clinical Decision Making (Complexity) Moderate complexity   Predicted Duration of Therapy Intervention (days/wks) 2x/week for 30 days and then 1x/week for 30 days   Risk & Benefits of therapy have been explained Yes   Patient, Family & other staff in agreement with plan of care Yes   GOALS   PT Eval Goals 1;2;3;4   Goal 1   Goal Identifier 6MWT   Goal Description Client will improve 150 ft on 6MWT to demonstrate improved gait speed and actiity tolerance   Target Date 04/06/18   Goal 2   Goal Identifier FGA   Goal Description Client will improve score to at least a 27/30 or better to be at decreased risk for falls   Target Date 04/06/18   Goal 3   Goal Identifier subjective   Goal Description Client subjectively will report improved energy and ability to take care of house inside and outside.   Target Date 04/06/18   Goal 4   Goal Identifier HEP   Goal Description Client will be independent wt HEP to continue strengthening and balance on his own   Target Date 04/06/18   Total Evaluation Time   Total Evaluation Time (Minutes) 50

## 2018-02-08 DIAGNOSIS — F41.9 ANXIETY: ICD-10-CM

## 2018-02-09 RX ORDER — LORAZEPAM 2 MG/1
TABLET ORAL
Qty: 60 TABLET | Refills: 0 | Status: SHIPPED | OUTPATIENT
Start: 2018-02-09 | End: 2018-03-02

## 2018-02-09 NOTE — TELEPHONE ENCOUNTER
[place] reviewed:  reviewed- no concerns 2/9/18    [place]  [email or web address]

## 2018-02-09 NOTE — TELEPHONE ENCOUNTER
Controlled Substance Refill Request for LORazepam (ATIVAN) 2 MG tablet  Problem List Complete:  No     PROVIDER TO CONSIDER COMPLETION OF PROBLEM LIST AND OVERVIEW/CONTROLLED SUBSTANCE AGREEMENT    Last Written Prescription Date:  11/16/2017  Last Fill Quantity: 60 tablet,   # refills: 0    Last Office Visit with [facility] primary care provider: 12/13/2017    Future Office visit:   Next 5 appointments (look out 90 days)     Feb 14, 2018  3:30 PM CST   Office Visit with Enrique Lucia MD   [facility] ([facility])    [address] [place] [id number] [phone]                  Controlled substance agreement on file: No.     Processing:  [facility]      checked in past 6 months?  No, route to RN

## 2018-02-13 ENCOUNTER — HOSPITAL ENCOUNTER (OUTPATIENT)
Dept: PHYSICAL THERAPY | Facility: CLINIC | Age: 73
Setting detail: THERAPIES SERIES
End: 2018-02-13
Attending: FAMILY MEDICINE
Payer: MEDICARE

## 2018-02-13 PROCEDURE — 97110 THERAPEUTIC EXERCISES: CPT | Mod: GP | Performed by: PHYSICAL THERAPIST

## 2018-02-13 PROCEDURE — 40000185 ZZHC STATISTIC PT OUTPT VISIT: Performed by: PHYSICAL THERAPIST

## 2018-02-14 ENCOUNTER — OFFICE VISIT (OUTPATIENT)
Dept: FAMILY MEDICINE | Facility: CLINIC | Age: 73
End: 2018-02-14
Payer: COMMERCIAL

## 2018-02-14 VITALS
SYSTOLIC BLOOD PRESSURE: 126 MMHG | HEIGHT: 73 IN | RESPIRATION RATE: 16 BRPM | HEART RATE: 74 BPM | DIASTOLIC BLOOD PRESSURE: 74 MMHG | BODY MASS INDEX: 26.24 KG/M2 | WEIGHT: 198 LBS | TEMPERATURE: 97.5 F

## 2018-02-14 DIAGNOSIS — I82.4Z2 ACUTE DEEP VEIN THROMBOSIS (DVT) OF DISTAL VEIN OF LEFT LOWER EXTREMITY (H): Primary | ICD-10-CM

## 2018-02-14 DIAGNOSIS — M25.512 CHRONIC LEFT SHOULDER PAIN: ICD-10-CM

## 2018-02-14 DIAGNOSIS — G47.00 PERSISTENT DISORDER OF INITIATING OR MAINTAINING SLEEP: ICD-10-CM

## 2018-02-14 DIAGNOSIS — M25.551 HIP PAIN, RIGHT: ICD-10-CM

## 2018-02-14 DIAGNOSIS — G89.29 CHRONIC LEFT SHOULDER PAIN: ICD-10-CM

## 2018-02-14 PROCEDURE — 99214 OFFICE O/P EST MOD 30 MIN: CPT | Performed by: FAMILY MEDICINE

## 2018-02-14 RX ORDER — ZOLPIDEM TARTRATE 10 MG/1
10 TABLET ORAL
Qty: 90 TABLET | Refills: 1 | Status: SHIPPED | OUTPATIENT
Start: 2018-02-14 | End: 2018-11-19

## 2018-02-14 RX ORDER — OXYCODONE HYDROCHLORIDE 5 MG/1
5 TABLET ORAL 2 TIMES DAILY
Qty: 60 TABLET | Refills: 0 | Status: SHIPPED | OUTPATIENT
Start: 2018-02-14 | End: 2018-03-21

## 2018-02-14 NOTE — NURSING NOTE
"Chief Complaint   Patient presents with     Musculoskeletal Problem     rt hip     Recheck Medication     discuss pain management       Initial /74  Pulse 74  Temp 97.5  F (36.4  C) (Tympanic)  Resp 16  Ht 6' 1\" (1.854 m)  Wt 198 lb (89.8 kg)  BMI 26.12 kg/m2 Estimated body mass index is 26.12 kg/(m^2) as calculated from the following:    Height as of this encounter: 6' 1\" (1.854 m).    Weight as of this encounter: 198 lb (89.8 kg).  Medication Reconciliation: completecomplete    Cydney Quintanilla CMA      "

## 2018-02-14 NOTE — PROGRESS NOTES
SUBJECTIVE:   Edi Villafana is a 72 year old male who presents to clinic today for the following health issues:    Musculoskeletal problem/pain      Duration: 3 years    Description  Location: shoulders and rt hip    Intensity:  moderate    Accompanying signs and symptoms: very painful    History  Previous similar problem: no   Previous evaluation:  x-ray    Precipitating or alleviating factors:  Trauma or overuse: YES- overuse  Aggravating factors include: walking and lifting    Therapies tried and outcome: NSAID - oxycodone 5 mg and physical therapy- only 50% effective, tramadol- not effective          Problem list and histories reviewed & adjusted, as indicated.  Additional history: The patient has an appointment with Dr. Connell at [facility] coming up soon.  When he takes his oxycodone twice a day his pain control is pretty good.    Patient Active Problem List   Diagnosis     Hypothyroidism     Hyperlipidemia LDL goal <100     Persistent insomnia     Hypertension goal BP (blood pressure) < 140/90     Anxiety     Hypotestosteronism     Trochanteric bursitis     Depression with anxiety     Left shoulder pain     Pancreatitis     Hepatitis     Physical deconditioning     Alcoholism (H)     Hyponatremia     Alcohol-induced acute pancreatitis, unspecified complication status     Alcoholic hepatitis without ascites     Chronic left shoulder pain     Advanced directives, counseling/discussion     Pain of left calf     Acute deep vein thrombosis (DVT) of distal vein of left lower extremity (H)     Anticoagulation management encounter     Hip pain, right     Past Surgical History:   Procedure Laterality Date     ABDOMEN SURGERY  1973    large benign tumor removed with thrombophlebitis post op     APPENDECTOMY  1960     CATARACT IOL, RT/LT      bilateral     EYE SURGERY  [id number], 2007    eyelid surgery, 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, trabeculoplasty       Social History   Substance Use Topics     Smoking  "status: Never Smoker     Smokeless tobacco: Never Used     Alcohol use Yes     Family History   Problem Relation Age of Onset     Hypertension Mother      Lipids Mother      HEART DISEASE Mother      Psychotic Disorder Mother      severe depression     HEART DISEASE Father      CANCER Sister      ovarian     Colon Cancer Paternal Grandfather            Reviewed and updated as needed this visit by clinical staff  Tobacco  Allergies  Meds  Med Hx  Surg Hx  Fam Hx  Soc Hx      Reviewed and updated as needed this visit by Provider         ROS:  Constitutional, HEENT, cardiovascular, pulmonary, gi and gu systems are negative, except as otherwise noted.    OBJECTIVE:                                                    /74  Pulse 74  Temp 97.5  F (36.4  C) (Tympanic)  Resp 16  Ht 6' 1\" (1.854 m)  Wt 198 lb (89.8 kg)  BMI 26.12 kg/m2  Body mass index is 26.12 kg/(m^2).  GENERAL APPEARANCE: healthy, alert and no distress         ASSESSMENT/PLAN:                                                        ICD-10-CM    1. Acute deep vein thrombosis (DVT) of distal vein of left lower extremity (H) I82.4Z2 ONC/HEME ADULT REFERRAL   2. Persistent disorder of initiating or maintaining sleep G47.00 zolpidem (AMBIEN) 10 MG tablet   3. Chronic left shoulder pain M25.512 oxyCODONE IR (ROXICODONE) 5 MG tablet    G89.29    4. Hip pain, right M25.551        Patient Instructions   I had a 25 minute discussion with the patient about his various joint elements, his DVT and his sleep disorder.  I gave him a new prescription for his oxycodone for 60 tablets to last 1 month.  He will follow-up otherwise as needed with me.      Enrique Lucia MD  [facility]    "

## 2018-02-14 NOTE — MR AVS SNAPSHOT
After Visit Summary   2/14/2018    Edi Villafana    MRN: [id number]           Patient Information     Date Of Birth          1945        Visit Information        Provider Department      2/14/2018 3:30 PM Enrique Lucia MD [facility]        Today's Diagnoses     Acute deep vein thrombosis (DVT) of distal vein of left lower extremity (H)    -  1    Persistent disorder of initiating or maintaining sleep        Chronic left shoulder pain           Follow-ups after your visit        Additional Services     ONC/HEME ADULT REFERRAL       Your provider has referred you to: N: [facility] [place] [phone]   [email or web address]    Please be aware that coverage of these services is subject to the terms and limitations of your health insurance plan.  Call member services at your health plan with any benefit or coverage questions.      Please bring the following with you to your appointment:    (1) Any X-Rays, CTs or MRIs which have been performed.  Contact the facility where they were done to arrange for  prior to your scheduled appointment.   (2) List of current medications  (3) This referral request   (4) Any documents/labs given to you for this referral                  Your next 10 appointments already scheduled     Feb 16, 2018  1:30 PM CST   Anticoagulation Visit with [facility]   [facility]    [address] [place] [address] [address]  [facility] [address]   [phone]            Feb 20, 2018 11:15 AM CST   Neuro Treatment with Maine Garland, PT   [facility] ([facility])    [address] [place] [address]   [phone]            Feb 22, 2018  2:15 PM CST   Neuro Treatment with Maine Garland, PT   [facility] ([facility]  [facility])    [address] [place] [address] [address]  [address] [address]   [phone]            Feb 27, 2018  1:30 PM CST   Neuro Treatment with Maine Garland, PT   [facility] ([facility])    [address] [place] [address] [address]  [place] [address]   [phone]            Mar 01, 2018  1:45 PM CST   Neuro Treatment with Maine Garland, PT   [facility] ([facility])    [address] [place] [address] [address]  [address] [address]   [phone]            Mar 06, 2018  1:30 PM CST   Neuro Treatment with Maine Garland, PT   [facility] ([facility])    [address] [place] [address] [address]  [address] [address]   [phone]            Mar 08, 2018  1:45 PM CST   Neuro Treatment with Maine Garland, PT   [facility] ([facility])    [address] [place] [address] [address] [address] [address]   [phone]              Who to contact     If you have questions or need follow up information about today's clinic visit or your schedule please contact [facility] directly at [phone].  Normal or non-critical lab and imaging results will be communicated to you by [employer], letter or phone within 4 business days after the clinic has received the results. If you do not hear from us within 7 days, please contact the clinic through [employer] or phone. If you have a critical or abnormal lab result, we will notify you by phone as soon as possible.  Submit refill requests through [employer] or call your pharmacy and they will forward the refill request to us. Please allow 3 business days for your refill to be completed.          Additional Information About Your Visit        [employer] Information     [employer] gives you secure access to your electronic health record. If you see a primary care provider, you can also send messages to your care team and make appointments. If you have questions, please call your primary  "care clinic.  If you do not have a primary care provider, please call [phone] and they will assist you.        Care EveryWhere ID     This is your Care EveryWhere ID. This could be used by other organizations to access your [facility] medical records  [id number]        Your Vitals Were     Pulse Temperature Respirations Height BMI (Body Mass Index)       74 97.5  F (36.4  C) (Tympanic) 16 6' 1\" (1.854 m) 26.12 kg/m2        Blood Pressure from Last 3 Encounters:   02/14/18 126/74   12/13/17 120/78   12/07/17 127/83    Weight from Last 3 Encounters:   02/14/18 198 lb (89.8 kg)   12/13/17 206 lb (93.4 kg)   12/07/17 199 lb 3.2 oz (90.4 kg)              We Performed the Following     ONC/HEME ADULT REFERRAL          Today's Medication Changes          These changes are accurate as of 2/14/18  4:14 PM.  If you have any questions, ask your nurse or doctor.               These medicines have changed or have updated prescriptions.        Dose/Directions    oxyCODONE IR 5 MG tablet   Commonly known as:  ROXICODONE   This may have changed:    - when to take this  - reasons to take this   Used for:  Chronic left shoulder pain   Changed by:  Enrique Lucia MD        Dose:  5 mg   Take 1 tablet (5 mg) by mouth 2 times daily   Quantity:  60 tablet   Refills:  0            Where to get your medicines      Some of these will need a paper prescription and others can be bought over the counter.  Ask your nurse if you have questions.     Bring a paper prescription for each of these medications     oxyCODONE IR 5 MG tablet    zolpidem 10 MG tablet                Primary Care Provider Office Phone # Fax #    Enrique Lucia [phone] [address] [facility] [address]        Equal Access to Services     RAUL ANDERSON AH: Stuart steino Socandeali, waaxda luqadaha, qaybta kaalmada adeegyada, abebe alcantar. So [facility] [phone].    ATENCIÓN: Si erica boswell, " tiene a kidd disposición servicios gratuitos de asistencia lingüística. Agus isaac [phone].    We comply with applicable federal civil rights laws and [place] laws. We do not discriminate on the basis of race, color, national origin, age, disability, sex, sexual orientation, or gender identity.            Thank you!     Thank you for choosing [facility]  for your care. Our goal is always to provide you with excellent care. Hearing back from our patients is one way we can continue to improve our services. Please take a few minutes to complete the written survey that you may receive in the mail after your visit with us. Thank you!             Your Updated Medication List - Protect others around you: Learn how to safely use, store and throw away your medicines at www.disposemymeds.org.          This list is accurate as of 2/14/18  4:14 PM.  Always use your most recent med list.                   Brand Name Dispense Instructions for use Diagnosis    atorvastatin 40 MG tablet    LIPITOR    90 tablet    TAKE 1 TABLET (40 MG) BY MOUTH DAILY    Hyperlipidemia LDL goal <100       ICY HOT EXTRA STRENGTH 10-30 % Crea      Apply topically every 4 hours as needed        levothyroxine 125 MCG tablet    SYNTHROID/LEVOTHROID    90 tablet    Take 1 tablet (125 mcg) by mouth daily    Hypothyroidism, unspecified type       LORazepam 2 MG tablet    ATIVAN    60 tablet    TAKE 1 TABLET EVERY 8 HOURS AS NEEDED FOR ANXIETY    Anxiety       losartan 50 MG tablet    COZAAR    90 tablet    Take 0.5 tablets (25 mg) by mouth daily    Essential hypertension with goal blood pressure less than 140/90       MULTIVITAMIN PO      Take 1 tablet by mouth daily        oxyCODONE IR 5 MG tablet    ROXICODONE    60 tablet    Take 1 tablet (5 mg) by mouth 2 times daily    Chronic left shoulder pain       vitamin D3 2000 UNITS Caps      Take 1 capsule by mouth daily        warfarin 7.5 MG tablet    COUMADIN    90 tablet     Take 1 tablet (7.5 mg) by mouth daily Recheck INR 12/11    Acute deep vein thrombosis (DVT) of distal vein of left lower extremity (H)       zolpidem 10 MG tablet    AMBIEN    90 tablet    Take 1 tablet (10 mg) by mouth nightly as needed for sleep    Persistent disorder of initiating or maintaining sleep

## 2018-02-15 PROBLEM — M25.551 HIP PAIN, RIGHT: Status: ACTIVE | Noted: 2018-02-15

## 2018-02-15 NOTE — PATIENT INSTRUCTIONS
I had a 25 minute discussion with the patient about his various joint elements, his DVT and his sleep disorder.  I gave him a new prescription for his oxycodone for 60 tablets to last 1 month.  He will follow-up otherwise as needed with me.

## 2018-02-16 ENCOUNTER — ANTICOAGULATION THERAPY VISIT (OUTPATIENT)
Dept: NURSING | Facility: CLINIC | Age: 73
End: 2018-02-16
Payer: COMMERCIAL

## 2018-02-16 DIAGNOSIS — I82.4Z2 ACUTE DEEP VEIN THROMBOSIS (DVT) OF DISTAL VEIN OF LEFT LOWER EXTREMITY (H): ICD-10-CM

## 2018-02-16 LAB — INR POINT OF CARE: 3.4 (ref 0.86–1.14)

## 2018-02-16 PROCEDURE — 36416 COLLJ CAPILLARY BLOOD SPEC: CPT

## 2018-02-16 PROCEDURE — 99207 ZZC NO CHARGE NURSE ONLY: CPT

## 2018-02-16 PROCEDURE — 85610 PROTHROMBIN TIME: CPT | Mod: QW

## 2018-02-16 NOTE — PROGRESS NOTES
ANTICOAGULATION FOLLOW-UP CLINIC VISIT    Patient Name:  Edi Villafana  Date:  2/16/2018  Contact Type:  Face to Face    SUBJECTIVE:     Patient Findings     Positives Other complaints (hip pain)           OBJECTIVE    INR Protime   Date Value Ref Range Status   02/16/2018 3.4 (A) 0.86 - 1.14 Final       ASSESSMENT / PLAN  INR assessment SUPRA    Recheck INR In: 2 WEEKS    INR Location Clinic      Anticoagulation Summary as of 2/16/2018     INR goal 2.0-3.0   Today's INR 3.4!   Maintenance plan 3.75 mg (7.5 mg x 0.5) on Fri; 7.5 mg (7.5 mg x 1) all other days   Full instructions 2/16: 3.75 mg; 2/23: 3.75 mg; 3/2: 3.75 mg; Otherwise 3.75 mg on Fri; 7.5 mg all other days   Weekly total 48.75 mg   Plan last modified Lacey Phan RN (2/16/2018)   Next INR check 3/2/2018   Priority INR   Target end date     Indications   Acute deep vein thrombosis (DVT) of distal vein of left lower extremity (H) [I82.4Z2]         Anticoagulation Episode Summary     INR check location Clinic Lab    Preferred lab     Send INR reminders to [facility] INR/PROTIME    Comments             See the Encounter Report to view Anticoagulation Flowsheet and Dosing Calendar (Go to Encounters tab in chart review, and find the Anticoagulation Therapy Visit)        Lacey Phan, RN

## 2018-02-16 NOTE — MR AVS SNAPSHOT
Edi Villafana   2/16/2018 1:30 PM   Anticoagulation Therapy Visit    Description:  72 year old male   Provider:  [facility]   Department:  Bx Nurse           INR as of 2/16/2018     Today's INR 3.4!      Anticoagulation Summary as of 2/16/2018     INR goal 2.0-3.0   Today's INR 3.4!   Full instructions 2/16: 3.75 mg; 2/23: 3.75 mg; 3/2: 3.75 mg; Otherwise 7.5 mg every day   Next INR check 3/2/2018    Indications   Acute deep vein thrombosis (DVT) of distal vein of left lower extremity (H) [I82.4Z2]         Your next Anticoagulation Clinic appointment(s)     Mar 02, 2018  2:15 PM CST   Anticoagulation Visit with  ANTICOAGULATION CLINIC   [facility]    [address] [place] [id number] [phone]              Contact Numbers     [facility]  Please call  [phone] to cancel and/or reschedule your appointment   The direct line to the anticoagulant nurse is [phone] on Monday, Wednesday, and Friday. On Thursday, the anticoagulant nurse can be reached directly at [phone].         February 2018 Details    Sun Mon Tue Wed Thu Fri Sat         1               2               3                 4               5               6               7               8               9               10                 11               12               13               14               15               16      3.75 mg   See details      17      7.5 mg           18      7.5 mg         19      7.5 mg         20      7.5 mg         21      7.5 mg         22      7.5 mg         23      3.75 mg         24      7.5 mg           25      7.5 mg         26      7.5 mg         27      7.5 mg         28      7.5 mg             Date Details   02/16 This INR check               How to take your warfarin dose     To take:  3.75 mg Take 0.5 of a 7.5 mg tablet.    To take:  7.5 mg Take 1 of the 7.5 mg  tablets.           March 2018 Details    Sun Mon Tue Wed Thu Fri Sat         1      7.5 mg         2            3                 4               5               6               7               8               9               10                 11               12               13               14               15               16               17                 18               19               20               21               22               23               24                 25               26               27               28               29               30               31                Date Details   No additional details    Date of next INR:  3/2/2018         How to take your warfarin dose     To take:  3.75 mg Take 0.5 of a 7.5 mg tablet.    To take:  7.5 mg Take 1 of the 7.5 mg tablets.

## 2018-02-20 ENCOUNTER — TRANSFERRED RECORDS (OUTPATIENT)
Dept: HEALTH INFORMATION MANAGEMENT | Facility: CLINIC | Age: 73
End: 2018-02-20

## 2018-02-20 ENCOUNTER — HOSPITAL ENCOUNTER (OUTPATIENT)
Dept: PHYSICAL THERAPY | Facility: CLINIC | Age: 73
Setting detail: THERAPIES SERIES
End: 2018-02-20
Attending: FAMILY MEDICINE
Payer: MEDICARE

## 2018-02-20 PROCEDURE — 97110 THERAPEUTIC EXERCISES: CPT | Mod: GP | Performed by: PHYSICAL THERAPIST

## 2018-02-20 PROCEDURE — 97112 NEUROMUSCULAR REEDUCATION: CPT | Mod: GP | Performed by: PHYSICAL THERAPIST

## 2018-02-20 PROCEDURE — 40000185 ZZHC STATISTIC PT OUTPT VISIT: Performed by: PHYSICAL THERAPIST

## 2018-02-22 ENCOUNTER — HOSPITAL ENCOUNTER (OUTPATIENT)
Dept: PHYSICAL THERAPY | Facility: CLINIC | Age: 73
Setting detail: THERAPIES SERIES
End: 2018-02-22
Attending: FAMILY MEDICINE
Payer: MEDICARE

## 2018-02-22 PROCEDURE — 40000185 ZZHC STATISTIC PT OUTPT VISIT: Performed by: PHYSICAL THERAPIST

## 2018-02-22 PROCEDURE — 97110 THERAPEUTIC EXERCISES: CPT | Mod: GP | Performed by: PHYSICAL THERAPIST

## 2018-02-27 ENCOUNTER — HOSPITAL ENCOUNTER (OUTPATIENT)
Dept: PHYSICAL THERAPY | Facility: CLINIC | Age: 73
Setting detail: THERAPIES SERIES
End: 2018-02-27
Attending: FAMILY MEDICINE
Payer: MEDICARE

## 2018-02-27 PROCEDURE — 97112 NEUROMUSCULAR REEDUCATION: CPT | Mod: GP | Performed by: PHYSICAL THERAPIST

## 2018-02-27 PROCEDURE — 40000719 ZZHC STATISTIC PT DEPARTMENT NEURO VISIT: Performed by: PHYSICAL THERAPIST

## 2018-02-27 PROCEDURE — 97110 THERAPEUTIC EXERCISES: CPT | Mod: GP | Performed by: PHYSICAL THERAPIST

## 2018-02-28 ENCOUNTER — TRANSFERRED RECORDS (OUTPATIENT)
Dept: HEALTH INFORMATION MANAGEMENT | Facility: CLINIC | Age: 73
End: 2018-02-28

## 2018-03-01 ENCOUNTER — HOSPITAL ENCOUNTER (OUTPATIENT)
Dept: PHYSICAL THERAPY | Facility: CLINIC | Age: 73
Setting detail: THERAPIES SERIES
End: 2018-03-01
Attending: FAMILY MEDICINE
Payer: MEDICARE

## 2018-03-01 PROCEDURE — 40000185 ZZHC STATISTIC PT OUTPT VISIT: Performed by: PHYSICAL THERAPIST

## 2018-03-01 PROCEDURE — 97110 THERAPEUTIC EXERCISES: CPT | Mod: GP | Performed by: PHYSICAL THERAPIST

## 2018-03-02 ENCOUNTER — ANTICOAGULATION THERAPY VISIT (OUTPATIENT)
Dept: NURSING | Facility: CLINIC | Age: 73
End: 2018-03-02
Payer: COMMERCIAL

## 2018-03-02 DIAGNOSIS — I82.4Z2 ACUTE DEEP VEIN THROMBOSIS (DVT) OF DISTAL VEIN OF LEFT LOWER EXTREMITY (H): ICD-10-CM

## 2018-03-02 DIAGNOSIS — F41.9 ANXIETY: ICD-10-CM

## 2018-03-02 LAB — INR POINT OF CARE: 2.3 (ref 0.86–1.14)

## 2018-03-02 PROCEDURE — 36416 COLLJ CAPILLARY BLOOD SPEC: CPT

## 2018-03-02 PROCEDURE — 99207 ZZC NO CHARGE NURSE ONLY: CPT

## 2018-03-02 PROCEDURE — 85610 PROTHROMBIN TIME: CPT | Mod: QW

## 2018-03-02 NOTE — TELEPHONE ENCOUNTER
Controlled Substance Refill Request for lorazspam   Problem List Complete:  No     PROVIDER TO CONSIDER COMPLETION OF PROBLEM LIST AND OVERVIEW/CONTROLLED SUBSTANCE AGREEMENT    Last Written Prescription Date:  2/9/2018  Last Fill Quantity: 60,   # refills: 0  PT DID NOT FILL BECAUSE HE WANTED 90 DAY SUPPLY #[id number]    [place] [facility] [place] [facility] [place]: 2/14/2018    Future Office visit:     Controlled substance agreement on file: No.     Processing:  [id number] - [place], [place] - [address] [place] [address] [address]   checked in past 6 months?  Yes 3/2/2018  No concerns

## 2018-03-02 NOTE — MR AVS SNAPSHOT
Edi Villafana   3/2/2018 2:15 PM   Anticoagulation Therapy Visit    Description:  72 year old male   Provider:  [facility]   Department:  Bx Nurse           INR as of 3/2/2018     Today's INR 2.3      Anticoagulation Summary as of 3/2/2018     INR goal 2.0-3.0   Today's INR 2.3   Full instructions 3/2: 3.75 mg; Otherwise 3.75 mg on Fri; 7.5 mg all other days   Next INR check 3/23/2018    Indications   Acute deep vein thrombosis (DVT) of distal vein of left lower extremity (H) [I82.4Z2]         Your next Anticoagulation Clinic appointment(s)     Mar 23, 2018  2:00 PM CDT   Anticoagulation Visit with  ANTICOAGULATION CLINIC   [facility]    [address] [place] [id number] [phone]              Contact Numbers     [facility]  Please call  [phone] to cancel and/or reschedule your appointment   The direct line to the anticoagulant nurse is [phone] on Monday, Wednesday, and Friday. On Thursday, the anticoagulant nurse can be reached directly at [phone].         March 2018 Details    Sun Mon Tue Wed Thu Fri Sat         1               2      3.75 mg   See details      3      7.5 mg           4      7.5 mg         5      7.5 mg         6      7.5 mg         7      7.5 mg         8      7.5 mg         9      3.75 mg         10      7.5 mg           11      7.5 mg         12      7.5 mg         13      7.5 mg         14      7.5 mg         15      7.5 mg         16      3.75 mg         17      7.5 mg           18      7.5 mg         19      7.5 mg         20      7.5 mg         21      7.5 mg         22      7.5 mg         23            24                 25               26               27               28               29               30               31                Date Details   03/02 This INR check       Date of next INR:  3/23/2018         How to take your  warfarin dose     To take:  3.75 mg Take 0.5 of a 7.5 mg tablet.    To take:  7.5 mg Take 1 of the 7.5 mg tablets.

## 2018-03-02 NOTE — PROGRESS NOTES
ANTICOAGULATION FOLLOW-UP CLINIC VISIT    Patient Name:  Edi Villafana  Date:  3/2/2018  Contact Type:  Face to Face    SUBJECTIVE:     Patient Findings     Positives No Problem Findings           OBJECTIVE    INR Protime   Date Value Ref Range Status   03/02/2018 2.3 (A) 0.86 - 1.14 Final       ASSESSMENT / PLAN  INR assessment THER    Recheck INR In: 3 WEEKS    INR Location Clinic      Anticoagulation Summary as of 3/2/2018     INR goal 2.0-3.0   Today's INR 2.3   Maintenance plan 3.75 mg (7.5 mg x 0.5) on Fri; 7.5 mg (7.5 mg x 1) all other days   Full instructions 3/2: 3.75 mg; Otherwise 3.75 mg on Fri; 7.5 mg all other days   Weekly total 48.75 mg   No change documented Lacey Phan RN   Plan last modified Lacey Phan RN (2/16/2018)   Next INR check 3/23/2018   Priority INR   Target end date     Indications   Acute deep vein thrombosis (DVT) of distal vein of left lower extremity (H) [I82.4Z2]         Anticoagulation Episode Summary     INR check location Clinic Lab    Preferred lab     Send INR reminders to [facility] INR/PROTIME    Comments             See the Encounter Report to view Anticoagulation Flowsheet and Dosing Calendar (Go to Encounters tab in chart review, and find the Anticoagulation Therapy Visit)        Lacey Phan RN

## 2018-03-03 RX ORDER — LORAZEPAM 2 MG/1
2 TABLET ORAL EVERY 8 HOURS PRN
Qty: 270 TABLET | Refills: 0 | Status: SHIPPED | OUTPATIENT
Start: 2018-03-09 | End: 2018-06-12

## 2018-03-08 ENCOUNTER — HOSPITAL ENCOUNTER (OUTPATIENT)
Dept: PHYSICAL THERAPY | Facility: CLINIC | Age: 73
Setting detail: THERAPIES SERIES
End: 2018-03-08
Attending: FAMILY MEDICINE
Payer: MEDICARE

## 2018-03-08 PROCEDURE — 40000185 ZZHC STATISTIC PT OUTPT VISIT: Performed by: PHYSICAL THERAPIST

## 2018-03-08 PROCEDURE — 97110 THERAPEUTIC EXERCISES: CPT | Mod: GP | Performed by: PHYSICAL THERAPIST

## 2018-03-08 PROCEDURE — 97112 NEUROMUSCULAR REEDUCATION: CPT | Mod: GP | Performed by: PHYSICAL THERAPIST

## 2018-03-09 ENCOUNTER — TRANSFERRED RECORDS (OUTPATIENT)
Dept: HEALTH INFORMATION MANAGEMENT | Facility: CLINIC | Age: 73
End: 2018-03-09

## 2018-03-12 ENCOUNTER — TELEPHONE (OUTPATIENT)
Dept: FAMILY MEDICINE | Facility: CLINIC | Age: 73
End: 2018-03-12

## 2018-03-12 DIAGNOSIS — E03.9 ACQUIRED HYPOTHYROIDISM: ICD-10-CM

## 2018-03-12 DIAGNOSIS — Z12.5 SCREENING FOR PROSTATE CANCER: ICD-10-CM

## 2018-03-12 DIAGNOSIS — Z01.818 PREOP GENERAL PHYSICAL EXAM: ICD-10-CM

## 2018-03-12 DIAGNOSIS — Z01.818 PREOP GENERAL PHYSICAL EXAM: Primary | ICD-10-CM

## 2018-03-12 LAB
ERYTHROCYTE [DISTWIDTH] IN BLOOD BY AUTOMATED COUNT: 13.3 % (ref 10–15)
HCT VFR BLD AUTO: 46.7 % (ref 40–53)
HGB BLD-MCNC: 15.8 G/DL (ref 13.3–17.7)
LIPASE SERPL-CCNC: 119 U/L (ref 73–393)
MCH RBC QN AUTO: 30.7 PG (ref 26.5–33)
MCHC RBC AUTO-ENTMCNC: 33.8 G/DL (ref 31.5–36.5)
MCV RBC AUTO: 91 FL (ref 78–100)
PLATELET # BLD AUTO: 220 10E9/L (ref 150–450)
RBC # BLD AUTO: 5.14 10E12/L (ref 4.4–5.9)
WBC # BLD AUTO: 6.9 10E9/L (ref 4–11)

## 2018-03-12 PROCEDURE — 80053 COMPREHEN METABOLIC PANEL: CPT | Performed by: PHYSICIAN ASSISTANT

## 2018-03-12 PROCEDURE — 83690 ASSAY OF LIPASE: CPT | Performed by: PHYSICIAN ASSISTANT

## 2018-03-12 PROCEDURE — 85027 COMPLETE CBC AUTOMATED: CPT | Performed by: PHYSICIAN ASSISTANT

## 2018-03-12 PROCEDURE — G0103 PSA SCREENING: HCPCS | Performed by: PHYSICIAN ASSISTANT

## 2018-03-12 PROCEDURE — 82150 ASSAY OF AMYLASE: CPT | Performed by: PHYSICIAN ASSISTANT

## 2018-03-12 PROCEDURE — 36415 COLL VENOUS BLD VENIPUNCTURE: CPT | Performed by: PHYSICIAN ASSISTANT

## 2018-03-12 NOTE — TELEPHONE ENCOUNTER
Patient called back. He is talking to surgeons now about right hip and interviewing one more surgeon. None will operate without the blood work - some blood work is pending. Pancreatitis is of personal concern for patient and 6/10 upper left dull upper pain since this Friday. He is requesting a repeat in panel of blood tests to see if the condition of the pancrease has worsened. Also needs to do a PSA - hematologist was upset because he didn't have a current one. Pt is also doing outpatinet physical therapy for hip strengthening and it is helping. Pt said he will come see Dr. Lucia once he sees this last surgeon. Would like to do labs asap and be called for lab only appt. Covering provider to review.

## 2018-03-12 NOTE — TELEPHONE ENCOUNTER
Called patient back and he is complaining of dull pain in upper left abdomen.     Would like lab tests done to confirm he does not have pancreatitis in addition, would like a PSA lab test drawn.     Routing to provider for lab orders if agrees.

## 2018-03-13 ENCOUNTER — HOSPITAL ENCOUNTER (OUTPATIENT)
Dept: PHYSICAL THERAPY | Facility: CLINIC | Age: 73
Setting detail: THERAPIES SERIES
End: 2018-03-13
Attending: FAMILY MEDICINE
Payer: MEDICARE

## 2018-03-13 LAB
ALBUMIN SERPL-MCNC: 4.2 G/DL (ref 3.4–5)
ALP SERPL-CCNC: 97 U/L (ref 40–150)
ALT SERPL W P-5'-P-CCNC: 24 U/L (ref 0–70)
AMYLASE SERPL-CCNC: 89 U/L (ref 30–110)
ANION GAP SERPL CALCULATED.3IONS-SCNC: 8 MMOL/L (ref 3–14)
AST SERPL W P-5'-P-CCNC: 23 U/L (ref 0–45)
BILIRUB SERPL-MCNC: 0.8 MG/DL (ref 0.2–1.3)
BUN SERPL-MCNC: 15 MG/DL (ref 7–30)
CALCIUM SERPL-MCNC: 8.8 MG/DL (ref 8.5–10.1)
CHLORIDE SERPL-SCNC: 103 MMOL/L (ref 94–109)
CO2 SERPL-SCNC: 25 MMOL/L (ref 20–32)
CREAT SERPL-MCNC: 0.96 MG/DL (ref 0.66–1.25)
GFR SERPL CREATININE-BSD FRML MDRD: 77 ML/MIN/1.7M2
GLUCOSE SERPL-MCNC: 110 MG/DL (ref 70–99)
POTASSIUM SERPL-SCNC: 4.3 MMOL/L (ref 3.4–5.3)
PROT SERPL-MCNC: 7.4 G/DL (ref 6.8–8.8)
PSA SERPL-ACNC: 0.58 UG/L (ref 0–4)
SODIUM SERPL-SCNC: 136 MMOL/L (ref 133–144)

## 2018-03-13 PROCEDURE — 97750 PHYSICAL PERFORMANCE TEST: CPT | Mod: GP | Performed by: PHYSICAL THERAPIST

## 2018-03-13 PROCEDURE — 97110 THERAPEUTIC EXERCISES: CPT | Mod: GP | Performed by: PHYSICAL THERAPIST

## 2018-03-13 PROCEDURE — 40000185 ZZHC STATISTIC PT OUTPT VISIT: Performed by: PHYSICAL THERAPIST

## 2018-03-13 NOTE — PROGRESS NOTES
Frank Daley,    I just wanted to let you know that your lab results have been reviewed and are attached.    - Your lab results look great; everything is normal.    Please let me know if you have any questions and have a great week!    Sincerely,    Cathy Malik PA-C    [facility]  [address]  [place] [address]  [phone] (p)

## 2018-03-14 ENCOUNTER — TELEPHONE (OUTPATIENT)
Dept: FAMILY MEDICINE | Facility: CLINIC | Age: 73
End: 2018-03-14

## 2018-03-14 DIAGNOSIS — E03.9 HYPOTHYROIDISM, UNSPECIFIED TYPE: ICD-10-CM

## 2018-03-14 DIAGNOSIS — E03.9 ACQUIRED HYPOTHYROIDISM: Primary | ICD-10-CM

## 2018-03-14 PROCEDURE — 84439 ASSAY OF FREE THYROXINE: CPT | Performed by: PHYSICIAN ASSISTANT

## 2018-03-14 PROCEDURE — 36415 COLL VENOUS BLD VENIPUNCTURE: CPT | Performed by: PHYSICIAN ASSISTANT

## 2018-03-14 PROCEDURE — 84443 ASSAY THYROID STIM HORMONE: CPT | Performed by: PHYSICIAN ASSISTANT

## 2018-03-14 RX ORDER — LEVOTHYROXINE SODIUM 125 UG/1
125 TABLET ORAL DAILY
Qty: 90 TABLET | Refills: 3 | Status: SHIPPED | OUTPATIENT
Start: 2018-03-14 | End: 2018-03-15

## 2018-03-14 NOTE — TELEPHONE ENCOUNTER
Reason for Call:  Other prescription    Detailed comments:  Patient's last refill for levothyroxine was only Okayed for 30 days as he need lab work in regards to this     He was here on 03/12/2018 and had lab work done  An order for this TSH etc has been requested from Cathy Malik to be added on to his 03/12/18 lab work    Please change his prescription to 90 days as soon as you can and please have someone let the patient know.    This message was sent to you per a triage nurse    Phone Number Patient can be reached at: Home number on file [phone] (home)    Best Time: anytime    Can we leave a detailed message on this number? YES    Call taken on 3/14/2018 at 11:52 AM by ILENE DELGADILLO

## 2018-03-14 NOTE — TELEPHONE ENCOUNTER
Informed pt order was placed. Asked if writer could help him schedule. Pt reports he was told by a nurse today he didn't need to schedule appointment that lab will be able to have lab add order to blood draw from yesterday. Writer is unable to locate message. Talked to Rebeka at lab and was informed TSH was already added.

## 2018-03-14 NOTE — TELEPHONE ENCOUNTER
Reason for Call: Request for an order or referral:    Order or referral being requested:     Lab work for thyroid    TSH etc    Date needed: as soon as possible    Has the patient been seen by the PCP for this problem? YES    Additional comments:    Patient needs this added on to his 03/12/2018 lab work so his prescription can be filled for 90 days instead of the 30 days as was prescribed        Phone number Patient can be reached at:  Home number on file [phone] (home)    Best Time:  Anytime            Can we leave a detailed message on this number?  YES    Call taken on 3/14/2018 at 11:38 AM by ILENE DELGADILLO

## 2018-03-15 ENCOUNTER — TELEPHONE (OUTPATIENT)
Dept: FAMILY MEDICINE | Facility: CLINIC | Age: 73
End: 2018-03-15

## 2018-03-15 DIAGNOSIS — E03.9 HYPOTHYROIDISM, UNSPECIFIED TYPE: ICD-10-CM

## 2018-03-15 LAB
T4 FREE SERPL-MCNC: 1.36 NG/DL (ref 0.76–1.46)
TSH SERPL DL<=0.005 MIU/L-ACNC: 6.94 MU/L (ref 0.4–4)

## 2018-03-15 RX ORDER — LEVOTHYROXINE SODIUM 125 UG/1
125 TABLET ORAL DAILY
Qty: 90 TABLET | Refills: 3 | Status: ON HOLD | OUTPATIENT
Start: 2018-03-15 | End: 2018-08-09

## 2018-03-15 NOTE — TELEPHONE ENCOUNTER
Pt's levothyroxine was sent to the incorrect pharmacy. Please re-send to the [facility] in [place]. Pharmacy pended.

## 2018-03-19 NOTE — PROGRESS NOTES
Frank Daley,    I just wanted to let you know that your lab results have been reviewed and are attached.    - Your TSH was a little HIGH but your T4 was normal so we don't need to change your thyroid medication dose unless you've been having problems with fatigue, cold intolerance or constipation.  If you are having any of these symptoms, let me know and I will increase your dose slightly.    Please let me know if you have any questions and have a great week!    Sincerely,    Cathy Malik PA-C    [facility]  [address]  [place] [address] [phone] (p)

## 2018-03-21 ENCOUNTER — TRANSFERRED RECORDS (OUTPATIENT)
Dept: HEALTH INFORMATION MANAGEMENT | Facility: CLINIC | Age: 73
End: 2018-03-21

## 2018-03-21 DIAGNOSIS — G89.29 CHRONIC LEFT SHOULDER PAIN: ICD-10-CM

## 2018-03-21 DIAGNOSIS — M25.512 CHRONIC LEFT SHOULDER PAIN: ICD-10-CM

## 2018-03-21 RX ORDER — OXYCODONE HYDROCHLORIDE 5 MG/1
5 TABLET ORAL 2 TIMES DAILY
Qty: 60 TABLET | Refills: 0 | Status: SHIPPED | OUTPATIENT
Start: 2018-03-21 | End: 2018-04-19

## 2018-03-21 NOTE — TELEPHONE ENCOUNTER
Also insistys you call and tell [facility] radiology to send tomorrows scan to [facility] asap-feels it is our job to do thios

## 2018-03-21 NOTE — TELEPHONE ENCOUNTER
Reason for Call:  Medication or medication refill:    Do you use a [facility]?  Name of the pharmacy and phone number for the current request:  Will pu [facility]    Name of the medication requested: oxyCODONE IR (ROXICODONE) 5 MG tablet    Other request: call when ready to pu at [facility]    Can we leave a detailed message on this number? YES-states is emergency asked for stat    Phone number patient can be reached at: Home number on file [phone] (home)    Best Time:     Call taken on 3/21/2018 at 1:24 PM by XOCHITL BRAR

## 2018-03-21 NOTE — TELEPHONE ENCOUNTER
Controlled Substance Refill Request for Oxycodone IR 5mg  Problem List Complete:  No     PROVIDER TO CONSIDER COMPLETION OF PROBLEM LIST AND OVERVIEW/CONTROLLED SUBSTANCE AGREEMENT    Last Written Prescription Date:  2-14-18  Last Fill Quantity: 60,   # refills: 0    [place] [facility] [place] 2-14-18    Future Office visit:   Next 5 appointments (look out 90 days)     Mar 27, 2018 11:30 AM CDT   Office Visit with Enrique Lucia MD   [facility]    [address] [place] [id number] [phone]                  Controlled substance agreement on file: no     Processing:  Patient will  in clinic   checked in past 6 months?  Yes 3-21-18 ambien and lorazepam from outside providers

## 2018-03-22 ENCOUNTER — TRANSFERRED RECORDS (OUTPATIENT)
Dept: HEALTH INFORMATION MANAGEMENT | Facility: CLINIC | Age: 73
End: 2018-03-22

## 2018-03-22 ENCOUNTER — HOSPITAL ENCOUNTER (OUTPATIENT)
Dept: PHYSICAL THERAPY | Facility: CLINIC | Age: 73
Setting detail: THERAPIES SERIES
End: 2018-03-22
Attending: FAMILY MEDICINE
Payer: MEDICARE

## 2018-03-22 PROCEDURE — 40000185 ZZHC STATISTIC PT OUTPT VISIT: Performed by: PHYSICAL THERAPIST

## 2018-03-22 PROCEDURE — 97110 THERAPEUTIC EXERCISES: CPT | Mod: GP | Performed by: PHYSICAL THERAPIST

## 2018-03-23 ENCOUNTER — TELEPHONE (OUTPATIENT)
Dept: FAMILY MEDICINE | Facility: CLINIC | Age: 73
End: 2018-03-23

## 2018-03-23 ENCOUNTER — ANTICOAGULATION THERAPY VISIT (OUTPATIENT)
Dept: NURSING | Facility: CLINIC | Age: 73
End: 2018-03-23
Payer: COMMERCIAL

## 2018-03-23 DIAGNOSIS — I82.4Z2 ACUTE DEEP VEIN THROMBOSIS (DVT) OF DISTAL VEIN OF LEFT LOWER EXTREMITY (H): ICD-10-CM

## 2018-03-23 LAB — INR POINT OF CARE: 2.1 (ref 0.86–1.14)

## 2018-03-23 PROCEDURE — 36416 COLLJ CAPILLARY BLOOD SPEC: CPT

## 2018-03-23 PROCEDURE — 85610 PROTHROMBIN TIME: CPT | Mod: QW

## 2018-03-23 PROCEDURE — 99207 ZZC NO CHARGE NURSE ONLY: CPT

## 2018-03-23 NOTE — TELEPHONE ENCOUNTER
Reason for Call:  Other call back    Detailed comments: Pt came into the clinic this afternoon and would like a call back from Cathy Malik or a member of her team in regards to his recent thyroid labs and has a couple of other questions to go over. Please give pt a call back ASAP. Thank you.    Phone Number Patient can be reached at: Home number on file [phone] (home)    Best Time:     Can we leave a detailed message on this number? YES    Call taken on 3/23/2018 at 2:12 PM by Maya Ortiz

## 2018-03-23 NOTE — TELEPHONE ENCOUNTER
Called patient back with the results with the results from his recent TSH test.     Patient is going to follow up with Dr. Lucia on Tuesday.

## 2018-03-23 NOTE — PROGRESS NOTES
ANTICOAGULATION FOLLOW-UP CLINIC VISIT    Patient Name:  Edi Villafana  Date:  3/23/2018  Contact Type:  Face to Face    SUBJECTIVE:     Patient Findings     Positives No Problem Findings           OBJECTIVE    INR Protime   Date Value Ref Range Status   03/23/2018 2.1 (A) 0.86 - 1.14 Final       ASSESSMENT / PLAN  INR assessment THER    Recheck INR In: 3 WEEKS    INR Location Clinic      Anticoagulation Summary as of 3/23/2018     INR goal 2.0-3.0   Today's INR 2.1   Maintenance plan 3.75 mg (7.5 mg x 0.5) on Fri; 7.5 mg (7.5 mg x 1) all other days   Full instructions 3.75 mg on Fri; 7.5 mg all other days   Weekly total 48.75 mg   No change documented Lacey Phan RN   Plan last modified Lacey Phan RN (2/16/2018)   Next INR check 4/13/2018   Priority INR   Target end date     Indications   Acute deep vein thrombosis (DVT) of distal vein of left lower extremity (H) [I82.4Z2]         Anticoagulation Episode Summary     INR check location Clinic Lab    Preferred lab     Send INR reminders to [facility] INR/PROTIME    Comments             See the Encounter Report to view Anticoagulation Flowsheet and Dosing Calendar (Go to Encounters tab in chart review, and find the Anticoagulation Therapy Visit)        Lacey Phan RN

## 2018-03-23 NOTE — MR AVS SNAPSHOT
Edi Villafana   3/23/2018 2:00 PM   Anticoagulation Therapy Visit    Description:  73 year old male   Provider:  [facility]   Department:  Bx Nurse           INR as of 3/23/2018     Today's INR 2.1      Anticoagulation Summary as of 3/23/2018     INR goal 2.0-3.0   Today's INR 2.1   Full instructions 3.75 mg on Fri; 7.5 mg all other days   Next INR check 4/13/2018    Indications   Acute deep vein thrombosis (DVT) of distal vein of left lower extremity (H) [I82.4Z2]         Your next Anticoagulation Clinic appointment(s)     Apr 13, 2018  1:30 PM CDT   Anticoagulation Visit with  ANTICOAGULATION CLINIC   [facility]    [address] [place] [id number] [phone]              Contact Numbers     [facility]  Please call  [phone] to cancel and/or reschedule your appointment   The direct line to the anticoagulant nurse is [phone] on Monday, Wednesday, and Friday. On Thursday, the anticoagulant nurse can be reached directly at [phone].         March 2018 Details    Sun Mon Tue Wed Thu Fri Sat         1               2               3                 4               5               6               7               8               9               10                 11               12               13               14               15               16               17                 18               19               20               21               22               23      3.75 mg   See details      24      7.5 mg           25      7.5 mg         26      7.5 mg         27      7.5 mg         28      7.5 mg         29      7.5 mg         30      3.75 mg         31      7.5 mg          Date Details   03/23 This INR check               How to take your warfarin dose     To take:  3.75 mg Take 0.5 of a 7.5 mg tablet.    To take:  7.5 mg Take 1 of the 7.5 mg tablets.            April 2018 Details    Sun Mon Tue Wed Thu Fri Sat     1      7.5 mg         2      7.5 mg         3      7.5 mg         4      7.5 mg         5      7.5 mg         6      3.75 mg         7      7.5 mg           8      7.5 mg         9      7.5 mg         10      7.5 mg         11      7.5 mg         12      7.5 mg         13            14                 15               16               17               18               19               20               21                 22               23               24               25               26               27               28                 29               30                     Date Details   No additional details    Date of next INR:  4/13/2018         How to take your warfarin dose     To take:  3.75 mg Take 0.5 of a 7.5 mg tablet.    To take:  7.5 mg Take 1 of the 7.5 mg tablets.

## 2018-03-27 ENCOUNTER — OFFICE VISIT (OUTPATIENT)
Dept: FAMILY MEDICINE | Facility: CLINIC | Age: 73
End: 2018-03-27
Payer: COMMERCIAL

## 2018-03-27 VITALS
WEIGHT: 197.5 LBS | HEIGHT: 73 IN | HEART RATE: 72 BPM | BODY MASS INDEX: 26.17 KG/M2 | OXYGEN SATURATION: 97 % | SYSTOLIC BLOOD PRESSURE: 128 MMHG | DIASTOLIC BLOOD PRESSURE: 72 MMHG

## 2018-03-27 DIAGNOSIS — E78.5 HYPERLIPIDEMIA LDL GOAL <100: Primary | ICD-10-CM

## 2018-03-27 DIAGNOSIS — F41.9 ANXIETY: ICD-10-CM

## 2018-03-27 DIAGNOSIS — G89.29 CHRONIC LEFT SHOULDER PAIN: ICD-10-CM

## 2018-03-27 DIAGNOSIS — M25.512 CHRONIC LEFT SHOULDER PAIN: ICD-10-CM

## 2018-03-27 DIAGNOSIS — I82.4Z2 ACUTE DEEP VEIN THROMBOSIS (DVT) OF DISTAL VEIN OF LEFT LOWER EXTREMITY (H): ICD-10-CM

## 2018-03-27 DIAGNOSIS — E03.9 ACQUIRED HYPOTHYROIDISM: ICD-10-CM

## 2018-03-27 DIAGNOSIS — I10 HYPERTENSION GOAL BP (BLOOD PRESSURE) < 140/90: ICD-10-CM

## 2018-03-27 DIAGNOSIS — F41.8 DEPRESSION WITH ANXIETY: ICD-10-CM

## 2018-03-27 LAB
ALBUMIN UR-MCNC: NEGATIVE MG/DL
APPEARANCE UR: CLEAR
BASOPHILS # BLD AUTO: 0 10E9/L (ref 0–0.2)
BASOPHILS NFR BLD AUTO: 0.3 %
BILIRUB UR QL STRIP: NEGATIVE
COLOR UR AUTO: YELLOW
DIFFERENTIAL METHOD BLD: NORMAL
EOSINOPHIL # BLD AUTO: 0.1 10E9/L (ref 0–0.7)
EOSINOPHIL NFR BLD AUTO: 1.9 %
ERYTHROCYTE [DISTWIDTH] IN BLOOD BY AUTOMATED COUNT: 13.3 % (ref 10–15)
GLUCOSE UR STRIP-MCNC: NEGATIVE MG/DL
HCT VFR BLD AUTO: 46 % (ref 40–53)
HGB BLD-MCNC: 15.8 G/DL (ref 13.3–17.7)
HGB UR QL STRIP: NEGATIVE
KETONES UR STRIP-MCNC: NEGATIVE MG/DL
LEUKOCYTE ESTERASE UR QL STRIP: NEGATIVE
LYMPHOCYTES # BLD AUTO: 1.6 10E9/L (ref 0.8–5.3)
LYMPHOCYTES NFR BLD AUTO: 21.8 %
MCH RBC QN AUTO: 30.6 PG (ref 26.5–33)
MCHC RBC AUTO-ENTMCNC: 34.3 G/DL (ref 31.5–36.5)
MCV RBC AUTO: 89 FL (ref 78–100)
MONOCYTES # BLD AUTO: 0.6 10E9/L (ref 0–1.3)
MONOCYTES NFR BLD AUTO: 7.6 %
NEUTROPHILS # BLD AUTO: 5.2 10E9/L (ref 1.6–8.3)
NEUTROPHILS NFR BLD AUTO: 68.4 %
NITRATE UR QL: NEGATIVE
PH UR STRIP: 7 PH (ref 5–7)
PLATELET # BLD AUTO: 232 10E9/L (ref 150–450)
RBC # BLD AUTO: 5.17 10E12/L (ref 4.4–5.9)
RBC #/AREA URNS AUTO: NORMAL /HPF
SOURCE: NORMAL
SP GR UR STRIP: 1.01 (ref 1–1.03)
UROBILINOGEN UR STRIP-ACNC: 0.2 EU/DL (ref 0.2–1)
WBC # BLD AUTO: 7.5 10E9/L (ref 4–11)
WBC #/AREA URNS AUTO: NORMAL /HPF

## 2018-03-27 PROCEDURE — 99214 OFFICE O/P EST MOD 30 MIN: CPT | Performed by: FAMILY MEDICINE

## 2018-03-27 PROCEDURE — 85025 COMPLETE CBC W/AUTO DIFF WBC: CPT | Performed by: FAMILY MEDICINE

## 2018-03-27 PROCEDURE — 81001 URINALYSIS AUTO W/SCOPE: CPT | Performed by: FAMILY MEDICINE

## 2018-03-27 PROCEDURE — 36415 COLL VENOUS BLD VENIPUNCTURE: CPT | Performed by: FAMILY MEDICINE

## 2018-03-27 PROCEDURE — 80061 LIPID PANEL: CPT | Performed by: FAMILY MEDICINE

## 2018-03-27 PROCEDURE — 80053 COMPREHEN METABOLIC PANEL: CPT | Performed by: FAMILY MEDICINE

## 2018-03-27 NOTE — NURSING NOTE
"Chief Complaint   Patient presents with     Patient Request       Initial /72 (BP Location: Left arm, Patient Position: Chair, Cuff Size: Adult Regular)  Pulse 72  Ht 6' 1\" (1.854 m)  Wt 197 lb 8 oz (89.6 kg)  SpO2 97%  BMI 26.06 kg/m2 Estimated body mass index is 26.06 kg/(m^2) as calculated from the following:    Height as of this encounter: 6' 1\" (1.854 m).    Weight as of this encounter: 197 lb 8 oz (89.6 kg).  Medication Reconciliation: complete     Jennie Jarquin MA     "

## 2018-03-27 NOTE — PROGRESS NOTES
SUBJECTIVE:   Edi Villafana is a 73 year old male who presents to clinic today for the following health issues:    Patient request : Consult - pt has some questions that he would like to discuss with provider     Hyperlipidemia Follow-Up      Rate your low fat/cholesterol diet?: good    Taking statin?  Yes, no muscle aches from statin    Other lipid medications/supplements?:  none    Hypertension Follow-up      Outpatient blood pressures are not being checked.    Low Salt Diet: no added salt    Depression and Anxiety Follow-Up    Status since last visit: Worsened living situation is contributing drastically to his level of stress as he was just kicked out of his rental housing spot and has no place else to go at present.    Other associated symptoms:None    Complicating factors:     Significant life event: Yes-see above     Current substance abuse: None    PHQ-9 6/13/2016 6/13/2016 12/13/2017   Total Score - 1 1   Q9: Suicide Ideation Not at all Not at all Not at all     PRINCESS-7 SCORE 6/13/2016 7/12/2016 12/13/2017   Total Score 9 4 3       PHQ-9  English  PHQ-9   Any Language  PRINCESS-7  Suicide Assessment Five-step Evaluation and Treatment (SAFE-T)  Hypothyroidism Follow-up      Since last visit, patient describes the following symptoms: Weight stable, no hair loss, no skin changes, no constipation, no loose stools      Amount of exercise or physical activity: 2-3 days/week for an average of 15-30 minutes    Problems taking medications regularly: No    Medication side effects: none    Diet: low salt      Musculoskeletal problem/pain      Duration: Many months    Description  Location: Left shoulder and right hip    Intensity:  moderate    Accompanying signs and symptoms: none    History  Previous similar problem: YES  Previous evaluation:  x-ray and orthopedic evaluation    Precipitating or alleviating factors:  Trauma or overuse: no   Aggravating factors include: none    Therapies tried and outcome: Physical therapy  and cortisone injections which have all helped          Problem list and histories reviewed & adjusted, as indicated.  Additional history: as documented    Patient Active Problem List   Diagnosis     Hypothyroidism     Hyperlipidemia LDL goal <100     Persistent insomnia     Hypertension goal BP (blood pressure) < 140/90     Anxiety     Hypotestosteronism     Trochanteric bursitis     Depression with anxiety     Left shoulder pain     Pancreatitis     Hepatitis     Physical deconditioning     Alcoholism (H)     Hyponatremia     Alcohol-induced acute pancreatitis, unspecified complication status     Alcoholic hepatitis without ascites     Chronic left shoulder pain     Advanced directives, counseling/discussion     Pain of left calf     Acute deep vein thrombosis (DVT) of distal vein of left lower extremity (H)     Anticoagulation management encounter     Hip pain, right     Past Surgical History:   Procedure Laterality Date     ABDOMEN SURGERY  1973    large benign tumor removed with thrombophlebitis post op     APPENDECTOMY  1960     CATARACT IOL, RT/LT      bilateral     EYE SURGERY  [id number], 2007    eyelid surgery, 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, trabeculoplasty       Social History   Substance Use Topics     Smoking status: Never Smoker     Smokeless tobacco: Never Used     Alcohol use Yes     Family History   Problem Relation Age of Onset     Hypertension Mother      Lipids Mother      HEART DISEASE Mother      Psychotic Disorder Mother      severe depression     HEART DISEASE Father      CANCER Sister      ovarian     Colon Cancer Paternal Grandfather            Reviewed and updated as needed this visit by clinical staff  Tobacco  Allergies  Meds  Med Hx  Surg Hx  Fam Hx  Soc Hx      Reviewed and updated as needed this visit by Provider         ROS:  Constitutional, HEENT, cardiovascular, pulmonary, gi and gu systems are negative, except as otherwise noted.    OBJECTIVE:                                    "                 /72 (BP Location: Left arm, Patient Position: Chair, Cuff Size: Adult Regular)  Pulse 72  Ht 6' 1\" (1.854 m)  Wt 197 lb 8 oz (89.6 kg)  SpO2 97%  BMI 26.06 kg/m2  Body mass index is 26.06 kg/(m^2).  GENERAL APPEARANCE: healthy, alert and no distress         ASSESSMENT/PLAN:                                                        ICD-10-CM    1. Hyperlipidemia LDL goal <100 E78.5 Lipid Profile   2. Acquired hypothyroidism E03.9    3. Hypertension goal BP (blood pressure) < 140/90 I10 Comprehensive metabolic panel     CBC with platelets differential     UA with Microscopic   4. Anxiety F41.9    5. Depression with anxiety F41.8    6. Chronic left shoulder pain M25.512     G89.29    7. Acute deep vein thrombosis (DVT) of distal vein of left lower extremity (H) I82.4Z2        Patient Instructions   The patient is extraordinarily stressed by his living situation.  He was kicked out of his rental unit.  He does not have any place to go.  He is working with [facility] to see if they can help find him some place to stay.  He does have places to stay after his hip surgery for replacement on the right is done in the future.  However in between now and then he does not have any place to go.  Again his stress level is markedly increased because of all of this.  I had a 30 minute discussion with the patient about this and his other medical problems.    He has been to see hematology about his DVT in his leg.  Ultrasound recently showed that that had improved some.  Medicare did not want to pay for further testing and to etiology and the hematologist was in agreement with that.  He has an appointment next week with hematology.  Ultimately that person will be the person who clears him for surgery or not.  It looks like they in essence have them will cover him with Lovenox if he is still on his Coumadin when surgery occurs.  He is    He is also continuing to have left shoulder pain.  He sees " .  He had a steroid injection in the left shoulder at the end of January.  That did help for a while.  However he is back having chronic pain now.    We will check blood tests as noted.  I will discuss the patient's case with our care coordinator to see if there is anything we can do with assistance in terms of finding him living arrangements.      Enrique Lucia MD  [facility]

## 2018-03-27 NOTE — MR AVS SNAPSHOT
After Visit Summary   3/27/2018    Edi Villafana    MRN: [id number]           Patient Information     Date Of Birth          1945        Visit Information        Provider Department      3/27/2018 11:30 AM Enrique Lucia MD [facility]        Today's Diagnoses     Hyperlipidemia LDL goal <100    -  1    Acquired hypothyroidism        Hypertension goal BP (blood pressure) < 140/90        Anxiety        Depression with anxiety        Chronic left shoulder pain        Acute deep vein thrombosis (DVT) of distal vein of left lower extremity (H)          Care Instructions    The patient is extraordinarily stressed by his living situation.  He was kicked out of his rental unit.  He does not have any place to go.  He is working with [facility] to see if they can help find him some place to stay.  He does have places to stay after his hip surgery for replacement on the right is done in the future.  However in between now and then he does not have any place to go.  Again his stress level is markedly increased because of all of this.  I had a 30 minute discussion with the patient about this and his other medical problems.    He has been to see hematology about his DVT in his leg.  Ultrasound recently showed that that had improved some.  Medicare did not want to pay for further testing and to etiology and the hematologist was in agreement with that.  He has an appointment next week with hematology.  Ultimately that person will be the person who clears him for surgery or not.  It looks like they in essence have them will cover him with Lovenox if he is still on his Coumadin when surgery occurs.  He is    He is also continuing to have left shoulder pain.  He sees .  He had a steroid injection in the left shoulder at the end of January.  That did help for a while.  However he is back having chronic pain now.    We will check blood tests as noted.  I  will discuss the patient's case with our care coordinator to see if there is anything we can do with assistance in terms of finding him living arrangements.          Follow-ups after your visit        Your next 10 appointments already scheduled     Apr 09, 2018  3:00 PM CDT   Neuro Treatment with Maine Garland, PT   [facility] ([facility]    [address] [place] [address]  [address]  [facility] [address]   [phone]            Apr 13, 2018  1:30 PM CDT   Anticoagulation Visit with [facility]   [facility]    [address]  [facility] [id number] [phone]              Who to contact     If you have questions or need follow up information about today's clinic visit or your schedule please contact [facility] directly at [phone].  Normal or non-critical lab and imaging results will be communicated to you by idiohart, letter or phone within 4 business days after the clinic has received the results. If you do not hear from us within 7 days, please contact the clinic through idiohart or phone. If you have a critical or abnormal lab result, we will notify you by phone as soon as possible.  Submit refill requests through Jotky or call your pharmacy and they will forward the refill request to us. Please allow 3 business days for your refill to be completed.          Additional Information About Your Visit        idiohart Information     Jotky gives you secure access to your electronic health record. If you see a primary care provider, you can also send messages to your care team and make appointments. If you have questions, please call your primary care clinic.  If you do not have a primary care provider, please call [phone] and they will assist you.        Care EveryWhere ID     This is your Care EveryWhere ID. This could be used by other  "organizations to access your [facility] medical records  [id number]        Your Vitals Were     Pulse Height Pulse Oximetry BMI (Body Mass Index)          72 6' 1\" (1.854 m) 97% 26.06 kg/m2         Blood Pressure from Last 3 Encounters:   03/27/18 128/72   02/14/18 126/74   12/13/17 120/78    Weight from Last 3 Encounters:   03/27/18 197 lb 8 oz (89.6 kg)   02/14/18 198 lb (89.8 kg)   12/13/17 206 lb (93.4 kg)              We Performed the Following     CBC with platelets differential     Comprehensive metabolic panel     Lipid Profile     UA with Microscopic          Today's Medication Changes          These changes are accurate as of 3/27/18  1:07 PM.  If you have any questions, ask your nurse or doctor.               These medicines have changed or have updated prescriptions.        Dose/Directions    warfarin 7.5 MG tablet   Commonly known as:  COUMADIN   This may have changed:  additional instructions   Used for:  Acute deep vein thrombosis (DVT) of distal vein of left lower extremity (H)        Dose:  7.5 mg   Take 1 tablet (7.5 mg) by mouth daily Recheck INR 12/11   Quantity:  90 tablet   Refills:  1                Primary Care Provider Office Phone # Fax #    Enrique Lucia [phone]       [address] [facility] [address]        Equal Access to Services     CONSUELO ANDERSON AH: Hadii jose ramon ku hadasho Soomaali, waaxda luqadaha, qaybta kaalmada adeegyada, abebe alcantar. So [facility] [phone].    ATENCIÓN: Si habla español, tiene a kidd disposición servicios gratuitos de asistencia lingüística. Llame al [phone].    We comply with applicable federal civil rights laws and [place] laws. We do not discriminate on the basis of race, color, national origin, age, disability, sex, sexual orientation, or gender identity.            Thank you!     Thank you for choosing [facility]  for your care. Our goal is always to provide you with " excellent care. Hearing back from our patients is one way we can continue to improve our services. Please take a few minutes to complete the written survey that you may receive in the mail after your visit with us. Thank you!             Your Updated Medication List - Protect others around you: Learn how to safely use, store and throw away your medicines at www.disposemymeds.org.          This list is accurate as of 3/27/18  1:07 PM.  Always use your most recent med list.                   Brand Name Dispense Instructions for use Diagnosis    atorvastatin 40 MG tablet    LIPITOR    90 tablet    TAKE 1 TABLET (40 MG) BY MOUTH DAILY    Hyperlipidemia LDL goal <100       ICY HOT EXTRA STRENGTH 10-30 % Crea      Apply topically every 4 hours as needed        levothyroxine 125 MCG tablet    SYNTHROID/LEVOTHROID    90 tablet    Take 1 tablet (125 mcg) by mouth daily    Hypothyroidism, unspecified type       LORazepam 2 MG tablet    ATIVAN    270 tablet    Take 1 tablet (2 mg) by mouth every 8 hours as needed for anxiety    Anxiety       losartan 50 MG tablet    COZAAR    90 tablet    Take 0.5 tablets (25 mg) by mouth daily    Essential hypertension with goal blood pressure less than 140/90       MULTIVITAMIN PO      Take 1 tablet by mouth daily        oxyCODONE IR 5 MG tablet    ROXICODONE    60 tablet    Take 1 tablet (5 mg) by mouth 2 times daily    Chronic left shoulder pain       vitamin D3 2000 UNITS Caps      Take 1 capsule by mouth daily        warfarin 7.5 MG tablet    COUMADIN    90 tablet    Take 1 tablet (7.5 mg) by mouth daily Recheck INR 12/11    Acute deep vein thrombosis (DVT) of distal vein of left lower extremity (H)       zolpidem 10 MG tablet    AMBIEN    90 tablet    Take 1 tablet (10 mg) by mouth nightly as needed for sleep    Persistent disorder of initiating or maintaining sleep

## 2018-03-27 NOTE — PATIENT INSTRUCTIONS
The patient is extraordinarily stressed by his living situation.  He was kicked out of his rental unit.  He does not have any place to go.  He is working with [facility] to see if they can help find him some place to stay.  He does have places to stay after his hip surgery for replacement on the right is done in the future.  However in between now and then he does not have any place to go.  Again his stress level is markedly increased because of all of this.  I had a 30 minute discussion with the patient about this and his other medical problems.    He has been to see hematology about his DVT in his leg.  Ultrasound recently showed that that had improved some.  Medicare did not want to pay for further testing and to etiology and the hematologist was in agreement with that.  He has an appointment next week with hematology.  Ultimately that person will be the person who clears him for surgery or not.  It looks like they in essence have them will cover him with Lovenox if he is still on his Coumadin when surgery occurs.  He is    He is also continuing to have left shoulder pain.  He sees .  He had a steroid injection in the left shoulder at the end of January.  That did help for a while.  However he is back having chronic pain now.    We will check blood tests as noted.  I will discuss the patient's case with our care coordinator to see if there is anything we can do with assistance in terms of finding him living arrangements.

## 2018-03-28 LAB
ALBUMIN SERPL-MCNC: 4.1 G/DL (ref 3.4–5)
ALP SERPL-CCNC: 77 U/L (ref 40–150)
ALT SERPL W P-5'-P-CCNC: 23 U/L (ref 0–70)
ANION GAP SERPL CALCULATED.3IONS-SCNC: 7 MMOL/L (ref 3–14)
AST SERPL W P-5'-P-CCNC: 22 U/L (ref 0–45)
BILIRUB SERPL-MCNC: 0.6 MG/DL (ref 0.2–1.3)
BUN SERPL-MCNC: 12 MG/DL (ref 7–30)
CALCIUM SERPL-MCNC: 8.8 MG/DL (ref 8.5–10.1)
CHLORIDE SERPL-SCNC: 103 MMOL/L (ref 94–109)
CHOLEST SERPL-MCNC: 130 MG/DL
CO2 SERPL-SCNC: 28 MMOL/L (ref 20–32)
CREAT SERPL-MCNC: 0.97 MG/DL (ref 0.66–1.25)
GFR SERPL CREATININE-BSD FRML MDRD: 76 ML/MIN/1.7M2
GLUCOSE SERPL-MCNC: 86 MG/DL (ref 70–99)
HDLC SERPL-MCNC: 40 MG/DL
LDLC SERPL CALC-MCNC: 67 MG/DL
NONHDLC SERPL-MCNC: 90 MG/DL
POTASSIUM SERPL-SCNC: 4.3 MMOL/L (ref 3.4–5.3)
PROT SERPL-MCNC: 6.9 G/DL (ref 6.8–8.8)
SODIUM SERPL-SCNC: 138 MMOL/L (ref 133–144)
TRIGL SERPL-MCNC: 116 MG/DL

## 2018-04-05 DIAGNOSIS — I10 ESSENTIAL HYPERTENSION WITH GOAL BLOOD PRESSURE LESS THAN 140/90: ICD-10-CM

## 2018-04-05 RX ORDER — LOSARTAN POTASSIUM 100 MG/1
TABLET ORAL
Qty: 30 TABLET | Refills: 3 | Status: SHIPPED | OUTPATIENT
Start: 2018-04-05 | End: 2018-05-19

## 2018-04-05 NOTE — TELEPHONE ENCOUNTER
Routing refill request to provider for review/approval because:  Pharmacy requested different dose than medication on list. Pt reports he takes 25 mg daily. He would prefer higher dose 50 mg or 100 mg tablet. States he is able to cut tablets in 1/2 (50 mg tab) or 1/4 (100 mg tab) as medication that will save him some money. Please advice .

## 2018-04-05 NOTE — TELEPHONE ENCOUNTER
"Requested Prescriptions   Pending Prescriptions Disp Refills     losartan (COZAAR) 100 MG tablet [Pharmacy Med Name: LOSARTAN POTASSIUM 100 MG TAB]  Last Written Prescription Date:  7/12/2016  Last Fill Quantity: 90,  # refills: 1   Last office visit: 3/27/2018 with prescribing provider:  Dr Lucia   Future Office Visit:     30 tablet 6     Sig: TAKE ONE TABLET BY MOUTH ONE TIME DAILY    Angiotensin-II Receptors Passed    4/5/2018 12:02 PM       Passed - Blood pressure under 140/90 in past 12 months    BP Readings from Last 3 Encounters:   03/27/18 128/72   02/14/18 126/74   12/13/17 120/78                Passed - Recent (12 mo) or future (30 days) visit within the authorizing provider's specialty    Patient had office visit in the last 12 months or has a visit in the next 30 days with authorizing provider or within the authorizing provider's specialty.  See \"Patient Info\" tab in inbasket, or \"Choose Columns\" in Meds & Orders section of the refill encounter.           Passed - Patient is age 18 or older       Passed - Normal serum creatinine on file in past 12 months    Recent Labs   Lab Test  03/27/18   1208   CR  0.97            Passed - Normal serum potassium on file in past 12 months    Recent Labs   Lab Test  03/27/18   1208   POTASSIUM  4.3                      "

## 2018-04-09 ENCOUNTER — HOSPITAL ENCOUNTER (OUTPATIENT)
Dept: PHYSICAL THERAPY | Facility: CLINIC | Age: 73
Setting detail: THERAPIES SERIES
End: 2018-04-09
Attending: FAMILY MEDICINE
Payer: MEDICARE

## 2018-04-09 PROCEDURE — 40000719 ZZHC STATISTIC PT DEPARTMENT NEURO VISIT: Performed by: PHYSICAL THERAPIST

## 2018-04-09 PROCEDURE — 97110 THERAPEUTIC EXERCISES: CPT | Mod: GP | Performed by: PHYSICAL THERAPIST

## 2018-04-13 ENCOUNTER — ANTICOAGULATION THERAPY VISIT (OUTPATIENT)
Dept: NURSING | Facility: CLINIC | Age: 73
End: 2018-04-13
Payer: COMMERCIAL

## 2018-04-13 DIAGNOSIS — I82.4Z2 ACUTE DEEP VEIN THROMBOSIS (DVT) OF DISTAL VEIN OF LEFT LOWER EXTREMITY (H): ICD-10-CM

## 2018-04-13 LAB — INR POINT OF CARE: 4.1 (ref 0.86–1.14)

## 2018-04-13 PROCEDURE — 85610 PROTHROMBIN TIME: CPT | Mod: QW

## 2018-04-13 PROCEDURE — 99207 ZZC NO CHARGE NURSE ONLY: CPT

## 2018-04-13 PROCEDURE — 36416 COLLJ CAPILLARY BLOOD SPEC: CPT

## 2018-04-13 NOTE — MR AVS SNAPSHOT
Edi Villafana   4/13/2018 1:30 PM   Anticoagulation Therapy Visit    Description:  73 year old male   Provider:  [facility]   Department:  Bx Nurse           INR as of 4/13/2018     Today's INR 4.1!      Anticoagulation Summary as of 4/13/2018     INR goal 2.0-3.0   Today's INR 4.1!   Full instructions 4/13: Hold; 4/14: 3.75 mg; Otherwise 3.75 mg on Fri; 7.5 mg all other days   Next INR check 4/20/2018    Indications   Acute deep vein thrombosis (DVT) of distal vein of left lower extremity (H) [I82.4Z2]         Your next Anticoagulation Clinic appointment(s)     Apr 20, 2018  2:30 PM CDT   Anticoagulation Visit with  ANTICOAGULATION CLINIC   [facility]    [address] [place] [id number] [phone]              Contact Numbers     [facility]  Please call  [phone] to cancel and/or reschedule your appointment   The direct line to the anticoagulant nurse is [phone] on Monday, Wednesday, and Friday. On Thursday, the anticoagulant nurse can be reached directly at [phone].         April 2018 Details    Sun Mon Tue Wed Thu Fri Sat     1               2               3               4               5               6               7                 8               9               10               11               12               13      Hold   See details      14      3.75 mg           15      7.5 mg         16      7.5 mg         17      7.5 mg         18      7.5 mg         19      7.5 mg         20            21                 22               23               24               25               26               27               28                 29               30                     Date Details   04/13 This INR check       Date of next INR:  4/20/2018         How to take your warfarin dose     To take:  3.75 mg Take 0.5 of a 7.5 mg tablet.    To take:  7.5 mg Take 1  of the 7.5 mg tablets.    Hold Do not take your warfarin dose. See the Details table to the right for additional instructions.

## 2018-04-13 NOTE — PROGRESS NOTES
ANTICOAGULATION FOLLOW-UP CLINIC VISIT    Patient Name:  Edi Villafana  Date:  4/13/2018  Contact Type:  Face to Face    SUBJECTIVE:     Patient Findings     Positives Other complaints (pain in rt shoulder and hip)           OBJECTIVE    INR Protime   Date Value Ref Range Status   04/13/2018 4.1 (A) 0.86 - 1.14 Final       ASSESSMENT / PLAN  INR assessment SUPRA    Recheck INR In: 1 WEEK    INR Location Clinic      Anticoagulation Summary as of 4/13/2018     INR goal 2.0-3.0   Today's INR 4.1!   Maintenance plan 3.75 mg (7.5 mg x 0.5) on Fri; 7.5 mg (7.5 mg x 1) all other days   Full instructions 4/13: Hold; 4/14: 3.75 mg; Otherwise 3.75 mg on Fri; 7.5 mg all other days   Weekly total 48.75 mg   Plan last modified Lacey Phan RN (2/16/2018)   Next INR check 4/20/2018   Priority INR   Target end date     Indications   Acute deep vein thrombosis (DVT) of distal vein of left lower extremity (H) [I82.4Z2]         Anticoagulation Episode Summary     INR check location Clinic Lab    Preferred lab     Send INR reminders to [facility] INR/PROTIME    Comments             See the Encounter Report to view Anticoagulation Flowsheet and Dosing Calendar (Go to Encounters tab in chart review, and find the Anticoagulation Therapy Visit)        Lacey Phan, RN

## 2018-04-19 DIAGNOSIS — G89.29 CHRONIC LEFT SHOULDER PAIN: ICD-10-CM

## 2018-04-19 DIAGNOSIS — M25.512 CHRONIC LEFT SHOULDER PAIN: ICD-10-CM

## 2018-04-19 NOTE — TELEPHONE ENCOUNTER
Reason for Call:  Medication or medication refill:    Do you use a [facility]?  Name of the pharmacy and phone number for the current request:  n/a    Name of the medication requested: oxyCODONE IR (ROXICODONE) 5 MG tablet    Other request: Patient is not going to have his surgery until 2-3 months out. He states he will be out of his pain medication in 2 days and would like to  his prescription on Friday when he comes in to do his INR    Can we leave a detailed message on this number? YES    Phone number patient can be reached at: Home number on file [phone] (home)    Best Time:     Call taken on 4/19/2018 at 11:47 AM by JOSE TAVARES

## 2018-04-19 NOTE — TELEPHONE ENCOUNTER
Requested Prescriptions   Pending Prescriptions Disp Refills     oxyCODONE IR (ROXICODONE) 5 MG tablet      Last Written Prescription Date:  3/21/18  Last Fill Quantity: 60,   # refills: 0  Last Office Visit: 3/27/18 alicia  Future Office visit:       Routing refill request to provider for review/approval because:  Drug not on the G, P or [facility] refill protocol or controlled substance   60 tablet 0     Sig: Take 1 tablet (5 mg) by mouth 2 times daily    There is no refill protocol information for this order

## 2018-04-20 ENCOUNTER — ANTICOAGULATION THERAPY VISIT (OUTPATIENT)
Dept: NURSING | Facility: CLINIC | Age: 73
End: 2018-04-20
Payer: COMMERCIAL

## 2018-04-20 DIAGNOSIS — I82.4Z2 ACUTE DEEP VEIN THROMBOSIS (DVT) OF DISTAL VEIN OF LEFT LOWER EXTREMITY (H): ICD-10-CM

## 2018-04-20 LAB — INR POINT OF CARE: 2.1 (ref 0.86–1.14)

## 2018-04-20 PROCEDURE — 85610 PROTHROMBIN TIME: CPT | Mod: QW

## 2018-04-20 PROCEDURE — 99207 ZZC NO CHARGE NURSE ONLY: CPT

## 2018-04-20 PROCEDURE — 36416 COLLJ CAPILLARY BLOOD SPEC: CPT

## 2018-04-20 RX ORDER — OXYCODONE HYDROCHLORIDE 5 MG/1
5 TABLET ORAL 2 TIMES DAILY
Qty: 60 TABLET | Refills: 0 | Status: SHIPPED | OUTPATIENT
Start: 2018-04-20 | End: 2018-05-10

## 2018-04-20 NOTE — TELEPHONE ENCOUNTER
Controlled Substance Refill Request for Oxycodone ir (Roxicodone) 5 mg Problem List Complete:  Yes   checked in past 6 months?  Yes 4/20/18 no record on file.

## 2018-04-20 NOTE — PROGRESS NOTES
ANTICOAGULATION FOLLOW-UP CLINIC VISIT    Patient Name:  Edi Villafana  Date:  4/20/2018  Contact Type:  Face to Face    SUBJECTIVE:     Patient Findings     Positives No Problem Findings           OBJECTIVE    INR Protime   Date Value Ref Range Status   04/20/2018 2.1 (A) 0.86 - 1.14 Final       ASSESSMENT / PLAN  INR assessment THER    Recheck INR In: 2 WEEKS    INR Location Clinic      Anticoagulation Summary as of 4/20/2018     INR goal 2.0-3.0   Today's INR 2.1   Maintenance plan 3.75 mg (7.5 mg x 0.5) on Fri; 7.5 mg (7.5 mg x 1) all other days   Full instructions 3.75 mg on Fri; 7.5 mg all other days   Weekly total 48.75 mg   No change documented Lacey Phan RN   Plan last modified Lacey Phan RN (2/16/2018)   Next INR check 5/4/2018   Priority INR   Target end date     Indications   Acute deep vein thrombosis (DVT) of distal vein of left lower extremity (H) [I82.4Z2]         Anticoagulation Episode Summary     INR check location Clinic Lab    Preferred lab     Send INR reminders to [facility] INR/PROTIME    Comments             See the Encounter Report to view Anticoagulation Flowsheet and Dosing Calendar (Go to Encounters tab in chart review, and find the Anticoagulation Therapy Visit)        Lacey Phan RN                 ANTICOAGULATION FOLLOW-UP CLINIC VISIT    Patient Name:  Edi Villafana  Date:  4/20/2018  Contact Type:  Face to Face    SUBJECTIVE:     Patient Findings     Positives No Problem Findings           OBJECTIVE    INR Protime   Date Value Ref Range Status   04/20/2018 2.1 (A) 0.86 - 1.14 Final       ASSESSMENT / PLAN  INR assessment THER    Recheck INR In: 2 WEEKS    INR Location Clinic      Anticoagulation Summary as of 4/20/2018     INR goal 2.0-3.0   Today's INR 2.1   Maintenance plan 3.75 mg (7.5 mg x 0.5) on Fri; 7.5 mg (7.5 mg x 1) all other days   Full instructions 3.75 mg on Fri; 7.5 mg all other days   Weekly total 48.75 mg   No change documented Lacey Phan  RN   Plan last modified Lacey Phan RN (2/16/2018)   Next INR check 5/4/2018   Priority INR   Target end date     Indications   Acute deep vein thrombosis (DVT) of distal vein of left lower extremity (H) [I82.4Z2]         Anticoagulation Episode Summary     INR check location Clinic Lab    Preferred lab     Send INR reminders to [facility] INR/PROTIME    Comments             See the Encounter Report to view Anticoagulation Flowsheet and Dosing Calendar (Go to Encounters tab in chart review, and find the Anticoagulation Therapy Visit)        Lacey Phan, RN

## 2018-04-20 NOTE — MR AVS SNAPSHOT
Edi Villafana   4/20/2018 2:30 PM   Anticoagulation Therapy Visit    Description:  73 year old male   Provider:  [facility]   Department:  Bx Nurse           INR as of 4/20/2018     Today's INR 2.1      Anticoagulation Summary as of 4/20/2018     INR goal 2.0-3.0   Today's INR 2.1   Full instructions 3.75 mg on Fri; 7.5 mg all other days   Next INR check 5/4/2018    Indications   Acute deep vein thrombosis (DVT) of distal vein of left lower extremity (H) [I82.4Z2]         Description     124/76      Your next Anticoagulation Clinic appointment(s)     May 04, 2018  2:15 PM CDT   Anticoagulation Visit with  ANTICOAGULATION CLINIC   [facility] ([facility])    [address] [place] [id number] [phone]              Contact Numbers     [facility]  Please call  [phone] to cancel and/or reschedule your appointment   The direct line to the anticoagulant nurse is [phone] on Monday, Wednesday, and Friday. On Thursday, the anticoagulant nurse can be reached directly at [phone].         April 2018 Details    Sun Mon Tue Wed Thu Fri Sat     1               2               3               4               5               6               7                 8               9               10               11               12               13               14                 15               16               17               18               19               20      3.75 mg   See details      21      7.5 mg           22      7.5 mg         23      7.5 mg         24      7.5 mg         25      7.5 mg         26      7.5 mg         27      3.75 mg         28      7.5 mg           29      7.5 mg         30      7.5 mg               Date Details   04/20 This INR check               How to take your warfarin dose     To take:  3.75 mg Take 0.5 of a 7.5 mg tablet.    To take:  7.5 mg Take 1 of the 7.5  mg tablets.           May 2018 Details    Sun Mon Tue Wed Thu Fri Sat       1      7.5 mg         2      7.5 mg         3      7.5 mg         4            5                 6               7               8               9               10               11               12                 13               14               15               16               17               18               19                 20               21               22               23               24               25               26                 27               28               29               30               31                  Date Details   No additional details    Date of next INR:  5/4/2018         How to take your warfarin dose     To take:  3.75 mg Take 0.5 of a 7.5 mg tablet.    To take:  7.5 mg Take 1 of the 7.5 mg tablets.

## 2018-04-24 ENCOUNTER — TRANSFERRED RECORDS (OUTPATIENT)
Dept: HEALTH INFORMATION MANAGEMENT | Facility: CLINIC | Age: 73
End: 2018-04-24

## 2018-04-25 PROBLEM — K75.9 HEPATITIS: Status: RESOLVED | Noted: 2017-11-17 | Resolved: 2018-04-25

## 2018-04-25 PROBLEM — K85.90 PANCREATITIS: Status: RESOLVED | Noted: 2017-11-11 | Resolved: 2018-04-25

## 2018-04-25 PROBLEM — E87.1 HYPONATREMIA: Status: RESOLVED | Noted: 2017-11-17 | Resolved: 2018-04-25

## 2018-04-25 PROBLEM — K70.10 ALCOHOLIC HEPATITIS WITHOUT ASCITES (H): Status: RESOLVED | Noted: 2017-11-21 | Resolved: 2018-04-25

## 2018-04-25 PROBLEM — K85.20 ALCOHOL-INDUCED ACUTE PANCREATITIS, UNSPECIFIED COMPLICATION STATUS: Status: RESOLVED | Noted: 2017-11-21 | Resolved: 2018-04-25

## 2018-04-26 ENCOUNTER — TRANSFERRED RECORDS (OUTPATIENT)
Dept: HEALTH INFORMATION MANAGEMENT | Facility: CLINIC | Age: 73
End: 2018-04-26

## 2018-05-01 ENCOUNTER — TRANSFERRED RECORDS (OUTPATIENT)
Dept: HEALTH INFORMATION MANAGEMENT | Facility: CLINIC | Age: 73
End: 2018-05-01

## 2018-05-04 ENCOUNTER — ANTICOAGULATION THERAPY VISIT (OUTPATIENT)
Dept: NURSING | Facility: CLINIC | Age: 73
End: 2018-05-04
Payer: COMMERCIAL

## 2018-05-04 VITALS — SYSTOLIC BLOOD PRESSURE: 119 MMHG | RESPIRATION RATE: 18 BRPM | DIASTOLIC BLOOD PRESSURE: 77 MMHG

## 2018-05-04 DIAGNOSIS — I82.4Z2 ACUTE DEEP VEIN THROMBOSIS (DVT) OF DISTAL VEIN OF LEFT LOWER EXTREMITY (H): ICD-10-CM

## 2018-05-04 LAB — INR POINT OF CARE: 1.4 (ref 2–3)

## 2018-05-04 PROCEDURE — 85610 PROTHROMBIN TIME: CPT | Mod: QW

## 2018-05-04 PROCEDURE — 36416 COLLJ CAPILLARY BLOOD SPEC: CPT

## 2018-05-04 PROCEDURE — 99207 ZZC NO CHARGE NURSE ONLY: CPT

## 2018-05-04 NOTE — MR AVS SNAPSHOT
Edi Villafana   5/4/2018 2:15 PM   Anticoagulation Therapy Visit    Description:  73 year old male   Provider:  [facility]   Department:  Bx Nurse           INR as of 5/4/2018     Today's INR 1.4!      Anticoagulation Summary as of 5/4/2018     INR goal 2.0-3.0   Today's INR 1.4!   Full instructions 5/4: 7.5 mg; Otherwise 3.75 mg on Fri; 7.5 mg all other days   Next INR check 5/11/2018    Indications   Acute deep vein thrombosis (DVT) of distal vein of left lower extremity (H) [I82.4Z2]         Your next Anticoagulation Clinic appointment(s)     May 04, 2018  2:15 PM CDT   Anticoagulation Visit with  ANTICOAGULATION CLINIC   [facility]    [address] [place] [phone]            May 11, 2018  2:15 PM CDT   Anticoagulation Visit with  ANTICOAGULATION CLINIC   [facility] ([facility])    [address] [place] [id number] [phone]              Contact Numbers     [facility]  Please call  [phone] to cancel and/or reschedule your appointment   The direct line to the anticoagulant nurse is [phone] on Monday, Wednesday, and Friday. On Thursday, the anticoagulant nurse can be reached directly at [phone].         May 2018 Details    Sun Mon Tue Wed Thu Fri Sat       1               2               3               4      7.5 mg   See details      5      7.5 mg           6      7.5 mg         7      7.5 mg         8      7.5 mg         9      7.5 mg         10      7.5 mg         11            12                 13               14               15               16               17               18               19                 20               21               22               23               24               25               26                 27                28               29               30               31                  Date Details   05/04 This INR check       Date of next INR:  5/11/2018         How to take your warfarin dose     To take:  3.75 mg Take 0.5 of a 7.5 mg tablet.    To take:  7.5 mg Take 1 of the 7.5 mg tablets.

## 2018-05-04 NOTE — PROGRESS NOTES
ANTICOAGULATION FOLLOW-UP CLINIC VISIT    Patient Name:  Edi Villafana  Date:  5/4/2018  Contact Type:  Face to Face    SUBJECTIVE:     Patient Findings     Positives Change in medications (new vitamin with 75 mg of vitamin K ), Change in diet/appetite, Missed doses           OBJECTIVE    INR Protime   Date Value Ref Range Status   05/04/2018 1.4 (A) 2 - 3 Final       ASSESSMENT / PLAN  No question data found.  Anticoagulation Summary as of 5/4/2018     INR goal 2.0-3.0   Today's INR 1.4!   Maintenance plan 3.75 mg (7.5 mg x 0.5) on Fri; 7.5 mg (7.5 mg x 1) all other days   Full instructions 5/4: 7.5 mg; Otherwise 3.75 mg on Fri; 7.5 mg all other days   Weekly total 48.75 mg   Plan last modified Lacey Phan RN (2/16/2018)   Next INR check 5/11/2018   Priority INR   Target end date     Indications   Acute deep vein thrombosis (DVT) of distal vein of left lower extremity (H) [I82.4Z2]         Anticoagulation Episode Summary     INR check location Clinic Lab    Preferred lab     Send INR reminders to [facility] INR/PROTIME    Comments             See the Encounter Report to view Anticoagulation Flowsheet and Dosing Calendar (Go to Encounters tab in chart review, and find the Anticoagulation Therapy Visit)    Dosage adjustment made based on physician directed care plan. Sub therapeutic, recheck in one week. Taking a new mulitvitamin with 75 mg of vitamin K in it. Patient aware if signs of clotting (pain, tenderness, swelling, color change in leg or arm, SOB) and bleeding occur (blood in stool, urine, large bruising, bleeding gums, nosebleeds) to have INR check sooner. If sx severe report to ER or concerned for stroke call 911. If general questions or concerns arise, call clinic.         Anna Harmon, ELTON               HPI      ROS      Physical Exam

## 2018-05-10 DIAGNOSIS — G89.29 CHRONIC LEFT SHOULDER PAIN: ICD-10-CM

## 2018-05-10 DIAGNOSIS — M25.512 CHRONIC LEFT SHOULDER PAIN: ICD-10-CM

## 2018-05-10 NOTE — TELEPHONE ENCOUNTER
Controlled Substance Refill Request for oxyCODONE IR (ROXICODONE) 5 MG tablet  Problem List Complete:  No     PROVIDER TO CONSIDER COMPLETION OF PROBLEM LIST AND OVERVIEW/CONTROLLED SUBSTANCE AGREEMENT    Last Written Prescription Date:  04/20/2018  Last Fill Quantity: 60,   # refills: 0    [facility] Visit with [facility] [place]: 03/27/2018    Future Office visit:   Next 5 appointments (look out 90 days)     Jun 04, 2018 11:30 AM CDT   SHORT with Enrique Lucia MD   [facility] ([facility])    [address] [place] [id number]   [phone]                  Controlled substance agreement on file: No.     Processing:  Patient will  in clinic   checked in past 6 months?  Yes  check 3-21-18 provider to review- lorazepam and ambien

## 2018-05-10 NOTE — TELEPHONE ENCOUNTER
Reason for Call:  Medication or medication refill:    Do you use a [facility]?  Name of the pharmacy and phone number for the current request:  Written will  at [facility]    Name of the medication requested: oxyCODONE IR (ROXICODONE) 5 MG tablet    Other request:    Patient is wondering if an increase in dosage is advised   Pain is increasing    Please call to discuss    Can we leave a detailed message on this number? YES    Phone number patient can be reached at: Home number on file [phone] (home)    Best Time:    anytime    Call taken on 5/10/2018 at 11:32 AM by ILENE DELGADILLO

## 2018-05-10 NOTE — TELEPHONE ENCOUNTER
Pt reports hip and left shoulder pain is worsening. Pain medication effect does not last. He would like Oxycodone dose increase from 5 mg BID to at least TID.  Pt has appt scheduled 06/04/2018. Declined earlier appt. He will be due for refill 05/20/2018. Ok to wait for providers response. Please advice.

## 2018-05-10 NOTE — TELEPHONE ENCOUNTER
Not due for refill until the 20th.  Needs to wait to discuss with JRB to see if have dose increase.

## 2018-05-11 ENCOUNTER — ANTICOAGULATION THERAPY VISIT (OUTPATIENT)
Dept: NURSING | Facility: CLINIC | Age: 73
End: 2018-05-11
Payer: COMMERCIAL

## 2018-05-11 DIAGNOSIS — I82.4Z2 ACUTE DEEP VEIN THROMBOSIS (DVT) OF DISTAL VEIN OF LEFT LOWER EXTREMITY (H): ICD-10-CM

## 2018-05-11 LAB — INR POINT OF CARE: 1.8 (ref 0.86–1.14)

## 2018-05-11 PROCEDURE — 85610 PROTHROMBIN TIME: CPT | Mod: QW

## 2018-05-11 PROCEDURE — 36416 COLLJ CAPILLARY BLOOD SPEC: CPT

## 2018-05-11 PROCEDURE — 99207 ZZC NO CHARGE NURSE ONLY: CPT

## 2018-05-11 NOTE — PROGRESS NOTES
ANTICOAGULATION FOLLOW-UP CLINIC VISIT    Patient Name:  Edi Villafana  Date:  5/11/2018  Contact Type:  Face to Face    SUBJECTIVE:     Patient Findings     Positives Other complaints (shoulder pain)           OBJECTIVE    INR Protime   Date Value Ref Range Status   05/11/2018 1.8 (A) 0.86 - 1.14 Final       ASSESSMENT / PLAN  INR assessment SUB    Recheck INR In: 1 WEEK    INR Location Clinic      Anticoagulation Summary as of 5/11/2018     INR goal 2.0-3.0   Today's INR 1.8!   Maintenance plan 3.75 mg (7.5 mg x 0.5) on Fri; 7.5 mg (7.5 mg x 1) all other days   Full instructions 5/11: 7.5 mg; Otherwise 3.75 mg on Fri; 7.5 mg all other days   Weekly total 48.75 mg   Plan last modified Lacey Phan RN (2/16/2018)   Next INR check 5/18/2018   Priority INR   Target end date     Indications   Acute deep vein thrombosis (DVT) of distal vein of left lower extremity (H) [I82.4Z2]         Anticoagulation Episode Summary     INR check location Clinic Lab    Preferred lab     Send INR reminders to [facility] INR/PROTIME    Comments             See the Encounter Report to view Anticoagulation Flowsheet and Dosing Calendar (Go to Encounters tab in chart review, and find the Anticoagulation Therapy Visit)        Lacey Phan, RN

## 2018-05-11 NOTE — MR AVS SNAPSHOT
Edi Villafana   5/11/2018 2:15 PM   Anticoagulation Therapy Visit    Description:  73 year old male   Provider:  [facility]   Department:  Bx Nurse           INR as of 5/11/2018     Today's INR 1.8!      Anticoagulation Summary as of 5/11/2018     INR goal 2.0-3.0   Today's INR 1.8!   Full instructions 5/11: 7.5 mg; Otherwise 3.75 mg on Fri; 7.5 mg all other days   Next INR check 5/18/2018    Indications   Acute deep vein thrombosis (DVT) of distal vein of left lower extremity (H) [I82.4Z2]         Your next Anticoagulation Clinic appointment(s)     May 11, 2018  2:15 PM CDT   Anticoagulation Visit with  ANTICOAGULATION CLINIC   [facility]    [address] [place] [id number] [phone]            May 18, 2018  2:00 PM CDT   Anticoagulation Visit with  ANTICOAGULATION CLINIC   [facility]    [address] [place] [phone]              Contact Numbers     [facility]  Please call  [phone] to cancel and/or reschedule your appointment   The direct line to the anticoagulant nurse is [phone] on Monday, Wednesday, and Friday. On Thursday, the anticoagulant nurse can be reached directly at [phone].         May 2018 Details    Sun Mon Tue Wed Thu Fri Sat       1               2               3               4               5                 6               7               8               9               10               11      7.5 mg   See details      12      7.5 mg           13      7.5 mg         14      7.5 mg         15      7.5 mg         16      7.5 mg         17      7.5 mg         18            19                 20               21               22               23               24               25               26                 27                28               29               30               31                  Date Details   05/11 This INR check       Date of next INR:  5/18/2018         How to take your warfarin dose     To take:  3.75 mg Take 0.5 of a 7.5 mg tablet.    To take:  7.5 mg Take 1 of the 7.5 mg tablets.

## 2018-05-15 ENCOUNTER — HOSPITAL ENCOUNTER (OUTPATIENT)
Dept: PHYSICAL THERAPY | Facility: CLINIC | Age: 73
Setting detail: THERAPIES SERIES
End: 2018-05-15
Attending: FAMILY MEDICINE
Payer: MEDICARE

## 2018-05-15 PROCEDURE — G8979 MOBILITY GOAL STATUS: HCPCS | Mod: GP,CJ | Performed by: PHYSICAL THERAPIST

## 2018-05-15 PROCEDURE — G8980 MOBILITY D/C STATUS: HCPCS | Mod: GP,CJ | Performed by: PHYSICAL THERAPIST

## 2018-05-15 PROCEDURE — 40000189 ZZH STATISTIC PT POOL VISIT: Performed by: PHYSICAL THERAPIST

## 2018-05-15 PROCEDURE — 97110 THERAPEUTIC EXERCISES: CPT | Mod: GP | Performed by: PHYSICAL THERAPIST

## 2018-05-15 RX ORDER — OXYCODONE HYDROCHLORIDE 5 MG/1
5 TABLET ORAL 3 TIMES DAILY
Qty: 90 TABLET | Refills: 0 | Status: SHIPPED | OUTPATIENT
Start: 2018-05-15 | End: 2018-06-12

## 2018-05-15 NOTE — TELEPHONE ENCOUNTER
Called pt to let them know that the Rx for oxycodone is ready for  at [facility]. Pt will pick this up.

## 2018-05-17 NOTE — PROGRESS NOTES
Outpatient Physical Therapy Discharge Note     Patient: Edi Villafana  : 1945    Beginning/End Dates of Reporting Period:  18 to 5/15/2018    Referring Provider: Dr. Enrique Lucia    Therapy Diagnosis: Weakness, Decreased activity tolerance, Difficulty walking     Client Self Report: Reports had cortisone injection in shoulder which helped but still really bothers him and is painful.  R hip slowly  bothering him more and more. Increasing oxycodone form 2x/day to 3x/day to try and help.     Objective Measurements:  Objective Measure: 6MWT  Details: 1880 ft (initial was 1735 ft)    FGA  (initial was        Goals:  Goal Identifier 6MWT   Goal Description Client will improve 150 ft on 6MWT to demonstrate improved gait speed and actiity tolerance   Target Date 18   Date Met      Progress: Goal met.     Goal Identifier FGA   Goal Description Client will improve score to at least a 27/30 or better to be at decreased risk for falls   Target Date 18   Date Met      Progress:  Goal met.      Goal Identifier subjective   Goal Description Client subjectively will report improved energy and ability to take care of house inside and outside.   Target Date 18   Date Met      Progress: Goal not met. Did end up moving to an apt where there was less upkeep of outside      Goal Identifier HEP   Goal Description Client will be independent [facility] HEP to continue strengthening and balance on his own   Target Date 18   Date Met      Progress: Goal met.        Progress Toward Goals:   Progress this reporting period: Client was able to meet 3 out of 4 goals. 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 R hip CATRACHITO tentatively scheduled for 2018 and would need a L total shoulder when he is ready. Therfore I feel these issues really limit him more now than anythiing. Client was encouraged to keep up with HEP and do more walking as his hip allows since he  typically walks about 2000 steps each day which is not enough for him.  Client is concerned about ongoing internal motivation and keeping up with HEP on his own.     Plan:  Discharge from therapy.    Discharge:    Reason for Discharge: No further expectation of progress. Has met the above goals to the best of his ability at this time    Equipment Issued: none    Discharge Plan: Patient to continue home program.

## 2018-05-18 ENCOUNTER — ANTICOAGULATION THERAPY VISIT (OUTPATIENT)
Dept: NURSING | Facility: CLINIC | Age: 73
End: 2018-05-18
Payer: COMMERCIAL

## 2018-05-18 DIAGNOSIS — I82.4Z2 ACUTE DEEP VEIN THROMBOSIS (DVT) OF DISTAL VEIN OF LEFT LOWER EXTREMITY (H): ICD-10-CM

## 2018-05-18 LAB — INR POINT OF CARE: 2 (ref 0.86–1.14)

## 2018-05-18 PROCEDURE — 85610 PROTHROMBIN TIME: CPT | Mod: QW

## 2018-05-18 PROCEDURE — 36416 COLLJ CAPILLARY BLOOD SPEC: CPT

## 2018-05-18 PROCEDURE — 99207 ZZC NO CHARGE NURSE ONLY: CPT

## 2018-05-18 NOTE — MR AVS SNAPSHOT
Edi Villafana   5/18/2018 2:00 PM   Anticoagulation Therapy Visit    Description:  73 year old male   Provider:  [facility]   Department:  Bx Nurse           INR as of 5/18/2018     Today's INR 2.0      Anticoagulation Summary as of 5/18/2018     INR goal 2.0-3.0   Today's INR 2.0   Full instructions 7.5 mg every day   Next INR check 6/1/2018    Indications   Acute deep vein thrombosis (DVT) of distal vein of left lower extremity (H) [I82.4Z2]         Your next Anticoagulation Clinic appointment(s)     Jun 01, 2018  2:00 PM CDT   Anticoagulation Visit with  ANTICOAGULATION CLINIC   [facility]    [address] [place] [id number] [phone]              Contact Numbers     [facility]  Please call  [phone] to cancel and/or reschedule your appointment   The direct line to the anticoagulant nurse is [phone] on Monday, Wednesday, and Friday. On Thursday, the anticoagulant nurse can be reached directly at [phone].         May 2018 Details    Sun Mon Tue Wed Thu Fri Sat       1               2               3               4               5                 6               7               8               9               10               11               12                 13               14               15               16               17               18      7.5 mg   See details      19      7.5 mg           20      7.5 mg         21      7.5 mg         22      7.5 mg         23      7.5 mg         24      7.5 mg         25      7.5 mg         26      7.5 mg           27      7.5 mg         28      7.5 mg         29      7.5 mg         30      7.5 mg         31      7.5 mg            Date Details   05/18 This INR check               How to take your warfarin dose     To take:  7.5 mg Take 1 of the 7.5 mg tablets.           June 2018 Details    Sun Mon Tue Wed Thu Fri  Sat          1            2                 3               4               5               6               7               8               9                 10               11               12               13               14               15               16                 17               18               19               20               21               22               23                 24               25               26               27               28               29               30                Date Details   No additional details    Date of next INR:  6/1/2018         How to take your warfarin dose     To take:  7.5 mg Take 1 of the 7.5 mg tablets.

## 2018-05-18 NOTE — PROGRESS NOTES
ANTICOAGULATION FOLLOW-UP CLINIC VISIT    Patient Name:  Edi Villafana  Date:  5/18/2018  Contact Type:  Face to Face    SUBJECTIVE:     Patient Findings     Positives No Problem Findings           OBJECTIVE    INR Protime   Date Value Ref Range Status   05/18/2018 2.0 (A) 0.86 - 1.14 Final       ASSESSMENT / PLAN  INR assessment THER    Recheck INR In: 2 WEEKS    INR Location Clinic      Anticoagulation Summary as of 5/18/2018     INR goal 2.0-3.0   Today's INR 2.0   Maintenance plan 7.5 mg (7.5 mg x 1) every day   Full instructions 7.5 mg every day   Weekly total 52.5 mg   Plan last modified Lacey Phan RN (5/18/2018)   Next INR check 6/1/2018   Priority INR   Target end date     Indications   Acute deep vein thrombosis (DVT) of distal vein of left lower extremity (H) [I82.4Z2]         Anticoagulation Episode Summary     INR check location Clinic Lab    Preferred lab     Send INR reminders to [facility] INR/PROTIME    Comments             See the Encounter Report to view Anticoagulation Flowsheet and Dosing Calendar (Go to Encounters tab in chart review, and find the Anticoagulation Therapy Visit)        Lacey Phan, RN

## 2018-05-19 ENCOUNTER — OFFICE VISIT (OUTPATIENT)
Dept: FAMILY MEDICINE | Facility: CLINIC | Age: 73
End: 2018-05-19
Payer: COMMERCIAL

## 2018-05-19 VITALS
OXYGEN SATURATION: 97 % | SYSTOLIC BLOOD PRESSURE: 120 MMHG | DIASTOLIC BLOOD PRESSURE: 78 MMHG | HEART RATE: 72 BPM | RESPIRATION RATE: 20 BRPM | WEIGHT: 196 LBS | TEMPERATURE: 97 F | BODY MASS INDEX: 25.86 KG/M2

## 2018-05-19 DIAGNOSIS — E03.9 ACQUIRED HYPOTHYROIDISM: ICD-10-CM

## 2018-05-19 DIAGNOSIS — M19.90 ARTHRITIS: ICD-10-CM

## 2018-05-19 DIAGNOSIS — M25.512 CHRONIC LEFT SHOULDER PAIN: ICD-10-CM

## 2018-05-19 DIAGNOSIS — M25.551 HIP PAIN, RIGHT: ICD-10-CM

## 2018-05-19 DIAGNOSIS — I10 ESSENTIAL HYPERTENSION WITH GOAL BLOOD PRESSURE LESS THAN 140/90: Primary | ICD-10-CM

## 2018-05-19 DIAGNOSIS — G47.00 PERSISTENT INSOMNIA: ICD-10-CM

## 2018-05-19 DIAGNOSIS — G89.29 CHRONIC LEFT SHOULDER PAIN: ICD-10-CM

## 2018-05-19 PROCEDURE — 36415 COLL VENOUS BLD VENIPUNCTURE: CPT | Performed by: FAMILY MEDICINE

## 2018-05-19 PROCEDURE — 99214 OFFICE O/P EST MOD 30 MIN: CPT | Performed by: FAMILY MEDICINE

## 2018-05-19 PROCEDURE — 84443 ASSAY THYROID STIM HORMONE: CPT | Performed by: FAMILY MEDICINE

## 2018-05-19 PROCEDURE — 84439 ASSAY OF FREE THYROXINE: CPT | Performed by: FAMILY MEDICINE

## 2018-05-19 RX ORDER — LOSARTAN POTASSIUM 50 MG/1
TABLET ORAL
Qty: 45 TABLET | Refills: 3 | Status: SHIPPED | OUTPATIENT
Start: 2018-05-19 | End: 2018-11-19

## 2018-05-19 NOTE — PATIENT INSTRUCTIONS
The patient's pharmacist recommended Voltaren gel for his left shoulder pain.  While that is an off label use of the medication I do think it is worth a try.  Patient has fairly severe left shoulder pain.  However, his right hip is bothering him more and limiting his ability to do things more and is getting that fixed first.  He is also due at this point to get his thyroid functions checked again.  He has been taking an extra half a tablet one day a week.  His oxycodone use does allow him to get through the day.  It does not remove his pain but just makes it tolerable.    His zolpidem which used to work very well and having him sleep all night, has not been doing that of late.  I suspect most of that is related to his increased stress of late with his living situation.  He has now found an apartment in [facility].  While it is expensive it is at least stable.  He has friends that will assist him postoperatively when he gets his hip replacement done this summer.  He will talk to his orthopedist about whether or not they still use raised toilet seats so that place where he will be staying postoperatively he can get one installed.    The patient will follow up with me in 1 month.  He will follow-up sooner as needed.  I did refill his losartan and gave him a new prescription for Voltaren gel.

## 2018-05-19 NOTE — LETTER
May 23, 2018      Edi Villafana  [address]  [place] [facility] [place] [facility] [address]        Dear ,    We are writing to inform you of your test results.    Your thyroid test looked great.  No change in your dose.  We can check this again in a year.    Resulted Orders   TSH   Result Value Ref Range    TSH 1.85 0.40 - 4.00 mU/L   T4, free   Result Value Ref Range    T4 Free 1.46 0.76 - 1.46 ng/dL       If you have any questions or concerns, please call the clinic at the number listed above.       Sincerely,        Enrique Lucia MD

## 2018-05-19 NOTE — PROGRESS NOTES
SUBJECTIVE:   Edi Villafana is a 73 year old male who presents to clinic today for the following health issues:    Hypertension Follow-up      Outpatient blood pressures are not being checked.    Low Salt Diet: no added salt      Amount of exercise or physical activity: The patient just finished physical therapy for his hip.    Problems taking medications regularly: No    Medication side effects: none    Diet: low salt      Insomnia  Onset: Many years    Description:   Time to fall asleep (sleep latency): 15 minutes  Middle of night awakening:  YES  Early morning awakening:  YES    Progression of Symptoms:  Intermittent, But worse of late I suspect due to his stressful living situation.    Accompanying Signs & Symptoms:  Daytime sleepiness/napping: no   Excessive snoring/apnea: no   Restless legs: no   Frequent urination: no   Chronic pain:  YES shoulder and hip    History:  Prior Insomnia: YES    Precipitating factors:   New stressful situation: YES  Caffeine intake: no   OTC decongestants: no   Any new medications: no     Alleviating factors:  Self medicating (alcohol, etc.):  no    Therapies Tried and outcome: For a long period of time his zolpidem worked well.  However, of late, he has not worked quite as well.  He is living circumstances while expensive are stable.      Joint Pain    Onset: Many months2    Description:   Location: left shoulder and right hip  Character: Chronic and intense    Intensity: moderate    Progression of Symptoms: worse    Accompanying Signs & Symptoms:  Other symptoms: none    History:   Previous similar pain: YES      Precipitating factors:   Trauma or overuse: no     Alleviating factors:  Improved by: physical therapy and opioids    Therapies Tried and outcome: Oxycodone which helps some and at least allows him to get through the day.  He is taking 3 daily.  He just finished physical therapy on his right hip which she said helped a lot.  He is set up to get his hip replacement  done this summer.            Problem list and histories reviewed & adjusted, as indicated.  Additional history: as documented    Patient Active Problem List   Diagnosis     Hypothyroidism     Hyperlipidemia LDL goal <100     Persistent insomnia     Hypertension goal BP (blood pressure) < 140/90     Anxiety     Hypotestosteronism     Trochanteric bursitis     Depression with anxiety     Left shoulder pain     Physical deconditioning     Alcoholism (H)     Chronic left shoulder pain     Advanced directives, counseling/discussion     Pain of left calf     Acute deep vein thrombosis (DVT) of distal vein of left lower extremity (H)     Anticoagulation management encounter     Hip pain, right     Past Surgical History:   Procedure Laterality Date     ABDOMEN SURGERY  1973    large benign tumor removed with thrombophlebitis post op     APPENDECTOMY  1960     CATARACT IOL, RT/LT      bilateral     EYE SURGERY  [id number], 2007    eyelid surgery, 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, trabeculoplasty       Social History   Substance Use Topics     Smoking status: Never Smoker     Smokeless tobacco: Never Used     Alcohol use Yes     Family History   Problem Relation Age of Onset     Hypertension Mother      Lipids Mother      HEART DISEASE Mother      Psychotic Disorder Mother      severe depression     HEART DISEASE Father      CANCER Sister      ovarian     Colon Cancer Paternal Grandfather            Reviewed and updated as needed this visit by clinical staff  Tobacco  Allergies  Meds  Med Hx  Surg Hx  Fam Hx  Soc Hx      Reviewed and updated as needed this visit by Provider         ROS:  Constitutional, HEENT, cardiovascular, pulmonary, gi and gu systems are negative, except as otherwise noted.    OBJECTIVE:                                                    /78 (BP Location: Left arm, Patient Position: Sitting, Cuff Size: Adult Large)  Pulse 72  Temp 97  F (36.1  C) (Tympanic)  Resp 20  Wt 196 lb (88.9 kg)  SpO2  97%  BMI 25.86 kg/m2  Body mass index is 25.86 kg/(m^2).  GENERAL APPEARANCE: healthy, alert and no distress         ASSESSMENT/PLAN:                                                        ICD-10-CM    1. Essential hypertension with goal blood pressure less than 140/90 I10 losartan (COZAAR) 50 MG tablet   2. Arthritis M19.90 diclofenac (VOLTAREN) 1 % GEL topical gel   3. Acquired hypothyroidism E03.9 TSH     T4, free   4. Chronic left shoulder pain M25.512     G89.29    5. Hip pain, right M25.551    6. Persistent insomnia G47.00        Patient Instructions   The patient's pharmacist recommended Voltaren gel for his left shoulder pain.  While that is an off label use of the medication I do think it is worth a try.  Patient has fairly severe left shoulder pain.  However, his right hip is bothering him more and limiting his ability to do things more and is getting that fixed first.  He is also due at this point to get his thyroid functions checked again.  He has been taking an extra half a tablet one day a week.  His oxycodone use does allow him to get through the day.  It does not remove his pain but just makes it tolerable.    His zolpidem which used to work very well and having him sleep all night, has not been doing that of late.  I suspect most of that is related to his increased stress of late with his living situation.  He has now found an apartment in [facility].  While it is expensive it is at least stable.  He has friends that will assist him postoperatively when he gets his hip replacement done this summer.  He will talk to his orthopedist about whether or not they still use raised toilet seats so that place where he will be staying postoperatively he can get one installed.    The patient will follow up with me in 1 month.  He will follow-up sooner as needed.  I did refill his losartan and gave him a new prescription for Voltaren gel.      Enrique Lucia MD  [facility]  [facility]

## 2018-05-21 ENCOUNTER — TRANSFERRED RECORDS (OUTPATIENT)
Dept: HEALTH INFORMATION MANAGEMENT | Facility: CLINIC | Age: 73
End: 2018-05-21

## 2018-05-21 LAB
T4 FREE SERPL-MCNC: 1.46 NG/DL (ref 0.76–1.46)
TSH SERPL DL<=0.005 MIU/L-ACNC: 1.85 MU/L (ref 0.4–4)

## 2018-05-22 ENCOUNTER — TRANSFERRED RECORDS (OUTPATIENT)
Dept: HEALTH INFORMATION MANAGEMENT | Facility: CLINIC | Age: 73
End: 2018-05-22

## 2018-06-01 ENCOUNTER — ANTICOAGULATION THERAPY VISIT (OUTPATIENT)
Dept: NURSING | Facility: CLINIC | Age: 73
End: 2018-06-01
Payer: COMMERCIAL

## 2018-06-01 DIAGNOSIS — I82.4Z2 ACUTE DEEP VEIN THROMBOSIS (DVT) OF DISTAL VEIN OF LEFT LOWER EXTREMITY (H): ICD-10-CM

## 2018-06-01 LAB — INR POINT OF CARE: 1.9 (ref 0.86–1.14)

## 2018-06-01 PROCEDURE — 99207 ZZC NO CHARGE NURSE ONLY: CPT

## 2018-06-01 PROCEDURE — 85610 PROTHROMBIN TIME: CPT | Mod: QW

## 2018-06-01 PROCEDURE — 36416 COLLJ CAPILLARY BLOOD SPEC: CPT

## 2018-06-01 NOTE — PROGRESS NOTES
ANTICOAGULATION FOLLOW-UP CLINIC VISIT    Patient Name:  Edi Villafana  Date:  6/1/2018  Contact Type:  Face to Face    SUBJECTIVE:     Patient Findings     Positives No Problem Findings           OBJECTIVE    INR Protime   Date Value Ref Range Status   06/01/2018 1.9 (A) 0.86 - 1.14 Final       ASSESSMENT / PLAN  INR assessment THER    Recheck INR In: 2 WEEKS    INR Location Clinic      Anticoagulation Summary as of 6/1/2018     INR goal 2.0-3.0   Today's INR 1.9!   Warfarin maintenance plan 7.5 mg (7.5 mg x 1) every day   Full warfarin instructions 7.5 mg every day   Weekly warfarin total 52.5 mg   Plan last modified Lacey Phan RN (5/18/2018)   Next INR check 6/15/2018   Priority INR   Target end date     Indications   Acute deep vein thrombosis (DVT) of distal vein of left lower extremity (H) [I82.4Z2]         Anticoagulation Episode Summary     INR check location Clinic Lab    Preferred lab     Send INR reminders to [facility] INR/PROTIME    Comments             See the Encounter Report to view Anticoagulation Flowsheet and Dosing Calendar (Go to Encounters tab in chart review, and find the Anticoagulation Therapy Visit)        Lacey Phan RN                 ANTICOAGULATION FOLLOW-UP CLINIC VISIT    Patient Name:  Edi Villafana  Date:  6/1/2018  Contact Type:  Face to Face    SUBJECTIVE:     Patient Findings     Positives No Problem Findings           OBJECTIVE    INR Protime   Date Value Ref Range Status   06/01/2018 1.9 (A) 0.86 - 1.14 Final       ASSESSMENT / PLAN  INR assessment THER    Recheck INR In: 2 WEEKS    INR Location Clinic      Anticoagulation Summary as of 6/1/2018     INR goal 2.0-3.0   Today's INR 1.9!   Warfarin maintenance plan 7.5 mg (7.5 mg x 1) every day   Full warfarin instructions 7.5 mg every day   Weekly warfarin total 52.5 mg   Plan last modified Lacey Phan RN (5/18/2018)   Next INR check 6/15/2018   Priority INR   Target end date     Indications   Acute deep vein  thrombosis (DVT) of distal vein of left lower extremity (H) [I82.4Z2]         Anticoagulation Episode Summary     INR check location Clinic Lab    Preferred lab     Send INR reminders to [facility] INR/PROTIME    Comments             See the Encounter Report to view Anticoagulation Flowsheet and Dosing Calendar (Go to Encounters tab in chart review, and find the Anticoagulation Therapy Visit)        Lacey Phan RN

## 2018-06-01 NOTE — MR AVS SNAPSHOT
Edi Villafana   6/1/2018 2:00 PM   Anticoagulation Therapy Visit    Description:  73 year old male   Provider:  [facility]   Department:  Bx Nurse           INR as of 6/1/2018     Today's INR 1.9!      Anticoagulation Summary as of 6/1/2018     INR goal 2.0-3.0   Today's INR 1.9!   Full warfarin instructions 7.5 mg every day   Next INR check 6/15/2018    Indications   Acute deep vein thrombosis (DVT) of distal vein of left lower extremity (H) [I82.4Z2]         Your next Anticoagulation Clinic appointment(s)     Sedrick 15, 2018  2:00 PM CDT   Anticoagulation Visit with  ANTICOAGULATION CLINIC   [facility] ([facility]    [address] [place] [address] [phone]              Contact Numbers     [facility]  Please call  [phone] to cancel and/or reschedule your appointment   The direct line to the anticoagulant nurse is [phone] on Monday, Wednesday, and Friday. On Thursday, the anticoagulant nurse can be reached directly at [phone].         June 2018 Details    Sun Mon Tue Wed Thu Fri Sat          1      7.5 mg   See details      2      7.5 mg           3      7.5 mg         4      7.5 mg         5      7.5 mg         6      7.5 mg         7      7.5 mg         8      7.5 mg         9      7.5 mg           10      7.5 mg         11      7.5 mg         12      7.5 mg         13      7.5 mg         14      7.5 mg         15            16                 17               18               19               20               21               22               23                 24               25               26               27               28               29               30                Date Details   06/01 This INR check       Date of next INR:  6/15/2018         How to take your warfarin dose     To take:  7.5 mg Take 1 of the 7.5 mg tablets.

## 2018-06-05 ENCOUNTER — TRANSFERRED RECORDS (OUTPATIENT)
Dept: HEALTH INFORMATION MANAGEMENT | Facility: CLINIC | Age: 73
End: 2018-06-05

## 2018-06-06 ENCOUNTER — TELEPHONE (OUTPATIENT)
Dept: NURSING | Facility: CLINIC | Age: 73
End: 2018-06-06

## 2018-06-06 NOTE — TELEPHONE ENCOUNTER
Patient calling to report that the hematologist has taken him off of warfarin.Will no longer need INRs.

## 2018-06-12 DIAGNOSIS — M25.512 CHRONIC LEFT SHOULDER PAIN: ICD-10-CM

## 2018-06-12 DIAGNOSIS — F41.9 ANXIETY: ICD-10-CM

## 2018-06-12 DIAGNOSIS — G89.29 CHRONIC LEFT SHOULDER PAIN: ICD-10-CM

## 2018-06-12 RX ORDER — OXYCODONE HYDROCHLORIDE 5 MG/1
5 TABLET ORAL 3 TIMES DAILY
Qty: 90 TABLET | Refills: 0 | Status: SHIPPED | OUTPATIENT
Start: 2018-06-14 | End: 2018-07-16

## 2018-06-12 NOTE — TELEPHONE ENCOUNTER
LORAZEPAM 2 MG TABLET  Last Written Prescription Date:  03/09/2018  Last Fill Quantity: 270,  # refills: 0   Last office visit: 5/19/2018 with prescribing provider:  05/19/18   Future Office Visit:   Next 5 appointments (look out 90 days)     Jul 16, 2018  1:00 PM CDT   PHYSICAL with Enrique Lucia MD   [facility] ([facility]    [address] [place] [id number] [phone]                 Requested Prescriptions   Pending Prescriptions Disp Refills     LORazepam (ATIVAN) 2 MG tablet [Pharmacy Med Name: LORAZEPAM 2 MG TABLET] 270 tablet 0     Sig: TAKE 1 TABLET BY MOUTH EVERY 8 HOURS AS NEEDED FOR ANXIETY.    There is no refill protocol information for this order

## 2018-06-12 NOTE — TELEPHONE ENCOUNTER
Controlled Substance Refill Request for Oxycodone 5 mg  Problem List Complete:  No     PROVIDER TO CONSIDER COMPLETION OF PROBLEM LIST AND OVERVIEW/CONTROLLED SUBSTANCE AGREEMENT    Last Written Prescription Date:  5/15/2018  Last Fill Quantity: 90,   # refills: 0    Last Office Visit with [facility] primary care provider: 5/19/2018    Future Office visit:   Next 5 appointments (look out 90 days)     Jul 16, 2018  1:00 PM CDT   PHYSICAL with Enrique [facility] MD   [facility]    [address] [place] [id number]   [phone]                  Controlled substance agreement on file: No.     Processing:  Patient will  in clinic please call when ready   checked in past 6 months?  Yes 3/21/2018

## 2018-06-13 DIAGNOSIS — E34.9 HYPOTESTOSTERONISM: ICD-10-CM

## 2018-06-13 RX ORDER — LORAZEPAM 2 MG/1
TABLET ORAL
Qty: 270 TABLET | Refills: 0 | Status: ON HOLD | OUTPATIENT
Start: 2018-06-13 | End: 2018-08-09

## 2018-06-13 NOTE — TELEPHONE ENCOUNTER
Called pt to let them know that the Rx for oxycodone is ready for  at [facility]. Patient will probably pick this up today.

## 2018-06-13 NOTE — TELEPHONE ENCOUNTER
Script printed and signed, in my Outbasket.      PCP:  Please update problem list and overview to reflect that this patient is on a controlled substance

## 2018-06-14 NOTE — TELEPHONE ENCOUNTER
Pt was called to clarify order. Reports he was off the compound testosterone cream for some time, but would like a refill now. He never filled Voltaren gel due to cost. Pt was unaware of medication order below. He wants the compound Rx from [facility]. Pt has OV with provider but asked that Rx be approved before OV 07/2018. Rx pended as last ordered . Please review and advice. Ok to wait for PCP to review when back.    Routing refill request to provider for review/approval because:  Drug not active on patient's medication list

## 2018-06-19 ENCOUNTER — TELEPHONE (OUTPATIENT)
Dept: FAMILY MEDICINE | Facility: CLINIC | Age: 73
End: 2018-06-19

## 2018-06-19 NOTE — TELEPHONE ENCOUNTER
They should put him back on what he used to be on.  I am certainly no compounding pharmacist and do not even know what strength any of this is so they should return him to his original prescription.

## 2018-06-19 NOTE — TELEPHONE ENCOUNTER
Pharmacist Jazmyne is calling for clarification on prescription for testoterone cream.Pt originally had rx for 300mg/gm apply 2.5 mg daily.Prescription today reads 2 mg /gm 15% cream apply.5 ml daily. 15% cream is not the same as 2 mg/gm.Will forward to provider for clarification and correction.

## 2018-06-21 ENCOUNTER — ANTICOAGULATION THERAPY VISIT (OUTPATIENT)
Dept: NURSING | Facility: CLINIC | Age: 73
End: 2018-06-21

## 2018-07-16 ENCOUNTER — OFFICE VISIT (OUTPATIENT)
Dept: FAMILY MEDICINE | Facility: CLINIC | Age: 73
End: 2018-07-16
Payer: COMMERCIAL

## 2018-07-16 VITALS
WEIGHT: 197 LBS | SYSTOLIC BLOOD PRESSURE: 110 MMHG | DIASTOLIC BLOOD PRESSURE: 78 MMHG | OXYGEN SATURATION: 95 % | HEART RATE: 78 BPM | BODY MASS INDEX: 26.11 KG/M2 | RESPIRATION RATE: 16 BRPM | HEIGHT: 73 IN | TEMPERATURE: 97.5 F

## 2018-07-16 DIAGNOSIS — E34.9 HYPOTESTOSTERONISM: ICD-10-CM

## 2018-07-16 DIAGNOSIS — G47.00 PERSISTENT INSOMNIA: ICD-10-CM

## 2018-07-16 DIAGNOSIS — Z86.718 HISTORY OF DEEP VENOUS THROMBOSIS: ICD-10-CM

## 2018-07-16 DIAGNOSIS — Z01.818 PREOP GENERAL PHYSICAL EXAM: Primary | ICD-10-CM

## 2018-07-16 DIAGNOSIS — G89.29 CHRONIC LEFT SHOULDER PAIN: ICD-10-CM

## 2018-07-16 DIAGNOSIS — F41.9 ANXIETY: ICD-10-CM

## 2018-07-16 DIAGNOSIS — E78.5 HYPERLIPIDEMIA LDL GOAL <100: ICD-10-CM

## 2018-07-16 DIAGNOSIS — M25.551 HIP PAIN, RIGHT: ICD-10-CM

## 2018-07-16 DIAGNOSIS — F41.8 DEPRESSION WITH ANXIETY: ICD-10-CM

## 2018-07-16 DIAGNOSIS — M25.512 CHRONIC LEFT SHOULDER PAIN: ICD-10-CM

## 2018-07-16 LAB
ALBUMIN UR-MCNC: NEGATIVE MG/DL
APPEARANCE UR: CLEAR
BASOPHILS # BLD AUTO: 0 10E9/L (ref 0–0.2)
BASOPHILS NFR BLD AUTO: 0.2 %
BILIRUB UR QL STRIP: NEGATIVE
COLOR UR AUTO: YELLOW
DIFFERENTIAL METHOD BLD: NORMAL
EOSINOPHIL # BLD AUTO: 0.2 10E9/L (ref 0–0.7)
EOSINOPHIL NFR BLD AUTO: 1.8 %
ERYTHROCYTE [DISTWIDTH] IN BLOOD BY AUTOMATED COUNT: 13.5 % (ref 10–15)
GLUCOSE UR STRIP-MCNC: NEGATIVE MG/DL
HCT VFR BLD AUTO: 45.6 % (ref 40–53)
HGB BLD-MCNC: 15.9 G/DL (ref 13.3–17.7)
HGB UR QL STRIP: NEGATIVE
KETONES UR STRIP-MCNC: NEGATIVE MG/DL
LEUKOCYTE ESTERASE UR QL STRIP: NEGATIVE
LYMPHOCYTES # BLD AUTO: 1.5 10E9/L (ref 0.8–5.3)
LYMPHOCYTES NFR BLD AUTO: 18.1 %
MCH RBC QN AUTO: 30.8 PG (ref 26.5–33)
MCHC RBC AUTO-ENTMCNC: 34.9 G/DL (ref 31.5–36.5)
MCV RBC AUTO: 88 FL (ref 78–100)
MONOCYTES # BLD AUTO: 0.7 10E9/L (ref 0–1.3)
MONOCYTES NFR BLD AUTO: 8.6 %
NEUTROPHILS # BLD AUTO: 5.9 10E9/L (ref 1.6–8.3)
NEUTROPHILS NFR BLD AUTO: 71.3 %
NITRATE UR QL: NEGATIVE
PH UR STRIP: 5.5 PH (ref 5–7)
PLATELET # BLD AUTO: 227 10E9/L (ref 150–450)
RBC # BLD AUTO: 5.16 10E12/L (ref 4.4–5.9)
RBC #/AREA URNS AUTO: NORMAL /HPF
SOURCE: NORMAL
SP GR UR STRIP: 1.01 (ref 1–1.03)
UROBILINOGEN UR STRIP-ACNC: 0.2 EU/DL (ref 0.2–1)
WBC # BLD AUTO: 8.3 10E9/L (ref 4–11)
WBC #/AREA URNS AUTO: NORMAL /HPF

## 2018-07-16 PROCEDURE — 82565 ASSAY OF CREATININE: CPT | Performed by: FAMILY MEDICINE

## 2018-07-16 PROCEDURE — 81001 URINALYSIS AUTO W/SCOPE: CPT | Performed by: FAMILY MEDICINE

## 2018-07-16 PROCEDURE — 99214 OFFICE O/P EST MOD 30 MIN: CPT | Mod: 25 | Performed by: FAMILY MEDICINE

## 2018-07-16 PROCEDURE — 80051 ELECTROLYTE PANEL: CPT | Performed by: FAMILY MEDICINE

## 2018-07-16 PROCEDURE — 36415 COLL VENOUS BLD VENIPUNCTURE: CPT | Performed by: FAMILY MEDICINE

## 2018-07-16 PROCEDURE — 85025 COMPLETE CBC W/AUTO DIFF WBC: CPT | Performed by: FAMILY MEDICINE

## 2018-07-16 PROCEDURE — 84520 ASSAY OF UREA NITROGEN: CPT | Performed by: FAMILY MEDICINE

## 2018-07-16 PROCEDURE — 93000 ELECTROCARDIOGRAM COMPLETE: CPT | Performed by: FAMILY MEDICINE

## 2018-07-16 RX ORDER — OXYCODONE HYDROCHLORIDE 5 MG/1
5 TABLET ORAL 3 TIMES DAILY
Qty: 90 TABLET | Refills: 0 | Status: SHIPPED | OUTPATIENT
Start: 2018-07-16 | End: 2018-11-19

## 2018-07-16 ASSESSMENT — PATIENT HEALTH QUESTIONNAIRE - PHQ9
10. IF YOU CHECKED OFF ANY PROBLEMS, HOW DIFFICULT HAVE THESE PROBLEMS MADE IT FOR YOU TO DO YOUR WORK, TAKE CARE OF THINGS AT HOME, OR GET ALONG WITH OTHER PEOPLE: NOT DIFFICULT AT ALL
SUM OF ALL RESPONSES TO PHQ QUESTIONS 1-9: 3
SUM OF ALL RESPONSES TO PHQ QUESTIONS 1-9: 3

## 2018-07-16 NOTE — LETTER
[facility]    07/16/18    Patient: Edi Villafana  YOB: 1945  Medical Record Number: [id number]                                                                  Controlled Substance Agreement  I understand that my care provider has prescribed controlled substances (narcotics, tranquilizers, and/or stimulants) to help manage my condition(s).  I am taking this medicine to help me function or work.  I know that this is strong medicine.  It could have serious side effects and even cause a dependency on the drug.  If I stop these medicines suddenly, I could have severe withdrawal symptoms.    The risks, benefits, and side effects of these medication(s) were explained to me.  I agree that:  1. I will take part in other treatments as advised by my provider.  This may be psychiatry or counseling, physical therapy, behavioral therapy, group treatment, or a referral to a pain clinic.  I will reduce or stop my medicine when my provider tells me to do so.   2. I will take my medicines as prescribed.  I will not change the dose or schedule unless my provider tells me to.  There will be no refills if I  run out early.   I may be contacted at any time without warning and asked to complete a drug test or pill count.   3. I will keep all my appointments at the clinic.  If I miss appointments or fail to follow instructions, my provider may stop my medicine.  4. I will not ask other providers to prescribe controlled substances. And I will not accept controlled substances from other people. If I need another prescribed controlled substance for a new reason, I will notify my provider within one business day.  5. If I enroll in the [place] Medical Marijuana program, I will tell my provider.  I will also sign an agreement to share my medical records with my provider.  6. I will use one pharmacy to fill all of my controlled substance prescriptions.  If my prescription is mailed to my pharmacy,  it may take 5 to 7 days for my medicine to be ready.  7. I understand that my provider, clinic care team, and pharmacy can track controlled substance prescriptions from other providers through a central database (prescription monitoring program).  8. I will bring in my list of medications (or my medicine bottles) each time I come to the clinic.  [id number] REV-  07/2018                                                                                                                                   Page 1 of 2      [facility]    07/16/18    Patient: Edi Villafana  YOB: 1945  Medical Record Number: [id number]    9. Refills of controlled substances will be made only during office hours.  It is up to me to make sure that I do not run out of my medicines on weekends or holidays.    10. I am responsible for my prescriptions.  If the medicine/prescription is lost or stolen, it will not be replaced.   I also agree not to share these medicines with anyone.  11. I agree to not use ANY illegal or recreational drugs.  This includes marijuana, cocaine, bath salts or other drugs.  I agree not to use alcohol unless my provider says I may.  I agree to give urine samples whenever asked.  If I fail to give a urine sample, the provider may stop my medicine.     12. I will tell my nurse or provider right away if I become pregnant or have a new medical problem treated outside of [facility]  13. I understand that this medicine can affect my thinking and judgment.  It may be unsafe for me to drive, use machinery and do dangerous tasks.  I will not do any of these things until I know how the medicine affects me.  If my dose changes, I will wait to see how it affects me.  I will contact my provider if I have concerns about medicine side effects.  I understand that if I do not follow any of the conditions above, my prescriptions or treatment may be stopped.    I agree that my provider, clinic care  team, and pharmacy may work with any city, state or federal law enforcement agency that investigates the misuse, sale, or other diversion of my controlled medicine. I will allow my provider to discuss my care with or share a copy of this agreement with any other treating provider, pharmacy or emergency room where I receive care.  I agree to give up (waive) any right of privacy or confidentiality with respect to these authorizations.   I have read this agreement and have asked questions about anything I did not understand.   ___________________________________    ___________________________  Patient Signature                                                           Date and Time  ___________________________________     ____________________________  Witness                                                                            Date and Time  ___________________________________  Enrique Lucia MD  [id number] REV-  07/2018                                                                                                                                                   Page 2 of 2

## 2018-07-16 NOTE — PROGRESS NOTES
[facility]  [address]  [facility] [address]  [phone]  Dept: [phone]    PRE-OP EVALUATION:  Today's date: 2018    Edi Villafana (: 1945) presents for pre-operative evaluation assessment as requested by Dr. Ortiz.  He requires evaluation and anesthesia risk assessment prior to undergoing surgery/procedure for treatment of RT hip .    Proposed Surgery/ Procedure: ARTHROPLASTY HIP ANTERIOR  Date of Surgery/ Procedure: 18  Time of Surgery/ Procedure:   Hospital/Surgical Facility: [facility]  Fax number for surgical facility:   Primary Physician: Enrique Lucia  Type of Anesthesia Anticipated: General    Patient has a Health Care Directive or Living Will:  YES     1. NO - Do you have a history of heart attack, stroke, stent, bypass or surgery on an artery in the head, neck, heart or legs?  2. NO - Do you ever have any pain or discomfort in your chest?  3. NO - Do you have a history of  Heart Failure?  4. NO - Are you troubled by shortness of breath when: walking on the level, up a slight hill or at night?  5. NO - Do you currently have a cold, bronchitis or other respiratory infection?  6. NO - Do you have a cough, shortness of breath or wheezing?  7. NO - Do you sometimes get pains in the calves of your legs when you walk?  8. YES - DO YOU OR ANYONE IN YOUR FAMILY HAVE PREVIOUS HISTORY OF BLOOD CLOTS? patient  9. NO - Do you or does anyone in your family have a serious bleeding problem such as prolonged bleeding following surgeries or cuts?  10. NO - Have you ever had problems with anemia or been told to take iron pills?  11. NO - Have you had any abnormal blood loss such as black, tarry or bloody stools, or abnormal vaginal bleeding?  12. YES - HAVE YOU EVER HAD A BLOOD TRANSFUSION? 1974 after tumor surgery in the 70's  13. YES - HAVE YOU OR ANY OF YOUR RELATIVES EVER HAD PROBLEMS WITH ANESTHESIA? Problems with spinal  anesthetic  14. NO - Do you have sleep apnea, excessive snoring or daytime drowsiness?  15. NO - Do you have any prosthetic heart valves?  16. NO - Do you have prosthetic joints?  17. NO - Is there any chance that you may be pregnant?      HPI:     HPI related to upcoming procedure: right hip pain increasing due to OA      See problem list for active medical problems.  Problems all longstanding and stable, except as noted/documented.  See ROS for pertinent symptoms related to these conditions.                                                                                                                                                          .    MEDICAL HISTORY:     Patient Active Problem List    Diagnosis Date Noted     History of deep venous thrombosis 07/16/2018     Priority: Medium     Hip pain, right 02/15/2018     Priority: Medium     Acute deep vein thrombosis (DVT) of distal vein of left lower extremity (H) 11/27/2017     Priority: Medium     Anticoagulation management encounter 11/27/2017     Priority: Medium     Pain of left calf 11/24/2017     Priority: Medium     Chronic left shoulder pain 11/21/2017     Priority: Medium     No CSA on file   check 3-21-18 provider to review- lorazepam and ambien       Advanced directives, counseling/discussion 11/21/2017     Priority: Medium     Physical deconditioning 11/17/2017     Priority: Medium     Alcoholism (H) 11/17/2017     Priority: Medium     Depression with anxiety 12/12/2015     Priority: Medium     Left shoulder pain 12/12/2015     Priority: Medium     Trochanteric bursitis 12/17/2013     Priority: Medium     Hypotestosteronism 10/23/2013     Priority: Medium     Hypothyroidism 10/17/2012     Priority: Medium     Hyperlipidemia LDL goal <100 10/17/2012     Priority: Medium     Persistent insomnia 10/17/2012     Priority: Medium      check 6-8-16-no concerns       Hypertension goal BP (blood pressure) < 140/90 10/17/2012     Priority: Medium      Anxiety 10/17/2012     Priority: Medium      check 6-8-16-no concerns        Past Medical History:   Diagnosis Date     Decreased libido      Diverticulosis of colon (without mention of hemorrhage)      Generalized anxiety disorder      Insomnia, unspecified      Other and unspecified hyperlipidemia      Other specified congenital anomaly of kidney      Other testicular hypofunction      Subjective visual disturbance, unspecified      Unspecified cataract      Unspecified essential hypertension      Unspecified hypothyroidism      Past Surgical History:   Procedure Laterality Date     ABDOMEN SURGERY  1973    large benign tumor removed with thrombophlebitis post op     APPENDECTOMY  1960     CATARACT IOL, RT/LT      bilateral     EYE SURGERY  [id number], 2007    eyelid surgery, 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 270 tablet 0     losartan (COZAAR) 50 MG tablet 1/2 tablet (25 mg) once daily 45 tablet 3     Menthol-Methyl Salicylate (ICY HOT EXTRA STRENGTH) 10-30 % CREA Apply topically every 4 hours as needed       Multiple Vitamin (MULTIVITAMIN OR) Take 1 tablet by mouth daily        oxyCODONE IR (ROXICODONE) 5 MG tablet Take 1 tablet (5 mg) by mouth 3 times daily 90 tablet 0     TESTOSTERONE 2 MG/GM CREAM 15% cream and apply 0.5 ml topically daily 40 g 5     zolpidem (AMBIEN) 10 MG tablet Take 1 tablet (10 mg) by mouth nightly as needed for sleep 90 tablet 1     [DISCONTINUED] TESTOSTERONE 2 MG/GM CREAM 15% cream and apply 0.5 ml topically daily 40 g 5     OTC products:  "None, except as noted above    Allergies   Allergen Reactions     Ace Inhibitors Cough     Ancef [Cefazolin Sodium]      Atenolol Other (See Comments)     Low BP     Compazine      Sulfa Drugs      Tramadol Fatigue     Side effects      Latex Allergy: NO    Social History   Substance Use Topics     Smoking status: Never Smoker     Smokeless tobacco: Never Used     Alcohol use Yes     History   Drug Use No       REVIEW OF SYSTEMS:   CONSTITUTIONAL: NEGATIVE for fever, chills, change in weight  INTEGUMENTARY/SKIN: NEGATIVE for worrisome rashes, moles or lesions  EYES: NEGATIVE for vision changes or irritation  ENT/MOUTH: NEGATIVE for ear, mouth and throat problems  RESP: NEGATIVE for significant cough or SOB  BREAST: NEGATIVE for masses, tenderness or discharge  CV: NEGATIVE for chest pain, palpitations or peripheral edema  GI: NEGATIVE for nausea, abdominal pain, heartburn, or change in bowel habits  : NEGATIVE for frequency, dysuria, or hematuria  MUSCULOSKELETAL:POSITIVE  for arthralgias bilaterally in the shoulders and right hip and Hx DVT   NEURO: NEGATIVE for weakness, dizziness or paresthesias  ENDOCRINE: NEGATIVE for temperature intolerance, skin/hair changes    EXAM:   /78 (Cuff Size: Adult Regular)  Pulse 78  Temp 97.5  F (36.4  C) (Tympanic)  Resp 16  Ht 6' 1\" (1.854 m)  Wt 197 lb (89.4 kg)  SpO2 95%  BMI 25.99 kg/m2    GENERAL APPEARANCE: healthy, alert and no distress     EYES: EOMI,  PERRL     HENT: ear canals and TM's normal and nose and mouth without ulcers or lesions     NECK: no adenopathy, no asymmetry, masses, or scars and thyroid normal to palpation     RESP: lungs clear to auscultation - no rales, rhonchi or wheezes     CV: regular rates and rhythm, normal S1 S2, no S3 or S4 and no murmur, click or rub     ABDOMEN:  soft, nontender, no HSM or masses and bowel sounds normal     MS: extremities normal- no gross deformities noted, no evidence of inflammation in joints, FROM in all " extremities.     SKIN: no suspicious lesions or rashes     NEURO: Normal strength and tone, sensory exam grossly normal, mentation intact and speech normal     PSYCH: mentation appears normal. and affect normal/bright     LYMPHATICS: No cervical adenopathy    DIAGNOSTICS:   EKG was done and is unchanged from previous.    Recent Labs   Lab Test 06/01/18 05/18/18 03/27/18   1208   03/12/18   1337   HGB   --    --    --   15.8   --   15.8   PLT   --    --    --   232   --   220   INR  1.9*  2.0*   < >   --    < >   --    NA   --    --    --   138   --   136   POTASSIUM   --    --    --   4.3   --   4.3   CR   --    --    --   0.97   --   0.96    < > = values in this interval not displayed.        IMPRESSION:   Reason for surgery/procedure: Increasing pain in the right hip.    The proposed surgical procedure is considered INTERMEDIATE risk.    REVISED CARDIAC RISK INDEX  The patient has the following serious cardiovascular risks for perioperative complications such as (MI, PE, VFib and 3  AV Block):  No serious cardiac risks  INTERPRETATION: 0 risks: Class I (very low risk - 0.4% complication rate)    The patient has the following additional risks for perioperative complications:  No identified additional risks      ICD-10-CM    1. Preop general physical exam Z01.818 UA with Microscopic     CBC with platelets differential     Electrolyte panel (Na, K, Cl, CO2, Anion gap)     Creatinine     Urea nitrogen     EKG 12-lead complete w/read - Clinics   2. Hip pain, right M25.551    3. Chronic left shoulder pain M25.512 oxyCODONE IR (ROXICODONE) 5 MG tablet    G89.29    4. Persistent insomnia G47.00    5. Hypotestosteronism E34.9    6. Hyperlipidemia LDL goal <100 E78.5    7. Depression with anxiety F41.8    8. Anxiety F41.9    9. History of deep venous thrombosis Z86.718        RECOMMENDATIONS:             APPROVAL GIVEN to proceed with proposed procedure, without further diagnostic evaluation       Signed Electronically  by: Enrique Lucia MD    Copy of this evaluation report is provided to requesting physician.    [place] Preop Guidelines    Revised Cardiac Risk Index

## 2018-07-16 NOTE — MR AVS SNAPSHOT
After Visit Summary   7/16/2018    Edi Villafana    MRN: [id number]           Patient Information     Date Of Birth          1945        Visit Information        Provider Department      7/16/2018 1:00 PM Enrique Lucia MD [facility]        Today's Diagnoses     Preop general physical exam    -  1    Hip pain, right        Chronic left shoulder pain        Persistent insomnia        Hypotestosteronism        Hyperlipidemia LDL goal <100        Depression with anxiety        Anxiety        History of deep venous thrombosis          Care Instructions      Before Your Surgery      Call your surgeon if there is any change in your health. This includes signs of a cold or flu (such as a sore throat, runny nose, cough, rash or fever).    Do not smoke, drink alcohol or take over the counter medicine (unless your surgeon or primary care doctor tells you to) for the 24 hours before and after surgery.    If you take prescribed drugs: Follow your doctor s orders about which medicines to take and which to stop until after surgery.    Eating and drinking prior to surgery: follow the instructions from your surgeon    Take a shower or bath the night before surgery. Use the soap your surgeon gave you to gently clean your skin. If you do not have soap from your surgeon, use your regular soap. Do not shave or scrub the surgery site.  Wear clean pajamas and have clean sheets on your bed.           Follow-ups after your visit        Your next 10 appointments already scheduled     Aug 09, 2018   Procedure with Chester Ortiz MD   [facility] (--)    [address]  [facility] [address] [phone]              Who to contact     If you have questions or need follow up information about today's clinic visit or your schedule please contact [facility] directly at [phone].  Normal or non-critical lab and  "imaging results will be communicated to you by MyChart, letter or phone within 4 business days after the clinic has received the results. If you do not hear from us within 7 days, please contact the clinic through [employer] or phone. If you have a critical or abnormal lab result, we will notify you by phone as soon as possible.  Submit refill requests through [employer] or call your pharmacy and they will forward the refill request to us. Please allow 3 business days for your refill to be completed.          Additional Information About Your Visit        [facility] Information     [employer] gives you secure access to your electronic health record. If you see a primary care provider, you can also send messages to your care team and make appointments. If you have questions, please call your primary care clinic.  If you do not have a primary care provider, please call [phone] and they will assist you.        Care EveryWhere ID     This is your Care EveryWhere ID. This could be used by other organizations to access your [facility] medical records  [id number]        Your Vitals Were     Pulse Temperature Respirations Height Pulse Oximetry BMI (Body Mass Index)    78 97.5  F (36.4  C) (Tympanic) 16 6' 1\" (1.854 m) 95% 25.99 kg/m2       Blood Pressure from Last 3 Encounters:   07/16/18 110/78   05/19/18 120/78   05/04/18 119/77    Weight from Last 3 Encounters:   07/16/18 197 lb (89.4 kg)   05/19/18 196 lb (88.9 kg)   03/27/18 197 lb 8 oz (89.6 kg)              We Performed the Following     CBC with platelets differential     Creatinine     EKG 12-lead complete w/read - Clinics     Electrolyte panel (Na, K, Cl, CO2, Anion gap)     UA with Microscopic     Urea nitrogen          Today's Medication Changes          These changes are accurate as of 7/16/18 11:59 PM.  If you have any questions, ask your nurse or doctor.               Stop taking these medicines if you haven't already. Please contact your care team if you have " questions.     diclofenac 1 % Gel topical gel   Commonly known as:  VOLTAREN   Stopped by:  Enrique Lucia MD                Where to get your medicines      Some of these will need a paper prescription and others can be bought over the counter.  Ask your nurse if you have questions.     Bring a paper prescription for each of these medications     oxyCODONE IR 5 MG tablet               Information about OPIOIDS     PRESCRIPTION OPIOIDS: WHAT YOU NEED TO KNOW   We gave you an opioid (narcotic) pain medicine. It is important to manage your pain, but opioids are not always the best choice. You should first try all the other options your care team gave you. Take this medicine for as short a time (and as few doses) as possible.     These medicines have risks:    DO NOT drive when on new or higher doses of pain medicine. These medicines can affect your alertness and reaction times, and you could be arrested for driving under the influence (DUI). If you need to use opioids long-term, talk to your care team about driving.    DO NOT operate heave machinery    DO NOT do any other dangerous activities while taking these medicines.     DO NOT drink any alcohol while taking these medicines.      If the opioid prescribed includes acetaminophen, DO NOT take with any other medicines that contain acetaminophen. Read all labels carefully. Look for the word  acetaminophen  or  Tylenol.  Ask your pharmacist if you have questions or are unsure.    You can get addicted to pain medicines, especially if you have a history of addiction (chemical, alcohol or substance dependence). Talk to your care team about ways to reduce this risk.    Store your pills in a secure place, locked if possible. We will not replace any lost or stolen medicine. If you don t finish your medicine, please throw away (dispose) as directed by your pharmacist. The [place] Pollution Control Agency has more information about safe disposal:  https://www.pca.[facility].[place].us/living-green/managing-unwanted-medications.     All opioids tend to cause constipation. Drink plenty of water and eat foods that have a lot of fiber, such as fruits, vegetables, prune juice, apple juice and high-fiber cereal. Take a laxative (Miralax, milk of magnesia, Colace, Senna) if you don t move your bowels at least every other day.          Primary Care Provider Office Phone # Fax #    Enrique Lucia [phone] [address] [facility] [address]        Equal Access to Services     CONSUELO ANDERSON : Hadii jose ramon cabrales hadasho Sofrederick, waaxda luqadaha, qaybta kaalmada ademaxim, abebe alcantar. So [facility] [phone].    ATENCIÓN: Si habla español, tiene a kidd disposición servicios gratuitos de asistencia lingüística. Llame al [phone].    We comply with applicable federal civil rights laws and [place] laws. We do not discriminate on the basis of race, color, national origin, age, disability, sex, sexual orientation, or gender identity.            Thank you!     Thank you for choosing [facility]  for your care. Our goal is always to provide you with excellent care. Hearing back from our patients is one way we can continue to improve our services. Please take a few minutes to complete the written survey that you may receive in the mail after your visit with us. Thank you!             Your Updated Medication List - Protect others around you: Learn how to safely use, store and throw away your medicines at www.disposemymeds.org.          This list is accurate as of 7/16/18 11:59 PM.  Always use your most recent med list.                   Brand Name Dispense Instructions for use Diagnosis    atorvastatin 40 MG tablet    LIPITOR    90 tablet    TAKE 1 TABLET (40 MG) BY MOUTH DAILY    Hyperlipidemia LDL goal <100       ICY HOT EXTRA STRENGTH 10-30 % Crea      Apply topically every 4 hours as needed         levothyroxine 125 MCG tablet    SYNTHROID/LEVOTHROID    90 tablet    Take 1 tablet (125 mcg) by mouth daily    Hypothyroidism, unspecified type       LORazepam 2 MG tablet    ATIVAN    270 tablet    TAKE 1 TABLET BY MOUTH EVERY 8 HOURS AS NEEDED FOR ANXIETY.    Anxiety       losartan 50 MG tablet    COZAAR    45 tablet    1/2 tablet (25 mg) once daily    Essential hypertension with goal blood pressure less than 140/90       MULTIVITAMIN PO      Take 1 tablet by mouth daily        oxyCODONE IR 5 MG tablet    ROXICODONE    90 tablet    Take 1 tablet (5 mg) by mouth 3 times daily    Chronic left shoulder pain       TESTOSTERONE 2 MG/GM CREAM     40 g    15% cream and apply 0.5 ml topically daily    Hypotestosteronism       vitamin D3 2000 units Caps      Take 1 capsule by mouth daily        zolpidem 10 MG tablet    AMBIEN    90 tablet    Take 1 tablet (10 mg) by mouth nightly as needed for sleep    Persistent disorder of initiating or maintaining sleep

## 2018-07-17 LAB
ANION GAP SERPL CALCULATED.3IONS-SCNC: 8 MMOL/L (ref 3–14)
BUN SERPL-MCNC: 16 MG/DL (ref 7–30)
CHLORIDE SERPL-SCNC: 102 MMOL/L (ref 94–109)
CO2 SERPL-SCNC: 26 MMOL/L (ref 20–32)
CREAT SERPL-MCNC: 1 MG/DL (ref 0.66–1.25)
GFR SERPL CREATININE-BSD FRML MDRD: 73 ML/MIN/1.7M2
POTASSIUM SERPL-SCNC: 4.4 MMOL/L (ref 3.4–5.3)
SODIUM SERPL-SCNC: 136 MMOL/L (ref 133–144)

## 2018-07-17 ASSESSMENT — PATIENT HEALTH QUESTIONNAIRE - PHQ9: SUM OF ALL RESPONSES TO PHQ QUESTIONS 1-9: 3

## 2018-07-19 ENCOUNTER — TELEPHONE (OUTPATIENT)
Dept: FAMILY MEDICINE | Facility: CLINIC | Age: 73
End: 2018-07-19

## 2018-07-19 NOTE — LETTER
July 19, 2018      Edi Villafana  [address]  [facility] [address]        Dear Edi,       Enclosed are you lab results from 7/16/18.      Sincerely,    Enrique Lucia MD

## 2018-07-19 NOTE — TELEPHONE ENCOUNTER
Reason for Call:  Request for results:    Name of test or procedure: labs for preop    Date of test of procedure: 4-16-18    Location of the test or procedure: [facility]    OK to leave the result message on voice mail or with a family member? YES    Phone number Patient can be reached at:  Home number on file [phone] (home)    Additional comments: PT WOULD LIKE RESULTS MAILED TO HIM.     Call taken on 7/19/2018 at 11:05 AM by ANNIE SELLERS

## 2018-07-23 ENCOUNTER — TELEPHONE (OUTPATIENT)
Dept: FAMILY MEDICINE | Facility: CLINIC | Age: 73
End: 2018-07-23

## 2018-07-26 ENCOUNTER — TRANSFERRED RECORDS (OUTPATIENT)
Dept: HEALTH INFORMATION MANAGEMENT | Facility: CLINIC | Age: 73
End: 2018-07-26

## 2018-08-06 ENCOUNTER — HOSPITAL ENCOUNTER (OUTPATIENT)
Dept: LAB | Facility: CLINIC | Age: 73
Discharge: HOME | DRG: 470 | End: 2018-08-06
Attending: ORTHOPAEDIC SURGERY | Admitting: ORTHOPAEDIC SURGERY
Payer: MEDICARE

## 2018-08-06 DIAGNOSIS — Z01.812 PRE-OPERATIVE LABORATORY EXAMINATION: ICD-10-CM

## 2018-08-06 LAB
MRSA DNA SPEC QL NAA+PROBE: NEGATIVE
SPECIMEN SOURCE: NORMAL

## 2018-08-06 PROCEDURE — 87640 STAPH A DNA AMP PROBE: CPT | Performed by: ORTHOPAEDIC SURGERY

## 2018-08-06 PROCEDURE — 87641 MR-STAPH DNA AMP PROBE: CPT | Performed by: ORTHOPAEDIC SURGERY

## 2018-08-06 PROCEDURE — 40000829 ZZHCL STATISTIC STAPH AUREUS SUSCEPT SCREEN PCR: Performed by: ORTHOPAEDIC SURGERY

## 2018-08-06 PROCEDURE — 40000830 ZZHCL STATISTIC STAPH AUREUS METH RESIST SCREEN PCR: Performed by: ORTHOPAEDIC SURGERY

## 2018-08-07 ENCOUNTER — TELEPHONE (OUTPATIENT)
Dept: FAMILY MEDICINE | Facility: CLINIC | Age: 73
End: 2018-08-07

## 2018-08-07 NOTE — TELEPHONE ENCOUNTER
Edi was in to Ortho and they say he has nasal staph and will be temporary treatment before his surgery.  He wants to know if there is a permanent treatment so he will not have it at all.     Discussed with Dr Rodriguez and called back pt. Informed him what dr Rodriguez had said and that staph is every where.  He still would like to get rid of it permanently because of all his medical issues.  He read about a way of doing it that takes a month and uses several antibiotics.  He will see if ortho would discuss it with him or else he will come in and see you after the surgery.

## 2018-08-08 NOTE — H&P (VIEW-ONLY)
[facility]  [address]  [facility] [address]  [phone]  Dept: [phone]    PRE-OP EVALUATION:  Today's date: 2018    Edi Villafana (: 1945) presents for pre-operative evaluation assessment as requested by Dr. Ortiz.  He requires evaluation and anesthesia risk assessment prior to undergoing surgery/procedure for treatment of RT hip .    Proposed Surgery/ Procedure: ARTHROPLASTY HIP ANTERIOR  Date of Surgery/ Procedure: 18  Time of Surgery/ Procedure:   Hospital/Surgical Facility: [facility]  Fax number for surgical facility:   Primary Physician: Enrique Lucia  Type of Anesthesia Anticipated: General    Patient has a Health Care Directive or Living Will:  YES     1. NO - Do you have a history of heart attack, stroke, stent, bypass or surgery on an artery in the head, neck, heart or legs?  2. NO - Do you ever have any pain or discomfort in your chest?  3. NO - Do you have a history of  Heart Failure?  4. NO - Are you troubled by shortness of breath when: walking on the level, up a slight hill or at night?  5. NO - Do you currently have a cold, bronchitis or other respiratory infection?  6. NO - Do you have a cough, shortness of breath or wheezing?  7. NO - Do you sometimes get pains in the calves of your legs when you walk?  8. YES - DO YOU OR ANYONE IN YOUR FAMILY HAVE PREVIOUS HISTORY OF BLOOD CLOTS? patient  9. NO - Do you or does anyone in your family have a serious bleeding problem such as prolonged bleeding following surgeries or cuts?  10. NO - Have you ever had problems with anemia or been told to take iron pills?  11. NO - Have you had any abnormal blood loss such as black, tarry or bloody stools, or abnormal vaginal bleeding?  12. YES - HAVE YOU EVER HAD A BLOOD TRANSFUSION? 1974 after tumor surgery in the 70's  13. YES - HAVE YOU OR ANY OF YOUR RELATIVES EVER HAD PROBLEMS WITH ANESTHESIA? Problems with spinal  anesthetic  14. NO - Do you have sleep apnea, excessive snoring or daytime drowsiness?  15. NO - Do you have any prosthetic heart valves?  16. NO - Do you have prosthetic joints?  17. NO - Is there any chance that you may be pregnant?      HPI:     HPI related to upcoming procedure: right hip pain increasing due to OA      See problem list for active medical problems.  Problems all longstanding and stable, except as noted/documented.  See ROS for pertinent symptoms related to these conditions.                                                                                                                                                          .    MEDICAL HISTORY:     Patient Active Problem List    Diagnosis Date Noted     History of deep venous thrombosis 07/16/2018     Priority: Medium     Hip pain, right 02/15/2018     Priority: Medium     Acute deep vein thrombosis (DVT) of distal vein of left lower extremity (H) 11/27/2017     Priority: Medium     Anticoagulation management encounter 11/27/2017     Priority: Medium     Pain of left calf 11/24/2017     Priority: Medium     Chronic left shoulder pain 11/21/2017     Priority: Medium     No CSA on file   check 3-21-18 provider to review- lorazepam and ambien       Advanced directives, counseling/discussion 11/21/2017     Priority: Medium     Physical deconditioning 11/17/2017     Priority: Medium     Alcoholism (H) 11/17/2017     Priority: Medium     Depression with anxiety 12/12/2015     Priority: Medium     Left shoulder pain 12/12/2015     Priority: Medium     Trochanteric bursitis 12/17/2013     Priority: Medium     Hypotestosteronism 10/23/2013     Priority: Medium     Hypothyroidism 10/17/2012     Priority: Medium     Hyperlipidemia LDL goal <100 10/17/2012     Priority: Medium     Persistent insomnia 10/17/2012     Priority: Medium      check 6-8-16-no concerns       Hypertension goal BP (blood pressure) < 140/90 10/17/2012     Priority: Medium      Anxiety 10/17/2012     Priority: Medium      check 6-8-16-no concerns        Past Medical History:   Diagnosis Date     Decreased libido      Diverticulosis of colon (without mention of hemorrhage)      Generalized anxiety disorder      Insomnia, unspecified      Other and unspecified hyperlipidemia      Other specified congenital anomaly of kidney      Other testicular hypofunction      Subjective visual disturbance, unspecified      Unspecified cataract      Unspecified essential hypertension      Unspecified hypothyroidism      Past Surgical History:   Procedure Laterality Date     ABDOMEN SURGERY  1973    large benign tumor removed with thrombophlebitis post op     APPENDECTOMY  1960     CATARACT IOL, RT/LT      bilateral     EYE SURGERY  [id number], 2007    eyelid surgery, 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 270 tablet 0     losartan (COZAAR) 50 MG tablet 1/2 tablet (25 mg) once daily 45 tablet 3     Menthol-Methyl Salicylate (ICY HOT EXTRA STRENGTH) 10-30 % CREA Apply topically every 4 hours as needed       Multiple Vitamin (MULTIVITAMIN OR) Take 1 tablet by mouth daily        oxyCODONE IR (ROXICODONE) 5 MG tablet Take 1 tablet (5 mg) by mouth 3 times daily 90 tablet 0     TESTOSTERONE 2 MG/GM CREAM 15% cream and apply 0.5 ml topically daily 40 g 5     zolpidem (AMBIEN) 10 MG tablet Take 1 tablet (10 mg) by mouth nightly as needed for sleep 90 tablet 1     [DISCONTINUED] TESTOSTERONE 2 MG/GM CREAM 15% cream and apply 0.5 ml topically daily 40 g 5     OTC products:  "None, except as noted above    Allergies   Allergen Reactions     Ace Inhibitors Cough     Ancef [Cefazolin Sodium]      Atenolol Other (See Comments)     Low BP     Compazine      Sulfa Drugs      Tramadol Fatigue     Side effects      Latex Allergy: NO    Social History   Substance Use Topics     Smoking status: Never Smoker     Smokeless tobacco: Never Used     Alcohol use Yes     History   Drug Use No       REVIEW OF SYSTEMS:   CONSTITUTIONAL: NEGATIVE for fever, chills, change in weight  INTEGUMENTARY/SKIN: NEGATIVE for worrisome rashes, moles or lesions  EYES: NEGATIVE for vision changes or irritation  ENT/MOUTH: NEGATIVE for ear, mouth and throat problems  RESP: NEGATIVE for significant cough or SOB  BREAST: NEGATIVE for masses, tenderness or discharge  CV: NEGATIVE for chest pain, palpitations or peripheral edema  GI: NEGATIVE for nausea, abdominal pain, heartburn, or change in bowel habits  : NEGATIVE for frequency, dysuria, or hematuria  MUSCULOSKELETAL:POSITIVE  for arthralgias bilaterally in the shoulders and right hip and Hx DVT   NEURO: NEGATIVE for weakness, dizziness or paresthesias  ENDOCRINE: NEGATIVE for temperature intolerance, skin/hair changes    EXAM:   /78 (Cuff Size: Adult Regular)  Pulse 78  Temp 97.5  F (36.4  C) (Tympanic)  Resp 16  Ht 6' 1\" (1.854 m)  Wt 197 lb (89.4 kg)  SpO2 95%  BMI 25.99 kg/m2    GENERAL APPEARANCE: healthy, alert and no distress     EYES: EOMI,  PERRL     HENT: ear canals and TM's normal and nose and mouth without ulcers or lesions     NECK: no adenopathy, no asymmetry, masses, or scars and thyroid normal to palpation     RESP: lungs clear to auscultation - no rales, rhonchi or wheezes     CV: regular rates and rhythm, normal S1 S2, no S3 or S4 and no murmur, click or rub     ABDOMEN:  soft, nontender, no HSM or masses and bowel sounds normal     MS: extremities normal- no gross deformities noted, no evidence of inflammation in joints, FROM in all " extremities.     SKIN: no suspicious lesions or rashes     NEURO: Normal strength and tone, sensory exam grossly normal, mentation intact and speech normal     PSYCH: mentation appears normal. and affect normal/bright     LYMPHATICS: No cervical adenopathy    DIAGNOSTICS:   EKG was done and is unchanged from previous.    Recent Labs   Lab Test 06/01/18 05/18/18 03/27/18   1208   03/12/18   1337   HGB   --    --    --   15.8   --   15.8   PLT   --    --    --   232   --   220   INR  1.9*  2.0*   < >   --    < >   --    NA   --    --    --   138   --   136   POTASSIUM   --    --    --   4.3   --   4.3   CR   --    --    --   0.97   --   0.96    < > = values in this interval not displayed.        IMPRESSION:   Reason for surgery/procedure: Increasing pain in the right hip.    The proposed surgical procedure is considered INTERMEDIATE risk.    REVISED CARDIAC RISK INDEX  The patient has the following serious cardiovascular risks for perioperative complications such as (MI, PE, VFib and 3  AV Block):  No serious cardiac risks  INTERPRETATION: 0 risks: Class I (very low risk - 0.4% complication rate)    The patient has the following additional risks for perioperative complications:  No identified additional risks      ICD-10-CM    1. Preop general physical exam Z01.818 UA with Microscopic     CBC with platelets differential     Electrolyte panel (Na, K, Cl, CO2, Anion gap)     Creatinine     Urea nitrogen     EKG 12-lead complete w/read - Clinics   2. Hip pain, right M25.551    3. Chronic left shoulder pain M25.512 oxyCODONE IR (ROXICODONE) 5 MG tablet    G89.29    4. Persistent insomnia G47.00    5. Hypotestosteronism E34.9    6. Hyperlipidemia LDL goal <100 E78.5    7. Depression with anxiety F41.8    8. Anxiety F41.9    9. History of deep venous thrombosis Z86.718        RECOMMENDATIONS:             APPROVAL GIVEN to proceed with proposed procedure, without further diagnostic evaluation       Signed Electronically  by: Enrique Lucia MD    Copy of this evaluation report is provided to requesting physician.    [place] Preop Guidelines    Revised Cardiac Risk Index

## 2018-08-09 ENCOUNTER — HOSPITAL ENCOUNTER (INPATIENT)
Facility: CLINIC | Age: 73
LOS: 3 days | Discharge: HOME | DRG: 470 | End: 2018-08-12
Attending: ORTHOPAEDIC SURGERY | Admitting: ORTHOPAEDIC SURGERY
Payer: MEDICARE

## 2018-08-09 ENCOUNTER — ANESTHESIA EVENT (OUTPATIENT)
Dept: SURGERY | Facility: CLINIC | Age: 73
DRG: 470 | End: 2018-08-09
Payer: MEDICARE

## 2018-08-09 ENCOUNTER — APPOINTMENT (OUTPATIENT)
Dept: GENERAL RADIOLOGY | Facility: CLINIC | Age: 73
DRG: 470 | End: 2018-08-09
Attending: ORTHOPAEDIC SURGERY
Payer: MEDICARE

## 2018-08-09 ENCOUNTER — APPOINTMENT (OUTPATIENT)
Dept: PHYSICAL THERAPY | Facility: CLINIC | Age: 73
DRG: 470 | End: 2018-08-09
Attending: ORTHOPAEDIC SURGERY
Payer: MEDICARE

## 2018-08-09 ENCOUNTER — ANESTHESIA (OUTPATIENT)
Dept: SURGERY | Facility: CLINIC | Age: 73
DRG: 470 | End: 2018-08-09
Payer: MEDICARE

## 2018-08-09 DIAGNOSIS — Z96.641 STATUS POST RIGHT HIP REPLACEMENT: Primary | ICD-10-CM

## 2018-08-09 LAB
ABO + RH BLD: NORMAL
ABO + RH BLD: NORMAL
BLD GP AB SCN SERPL QL: NORMAL
BLOOD BANK CMNT PATIENT-IMP: NORMAL
BUN SERPL-MCNC: 14 MG/DL (ref 7–30)
CALCIUM SERPL-MCNC: 8.8 MG/DL (ref 8.5–10.1)
CHLORIDE SERPL-SCNC: 103 MMOL/L (ref 94–109)
CO2 SERPL-SCNC: 22 MMOL/L (ref 20–32)
CREAT SERPL-MCNC: 1.01 MG/DL (ref 0.66–1.25)
GFR SERPL CREATININE-BSD FRML MDRD: 72 ML/MIN/1.7M2
GLUCOSE SERPL-MCNC: 92 MG/DL (ref 70–99)
HGB BLD-MCNC: 14.8 G/DL (ref 13.3–17.7)
PLATELET # BLD AUTO: 181 10E9/L (ref 150–450)
POTASSIUM SERPL-SCNC: 3.9 MMOL/L (ref 3.4–5.3)
SODIUM SERPL-SCNC: 137 MMOL/L (ref 133–144)
SPECIMEN EXP DATE BLD: NORMAL

## 2018-08-09 PROCEDURE — 36000063 ZZH SURGERY LEVEL 4 EA 15 ADDTL MIN: Performed by: ORTHOPAEDIC SURGERY

## 2018-08-09 PROCEDURE — 99207 ZZC CONSULT E&M CHANGED TO INITIAL LEVEL: CPT | Performed by: NURSE PRACTITIONER

## 2018-08-09 PROCEDURE — 25000132 ZZH RX MED GY IP 250 OP 250 PS 637: Mod: GY | Performed by: NURSE PRACTITIONER

## 2018-08-09 PROCEDURE — C1713 ANCHOR/SCREW BN/BN,TIS/BN: HCPCS | Performed by: ORTHOPAEDIC SURGERY

## 2018-08-09 PROCEDURE — 25000125 ZZHC RX 250: Performed by: ORTHOPAEDIC SURGERY

## 2018-08-09 PROCEDURE — P9041 ALBUMIN (HUMAN),5%, 50ML: HCPCS | Performed by: NURSE ANESTHETIST, CERTIFIED REGISTERED

## 2018-08-09 PROCEDURE — 37000008 ZZH ANESTHESIA TECHNICAL FEE, 1ST 30 MIN: Performed by: ORTHOPAEDIC SURGERY

## 2018-08-09 PROCEDURE — 0SR903A REPLACEMENT OF RIGHT HIP JOINT WITH CERAMIC SYNTHETIC SUBSTITUTE, UNCEMENTED, OPEN APPROACH: ICD-10-PCS | Performed by: ORTHOPAEDIC SURGERY

## 2018-08-09 PROCEDURE — 36415 COLL VENOUS BLD VENIPUNCTURE: CPT | Performed by: PHYSICIAN ASSISTANT

## 2018-08-09 PROCEDURE — A9270 NON-COVERED ITEM OR SERVICE: HCPCS | Mod: GY | Performed by: PHYSICIAN ASSISTANT

## 2018-08-09 PROCEDURE — C1776 JOINT DEVICE (IMPLANTABLE): HCPCS | Performed by: ORTHOPAEDIC SURGERY

## 2018-08-09 PROCEDURE — 86900 BLOOD TYPING SEROLOGIC ABO: CPT | Performed by: ANESTHESIOLOGY

## 2018-08-09 PROCEDURE — 85018 HEMOGLOBIN: CPT | Performed by: PHYSICIAN ASSISTANT

## 2018-08-09 PROCEDURE — 37000009 ZZH ANESTHESIA TECHNICAL FEE, EACH ADDTL 15 MIN: Performed by: ORTHOPAEDIC SURGERY

## 2018-08-09 PROCEDURE — 27210794 ZZH OR GENERAL SUPPLY STERILE: Performed by: ORTHOPAEDIC SURGERY

## 2018-08-09 PROCEDURE — 86850 RBC ANTIBODY SCREEN: CPT | Performed by: ANESTHESIOLOGY

## 2018-08-09 PROCEDURE — 25000125 ZZHC RX 250: Performed by: PHYSICIAN ASSISTANT

## 2018-08-09 PROCEDURE — 80048 BASIC METABOLIC PNL TOTAL CA: CPT | Performed by: PHYSICIAN ASSISTANT

## 2018-08-09 PROCEDURE — 97110 THERAPEUTIC EXERCISES: CPT | Mod: GP

## 2018-08-09 PROCEDURE — 25000128 H RX IP 250 OP 636: Performed by: ANESTHESIOLOGY

## 2018-08-09 PROCEDURE — 12000007 ZZH R&B INTERMEDIATE

## 2018-08-09 PROCEDURE — 71000012 ZZH RECOVERY PHASE 1 LEVEL 1 FIRST HR: Performed by: ORTHOPAEDIC SURGERY

## 2018-08-09 PROCEDURE — 25000128 H RX IP 250 OP 636: Performed by: NURSE ANESTHETIST, CERTIFIED REGISTERED

## 2018-08-09 PROCEDURE — 25000128 H RX IP 250 OP 636: Performed by: ORTHOPAEDIC SURGERY

## 2018-08-09 PROCEDURE — 25000132 ZZH RX MED GY IP 250 OP 250 PS 637: Mod: GY | Performed by: ORTHOPAEDIC SURGERY

## 2018-08-09 PROCEDURE — 99222 1ST HOSP IP/OBS MODERATE 55: CPT | Performed by: NURSE PRACTITIONER

## 2018-08-09 PROCEDURE — 25000125 ZZHC RX 250: Performed by: NURSE ANESTHETIST, CERTIFIED REGISTERED

## 2018-08-09 PROCEDURE — 71000013 ZZH RECOVERY PHASE 1 LEVEL 1 EA ADDTL HR: Performed by: ORTHOPAEDIC SURGERY

## 2018-08-09 PROCEDURE — A9270 NON-COVERED ITEM OR SERVICE: HCPCS | Mod: GY | Performed by: NURSE PRACTITIONER

## 2018-08-09 PROCEDURE — 85049 AUTOMATED PLATELET COUNT: CPT | Performed by: PHYSICIAN ASSISTANT

## 2018-08-09 PROCEDURE — 40000193 ZZH STATISTIC PT WARD VISIT

## 2018-08-09 PROCEDURE — 25000132 ZZH RX MED GY IP 250 OP 250 PS 637: Mod: GY | Performed by: PHYSICIAN ASSISTANT

## 2018-08-09 PROCEDURE — 40000986 XR PELVIS AD HIP PORTABLE RIGHT 1 VIEW

## 2018-08-09 PROCEDURE — A9270 NON-COVERED ITEM OR SERVICE: HCPCS | Mod: GY | Performed by: ORTHOPAEDIC SURGERY

## 2018-08-09 PROCEDURE — 27210995 ZZH RX 272: Performed by: ORTHOPAEDIC SURGERY

## 2018-08-09 PROCEDURE — 25000566 ZZH SEVOFLURANE, EA 15 MIN: Performed by: ORTHOPAEDIC SURGERY

## 2018-08-09 PROCEDURE — 97161 PT EVAL LOW COMPLEX 20 MIN: CPT | Mod: GP

## 2018-08-09 PROCEDURE — 40000277 XR SURGERY CARM FLUORO LESS THAN 5 MIN W STILLS

## 2018-08-09 PROCEDURE — 25800025 ZZH RX 258: Performed by: ORTHOPAEDIC SURGERY

## 2018-08-09 PROCEDURE — 40000170 ZZH STATISTIC PRE-PROCEDURE ASSESSMENT II: Performed by: ORTHOPAEDIC SURGERY

## 2018-08-09 PROCEDURE — 86901 BLOOD TYPING SEROLOGIC RH(D): CPT | Performed by: ANESTHESIOLOGY

## 2018-08-09 PROCEDURE — 36000065 ZZH SURGERY LEVEL 4 W FLUORO 1ST 30 MIN: Performed by: ORTHOPAEDIC SURGERY

## 2018-08-09 PROCEDURE — 25000128 H RX IP 250 OP 636: Performed by: PHYSICIAN ASSISTANT

## 2018-08-09 PROCEDURE — 25000125 ZZHC RX 250: Performed by: ANESTHESIOLOGY

## 2018-08-09 PROCEDURE — 25000128 H RX IP 250 OP 636

## 2018-08-09 DEVICE — IMP SCR DEPUY PINNACLE CANC 6.5X15MM 1217-15-500: Type: IMPLANTABLE DEVICE | Site: HIP | Status: FUNCTIONAL

## 2018-08-09 DEVICE — IMP SCR BONE CAN ACE 6.5X40MM 1217-40-500: Type: IMPLANTABLE DEVICE | Site: HIP | Status: FUNCTIONAL

## 2018-08-09 DEVICE — IMP HEAD FEMORAL DEPUY CERAMIC 36MM +5MM 136536320: Type: IMPLANTABLE DEVICE | Site: HIP | Status: FUNCTIONAL

## 2018-08-09 DEVICE — IMP SCR BONE CAN ACE 6.5X30MM 1217-30-500: Type: IMPLANTABLE DEVICE | Site: HIP | Status: FUNCTIONAL

## 2018-08-09 DEVICE — IMP APEX HOLE ELIMINATOR HIP DEPUY DURALOC 1246-03-000: Type: IMPLANTABLE DEVICE | Site: HIP | Status: FUNCTIONAL

## 2018-08-09 RX ORDER — EPHEDRINE SULFATE 50 MG/ML
INJECTION, SOLUTION INTRAMUSCULAR; INTRAVENOUS; SUBCUTANEOUS PRN
Status: DISCONTINUED | OUTPATIENT
Start: 2018-08-09 | End: 2018-08-09

## 2018-08-09 RX ORDER — ALBUMIN, HUMAN INJ 5% 5 %
SOLUTION INTRAVENOUS CONTINUOUS PRN
Status: DISCONTINUED | OUTPATIENT
Start: 2018-08-09 | End: 2018-08-09

## 2018-08-09 RX ORDER — ONDANSETRON 4 MG/1
4 TABLET, ORALLY DISINTEGRATING ORAL EVERY 6 HOURS PRN
Status: DISCONTINUED | OUTPATIENT
Start: 2018-08-09 | End: 2018-08-12 | Stop reason: HOSPADM

## 2018-08-09 RX ORDER — LIDOCAINE 40 MG/G
CREAM TOPICAL
Status: DISCONTINUED | OUTPATIENT
Start: 2018-08-09 | End: 2018-08-12 | Stop reason: HOSPADM

## 2018-08-09 RX ORDER — SODIUM CHLORIDE, SODIUM LACTATE, POTASSIUM CHLORIDE, CALCIUM CHLORIDE 600; 310; 30; 20 MG/100ML; MG/100ML; MG/100ML; MG/100ML
INJECTION, SOLUTION INTRAVENOUS CONTINUOUS
Status: DISCONTINUED | OUTPATIENT
Start: 2018-08-09 | End: 2018-08-09 | Stop reason: HOSPADM

## 2018-08-09 RX ORDER — ATORVASTATIN CALCIUM 40 MG/1
40 TABLET, FILM COATED ORAL DAILY
Status: DISCONTINUED | OUTPATIENT
Start: 2018-08-09 | End: 2018-08-12 | Stop reason: HOSPADM

## 2018-08-09 RX ORDER — CEFAZOLIN SODIUM 2 G/100ML
2 INJECTION, SOLUTION INTRAVENOUS
Status: COMPLETED | OUTPATIENT
Start: 2018-08-09 | End: 2018-08-09

## 2018-08-09 RX ORDER — PREGABALIN 75 MG/1
150 CAPSULE ORAL DAILY
Status: COMPLETED | OUTPATIENT
Start: 2018-08-09 | End: 2018-08-09

## 2018-08-09 RX ORDER — METOPROLOL TARTRATE 1 MG/ML
1-2 INJECTION, SOLUTION INTRAVENOUS EVERY 5 MIN PRN
Status: DISCONTINUED | OUTPATIENT
Start: 2018-08-09 | End: 2018-08-09 | Stop reason: HOSPADM

## 2018-08-09 RX ORDER — HYDROMORPHONE HYDROCHLORIDE 1 MG/ML
.3-.5 INJECTION, SOLUTION INTRAMUSCULAR; INTRAVENOUS; SUBCUTANEOUS EVERY 5 MIN PRN
Status: DISCONTINUED | OUTPATIENT
Start: 2018-08-09 | End: 2018-08-09 | Stop reason: HOSPADM

## 2018-08-09 RX ORDER — ONDANSETRON 2 MG/ML
INJECTION INTRAMUSCULAR; INTRAVENOUS PRN
Status: DISCONTINUED | OUTPATIENT
Start: 2018-08-09 | End: 2018-08-09

## 2018-08-09 RX ORDER — ONDANSETRON 2 MG/ML
4 INJECTION INTRAMUSCULAR; INTRAVENOUS EVERY 30 MIN PRN
Status: DISCONTINUED | OUTPATIENT
Start: 2018-08-09 | End: 2018-08-09 | Stop reason: HOSPADM

## 2018-08-09 RX ORDER — HYDRALAZINE HYDROCHLORIDE 20 MG/ML
2.5-5 INJECTION INTRAMUSCULAR; INTRAVENOUS EVERY 10 MIN PRN
Status: DISCONTINUED | OUTPATIENT
Start: 2018-08-09 | End: 2018-08-09 | Stop reason: HOSPADM

## 2018-08-09 RX ORDER — PROPOFOL 10 MG/ML
INJECTION, EMULSION INTRAVENOUS PRN
Status: DISCONTINUED | OUTPATIENT
Start: 2018-08-09 | End: 2018-08-09

## 2018-08-09 RX ORDER — AMOXICILLIN 250 MG
1 CAPSULE ORAL 2 TIMES DAILY
Status: DISCONTINUED | OUTPATIENT
Start: 2018-08-09 | End: 2018-08-12 | Stop reason: HOSPADM

## 2018-08-09 RX ORDER — ONDANSETRON 4 MG/1
4 TABLET, ORALLY DISINTEGRATING ORAL EVERY 30 MIN PRN
Status: DISCONTINUED | OUTPATIENT
Start: 2018-08-09 | End: 2018-08-09 | Stop reason: HOSPADM

## 2018-08-09 RX ORDER — WARFARIN SODIUM 6 MG/1
6 TABLET ORAL
Status: DISCONTINUED | OUTPATIENT
Start: 2018-08-09 | End: 2018-08-09

## 2018-08-09 RX ORDER — DEXTROSE MONOHYDRATE, SODIUM CHLORIDE, AND POTASSIUM CHLORIDE 50; 1.49; 4.5 G/1000ML; G/1000ML; G/1000ML
INJECTION, SOLUTION INTRAVENOUS CONTINUOUS
Status: DISCONTINUED | OUTPATIENT
Start: 2018-08-09 | End: 2018-08-10

## 2018-08-09 RX ORDER — CEFAZOLIN SODIUM 2 G/100ML
2 INJECTION, SOLUTION INTRAVENOUS EVERY 8 HOURS
Status: COMPLETED | OUTPATIENT
Start: 2018-08-09 | End: 2018-08-10

## 2018-08-09 RX ORDER — TEMAZEPAM 7.5 MG/1
7.5 CAPSULE ORAL
Status: DISCONTINUED | OUTPATIENT
Start: 2018-08-10 | End: 2018-08-12 | Stop reason: HOSPADM

## 2018-08-09 RX ORDER — FENTANYL CITRATE 50 UG/ML
INJECTION, SOLUTION INTRAMUSCULAR; INTRAVENOUS PRN
Status: DISCONTINUED | OUTPATIENT
Start: 2018-08-09 | End: 2018-08-09

## 2018-08-09 RX ORDER — OXYCODONE HYDROCHLORIDE 5 MG/1
5-10 TABLET ORAL
Status: DISCONTINUED | OUTPATIENT
Start: 2018-08-09 | End: 2018-08-10

## 2018-08-09 RX ORDER — ACETAMINOPHEN 325 MG/1
650 TABLET ORAL EVERY 4 HOURS PRN
Status: DISCONTINUED | OUTPATIENT
Start: 2018-08-12 | End: 2018-08-12 | Stop reason: HOSPADM

## 2018-08-09 RX ORDER — ACETAMINOPHEN 325 MG/1
975 TABLET ORAL EVERY 8 HOURS
Status: COMPLETED | OUTPATIENT
Start: 2018-08-09 | End: 2018-08-12

## 2018-08-09 RX ORDER — LIDOCAINE HYDROCHLORIDE 20 MG/ML
INJECTION, SOLUTION INFILTRATION; PERINEURAL PRN
Status: DISCONTINUED | OUTPATIENT
Start: 2018-08-09 | End: 2018-08-09

## 2018-08-09 RX ORDER — FENTANYL CITRATE 50 UG/ML
25-50 INJECTION, SOLUTION INTRAMUSCULAR; INTRAVENOUS EVERY 5 MIN PRN
Status: DISCONTINUED | OUTPATIENT
Start: 2018-08-09 | End: 2018-08-09 | Stop reason: HOSPADM

## 2018-08-09 RX ORDER — NALOXONE HYDROCHLORIDE 0.4 MG/ML
.1-.4 INJECTION, SOLUTION INTRAMUSCULAR; INTRAVENOUS; SUBCUTANEOUS
Status: DISCONTINUED | OUTPATIENT
Start: 2018-08-09 | End: 2018-08-09

## 2018-08-09 RX ORDER — CEFAZOLIN SODIUM 1 G/3ML
1 INJECTION, POWDER, FOR SOLUTION INTRAMUSCULAR; INTRAVENOUS SEE ADMIN INSTRUCTIONS
Status: DISCONTINUED | OUTPATIENT
Start: 2018-08-09 | End: 2018-08-09 | Stop reason: HOSPADM

## 2018-08-09 RX ORDER — GLYCOPYRROLATE 0.2 MG/ML
INJECTION, SOLUTION INTRAMUSCULAR; INTRAVENOUS PRN
Status: DISCONTINUED | OUTPATIENT
Start: 2018-08-09 | End: 2018-08-09

## 2018-08-09 RX ORDER — NEOSTIGMINE METHYLSULFATE 1 MG/ML
VIAL (ML) INJECTION PRN
Status: DISCONTINUED | OUTPATIENT
Start: 2018-08-09 | End: 2018-08-09

## 2018-08-09 RX ORDER — HYDROMORPHONE HYDROCHLORIDE 1 MG/ML
.3-.5 INJECTION, SOLUTION INTRAMUSCULAR; INTRAVENOUS; SUBCUTANEOUS
Status: DISCONTINUED | OUTPATIENT
Start: 2018-08-09 | End: 2018-08-12 | Stop reason: HOSPADM

## 2018-08-09 RX ORDER — BUPIVACAINE HYDROCHLORIDE AND EPINEPHRINE 5; 5 MG/ML; UG/ML
INJECTION, SOLUTION EPIDURAL; INTRACAUDAL; PERINEURAL PRN
Status: DISCONTINUED | OUTPATIENT
Start: 2018-08-09 | End: 2018-08-09 | Stop reason: HOSPADM

## 2018-08-09 RX ORDER — LOSARTAN POTASSIUM 25 MG/1
25 TABLET ORAL AT BEDTIME
Status: DISCONTINUED | OUTPATIENT
Start: 2018-08-09 | End: 2018-08-12 | Stop reason: HOSPADM

## 2018-08-09 RX ORDER — ACETAMINOPHEN 500 MG
1000 TABLET ORAL ONCE
Status: COMPLETED | OUTPATIENT
Start: 2018-08-09 | End: 2018-08-09

## 2018-08-09 RX ORDER — VANCOMYCIN HCL 900 MCG/MG
POWDER (GRAM) MISCELLANEOUS PRN
Status: DISCONTINUED | OUTPATIENT
Start: 2018-08-09 | End: 2018-08-09 | Stop reason: HOSPADM

## 2018-08-09 RX ORDER — NALOXONE HYDROCHLORIDE 0.4 MG/ML
.1-.4 INJECTION, SOLUTION INTRAMUSCULAR; INTRAVENOUS; SUBCUTANEOUS
Status: DISCONTINUED | OUTPATIENT
Start: 2018-08-09 | End: 2018-08-12 | Stop reason: HOSPADM

## 2018-08-09 RX ORDER — ONDANSETRON 2 MG/ML
4 INJECTION INTRAMUSCULAR; INTRAVENOUS EVERY 6 HOURS PRN
Status: DISCONTINUED | OUTPATIENT
Start: 2018-08-09 | End: 2018-08-12 | Stop reason: HOSPADM

## 2018-08-09 RX ORDER — HYDROXYZINE HYDROCHLORIDE 10 MG/1
10 TABLET, FILM COATED ORAL EVERY 6 HOURS PRN
Status: DISCONTINUED | OUTPATIENT
Start: 2018-08-09 | End: 2018-08-12 | Stop reason: HOSPADM

## 2018-08-09 RX ORDER — AMOXICILLIN 250 MG
2 CAPSULE ORAL 2 TIMES DAILY
Status: DISCONTINUED | OUTPATIENT
Start: 2018-08-09 | End: 2018-08-12 | Stop reason: HOSPADM

## 2018-08-09 RX ADMIN — OXYCODONE HYDROCHLORIDE 10 MG: 5 TABLET ORAL at 18:21

## 2018-08-09 RX ADMIN — LEVOTHYROXINE SODIUM 125 MCG: 75 TABLET ORAL at 18:22

## 2018-08-09 RX ADMIN — LIDOCAINE HYDROCHLORIDE 1 ML: 10 INJECTION, SOLUTION EPIDURAL; INFILTRATION; INTRACAUDAL; PERINEURAL at 06:33

## 2018-08-09 RX ADMIN — Medication 1 LOZENGE: at 20:51

## 2018-08-09 RX ADMIN — NEOSTIGMINE METHYLSULFATE 4.5 MG: 1 INJECTION, SOLUTION INTRAVENOUS at 10:30

## 2018-08-09 RX ADMIN — Medication 5 MG: at 10:15

## 2018-08-09 RX ADMIN — OXYCODONE HYDROCHLORIDE 10 MG: 5 TABLET ORAL at 22:12

## 2018-08-09 RX ADMIN — Medication 1 LOZENGE: at 22:12

## 2018-08-09 RX ADMIN — PHENYLEPHRINE HYDROCHLORIDE 50 MCG: 10 INJECTION, SOLUTION INTRAMUSCULAR; INTRAVENOUS; SUBCUTANEOUS at 10:17

## 2018-08-09 RX ADMIN — PHENYLEPHRINE HYDROCHLORIDE 50 MCG: 10 INJECTION, SOLUTION INTRAMUSCULAR; INTRAVENOUS; SUBCUTANEOUS at 09:08

## 2018-08-09 RX ADMIN — FENTANYL CITRATE 25 MCG: 50 INJECTION INTRAMUSCULAR; INTRAVENOUS at 11:31

## 2018-08-09 RX ADMIN — CEFAZOLIN SODIUM 2 G: 2 INJECTION, SOLUTION INTRAVENOUS at 07:45

## 2018-08-09 RX ADMIN — Medication 5 MG: at 08:32

## 2018-08-09 RX ADMIN — FENTANYL CITRATE 25 MCG: 50 INJECTION INTRAMUSCULAR; INTRAVENOUS at 11:36

## 2018-08-09 RX ADMIN — Medication 0.5 MG: at 11:19

## 2018-08-09 RX ADMIN — FENTANYL CITRATE 25 MCG: 50 INJECTION INTRAMUSCULAR; INTRAVENOUS at 11:48

## 2018-08-09 RX ADMIN — MIDAZOLAM 1 MG: 1 INJECTION INTRAMUSCULAR; INTRAVENOUS at 07:22

## 2018-08-09 RX ADMIN — ATORVASTATIN CALCIUM 40 MG: 40 TABLET, FILM COATED ORAL at 18:22

## 2018-08-09 RX ADMIN — HYDROMORPHONE HYDROCHLORIDE 0.3 MG: 1 INJECTION, SOLUTION INTRAMUSCULAR; INTRAVENOUS; SUBCUTANEOUS at 08:43

## 2018-08-09 RX ADMIN — Medication 5 MG: at 08:03

## 2018-08-09 RX ADMIN — PHENYLEPHRINE HYDROCHLORIDE 50 MCG: 10 INJECTION, SOLUTION INTRAMUSCULAR; INTRAVENOUS; SUBCUTANEOUS at 09:32

## 2018-08-09 RX ADMIN — Medication 0.5 MG: at 11:51

## 2018-08-09 RX ADMIN — OXYCODONE HYDROCHLORIDE 10 MG: 5 TABLET ORAL at 14:45

## 2018-08-09 RX ADMIN — MIDAZOLAM 1 MG: 1 INJECTION INTRAMUSCULAR; INTRAVENOUS at 07:23

## 2018-08-09 RX ADMIN — ALBUMIN (HUMAN): 12.5 SOLUTION INTRAVENOUS at 09:05

## 2018-08-09 RX ADMIN — HYDROMORPHONE HYDROCHLORIDE 0.2 MG: 1 INJECTION, SOLUTION INTRAMUSCULAR; INTRAVENOUS; SUBCUTANEOUS at 09:37

## 2018-08-09 RX ADMIN — Medication 5 MG: at 10:02

## 2018-08-09 RX ADMIN — SODIUM CHLORIDE 1 G: 9 INJECTION, SOLUTION INTRAVENOUS at 07:49

## 2018-08-09 RX ADMIN — Medication 1 LOZENGE: at 23:35

## 2018-08-09 RX ADMIN — ACETAMINOPHEN 1000 MG: 500 TABLET, FILM COATED ORAL at 06:34

## 2018-08-09 RX ADMIN — PHENYLEPHRINE HYDROCHLORIDE 50 MCG: 10 INJECTION, SOLUTION INTRAMUSCULAR; INTRAVENOUS; SUBCUTANEOUS at 09:47

## 2018-08-09 RX ADMIN — SODIUM CHLORIDE, POTASSIUM CHLORIDE, SODIUM LACTATE AND CALCIUM CHLORIDE: 600; 310; 30; 20 INJECTION, SOLUTION INTRAVENOUS at 10:51

## 2018-08-09 RX ADMIN — SODIUM CHLORIDE 1 G: 9 INJECTION, SOLUTION INTRAVENOUS at 09:50

## 2018-08-09 RX ADMIN — SODIUM CHLORIDE, POTASSIUM CHLORIDE, SODIUM LACTATE AND CALCIUM CHLORIDE: 600; 310; 30; 20 INJECTION, SOLUTION INTRAVENOUS at 07:22

## 2018-08-09 RX ADMIN — SODIUM CHLORIDE, POTASSIUM CHLORIDE, SODIUM LACTATE AND CALCIUM CHLORIDE: 600; 310; 30; 20 INJECTION, SOLUTION INTRAVENOUS at 08:15

## 2018-08-09 RX ADMIN — CEFAZOLIN SODIUM 1 G: 2 INJECTION, SOLUTION INTRAVENOUS at 09:45

## 2018-08-09 RX ADMIN — LIDOCAINE HYDROCHLORIDE 100 MG: 20 INJECTION, SOLUTION INFILTRATION; PERINEURAL at 07:25

## 2018-08-09 RX ADMIN — Medication 0.5 MG: at 23:33

## 2018-08-09 RX ADMIN — CEFAZOLIN SODIUM 2 G: 2 INJECTION, SOLUTION INTRAVENOUS at 18:23

## 2018-08-09 RX ADMIN — HYDROMORPHONE HYDROCHLORIDE 0.3 MG: 1 INJECTION, SOLUTION INTRAMUSCULAR; INTRAVENOUS; SUBCUTANEOUS at 09:40

## 2018-08-09 RX ADMIN — PHENYLEPHRINE HYDROCHLORIDE 50 MCG: 10 INJECTION, SOLUTION INTRAMUSCULAR; INTRAVENOUS; SUBCUTANEOUS at 09:02

## 2018-08-09 RX ADMIN — VITAMIN D, TAB 1000IU (100/BT) 2000 UNITS: 25 TAB at 18:23

## 2018-08-09 RX ADMIN — SENNOSIDES AND DOCUSATE SODIUM 1 TABLET: 8.6; 5 TABLET ORAL at 14:45

## 2018-08-09 RX ADMIN — DEXMEDETOMIDINE HYDROCHLORIDE 0.5 MCG/KG/HR: 100 INJECTION, SOLUTION INTRAVENOUS at 07:44

## 2018-08-09 RX ADMIN — PROPOFOL 200 MG: 10 INJECTION, EMULSION INTRAVENOUS at 07:25

## 2018-08-09 RX ADMIN — SENNOSIDES AND DOCUSATE SODIUM 1 TABLET: 8.6; 5 TABLET ORAL at 20:51

## 2018-08-09 RX ADMIN — POTASSIUM CHLORIDE, DEXTROSE MONOHYDRATE AND SODIUM CHLORIDE: 150; 5; 450 INJECTION, SOLUTION INTRAVENOUS at 14:00

## 2018-08-09 RX ADMIN — Medication 0.5 MG: at 10:55

## 2018-08-09 RX ADMIN — PHENYLEPHRINE HYDROCHLORIDE 100 MCG: 10 INJECTION, SOLUTION INTRAMUSCULAR; INTRAVENOUS; SUBCUTANEOUS at 09:22

## 2018-08-09 RX ADMIN — FENTANYL CITRATE 25 MCG: 50 INJECTION INTRAMUSCULAR; INTRAVENOUS at 12:02

## 2018-08-09 RX ADMIN — PREGABALIN 150 MG: 75 CAPSULE ORAL at 06:34

## 2018-08-09 RX ADMIN — Medication 0.3 MG: at 13:37

## 2018-08-09 RX ADMIN — FENTANYL CITRATE 50 MCG: 50 INJECTION, SOLUTION INTRAMUSCULAR; INTRAVENOUS at 08:16

## 2018-08-09 RX ADMIN — ROCURONIUM BROMIDE 50 MG: 10 INJECTION INTRAVENOUS at 07:25

## 2018-08-09 RX ADMIN — Medication 5 MG: at 08:09

## 2018-08-09 RX ADMIN — Medication 5 MG: at 09:32

## 2018-08-09 RX ADMIN — ONDANSETRON 4 MG: 2 INJECTION INTRAMUSCULAR; INTRAVENOUS at 10:00

## 2018-08-09 RX ADMIN — HYDROMORPHONE HYDROCHLORIDE 0.2 MG: 1 INJECTION, SOLUTION INTRAMUSCULAR; INTRAVENOUS; SUBCUTANEOUS at 09:53

## 2018-08-09 RX ADMIN — Medication 0.5 MG: at 11:05

## 2018-08-09 RX ADMIN — LOSARTAN POTASSIUM 25 MG: 25 TABLET ORAL at 22:12

## 2018-08-09 RX ADMIN — PHENYLEPHRINE HYDROCHLORIDE 50 MCG: 10 INJECTION, SOLUTION INTRAMUSCULAR; INTRAVENOUS; SUBCUTANEOUS at 08:09

## 2018-08-09 RX ADMIN — Medication 5 MG: at 07:47

## 2018-08-09 RX ADMIN — GLYCOPYRROLATE 0.7 MG: 0.2 INJECTION, SOLUTION INTRAMUSCULAR; INTRAVENOUS at 10:30

## 2018-08-09 RX ADMIN — PHENYLEPHRINE HYDROCHLORIDE 50 MCG: 10 INJECTION, SOLUTION INTRAMUSCULAR; INTRAVENOUS; SUBCUTANEOUS at 08:50

## 2018-08-09 RX ADMIN — FENTANYL CITRATE 50 MCG: 50 INJECTION, SOLUTION INTRAMUSCULAR; INTRAVENOUS at 07:25

## 2018-08-09 RX ADMIN — ACETAMINOPHEN 975 MG: 325 TABLET, FILM COATED ORAL at 13:37

## 2018-08-09 RX ADMIN — Medication 5 MG: at 07:56

## 2018-08-09 RX ADMIN — ACETAMINOPHEN 975 MG: 325 TABLET, FILM COATED ORAL at 22:12

## 2018-08-09 ASSESSMENT — ACTIVITIES OF DAILY LIVING (ADL)
ADLS_ACUITY_SCORE: 11
ADLS_ACUITY_SCORE: 11

## 2018-08-09 NOTE — ANESTHESIA CARE TRANSFER NOTE
Patient: Edi Villafana    Procedure(s):  RIGHT TOTAL HIP ARTHROPLASTY DIRECT ANTERIOR APPROACH  - Wound Class: I-Clean    Diagnosis: RIGHT HIP DJD   Diagnosis Additional Information: No value filed.    Anesthesia Type:   General, ETT     Note:  Airway :Face Mask  Patient transferred to:PACU  Comments: Neuromuscular blockade reversed after TOF 4/4, spontaneous respirations, adequate tidal volumes, followed commands to voice, oropharynx suctioned with soft flexible catheter, extubated atraumatically, extubated with suction, airway patent after extubation.  Oxygen via facemask at 6 liters per minute to PACU. Oxygen tubing connected to wall O2 in PACU, SpO2, NiBP, and EKG monitors and alarms on and functioning, Cristian Hugger warmer connected to patient gown, report on patient's clinical status given to PACU RN, RN questions answered. Handoff Report: Identifed the Patient, Identified the Reponsible Provider, Reviewed the pertinent medical history, Discussed the surgical course, Reviewed Intra-OP anesthesia mangement and issues during anesthesia, Set expectations for post-procedure period and Allowed opportunity for questions and acknowledgement of understanding      Vitals: (Last set prior to Anesthesia Care Transfer)    CRNA VITALS  8/9/2018 1005 - 8/9/2018 1043      8/9/2018             Pulse: 75    SpO2: 100 %    Resp Rate (observed):     Resp Rate (set): 10                Electronically Signed By: ANDREWS Mackenzie CRNA  August 9, 2018  10:43 AM

## 2018-08-09 NOTE — PLAN OF CARE
Problem: Patient Care Overview  Goal: Plan of Care/Patient Progress Review  PT:  Discharge Planner PT   Patient plan for discharge: home  Current status: Orders received, eval completed, treatment initiated. Pt admitted s/p R anterior approach CATRACHITO. Prior to admission pt was living independently in an apartment and independent with all functional mobility. Pt currently independent with all bed mobility, requires CGA and FWW use for transferring sit <> stand, and is able to ambulate 20' with CGA and FWW but limited due to R hip pain. Pt completed supine bed exercises.  Barriers to return to prior living situation: falls risk  Recommendations for discharge: home   Rationale for recommendations: Pt will continue to benefit from skilled inpatient physical therapy to increase his strength, and safety and independence with transfers, ambulation, and ascending one flight of stairs prior to returning home.        Entered by: Carie Cid 08/09/2018 4:56 PM

## 2018-08-09 NOTE — IP AVS SNAPSHOT
[address] [facility]    [address] [place] [address]    Phone:  [phone]                                       After Visit Summary   8/9/2018    Edi Villafana    MRN: [id number]           After Visit Summary Signature Page     I have received my discharge instructions, and my questions have been answered. I have discussed any challenges I see with this plan with the nurse or doctor.    ..........................................................................................................................................  Patient/Patient Representative Signature      ..........................................................................................................................................  Patient Representative Print Name and Relationship to Patient    ..................................................               ................................................  Date                                            Time    ..........................................................................................................................................  Reviewed by Signature/Title    ...................................................              ..............................................  Date                                                            Time

## 2018-08-09 NOTE — IP AVS SNAPSHOT
MRN:[id number]                      After Visit Summary   8/9/2018    Edi Villafana    MRN: [id number]           Thank you!     Thank you for choosing [facility] for your care. Our goal is always to provide you with excellent care. Hearing back from our patients is one way we can continue to improve our services. Please take a few minutes to complete the written survey that you may receive in the mail after you visit with us. Thank you!        Patient Information     Date Of Birth          1945        Designated Caregiver       Most Recent Value    Caregiver    Will someone help with your care after discharge? yes    Name of designated caregiver Zach man    Phone number of caregiver unknown    Caregiver address [place]      About your hospital stay     You were admitted on:  August 9, 2018 You last received care in the:  [facility] [address] [facility]    You were discharged on:  August 12, 2018        Reason for your hospital stay       Minimally invasive total hip replacement                  Who to Call     For medical emergencies, please call 911.  For non-urgent questions about your medical care, please call your primary care provider or clinic, [phone]  For questions related to your surgery, please call your surgery clinic        Attending Provider     Provider Specialty    Chester Ortiz MD Orthopedics       Primary Care Provider Office Phone # Fax #    Enrique John Lucia [phone]       When to contact your care team       EMERGENCY CARE PLAN     1. I have questions or concerns during clinic hours:   - I will call the clinic directly at [phone] and and speak with Dr. Ko Razo's care coordinator.     2. I have questions or concerns outside clinic hours:   - I will call the clinic directly at [phone] and speak with the doctor on-call.     3. I need to schedule an appointment:   - I will call the clinic directly at  [phone] for [employer] appointments or [phone] for [employer] appointments.     4. I am concerned about symptoms that I am having and think I need same day treatment:   - During clinic hours, I will call the clinic first at [phone] and speak with Danni.   - She will either make an appointment with Dr. Connell or she will direct you to come in to our walk-in urgent care clinic.   - The urgent care is open 7 days a week from 8:00am - 8:00pm at all of our locations.     - After clinic hours, I will call the clinic first at [phone] and speak with the on-call doctor.   - You should NOT go to the emergency room or a urgent care with out being directed to do so by the clinic.                  After Care Instructions     Activity       Your activity upon discharge: activity as tolerated.  You should work on home exercises provided by the physical therapist at the hospital 2-3 times a day.     Physical Therapy: Once you leave the hospital you will not need outpatient physical therapy for your hip.  Walking is the best exercise after a hip replacement.  Do as much walking as you feel comfortable doing  Remember, you have no hip restrictions or precautions, however you should avoid any twisting or torquing of the hip (no hokie pokie).  You should also avoid any kind of strengthening exercises of the hip and leg for the first 8 weeks after surgery.     Assistive Ambulatory Devices:  Use your walker/crutches until you feel comfortable advancing to a cane.  You should use the cane in the hand opposite your surgery.  You can then advance from the cane as you feel comfortable.     Elevate: Swelling in the leg is normal after a hip replacement.  By keeping your leg elevated with a pillow under your calf, not under the knee, will help with the swelling.  The best position is to have the knee above the level of your heart and your ankle above the level of your knee.  Wearing the compression stockings (Gonzalo hose) can help  reduce swelling.  You should wear these until you are seen at Dr. Connell's office post operatively.  You can remove these twice a day for laundering and hygiene.  The swelling in the leg will be present for at least 4-6 weeks.       Ice: Ice the hip 4-5 times a day for 20-30 minutes at a time.  Icing will help reduce swelling and pain.     Pain control: Take the pain medication and/or anti-inflammatory medication as prescribed.  Don't let your pain become severe.            Diet       Follow this diet upon discharge: Regular            Discharge Instructions       Upon leaving the hospital you will be discharged with a few different medications:     1. Tylenol 500mg - you will be taking two tablets every six hours for pain.  You can take Tylenol in addition to the pain medication.  You should use Tylenol as a scheduled pain medication as long or longer than you are taking the narcotic pain medication (oxycodone/dilaudid/hydrocodone).  Do not exceed 4,000mg in a day.  2. Oxycodone 10mg - this is a pain medication.  You can take one half or one tablet every four to six hours.  You will need this medication the longest to sleep at night and for physical therapy.  You can wean yourself from this medication as you are able by either increasing the time between tablets or going down to one tablet if you are taking two at a time.    **REMINDER: If you need a refill of your pain medication prior to your first post-operative appointment please contact our office and allow 24 to 48 hours for refills to be processed. Any refills needed before the weekend will need to be submitted on Thursday.  Also, all narcotic pain medication cannot be called in to the pharmacy and will need to be picked up at one of our clinics ([place] or [place]), this is a Federal Law.  You or a family member will need to allow for time to come to our office to pick these up.  Please let us know who will be picking up the prescription as that person  will need to show a photo ID to get the prescription.**    3.  Senokot - this is a stool softener.  You can take one or two pills twice a day as needed for constipation.  You should take this medication as long as you are taking narcotic pain medication and as long as you do not have diarrhea.  As you are more active and taking less pain medication you will be able to stop using this.     4. Lovenox - this is a blood thinning medication you will inject.  You will use these once a day until the syringes are gone.  This medication will not be refilled.    5. Xarelto - this is a blood thinning medication you will take once a day for one month.  Start this medication after the lovenox injections are complete. This medication will not be refilled.    Please contact Dr. Connell's office with any questions.  You can either call Dr. Ko Razo's , at [phone] or email Dr. Ko Avelar's physician assistant, at [email or web address].            Wound care and dressings       You have a gauze and tape dressing over the incision that you should leave in place for 2 days after leaving the hospital.  You will remove this dressing after 2 days and do not need to replace the dressing.  There is a thin mesh film adhered to your skin over the incision which should stay in place until you are instructed to remove this.  If this comes off you should call Dr. Connell's office for further instruction.  You can get your incision wet in the shower but you should not submerge it.                  Follow-up Appointments     Follow-up and recommended labs and tests       Your follow-up appointment is schedule for 4:30pm on Tuesday 8/28 at the [place] office with Dr. Connell.  Please call [phone] if you need to reschedule this appointment.     If you have any questions prior to your follow-up appointment please call Dr. Ko Razo's , at [phone].  You may also email Rosio with any questions at  [email or web address]    Follow up with urology, next available.                  Pending Results     No orders found from 8/7/2018 to 8/10/2018.            Statement of Approval     Ordered          08/12/18 0855  I have reviewed and agree with all the recommendations and orders detailed in this document.  EFFECTIVE NOW     Approved and electronically signed by:  Casa Murdock DO           08/11/18 0902  I have reviewed and agree with all the recommendations and orders detailed in this document.  EFFECTIVE NOW     Approved and electronically signed by:  Chester Ortiz MD           08/10/18 1645  I have reviewed and agree with all the recommendations and orders detailed in this document.  EFFECTIVE NOW     Approved and electronically signed by:  Rosio Perry PA-C             Admission Information     Date & Time Provider Department Dept. Phone    8/9/2018 Chester Ortiz MD [facility] [address] [facility] [phone]      Your Vitals Were     Blood Pressure Pulse Temperature Respirations Height Weight    151/82 (BP Location: Right arm) 66 99.5  F (37.5  C) 16 1.854 m (6' 1\") 88.5 kg (195 lb)    Pulse Oximetry BMI (Body Mass Index)                96% 25.73 kg/m2          [employer] customer insighthart Information     [employer] gives you secure access to your electronic health record. If you see a primary care provider, you can also send messages to your care team and make appointments. If you have questions, please call your primary care clinic.  If you do not have a primary care provider, please call [phone] and they will assist you.        Care EveryWhere ID     This is your Care EveryWhere ID. This could be used by other organizations to access your [facility] medical records  [id number]        Equal Access to Services     CONSUELO ANDERSON : Stuart Ariza, maureen acosta, abebe fan. So [facility] [phone].    ATENCIÓN: Si " erica boswell, tiene a kidd disposición servicios gratuitos de asistencia lingüística. Agus isaac [phone].    We comply with applicable federal civil rights laws and [place] laws. We do not discriminate on the basis of race, color, national origin, age, disability, sex, sexual orientation, or gender identity.               Review of your medicines      START taking        Dose / Directions    acetaminophen 500 MG tablet   Commonly known as:  TYLENOL        Dose:  1000 mg   Take 2 tablets (1,000 mg) by mouth every 6 hours as needed for pain   Quantity:  100 tablet   Refills:  0       HYDROmorphone 2 MG tablet   Commonly known as:  DILAUDID        Dose:  2-4 mg   Take 1-2 tablets (2-4 mg) by mouth every 3 hours as needed for moderate to severe pain   Quantity:  50 tablet   Refills:  0       rivaroxaban ANTICOAGULANT 10 MG Tabs tablet   Commonly known as:  XARELTO        Dose:  10 mg   Take 1 tablet (10 mg) by mouth daily (with dinner)   Quantity:  30 tablet   Refills:  0       senna-docusate 8.6-50 MG per tablet   Commonly known as:  SENOKOT-S;PERICOLACE        Dose:  2 tablet   Take 2 tablets by mouth 2 times daily as needed for constipation   Quantity:  60 tablet   Refills:  0         CONTINUE these medicines which may have CHANGED, or have new prescriptions. If we are uncertain of the size of tablets/capsules you have at home, strength may be listed as something that might have changed.        Dose / Directions    losartan 50 MG tablet   Commonly known as:  COZAAR   This may have changed:    - how much to take  - how to take this  - when to take this  - additional instructions   Used for:  Essential hypertension with goal blood pressure less than 140/90        1/2 tablet (25 mg) once daily   Quantity:  45 tablet   Refills:  3       TESTOSTERONE 2 MG/GM CREAM   This may have changed:    - how to take this  - when to take this  - additional instructions   Used for:  Hypotestosteronism        15% cream and apply  0.5 ml topically daily   Quantity:  40 g   Refills:  5       zolpidem 10 MG tablet   Commonly known as:  AMBIEN   This may have changed:    - how much to take  - additional instructions   Used for:  Persistent disorder of initiating or maintaining sleep        Dose:  10 mg   Take 1 tablet (10 mg) by mouth nightly as needed for sleep   Quantity:  90 tablet   Refills:  1         CONTINUE these medicines which have NOT CHANGED        Dose / Directions    ATIVAN PO        Dose:  1-2 mg   Take 1-2 mg by mouth 3 times daily as needed for anxiety (0.5 - 1 tablet x 2 mg = 1 - 2 mg dose)   Refills:  0       atorvastatin 40 MG tablet   Commonly known as:  LIPITOR   Used for:  Hyperlipidemia LDL goal <100        TAKE 1 TABLET (40 MG) BY MOUTH DAILY   Quantity:  90 tablet   Refills:  1       MULTIVITAMIN PO        Dose:  1 tablet   Take 1 tablet by mouth daily   Refills:  0       oxyCODONE IR 5 MG tablet   Commonly known as:  ROXICODONE   Used for:  Chronic left shoulder pain        Dose:  5 mg   Take 1 tablet (5 mg) by mouth 3 times daily   Quantity:  90 tablet   Refills:  0       * SYNTHROID PO        Dose:  125 mcg   Take 125 mcg by mouth See Admin Instructions Six days weekly- Monday, Tuesday, Thursday, Friday, Saturday, Sunday (Patient takes 1.5 x 125 mcg on Wednesday)   Refills:  0       * SYNTHROID PO        Dose:  187.5 mcg   Take 187.5 mcg by mouth once a week (1.5 x 125 mcg = 187.5 mcg dose) on Wednesday   Refills:  0       vitamin D3 2000 units Caps        Dose:  1 capsule   Take 1 capsule by mouth daily   Refills:  0       * Notice:  This list has 2 medication(s) that are the same as other medications prescribed for you. Read the directions carefully, and ask your doctor or other care provider to review them with you.      STOP taking     ASPIRIN PO                Where to get your medicines      These medications were sent to [facility] [place], [place] - [address]  [address] [place]  [place] [address]     Phone:  [phone]     acetaminophen 500 MG tablet    rivaroxaban ANTICOAGULANT 10 MG Tabs tablet    senna-docusate 8.6-50 MG per tablet         Some of these will need a paper prescription and others can be bought over the counter. Ask your nurse if you have questions.     Bring a paper prescription for each of these medications     HYDROmorphone 2 MG tablet                Protect others around you: Learn how to safely use, store and throw away your medicines at www.disposemymeds.org.        Information about OPIOIDS     PRESCRIPTION OPIOIDS: WHAT YOU NEED TO KNOW   We gave you an opioid (narcotic) pain medicine. It is important to manage your pain, but opioids are not always the best choice. You should first try all the other options your care team gave you. Take this medicine for as short a time (and as few doses) as possible.    Some activities can increase your pain, such as bandage changes or therapy sessions. It may help to take your pain medicine 30 to 60 minutes before these activities. Reduce your stress by getting enough sleep, working on hobbies you enjoy and practicing relaxation or meditation. Talk to your care team about ways to manage your pain beyond prescription opioids.    These medicines have risks:    DO NOT drive when on new or higher doses of pain medicine. These medicines can affect your alertness and reaction times, and you could be arrested for driving under the influence (DUI). If you need to use opioids long-term, talk to your care team about driving.    DO NOT operate heavy machinery    DO NOT do any other dangerous activities while taking these medicines.    DO NOT drink any alcohol while taking these medicines.     If the opioid prescribed includes acetaminophen, DO NOT take with any other medicines that contain acetaminophen. Read all labels carefully. Look for the word  acetaminophen  or  Tylenol.  Ask your pharmacist if you have questions or are unsure.    You  can get addicted to pain medicines, especially if you have a history of addiction (chemical, alcohol or substance dependence). Talk to your care team about ways to reduce this risk.    All opioids tend to cause constipation. Drink plenty of water and eat foods that have a lot of fiber, such as fruits, vegetables, prune juice, apple juice and high-fiber cereal. Take a laxative (Miralax, milk of magnesia, Colace, Senna) if you don t move your bowels at least every other day. Other side effects include upset stomach, sleepiness, dizziness, throwing up, tolerance (needing more of the medicine to have the same effect), physical dependence and slowed breathing.    Store your pills in a secure place, locked if possible. We will not replace any lost or stolen medicine. If you don t finish your medicine, please throw away (dispose) as directed by your pharmacist. The [place] Pollution Control Agency has more information about safe disposal: https://www.pca.[facility].[place].us/living-green/managing-unwanted-medications             Medication List: This is a list of all your medications and when to take them. Check marks below indicate your daily home schedule. Keep this list as a reference.      Medications           Morning Afternoon Evening Bedtime As Needed    acetaminophen 500 MG tablet   Commonly known as:  TYLENOL   Take 2 tablets (1,000 mg) by mouth every 6 hours as needed for pain   Last time this was given:  975 mg on 8/12/2018  7:17 AM   Next Dose Due:  Available                                   ATIVAN PO   Take 1-2 mg by mouth 3 times daily as needed for anxiety (0.5 - 1 tablet x 2 mg = 1 - 2 mg dose)   Last time this was given:  2 mg on 8/12/2018  1:31 PM   Next Dose Due:  Available 8/12/18 at 9:30 pm                                   atorvastatin 40 MG tablet   Commonly known as:  LIPITOR   TAKE 1 TABLET (40 MG) BY MOUTH DAILY   Last time this was given:  40 mg on 8/12/2018  9:42 AM   Next Dose Due:  8/13/18                                    HYDROmorphone 2 MG tablet   Commonly known as:  DILAUDID   Take 1-2 tablets (2-4 mg) by mouth every 3 hours as needed for moderate to severe pain   Last time this was given:  4 mg on 8/12/2018  2:34 PM   Next Dose Due:  Available 8/12/18 at 5:34 pm                                   losartan 50 MG tablet   Commonly known as:  COZAAR   1/2 tablet (25 mg) once daily   Last time this was given:  25 mg on 8/11/2018 10:40 PM   Next Dose Due:  8/12/18                                   MULTIVITAMIN PO   Take 1 tablet by mouth daily   Next Dose Due:  Resume at discharge                                oxyCODONE IR 5 MG tablet   Commonly known as:  ROXICODONE   Take 1 tablet (5 mg) by mouth 3 times daily   Last time this was given:  5 mg on 8/11/2018  7:21 AM   Next Dose Due:  discontinued                                rivaroxaban ANTICOAGULANT 10 MG Tabs tablet   Commonly known as:  XARELTO   Take 1 tablet (10 mg) by mouth daily (with dinner)   Next Dose Due:  Start 8/13/18                                   senna-docusate 8.6-50 MG per tablet   Commonly known as:  SENOKOT-S;PERICOLACE   Take 2 tablets by mouth 2 times daily as needed for constipation   Last time this was given:  2 tablets on 8/12/2018  9:42 AM   Next Dose Due:  As needed                                   * SYNTHROID PO   Take 125 mcg by mouth See Admin Instructions Six days weekly- Monday, Tuesday, Thursday, Friday, Saturday, Sunday (Patient takes 1.5 x 125 mcg on Wednesday)   Last time this was given:  125 mcg on 8/12/2018 11:07 AM   Next Dose Due:  8/13/18                                   * SYNTHROID PO   Take 187.5 mcg by mouth once a week (1.5 x 125 mcg = 187.5 mcg dose) on Wednesday   Last time this was given:  125 mcg on 8/12/2018 11:07 AM   Next Dose Due:  8/15/18                                TESTOSTERONE 2 MG/GM CREAM   15% cream and apply 0.5 ml topically daily   Next Dose Due:  Resume at discharge                                 vitamin D3 2000 units Caps   Take 1 capsule by mouth daily   Next Dose Due:  Resume at discharge                                zolpidem 10 MG tablet   Commonly known as:  AMBIEN   Take 1 tablet (10 mg) by mouth nightly as needed for sleep   Next Dose Due:  As needed                                   * Notice:  This list has 2 medication(s) that are the same as other medications prescribed for you. Read the directions carefully, and ask your doctor or other care provider to review them with you.

## 2018-08-09 NOTE — PROGRESS NOTES
Admission medication history interview status for the 8/9/2018  admission is complete. See EPIC admission navigator for prior to admission medications     Medication history source reliability:Good    Medication history interview source(s):Patient    Medication history resources (including written lists, pill bottles, clinic record):None    Primary pharmacy[facility]    Additional medication history information not noted on PTA med list :None    Time spent in this activity: 40 minutes    Prior to Admission medications    Medication Sig Last Dose Taking? Auth Provider   ASPIRIN PO Take 325 mg by mouth daily 8/2/2018 at AM Yes Reported, Patient   atorvastatin (LIPITOR) 40 MG tablet TAKE 1 TABLET (40 MG) BY MOUTH DAILY 8/8/2018 at 2100 Yes Enrique Lucia MD   Cholecalciferol (VITAMIN D3) 2000 UNITS CAPS Take 1 capsule by mouth daily  8/1/2018 at AM Yes Reported, Patient   Levothyroxine Sodium (SYNTHROID PO) Take 125 mcg by mouth See Admin Instructions Six days weekly- Monday, Tuesday, Thursday, Friday, Saturday, Sunday (Patient takes 1.5 x 125 mcg on Wednesday) 8/8/2018 at AM Yes Reported, Patient   Levothyroxine Sodium (SYNTHROID PO) Take 187.5 mcg by mouth once a week (1.5 x 125 mcg = 187.5 mcg dose) on Wednesday 8/1/2018 at AM Yes Reported, Patient   LORazepam (ATIVAN PO) Take 1-2 mg by mouth 3 times daily as needed for anxiety (0.5 - 1 tablet x 2 mg = 1 - 2 mg dose) 8/8/2018 at 2100 Yes Reported, Patient   losartan (COZAAR) 50 MG tablet 1/2 tablet (25 mg) once daily  Patient taking differently: Take 25 mg by mouth At Bedtime ( 0.5 x 50 mg = 25 mg dose) 8/8/2018 at 2100 Yes Enrique Lucia MD   Multiple Vitamin (MULTIVITAMIN OR) Take 1 tablet by mouth daily  8/1/2018 at AM Yes Reported, Patient   oxyCODONE IR (ROXICODONE) 5 MG tablet Take 1 tablet (5 mg) by mouth 3 times daily 8/8/2018 at 2100 Yes Enrique Lucia MD   zolpidem (AMBIEN) 10 MG tablet Take 1 tablet (10 mg) by mouth nightly  as needed for sleep  Patient taking differently: Take 5-10 mg by mouth nightly as needed for sleep (0.5 - 1 tablet x 10 mg = 5 mg dose) Past Month at HS Yes Enrique Lucia MD   TESTOSTERONE 2 MG/GM CREAM 15% cream and apply 0.5 ml topically daily  Patient taking differently: Apply topically daily 15% cream and apply 0.5 ml topically daily More than a month  Enrique Lucia MD

## 2018-08-09 NOTE — PROVIDER NOTIFICATION
MD Notification    Notified Person: MD    Notified Person Name: JAIME Villareal    Notification Date/Time: 8/9/18 1830    Notification Interaction: spoke with PA via phone    Purpose of Notification: pt does not want to take warfarin    Orders Received: dc warfarin, pt to remain on lovenox x 3days, then start xarelto    Comments:

## 2018-08-09 NOTE — PLAN OF CARE
Problem: Patient Care Overview  Goal: Plan of Care/Patient Progress Review  Outcome: No Change  Pt has been in pain and confused since admission. He has been encouraged to eat crackers so we can give him pain medication. Missing his glasses, PACU has been called. Pt told visitor that no nurses had been in to see him despite being in his room 10 minutes earlier. Possible confusion due to medications.

## 2018-08-09 NOTE — BRIEF OP NOTE
[facility] Brief Operative Note    Pre-operative diagnosis: RIGHT HIP DJD    Post-operative diagnosis same   Procedure: Procedure(s):  RIGHT TOTAL HIP ARTHROPLASTY DIRECT ANTERIOR APPROACH  - Wound Class: I-Clean   Surgeon(s): Surgeon(s) and Role:     * Chester Ortiz MD - Primary     * Rosio Perry PA-C - Assisting     * Adamaris Louie PA-C - Assisting   Estimated blood loss: 300ml    Specimens: * No specimens in log *   Findings: Advanced OA     Plan: DC home POD1 w/friends to assist.  DVT prophylaxis w/warfarin x1mo.

## 2018-08-09 NOTE — PHARMACY-ANTICOAGULATION SERVICE
Clinical Pharmacy - Warfarin Dosing Consult     Pharmacy has been consulted to manage this patient s warfarin therapy.  Indication: DVT/PE Prophylaxis  Provider/Team: 1.8-2.5  Warfarin Prior to Admission: No  Recent documented change in oral intake/nutrition: Unknown    INR Protime   Date Value Ref Range Status   06/01/2018 1.9 (A) 0.86 - 1.14 Final   05/18/2018 2.0 (A) 0.86 - 1.14 Final       Recommend warfarin 6 mg today.  Pharmacy will monitor Edi Villafana daily and order warfarin doses to achieve specified goal.      Please contact pharmacy as soon as possible if the warfarin needs to be held for a procedure or if the warfarin goals change.

## 2018-08-09 NOTE — PROGRESS NOTES
Pt arrived from PACU around 1245. Unable to find pt glasses. Belongings was checked by RNs, NA, Spouse. PACU and pre-op were called. No glasses was found. 55 manager and pt relations were notified. Continue to monitor.

## 2018-08-09 NOTE — CONSULTS
"Consult Date:  08/09/2018      REASON FOR CONSULTATION:  \"Hospitalist consult.\"      REQUESTING PHYSICIAN:  Dr. Chester Connell MD.        HISTORY OF PRESENT ILLNESS:  Mr. Villafana is a 73-year-old gentleman with past medical history of a provoked DVT in 11/2017 following hospitalization and rehab stay for severe pancreatitis.  He was anticoagulated for 7 months and now is off of anticoagulation.  There is no plan for lifelong anticoagulation.  Otherwise, has a chronic left shoulder pain for which he is opioid dependent, hypotestosteronism, hypothyroidism, hyperlipidemia, hypertension, diverticulosis, congenital renal abnormality with solitary kidney.   Following his pancreatitis the patient had acute onset of left hip pain.  Evaluation yielded a diagnosis of osteoarthritis.  Today, he underwent a right total hip arthroplasty with a direct anterior approach for right hip DJD with Dr. Connell.  Duration of surgery was 3 hours and 20 minutes.  He received general endotracheal anesthesia, had a routine airway.  Systolic blood pressures ranged from 80s to 145.  He received perioperative ephedrine, Versed, Ancef, although that is listed as an allergy, tranexamic acid and phenylephrine.  Crystalloid intake with LR was 1.9 liters.  Albumin 5% was 250 mL, urine output was 600 mL and EBL was 600 mL.  The patient received preoperative Lyrica and 1 gram of acetaminophen.  He was extubated at the end of his procedure and admitted to [facility] for further postoperative management.  The Hospitalist Service was asked to see him for the above reason, which was interpreted as assist with medical management, particularly with his history of DVT.      PAST MEDICAL HISTORY:   1.  DVT in the left lower extremity in 11/2017, preceded by acute illness with pancreatitis.   2.  Chronic left shoulder pain, rendering him opioid dependent.   3.  Hypotestosteronism.   4.  Hypothyroidism.   5.  Hyperlipidemia.   6.  Hypertension.   7.  " Diverticulosis.   8.  Congenital solitary kidney.        PAST SURGICAL HISTORY:     1.  Status post appendectomy.     2.  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   3.  Cataract surgery.   4.  Six other eye surgeries.      ALLERGIES:      Allergies   Allergen Reactions     Ace Inhibitors Cough     Ancef [Cefazolin Sodium]      Atenolol Other (See Comments)     Low BP     Compazine      Sulfa Drugs      Tramadol Fatigue     Side effects        SOCIAL HISTORY:  The patient is single, no kids, never smoker, non-drug user, social alcohol use varies from daily to weekly, last was in November before his pancreatitis.  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      FAMILY HISTORY:   Family History   Problem Relation Age of Onset     Hypertension Mother      Lipids Mother      HEART DISEASE Mother      Psychotic Disorder Mother      severe depression     HEART DISEASE Father      Cancer Sister      ovarian     Colon Cancer Paternal Grandfather         OUTPATIENT MEDICATIONS:   Prior to Admission Medications   Prescriptions Last Dose Informant Patient Reported? Taking?   ASPIRIN PO 8/2/2018 at AM Self Yes Yes   Sig: Take 325 mg by mouth daily   Cholecalciferol (VITAMIN D3) 2000 UNITS CAPS 8/1/2018 at AM Self Yes Yes   Sig: Take 1 capsule by mouth daily    LORazepam (ATIVAN PO) 8/8/2018 at 2100 Self Yes Yes   Sig: Take 1-2 mg by mouth 3 times daily as needed for anxiety (0.5 - 1 tablet x 2 mg = 1 - 2 mg dose)   Levothyroxine Sodium (SYNTHROID PO) 8/8/2018 at AM Self Yes Yes   Sig: Take 125 mcg by mouth See Admin Instructions Six days weekly- Monday, Tuesday, Thursday, Friday, Saturday, Sunday (Patient takes 1.5 x 125 mcg on Wednesday)   Levothyroxine Sodium (SYNTHROID PO) 8/1/2018 at AM Self Yes Yes   Sig: Take 187.5 mcg by mouth once a week (1.5 x 125 mcg = 187.5 mcg dose) on Wednesday   Multiple Vitamin (MULTIVITAMIN  OR) 2018 at AM Self Yes Yes   Sig: Take 1 tablet by mouth daily    TESTOSTERONE 2 MG/GM CREAM More than a month Self No No   Sig: 15% cream and apply 0.5 ml topically daily   Patient taking differently: Apply topically daily 15% cream and apply 0.5 ml topically daily   atorvastatin (LIPITOR) 40 MG tablet 2018 at 2100 Self No Yes   Sig: TAKE 1 TABLET (40 MG) BY MOUTH DAILY   losartan (COZAAR) 50 MG tablet 2018 at 2100 Self No Yes   Si/2 tablet (25 mg) once daily   Patient taking differently: Take 25 mg by mouth At Bedtime ( 0.5 x 50 mg = 25 mg dose)   oxyCODONE IR (ROXICODONE) 5 MG tablet 2018 at 2100 Self No Yes   Sig: Take 1 tablet (5 mg) by mouth 3 times daily   zolpidem (AMBIEN) 10 MG tablet Past Month at HS Self No Yes   Sig: Take 1 tablet (10 mg) by mouth nightly as needed for sleep   Patient taking differently: Take 5-10 mg by mouth nightly as needed for sleep (0.5 - 1 tablet x 10 mg = 5 mg dose)      Facility-Administered Medications: None       REVIEW OF SYSTEMS:   CONSTITUTIONAL:  Negative for recent fevers, chills.   CARDIOVASCULAR:  Negative for chest pain, heaviness, tightness, and saw his cardiologist 1 month ago.  He receives primary cardiology care at [facility].   PULMONARY:  Denies any shortness of breath.  Has a sore throat after intubation.   GI:  Denies any anorexia, nausea, vomiting, diarrhea, constipation or abdominal pain.  Reports 25-pound weight loss after his pancreatitis and there has been no recurrence of such.     :  Denies any urinary symptoms aside from slow initiation of flow.   MUSCULOSKELETAL:  As per HPI, had not been using a walker.   ENDOCRINE:  No history of diabetes.  No heat or cold intolerance.   INTEGUMENT:  He has some bruising on his left arm.     NEUROLOGIC:  He has some discombobulated thoughts postanesthesia, but otherwise denies any falls, headaches or acute vision changes.      PHYSICAL EXAMINATION:   General:   Older man lying in bed w/ NAD  HEENT:  Head is normocephalic, atraumatic.  Pupils are 3 mm and briskly reactive bilaterally.  Sclerae nonicteric, noninjected.  Conjunctivae are pink.  Mouth has dry oral mucosa.   NECK:  Supple.   CARDIOVASCULAR:  S1, S2, bradycardic rhythm.   LUNGS:  Clear to auscultation bilateral upper and lower lobes.   ABDOMEN:  Hypoactive bowel sounds, soft, nontender, nondistended.   EXTREMITIES:  Without edema.  Pulses, movement and sensation are all preserved in the right lower extremity.  The hip dressing was not visualized.     CNS:  Face is symmetric.  Tongue is midline.  Speech is fluent.  Follows commands to move fingers of all feet bilaterally.      ASSESSMENT AND PLAN:  Mr. Villafana is a 73-year-old gentleman with past medical history provoked DVT in 11/2017 following hospitalization and rehab stay for severe pancreatitis, s/p anticoagulated for 7 months, chronic left shoulder pain for which he is opioid dependent, hypotestosteronism, hypothyroidism, hyperlipidemia, hypertension, diverticulosis, congenital renal abnormality with solitary kidney who is status post right total hip arthroplasty with direct anterior approach for right hip degenerative joint disease on 08/09/2018 with Dr. Connell.  The Hospitalist Service has been asked to assist with management of medical comorbid conditions in the postoperative period.      PROBLEM LIST AND PLAN:   1.  Status post right total hip arthroplasty with direct anterior approach on 08/09/2018 with Dr. Connell..   -- Defer definitive management including analgesia, mobility, therapies, pharmacologic prophylaxis to the primary surgical service.     2.  Hyperlipidemia.   -- We will resume his PTA simvastatin.     3.  Hypertension.   -- Will resume his ARB likely no later than the a.m. of 08/10/2018.     4.  Solitary congenital kidney.   -- Avoid hypertension, nephrotoxins.  Renally dose all medications.     5.  Hypothyroidism.   -- Resume PTA  levothyroxine.     6.  History of deep venous thrombosis provoked by acute medical illness in 2017, status post 7 months of anticoagulation.   -- Pharmacy has already been consulted to assist with warfarin dosing.  Looks as though that will be the mode of DVT prophylaxis.   -- enoxaparin also starting on the a.m. of 08/10/2018.     7.  The patient is colonized with MRSA.  This likely occurred with his November hospitalization for pancreatitis followed by a nursing home stay.  He desires definitive eradicating treatment as he has found on [employer].     -- The patient has been counseled on potential risks of this including antimicrobial resistant C. diff, etc.   -- We will defer this to the outpatient setting and he can revisit it with his primary provider.     8.  Lines.  Adequate peripheral IV access is one 18-gauge catheter.     9.  Full code.     10.  Prophylaxis:  Enoxaparin and PCDs.      We thank you for this interesting consult.  The Hospitalist Service will round on him again on 08/10/2018.      Total time is 40 minutes from 2:04 to 2:13 and 3:09 to 3:40 p.m., of which 31 minutes was face-to-face time, remainder spent in a counseling and coordination of care.  The patient will be independently evaluated by Dr. Efrain Cano.      As dictated by ANDREWS CASTILLO, CNP      EFRAIN CANO MD                  D: 2018   T: 2018   MT: ALYSHA      Name:     MAURICE NORRIS   MRN:      [id number]        Account:       [id number]   :      1945           Consult Date:  2018      Document: [id number]       cc: Efrain Ortiz MD

## 2018-08-09 NOTE — ANESTHESIA POSTPROCEDURE EVALUATION
Patient: Edi Villafana    Procedure(s):  RIGHT TOTAL HIP ARTHROPLASTY DIRECT ANTERIOR APPROACH  - Wound Class: I-Clean    Diagnosis:RIGHT HIP DJD   Diagnosis Additional Information: No value filed.    Anesthesia Type:  General, ETT    Note:  Anesthesia Post Evaluation    Patient location during evaluation: PACU  Patient participation: Able to fully participate in evaluation  Level of consciousness: awake  Pain management: adequate  Airway patency: patent  Cardiovascular status: acceptable  Respiratory status: acceptable  Hydration status: acceptable  PONV: none             Last vitals:  Vitals:    08/09/18 1210 08/09/18 1220 08/09/18 1230   BP: 108/65 106/68    Pulse:      Resp: 10 12 11   Temp:  37.1  C (98.7  F)    SpO2: 93% 95% 99%         Electronically Signed By: Torey Medina MD  August 9, 2018  12:35 PM

## 2018-08-10 ENCOUNTER — APPOINTMENT (OUTPATIENT)
Dept: PHYSICAL THERAPY | Facility: CLINIC | Age: 73
DRG: 470 | End: 2018-08-10
Attending: ORTHOPAEDIC SURGERY
Payer: MEDICARE

## 2018-08-10 ENCOUNTER — APPOINTMENT (OUTPATIENT)
Dept: OCCUPATIONAL THERAPY | Facility: CLINIC | Age: 73
DRG: 470 | End: 2018-08-10
Attending: ORTHOPAEDIC SURGERY
Payer: MEDICARE

## 2018-08-10 PROBLEM — Z71.89 ADVANCED DIRECTIVES, COUNSELING/DISCUSSION: Status: RESOLVED | Noted: 2017-11-21 | Resolved: 2018-08-10

## 2018-08-10 LAB
CREAT SERPL-MCNC: 0.87 MG/DL (ref 0.66–1.25)
GFR SERPL CREATININE-BSD FRML MDRD: 86 ML/MIN/1.7M2
GLUCOSE SERPL-MCNC: 118 MG/DL (ref 70–99)
HGB BLD-MCNC: 14.1 G/DL (ref 13.3–17.7)
INR PPP: 1.09 (ref 0.86–1.14)

## 2018-08-10 PROCEDURE — 25000132 ZZH RX MED GY IP 250 OP 250 PS 637: Mod: GY | Performed by: ORTHOPAEDIC SURGERY

## 2018-08-10 PROCEDURE — 97530 THERAPEUTIC ACTIVITIES: CPT | Mod: GO

## 2018-08-10 PROCEDURE — 97165 OT EVAL LOW COMPLEX 30 MIN: CPT | Mod: GO

## 2018-08-10 PROCEDURE — A9270 NON-COVERED ITEM OR SERVICE: HCPCS | Mod: GY | Performed by: HOSPITALIST

## 2018-08-10 PROCEDURE — 97110 THERAPEUTIC EXERCISES: CPT | Mod: GP

## 2018-08-10 PROCEDURE — A9270 NON-COVERED ITEM OR SERVICE: HCPCS | Mod: GY | Performed by: ORTHOPAEDIC SURGERY

## 2018-08-10 PROCEDURE — 99232 SBSQ HOSP IP/OBS MODERATE 35: CPT | Performed by: HOSPITALIST

## 2018-08-10 PROCEDURE — A9270 NON-COVERED ITEM OR SERVICE: HCPCS | Mod: GY | Performed by: PHYSICIAN ASSISTANT

## 2018-08-10 PROCEDURE — 25000132 ZZH RX MED GY IP 250 OP 250 PS 637: Mod: GY | Performed by: PHYSICIAN ASSISTANT

## 2018-08-10 PROCEDURE — 40000133 ZZH STATISTIC OT WARD VISIT

## 2018-08-10 PROCEDURE — 97530 THERAPEUTIC ACTIVITIES: CPT | Mod: GP

## 2018-08-10 PROCEDURE — 25000132 ZZH RX MED GY IP 250 OP 250 PS 637: Mod: GY | Performed by: HOSPITALIST

## 2018-08-10 PROCEDURE — 12000007 ZZH R&B INTERMEDIATE

## 2018-08-10 PROCEDURE — 25000128 H RX IP 250 OP 636: Performed by: ORTHOPAEDIC SURGERY

## 2018-08-10 PROCEDURE — 36415 COLL VENOUS BLD VENIPUNCTURE: CPT | Performed by: ORTHOPAEDIC SURGERY

## 2018-08-10 PROCEDURE — A9270 NON-COVERED ITEM OR SERVICE: HCPCS | Mod: GY | Performed by: NURSE PRACTITIONER

## 2018-08-10 PROCEDURE — 85610 PROTHROMBIN TIME: CPT | Performed by: ORTHOPAEDIC SURGERY

## 2018-08-10 PROCEDURE — 97116 GAIT TRAINING THERAPY: CPT | Mod: GP

## 2018-08-10 PROCEDURE — 82947 ASSAY GLUCOSE BLOOD QUANT: CPT | Performed by: ORTHOPAEDIC SURGERY

## 2018-08-10 PROCEDURE — 40000193 ZZH STATISTIC PT WARD VISIT

## 2018-08-10 PROCEDURE — 85018 HEMOGLOBIN: CPT | Performed by: ORTHOPAEDIC SURGERY

## 2018-08-10 PROCEDURE — 82565 ASSAY OF CREATININE: CPT | Performed by: ORTHOPAEDIC SURGERY

## 2018-08-10 PROCEDURE — 25000132 ZZH RX MED GY IP 250 OP 250 PS 637: Mod: GY | Performed by: NURSE PRACTITIONER

## 2018-08-10 PROCEDURE — 97535 SELF CARE MNGMENT TRAINING: CPT | Mod: GO

## 2018-08-10 RX ORDER — POLYETHYLENE GLYCOL 3350 17 G/17G
17 POWDER, FOR SOLUTION ORAL DAILY PRN
Status: DISCONTINUED | OUTPATIENT
Start: 2018-08-10 | End: 2018-08-12 | Stop reason: HOSPADM

## 2018-08-10 RX ORDER — AMOXICILLIN 250 MG
2 CAPSULE ORAL 2 TIMES DAILY PRN
Status: DISCONTINUED | OUTPATIENT
Start: 2018-08-10 | End: 2018-08-12 | Stop reason: HOSPADM

## 2018-08-10 RX ORDER — OXYCODONE HYDROCHLORIDE 5 MG/1
TABLET ORAL
Qty: 70 TABLET | Refills: 0 | Status: SHIPPED | OUTPATIENT
Start: 2018-08-10 | End: 2018-08-12

## 2018-08-10 RX ORDER — OXYCODONE HCL 10 MG/1
10 TABLET, FILM COATED, EXTENDED RELEASE ORAL EVERY 12 HOURS
Status: DISCONTINUED | OUTPATIENT
Start: 2018-08-10 | End: 2018-08-12 | Stop reason: HOSPADM

## 2018-08-10 RX ORDER — AMOXICILLIN 250 MG
2 CAPSULE ORAL 2 TIMES DAILY PRN
Qty: 60 TABLET | Refills: 0 | Status: SHIPPED | OUTPATIENT
Start: 2018-08-10 | End: 2020-01-01

## 2018-08-10 RX ORDER — LORAZEPAM 1 MG/1
1-2 TABLET ORAL 3 TIMES DAILY PRN
Status: DISCONTINUED | OUTPATIENT
Start: 2018-08-10 | End: 2018-08-12 | Stop reason: HOSPADM

## 2018-08-10 RX ORDER — AMOXICILLIN 250 MG
1 CAPSULE ORAL 2 TIMES DAILY PRN
Status: DISCONTINUED | OUTPATIENT
Start: 2018-08-10 | End: 2018-08-12 | Stop reason: HOSPADM

## 2018-08-10 RX ORDER — BISACODYL 10 MG
10 SUPPOSITORY, RECTAL RECTAL DAILY PRN
Status: DISCONTINUED | OUTPATIENT
Start: 2018-08-10 | End: 2018-08-12 | Stop reason: HOSPADM

## 2018-08-10 RX ORDER — SIMETHICONE 80 MG
80 TABLET,CHEWABLE ORAL EVERY 6 HOURS PRN
Status: DISCONTINUED | OUTPATIENT
Start: 2018-08-10 | End: 2018-08-12 | Stop reason: HOSPADM

## 2018-08-10 RX ORDER — ACETAMINOPHEN 500 MG
1000 TABLET ORAL EVERY 6 HOURS PRN
Qty: 100 TABLET | Refills: 0 | Status: SHIPPED | OUTPATIENT
Start: 2018-08-10

## 2018-08-10 RX ORDER — OXYCODONE HYDROCHLORIDE 5 MG/1
10-15 TABLET ORAL
Status: DISCONTINUED | OUTPATIENT
Start: 2018-08-10 | End: 2018-08-12 | Stop reason: HOSPADM

## 2018-08-10 RX ADMIN — Medication 2 LOZENGE: at 01:37

## 2018-08-10 RX ADMIN — LOSARTAN POTASSIUM 25 MG: 25 TABLET ORAL at 21:19

## 2018-08-10 RX ADMIN — HYDROXYZINE HYDROCHLORIDE 10 MG: 10 TABLET ORAL at 02:22

## 2018-08-10 RX ADMIN — HYDROXYZINE HYDROCHLORIDE 10 MG: 10 TABLET ORAL at 12:51

## 2018-08-10 RX ADMIN — OXYCODONE HYDROCHLORIDE 15 MG: 5 TABLET ORAL at 03:52

## 2018-08-10 RX ADMIN — Medication 0.5 MG: at 04:46

## 2018-08-10 RX ADMIN — LORAZEPAM 1 MG: 1 TABLET ORAL at 03:52

## 2018-08-10 RX ADMIN — OXYCODONE HYDROCHLORIDE 15 MG: 5 TABLET ORAL at 11:45

## 2018-08-10 RX ADMIN — OXYCODONE HYDROCHLORIDE 10 MG: 5 TABLET ORAL at 01:37

## 2018-08-10 RX ADMIN — SENNOSIDES AND DOCUSATE SODIUM 1 TABLET: 8.6; 5 TABLET ORAL at 21:18

## 2018-08-10 RX ADMIN — LEVOTHYROXINE SODIUM 125 MCG: 75 TABLET ORAL at 09:07

## 2018-08-10 RX ADMIN — HYDROXYZINE HYDROCHLORIDE 10 MG: 10 TABLET ORAL at 22:41

## 2018-08-10 RX ADMIN — ACETAMINOPHEN 975 MG: 325 TABLET, FILM COATED ORAL at 21:18

## 2018-08-10 RX ADMIN — SENNOSIDES AND DOCUSATE SODIUM 1 TABLET: 8.6; 5 TABLET ORAL at 08:55

## 2018-08-10 RX ADMIN — ATORVASTATIN CALCIUM 40 MG: 40 TABLET, FILM COATED ORAL at 08:55

## 2018-08-10 RX ADMIN — Medication 0.5 MG: at 06:46

## 2018-08-10 RX ADMIN — Medication 1 ML: at 02:31

## 2018-08-10 RX ADMIN — Medication 0.5 MG: at 02:22

## 2018-08-10 RX ADMIN — ACETAMINOPHEN 975 MG: 325 TABLET, FILM COATED ORAL at 14:48

## 2018-08-10 RX ADMIN — CEFAZOLIN SODIUM 2 G: 2 INJECTION, SOLUTION INTRAVENOUS at 01:37

## 2018-08-10 RX ADMIN — OXYCODONE HYDROCHLORIDE 10 MG: 10 TABLET, FILM COATED, EXTENDED RELEASE ORAL at 08:56

## 2018-08-10 RX ADMIN — SIMETHICONE CHEW TAB 80 MG 80 MG: 80 TABLET ORAL at 12:51

## 2018-08-10 RX ADMIN — OXYCODONE HYDROCHLORIDE 15 MG: 5 TABLET ORAL at 07:48

## 2018-08-10 RX ADMIN — ACETAMINOPHEN 975 MG: 325 TABLET, FILM COATED ORAL at 06:46

## 2018-08-10 RX ADMIN — LORAZEPAM 1 MG: 1 TABLET ORAL at 14:16

## 2018-08-10 RX ADMIN — ENOXAPARIN SODIUM 40 MG: 40 INJECTION SUBCUTANEOUS at 08:56

## 2018-08-10 ASSESSMENT — ACTIVITIES OF DAILY LIVING (ADL)
ADLS_ACUITY_SCORE: 12
ADLS_ACUITY_SCORE: 12
ADLS_ACUITY_SCORE: 11
ADLS_ACUITY_SCORE: 12
ADLS_ACUITY_SCORE: 11
ADLS_ACUITY_SCORE: 12

## 2018-08-10 NOTE — PLAN OF CARE
"Problem: Patient Care Overview  Goal: Plan of Care/Patient Progress Review  Discharge Planner PT   Patient plan for discharge: Home with assist  Current status: Pt with poor pain control overnight, continues with increased pain throughout PT session. Pt also with c/o dizziness with standing, unable to tolerate ambulation at this time. Pt min assist for bed mobility, increased verbal cueing and encouragement needed. Pt min assist for sit to stand and min assist for taking side steps. Pt with difficulty weight shifting and fully clearing feet. Pt with increased c/o dizziness throughout standing, unable to tere ambulation or transfer to chair at this time.   Barriers to return to prior living situation: Decreased activity tolerance, increased pain, needing assist for all mobility tasks.  Recommendations for discharge: Home with assist vs TCU depending on pt progress  Rationale for recommendations: Pt states he will be staying with friends upon discharge so \"I can't be any trouble.\" Pt is currently with c/o 8/10 pain at rest and 10/10 with mobility, dizziness in standing, unable to tolerate weight shifting and ambulation due to pain/dizziness. Will continue to follow for PT intervention during hospital stay and progress mobility as pt tolerates, do not anticipate pt will meet mobility requirements for discharge to home safely today.       Entered by: Melani Spencer 08/10/2018 9:27 AM         "

## 2018-08-10 NOTE — PLAN OF CARE
Problem: Patient Care Overview  Goal: Plan of Care/Patient Progress Review  Outcome: Improving  Pt was A & O x 4. Lungs sound clear, bowel sounds active, cms intact. Received oxycodone for pain. IV fluid and dumont in place. Dressing is CDI. Will continue to monitor.

## 2018-08-10 NOTE — PLAN OF CARE
"Problem: Patient Care Overview  Goal: Plan of Care/Patient Progress Review  Outcome: Improving  Pt seems to be slowly improving. Was expected to go home today but pt is in too much pain and PT did not go well in the morning. Pt up to walk in room this afternoon and felt better after that. Still in severe pain, rating it between 7-8 all day despite pain meds. DC'd IV dilaudid at beginning of shift. Pt has been saying he wishes he could still have it because it \"is the only thing that I have noticed working\" (per pt). Griffith taken out this AM and was dribbling urine for a few hours after. Painful gas which was resolved with meds.       "

## 2018-08-10 NOTE — PLAN OF CARE
Problem: Patient Care Overview  Goal: Plan of Care/Patient Progress Review  Discharge Planner OT   Patient plan for discharge: Go home  Current status: Eval received and completed. Pt presents after R total hip replacement 8/9.Pt at baseline is independent, he does live alone and has 12 stairs to enter. Pt able to complete ADLs independently, taught how to don/doff socks and get on/off commode by himself. He was not safe completing tub transfer but Pt has option of walk in shower at friends [place]- which I recommend he used. Pt has no continued OT needs, safe to friends house  Barriers to return to prior living situation: None to return to friends house  Recommendations for discharge: home with friends  Rationale for recommendations: Able to complete Lb dressing and standing ADLs with SBA, has supportive friends and environment safe for him to DC home       Entered by: Jorden Mora 08/10/2018 4:39 PM

## 2018-08-10 NOTE — PROGRESS NOTES
[facility]  Hospitalist Progress Note        Casa Murdock, DO  08/10/2018        Interval History:      Patient states he is doing well. Discussed plan of care at length, patient verbalized understanding.          Assessment and Plan:        73-year-old gentleman with past medical history provoked DVT in 11/2017 following hospitalization and rehab stay for severe pancreatitis, s/p anticoagulated for 7 months, chronic left shoulder pain for which he is opioid dependent, hypotestosteronism, hypothyroidism, hyperlipidemia, hypertension, diverticulosis, congenital renal abnormality with solitary kidney who is status post right total hip arthroplasty with direct anterior approach for right hip degenerative joint disease on 08/09/2018 with Dr. Connell.  The Hospitalist Service has been asked to assist with management of medical comorbid conditions in the postoperative period.       Status post right total hip arthroplasty with direct anterior approach on 08/09/2018 with Dr. Connell..   - Defer definitive management including analgesia, mobility, therapies, pharmacologic prophylaxis to the primary surgical service.      Hyperlipidemia.   - PTA simvastatin.      Hypertension.   - Monitor.  - PRN Labetalol.   - Consider restart of ARB as clinically indicated.       Solitary congenital kidney.   - Avoid nephrotoxins.       Hypothyroidism.   - PTA levothyroxine.      History of deep venous thrombosis provoked by acute medical illness in 11/2017, status post 7 months of anticoagulation.   - Pharmacy consulted to assist with warfarin dosing.      Colonized with MRSA. This likely occurred with his November hospitalization for pancreatitis followed by a nursing home stay.  He desires definitive eradicating treatment as he has found on [employer].     - The patient has been counseled on potential risks of this including antimicrobial resistant C. diff, etc.   - We will defer this to the outpatient setting and  "he can revisit it with his primary provider.      Code: Full code.      Prophylaxis:  Defer to Orthopedics.     Disposition: Defer to Orthopedics.     Pain: # Pain Assessment:  Current Pain Score 8/10/2018   Patient currently in pain? -   Pain score (0-10) 8   Pain location -   Pain descriptors -   - Edi is experiencing pain. Pain management was discussed and the plan was created in a collaborative fashion.  Edi's response to the current recommendations: mixed response  - Please see the plan for pain management as documented above                       Physical Exam:      Heart Rate: 69    Blood pressure 119/68, pulse 74, temperature 98.7  F (37.1  C), temperature source Axillary, resp. rate 18, height 1.854 m (6' 1\"), weight 88.5 kg (195 lb), SpO2 93 %.    Vitals:    18 0639   Weight: 88.5 kg (195 lb)       Vital Sign Ranges  Temperature Temp  Av.6  F (36.4  C)  Min: 96.1  F (35.6  C)  Max: 98.7  F (37.1  C)   Blood pressure Systolic (24hrs), Av , Min:104 , Max:142        Diastolic (24hrs), Av, Min:61, Max:93      Pulse Pulse  Av.3  Min: 51  Max: 74   Respirations Resp  Avg: 15.3  Min: 8  Max: 20   Pulse oximetry SpO2  Av.4 %  Min: 93 %  Max: 99 %     Vital Signs with Ranges  Temp:  [96.1  F (35.6  C)-98.7  F (37.1  C)] 98.7  F (37.1  C)  Pulse:  [51-74] 74  Heart Rate:  [50-79] 69  Resp:  [8-20] 18  BP: (104-142)/(61-93) 119/68  SpO2:  [93 %-99 %] 93 %    I/O Last 3 Shifts:   I/O last 3 completed shifts:  In: 4160 [P.O.:910; I.V.:3000]  Out: 5000 [Urine:4400; Blood:600]    I/O past 24 hours:     Intake/Output Summary (Last 24 hours) at 08/10/18 0943  Last data filed at 08/10/18 0551   Gross per 24 hour   Intake             2910 ml   Output             3925 ml   Net            -1015 ml     GENERAL: Alert. NAD. Conversational, appropriate.   HEENT: Normocephalic. EOMI. No icterus or injection. Nares normal.   LUNGS: Clear to auscultation. No dyspnea at rest.   HEART: Regular rate. " Extremities perfused.   ABDOMEN: Soft, nontender, and nondistended. Positive bowel sounds.   NEUROLOGIC: Moves extremities x4. No acute focal neurologic abnormalities noted.          Prior to Admission Medications:        Prescriptions Prior to Admission   Medication Sig Dispense Refill Last Dose     ASPIRIN PO Take 325 mg by mouth daily   8/2/2018 at AM     atorvastatin (LIPITOR) 40 MG tablet TAKE 1 TABLET (40 MG) BY MOUTH DAILY 90 tablet 1 8/8/2018 at 2100     Cholecalciferol (VITAMIN D3) 2000 UNITS CAPS Take 1 capsule by mouth daily    8/1/2018 at AM     Levothyroxine Sodium (SYNTHROID PO) Take 125 mcg by mouth See Admin Instructions Six days weekly- Monday, Tuesday, Thursday, Friday, Saturday, Sunday (Patient takes 1.5 x 125 mcg on Wednesday)   8/8/2018 at AM     Levothyroxine Sodium (SYNTHROID PO) Take 187.5 mcg by mouth once a week (1.5 x 125 mcg = 187.5 mcg dose) on Wednesday 8/1/2018 at AM     LORazepam (ATIVAN PO) Take 1-2 mg by mouth 3 times daily as needed for anxiety (0.5 - 1 tablet x 2 mg = 1 - 2 mg dose)   8/8/2018 at 2100     losartan (COZAAR) 50 MG tablet 1/2 tablet (25 mg) once daily (Patient taking differently: Take 25 mg by mouth At Bedtime ( 0.5 x 50 mg = 25 mg dose)) 45 tablet 3 8/8/2018 at 2100     Multiple Vitamin (MULTIVITAMIN OR) Take 1 tablet by mouth daily    8/1/2018 at AM     oxyCODONE IR (ROXICODONE) 5 MG tablet Take 1 tablet (5 mg) by mouth 3 times daily 90 tablet 0 8/8/2018 at 2100     zolpidem (AMBIEN) 10 MG tablet Take 1 tablet (10 mg) by mouth nightly as needed for sleep (Patient taking differently: Take 5-10 mg by mouth nightly as needed for sleep (0.5 - 1 tablet x 10 mg = 5 mg dose)) 90 tablet 1 Past Month at HS     TESTOSTERONE 2 MG/GM CREAM 15% cream and apply 0.5 ml topically daily (Patient taking differently: Apply topically daily 15% cream and apply 0.5 ml topically daily) 40 g 5 More than a month            Medications:        Current Facility-Administered Medications    Medication Last Rate     Current Facility-Administered Medications   Medication Dose Route Frequency     acetaminophen  975 mg Oral Q8H     atorvastatin  40 mg Oral Daily     cholecalciferol  2,000 Units Oral Daily     enoxaparin  40 mg Subcutaneous Q24H     [START ON 8/15/2018] levothyroxine (SYNTHROID/LEVOTHROID) half-tab 187.5 mcg  187.5 mcg Oral Weekly     levothyroxine (SYNTHROID/LEVOTHROID) tablet 125 mcg  125 mcg Oral Once per day on Sun Mon Tue Thu Fri Sat     losartan  25 mg Oral At Bedtime     oxyCODONE  10 mg Oral Q12H     senna-docusate  1 tablet Oral BID    Or     senna-docusate  2 tablet Oral BID     sodium chloride (PF)  3 mL Intracatheter Q8H     Current Facility-Administered Medications   Medication Dose Route Frequency     [START ON 8/12/2018] acetaminophen  650 mg Oral Q4H PRN     sore throat lozenge  1-2 lozenge Buccal Q1H PRN     HYDROmorphone  0.3-0.5 mg Intravenous Q2H PRN     hydrOXYzine  10 mg Oral Q6H PRN     lidocaine 4%   Topical Q1H PRN     lidocaine (buffered or not buffered)  1 mL Other Q1H PRN     LORazepam  1-2 mg Oral TID PRN     naloxone  0.1-0.4 mg Intravenous Q2 Min PRN     ondansetron  4 mg Oral Q6H PRN    Or     ondansetron  4 mg Intravenous Q6H PRN     oxyCODONE IR  10-15 mg Oral Q3H PRN     phenol  1 spray Mouth/Throat Q1H PRN     sodium chloride (PF)  3 mL Intracatheter Q1H PRN     temazepam  7.5 mg Oral At Bedtime PRN            Data:      Lab data reviewed.     Recent Labs  Lab 08/10/18  0727 08/09/18  0634   HGB 14.1 14.8   PLT  --  181   INR 1.09  --    NA  --  137   POTASSIUM  --  3.9   CHLORIDE  --  103   CO2  --  22   BUN  --  14   CR 0.87 1.01   CECY  --  8.8   * 92           Imaging:      Imaging data reviewed.     Dr. Casa Murdock D.O.  [facility]  Pager [phone]

## 2018-08-10 NOTE — PLAN OF CARE
Problem: Patient Care Overview  Goal: Plan of Care/Patient Progress Review  Outcome: Improving  Pt is AAOx4, VSS, CMS intact, dressing CDI, adequate output, tolerating regular diet, IV SL, RA, capno wnl, pain is severe, uncontrolled throughout night with current regimen. JAIME Mckenna notified, increased oxy 15mg q3hrs, ativan added for anxiety. Some relief achieved. Will address with am team.

## 2018-08-10 NOTE — PROGRESS NOTES
"BRIEF NUTRITION ASSESSMENT      REASON FOR ASSESSMENT:  Admission Nutrition Risk Screen - Unintentional weight loss of 10# or more in past 2 months    NUTRITION HISTORY:  Information from patient:   - Reports a regular diet at home \"I eat when I need to\"  - Enjoys the [employer] frozen meals daily  - Feels that he has lost ~30# recently -- reports that he is happy with current weight and does not plan on losing more   - Reports that his pancreatitis \"seems under control\" at the moment     NKFA    CURRENT DIET AND INTAKE:  Diet:  Regular    Appetite is good. Patient reports a good breakfast of pancakes, fruit, and yogurt    ANTHROPOMETRICS:  Height: 6' 1\"  Weight: 88.5 kg  BMI: 25.73 kg/m2  IBW:  83.6 kg  Weight Status: Overweight BMI 25-29.9  %IBW: 106%  Weight History: Stable over the past 6 months   Wt Readings from Last 10 Encounters:   08/09/18 88.5 kg (195 lb)   07/16/18 89.4 kg (197 lb)   05/19/18 88.9 kg (196 lb)   03/27/18 89.6 kg (197 lb 8 oz)   02/14/18 89.8 kg (198 lb)   12/13/17 93.4 kg (206 lb)   12/07/17 90.4 kg (199 lb 3.2 oz)   12/05/17 90.4 kg (199 lb 3.2 oz)   12/04/17 90.4 kg (199 lb 3.2 oz)   11/29/17 90.8 kg (200 lb 3.2 oz)       LABS:  Labs noted    MALNUTRITION:  Patient does not meet two of the following criteria necessary for diagnosing malnutrition: significant weight loss, reduced intake, subcutaneous fat loss, muscle loss or fluid retention    NUTRITION INTERVENTION:  Nutrition Diagnosis:  No nutrition diagnosis at this time.    Implementation:  Nutrition Education: Per Provider order if indicated  Suggested Boost if patient feels it is necessary. Declined at this time.     FOLLOW UP/MONITORING:   Will re-evaluate in 7 - 10 days, or sooner, if re-consulted.        Rosalba Thurston RD  Clinical Dietitian     "

## 2018-08-10 NOTE — PROGRESS NOTES
08/10/18 1626   Quick Adds   Type of Visit Initial Occupational Therapy Evaluation   Living Environment   Lives With alone   Living Arrangements apartment   Home Accessibility stairs within home   Number of Stairs to Enter Home 0   Number of Stairs Within Home 12   Stair Railings at Home inside, present at both sides   Living Environment Comment Lives in apartment, 12 steps to get up to. Tub shower with shower bench   Self-Care   Dominant Hand right   Usual Activity Tolerance good   Current Activity Tolerance moderate   Functional Level Prior   Ambulation 0-->independent   Transferring 0-->independent   Toileting 0-->independent   Bathing 0-->independent   Dressing 0-->independent   Eating 0-->independent   Communication 0-->understands/communicates without difficulty   Swallowing 0-->swallows foods/liquids without difficulty   Cognition 0 - no cognition issues reported   Fall history within last six months no   Prior Functional Level Comment Independent with ADLs   General Information   Onset of Illness/Injury or Date of Surgery - Date 08/09/18   Referring Physician Chester Ortiz MD   Patient/Family Goals Statement Go home   Additional Occupational Profile Info/Pertinent History of Current Problem s/p R total hip anterior approach 8/9   Precautions/Limitations fall precautions   General Observations Recieved in bed, SCD on, agreed to therapy   Cognitive Status Examination   Orientation orientation to person, place and time   Range of Motion (ROM)   ROM Comment Full range   Strength   Strength Comments Strength WFL   Mobility   Bed Mobility Comments SBA   Transfer Skill: Bed to Chair/Chair to Bed   Level of [place]: Bed to Chair stand-by assist   Transfer Skill: Sit to Stand   Level of [place]: Sit/Stand stand-by assist   Transfer Skill: Toilet Transfer   Level of [place]: Toilet stand-by assist   Bathing   Level of [place] contact guard   Upper Body Dressing   Level of [place]: Dress  "Upper Body independent   Lower Body Dressing   Level of [place]: Dress Lower Body independent   Toileting   Level of [place]: Toilet stand-by assist   Grooming   Level of [place]: Grooming independent   Instrumental Activities of Daily Living (IADL)   Previous Responsibilities meal prep;housekeeping;laundry;shopping;finances;driving;school   Clinical Impression   Criteria for Skilled Therapeutic Interventions Met evaluation only   OT Diagnosis Impairments in ADLs   Influenced by the following impairments pain   Assessment of Occupational Performance 1-3 Performance Deficits   Identified Performance Deficits bathing   Clinical Decision Making (Complexity) Low complexity   Therapy Frequency (eval only)   Anticipated Discharge Disposition Home   Risks and Benefits of Treatment have been explained. Yes   Patient, Family & other staff in agreement with plan of care Yes   [facility] AM-PAC  \"6 Clicks\" Daily Activity Inpatient Short Form   1. Putting on and taking off regular lower body clothing? 4 - None   2. Bathing (including washing, rinsing, drying)? 3 - A Little   3. Toileting, which includes using toilet, bedpan or urinal? 4 - None   4. Putting on and taking off regular upper body clothing? 4 - None   5. Taking care of personal grooming such as brushing teeth? 4 - None   6. Eating meals? 4 - None   Daily Activity Raw Score (Score out of 24.Lower scores equate to lower levels of function) 23   Total Evaluation Time   Total Evaluation Time (Minutes) 13     "

## 2018-08-11 ENCOUNTER — APPOINTMENT (OUTPATIENT)
Dept: PHYSICAL THERAPY | Facility: CLINIC | Age: 73
DRG: 470 | End: 2018-08-11
Attending: ORTHOPAEDIC SURGERY
Payer: MEDICARE

## 2018-08-11 LAB
ALBUMIN UR-MCNC: NEGATIVE MG/DL
APPEARANCE UR: CLEAR
BILIRUB UR QL STRIP: NEGATIVE
COLOR UR AUTO: ABNORMAL
GLUCOSE SERPL-MCNC: 109 MG/DL (ref 70–99)
GLUCOSE UR STRIP-MCNC: NEGATIVE MG/DL
HGB BLD-MCNC: 13.7 G/DL (ref 13.3–17.7)
HGB UR QL STRIP: ABNORMAL
INR PPP: 1.15 (ref 0.86–1.14)
KETONES UR STRIP-MCNC: NEGATIVE MG/DL
LEUKOCYTE ESTERASE UR QL STRIP: NEGATIVE
NITRATE UR QL: NEGATIVE
PH UR STRIP: 6.5 PH (ref 5–7)
RBC #/AREA URNS AUTO: <1 /HPF (ref 0–2)
SOURCE: ABNORMAL
SP GR UR STRIP: 1 (ref 1–1.03)
UROBILINOGEN UR STRIP-MCNC: NORMAL MG/DL (ref 0–2)
WBC #/AREA URNS AUTO: <1 /HPF (ref 0–5)

## 2018-08-11 PROCEDURE — 25000132 ZZH RX MED GY IP 250 OP 250 PS 637: Mod: GY | Performed by: ORTHOPAEDIC SURGERY

## 2018-08-11 PROCEDURE — 97110 THERAPEUTIC EXERCISES: CPT | Mod: GP

## 2018-08-11 PROCEDURE — 25000125 ZZHC RX 250

## 2018-08-11 PROCEDURE — 85610 PROTHROMBIN TIME: CPT | Performed by: ORTHOPAEDIC SURGERY

## 2018-08-11 PROCEDURE — 40000193 ZZH STATISTIC PT WARD VISIT

## 2018-08-11 PROCEDURE — 97116 GAIT TRAINING THERAPY: CPT | Mod: GP

## 2018-08-11 PROCEDURE — A9270 NON-COVERED ITEM OR SERVICE: HCPCS | Mod: GY | Performed by: HOSPITALIST

## 2018-08-11 PROCEDURE — 97530 THERAPEUTIC ACTIVITIES: CPT | Mod: GP

## 2018-08-11 PROCEDURE — 12000007 ZZH R&B INTERMEDIATE

## 2018-08-11 PROCEDURE — 85018 HEMOGLOBIN: CPT | Performed by: ORTHOPAEDIC SURGERY

## 2018-08-11 PROCEDURE — 99232 SBSQ HOSP IP/OBS MODERATE 35: CPT | Performed by: HOSPITALIST

## 2018-08-11 PROCEDURE — 82947 ASSAY GLUCOSE BLOOD QUANT: CPT | Performed by: ORTHOPAEDIC SURGERY

## 2018-08-11 PROCEDURE — 36415 COLL VENOUS BLD VENIPUNCTURE: CPT | Performed by: ORTHOPAEDIC SURGERY

## 2018-08-11 PROCEDURE — 25000132 ZZH RX MED GY IP 250 OP 250 PS 637: Mod: GY | Performed by: HOSPITALIST

## 2018-08-11 PROCEDURE — 81001 URINALYSIS AUTO W/SCOPE: CPT | Performed by: HOSPITALIST

## 2018-08-11 PROCEDURE — A9270 NON-COVERED ITEM OR SERVICE: HCPCS | Mod: GY | Performed by: NURSE PRACTITIONER

## 2018-08-11 PROCEDURE — 25000132 ZZH RX MED GY IP 250 OP 250 PS 637: Mod: GY | Performed by: NURSE PRACTITIONER

## 2018-08-11 PROCEDURE — 25000128 H RX IP 250 OP 636: Performed by: ORTHOPAEDIC SURGERY

## 2018-08-11 PROCEDURE — A9270 NON-COVERED ITEM OR SERVICE: HCPCS | Mod: GY | Performed by: ORTHOPAEDIC SURGERY

## 2018-08-11 PROCEDURE — 25000132 ZZH RX MED GY IP 250 OP 250 PS 637: Mod: GY | Performed by: PHYSICIAN ASSISTANT

## 2018-08-11 PROCEDURE — A9270 NON-COVERED ITEM OR SERVICE: HCPCS | Mod: GY | Performed by: PHYSICIAN ASSISTANT

## 2018-08-11 RX ORDER — HYDROMORPHONE HYDROCHLORIDE 2 MG/1
2-4 TABLET ORAL
Status: DISCONTINUED | OUTPATIENT
Start: 2018-08-11 | End: 2018-08-12 | Stop reason: HOSPADM

## 2018-08-11 RX ORDER — HYDROMORPHONE HYDROCHLORIDE 2 MG/1
2-4 TABLET ORAL
Qty: 50 TABLET | Refills: 0 | Status: SHIPPED | OUTPATIENT
Start: 2018-08-11 | End: 2018-11-19

## 2018-08-11 RX ADMIN — HYDROMORPHONE HYDROCHLORIDE 4 MG: 2 TABLET ORAL at 13:57

## 2018-08-11 RX ADMIN — ENOXAPARIN SODIUM 40 MG: 40 INJECTION SUBCUTANEOUS at 10:30

## 2018-08-11 RX ADMIN — HYDROMORPHONE HYDROCHLORIDE 4 MG: 2 TABLET ORAL at 20:10

## 2018-08-11 RX ADMIN — ACETAMINOPHEN 975 MG: 325 TABLET, FILM COATED ORAL at 22:40

## 2018-08-11 RX ADMIN — LORAZEPAM 1 MG: 1 TABLET ORAL at 18:22

## 2018-08-11 RX ADMIN — ATORVASTATIN CALCIUM 40 MG: 40 TABLET, FILM COATED ORAL at 10:27

## 2018-08-11 RX ADMIN — LEVOTHYROXINE SODIUM 125 MCG: 75 TABLET ORAL at 10:35

## 2018-08-11 RX ADMIN — HYDROMORPHONE HYDROCHLORIDE 4 MG: 2 TABLET ORAL at 10:28

## 2018-08-11 RX ADMIN — VITAMIN D, TAB 1000IU (100/BT) 2000 UNITS: 25 TAB at 10:27

## 2018-08-11 RX ADMIN — ACETAMINOPHEN 975 MG: 325 TABLET, FILM COATED ORAL at 07:15

## 2018-08-11 RX ADMIN — SENNOSIDES AND DOCUSATE SODIUM 2 TABLET: 8.6; 5 TABLET ORAL at 20:10

## 2018-08-11 RX ADMIN — LOSARTAN POTASSIUM 25 MG: 25 TABLET ORAL at 22:40

## 2018-08-11 RX ADMIN — LIDOCAINE HYDROCHLORIDE 10 ML: 20 JELLY TOPICAL at 15:49

## 2018-08-11 RX ADMIN — HYDROMORPHONE HYDROCHLORIDE 4 MG: 2 TABLET ORAL at 23:38

## 2018-08-11 RX ADMIN — OXYCODONE HYDROCHLORIDE 15 MG: 5 TABLET ORAL at 04:00

## 2018-08-11 RX ADMIN — LORAZEPAM 1 MG: 1 TABLET ORAL at 03:48

## 2018-08-11 RX ADMIN — HYDROXYZINE HYDROCHLORIDE 10 MG: 10 TABLET ORAL at 07:16

## 2018-08-11 RX ADMIN — HYDROMORPHONE HYDROCHLORIDE 4 MG: 2 TABLET ORAL at 17:14

## 2018-08-11 RX ADMIN — OXYCODONE HYDROCHLORIDE 15 MG: 5 TABLET ORAL at 01:02

## 2018-08-11 RX ADMIN — OXYCODONE HYDROCHLORIDE 5 MG: 5 TABLET ORAL at 07:21

## 2018-08-11 RX ADMIN — ACETAMINOPHEN 975 MG: 325 TABLET, FILM COATED ORAL at 13:56

## 2018-08-11 RX ADMIN — OXYCODONE HYDROCHLORIDE 10 MG: 5 TABLET ORAL at 07:15

## 2018-08-11 RX ADMIN — LIDOCAINE HYDROCHLORIDE 10 ML: 20 JELLY TOPICAL at 18:34

## 2018-08-11 RX ADMIN — SENNOSIDES AND DOCUSATE SODIUM 2 TABLET: 8.6; 5 TABLET ORAL at 10:28

## 2018-08-11 ASSESSMENT — ACTIVITIES OF DAILY LIVING (ADL)
ADLS_ACUITY_SCORE: 12
ADLS_ACUITY_SCORE: 13
ADLS_ACUITY_SCORE: 13
ADLS_ACUITY_SCORE: 12

## 2018-08-11 NOTE — PROGRESS NOTES
"Orthopedic Surgery  8/11/2018  POD #2    S: Patient voices no complaints today.     O: Blood pressure 118/64, pulse 66, temperature 98.6  F (37  C), resp. rate 16, height 1.854 m (6' 1\"), weight 88.5 kg (195 lb), SpO2 100 %.  Lab Results   Component Value Date    HGB 13.7 08/11/2018     Neurovascularly intact.  Calves are negative bilaterally, both soft and nontender.  The dressing is C/D/I.      A: Mr. Villafana is doing well status post Procedure(s):  ARTHROPLASTY HIP ANTERIOR.    P: Dressing change prior to d/c, use ABD and tape dressing.  No aquacel or island dressing.  Continue physical therapy. Anticipate discharge to home today.    Patient would like Dilaudid instead of oxycodone, will order.    Chester Connell  [phone]    "

## 2018-08-11 NOTE — CONSULTS
Insurance check completed by discharge pharmacy for Xarelto: Patient qualifies for 1st month free and then med would be $47 per month.   Patient agreeable to cost.

## 2018-08-11 NOTE — PROVIDER NOTIFICATION
"Dr. Murdock paged, \"temp 100.8 after Tylenol. . Would you like UA/UC? Please advise if to hold DC this afternoon. Thank you!\"    Return call from Dr. Murdock, will send UA/UC, hold DC until tomorrow.   "

## 2018-08-11 NOTE — PLAN OF CARE
Problem: Patient Care Overview  Goal: Plan of Care/Patient Progress Review  Outcome: Improving  Patient up with assist of 1 and walker.  Pain managed scheduled Tylenol; not given narcotics this evening due to confusion and lethargy.  VSS on RA; 2L NC for sleep.  Oriented to self and situation.  Dressing CDI.  CMS intact.  Voiding adequately.  Fair PO intake.  Plan to discharge home tomorrow.

## 2018-08-11 NOTE — PLAN OF CARE
Problem: Patient Care Overview  Goal: Plan of Care/Patient Progress Review  Discharge Planner PT   Patient plan for discharge: Home with friends  Current status: Pt is min assist for bed mobility, CGA with verbal cues for transfers, CGA for ambulation 150' with rolling walker, short step length, slow francisco javier. Pt up/down 3 stairs x2 trials with CGA, good recall of sequencing. Pt does c/o dizziness with mobility. Pt also appearing confused at times, difficulty remaining on task.  Barriers to return to prior living situation: Decreased activity tolerance, decreased strength  Recommendations for discharge: Home with 24 hour assistance for household tasks and stairs as needed, home care.  Rationale for recommendations: Pt is improving with mobility, is able to complete tasks needed for discharge to home but does still need assistance for safety. Also recommend home services to maximize function and safety in home environment.       Entered by: Melani Spencer 08/11/2018 8:43 AM

## 2018-08-11 NOTE — PLAN OF CARE
Problem: Patient Care Overview  Goal: Plan of Care/Patient Progress Review  Outcome: Improving  A&OX4.  Up with 1 and walker.  Voiding using urinal, standing up at bedside.  Oxygen 2L via nasal cannula while sleeping.  Taking oxycodone, atarax for pain and ativan for anxiety.  Plan to discharge home today.

## 2018-08-11 NOTE — PROGRESS NOTES
[facility]  Hospitalist Progress Note        Casa Murdock, DO  08/11/2018        Interval History:      Patient states he is doing well.          Assessment and Plan:        73-year-old gentleman with past medical history provoked DVT in 11/2017 following hospitalization and rehab stay for severe pancreatitis, s/p anticoagulated for 7 months, chronic left shoulder pain for which he is opioid dependent, hypotestosteronism, hypothyroidism, hyperlipidemia, hypertension, diverticulosis, congenital renal abnormality with solitary kidney who is status post right total hip arthroplasty with direct anterior approach for right hip degenerative joint disease on 08/09/2018 with Dr. Connell.  The Hospitalist Service has been asked to assist with management of medical comorbid conditions in the postoperative period.       Status post right total hip arthroplasty with direct anterior approach on 08/09/2018 with Dr. Connell..   - Defer definitive management including analgesia, mobility, therapies, pharmacologic prophylaxis to the primary surgical service.       Hyperlipidemia.   - PTA simvastatin.       Hypertension.   - Monitor.  - Restart previously established outpatient therapy upon discharge.       Solitary congenital kidney.   - Avoid nephrotoxins.        Hypothyroidism.   - PTA levothyroxine.       History of deep venous thrombosis provoked by acute medical illness in 11/2017, status post 7 months of anticoagulation.   - Defer to Ortho.       Colonized with MRSA. This likely occurred with his November hospitalization for pancreatitis followed by a nursing home stay.  He desires definitive eradicating treatment as he has found on [facility].     - The patient has been counseled on potential risks of this including antimicrobial resistant C. diff, etc.   - We will defer this to the outpatient setting and he can revisit it with his primary provider.       Code: Full code.       Prophylaxis:  Defer to  "Orthopedics.      Disposition: Defer to Orthopedics.                    Physical Exam:      Heart Rate: 67    Blood pressure 118/64, pulse 66, temperature 98.6  F (37  C), resp. rate 16, height 1.854 m (6' 1\"), weight 88.5 kg (195 lb), SpO2 100 %.    Vitals:    18 0639   Weight: 88.5 kg (195 lb)       Vital Sign Ranges  Temperature Temp  Av.8  F (37.1  C)  Min: 98.1  F (36.7  C)  Max: 99.3  F (37.4  C)   Blood pressure Systolic (24hrs), Av , Min:111 , Max:153        Diastolic (24hrs), Av, Min:56, Max:82      Pulse Pulse  Av  Min: 66  Max: 76   Respirations Resp  Av.3  Min: 16  Max: 20   Pulse oximetry SpO2  Av %  Min: 96 %  Max: 100 %     Vital Signs with Ranges  Temp:  [98.1  F (36.7  C)-99.3  F (37.4  C)] 98.6  F (37  C)  Pulse:  [66-76] 66  Heart Rate:  [67-69] 67  Resp:  [16-20] 16  BP: (111-153)/(56-82) 118/64  SpO2:  [96 %-100 %] 100 %    I/O Last 3 Shifts:   I/O last 3 completed shifts:  In: 1000 [P.O.:1000]  Out: 1890 [Urine:1890]    I/O past 24 hours:     Intake/Output Summary (Last 24 hours) at 18 0906  Last data filed at 18 0634   Gross per 24 hour   Intake             1000 ml   Output             1890 ml   Net             -890 ml     GENERAL: Alert. NAD. Conversational, appropriate.   HEENT: Normocephalic. EOMI. No icterus or injection. Nares normal.   LUNGS: Clear to auscultation. No dyspnea at rest.   HEART: Regular rate. Extremities perfused.   ABDOMEN: Soft, nontender, and nondistended. Positive bowel sounds.   NEUROLOGIC: Moves extremities x4. No acute focal neurologic abnormalities noted.          Prior to Admission Medications:        Prescriptions Prior to Admission   Medication Sig Dispense Refill Last Dose     atorvastatin (LIPITOR) 40 MG tablet TAKE 1 TABLET (40 MG) BY MOUTH DAILY 90 tablet 1 2018 at 2100     Cholecalciferol (VITAMIN D3) 2000 UNITS CAPS Take 1 capsule by mouth daily    2018 at AM     Levothyroxine Sodium (SYNTHROID PO) " Take 125 mcg by mouth See Admin Instructions Six days weekly- Monday, Tuesday, Thursday, Friday, Saturday, Sunday (Patient takes 1.5 x 125 mcg on Wednesday)   8/8/2018 at AM     Levothyroxine Sodium (SYNTHROID PO) Take 187.5 mcg by mouth once a week (1.5 x 125 mcg = 187.5 mcg dose) on Wednesday 8/1/2018 at AM     LORazepam (ATIVAN PO) Take 1-2 mg by mouth 3 times daily as needed for anxiety (0.5 - 1 tablet x 2 mg = 1 - 2 mg dose)   8/8/2018 at 2100     losartan (COZAAR) 50 MG tablet 1/2 tablet (25 mg) once daily (Patient taking differently: Take 25 mg by mouth At Bedtime ( 0.5 x 50 mg = 25 mg dose)) 45 tablet 3 8/8/2018 at 2100     Multiple Vitamin (MULTIVITAMIN OR) Take 1 tablet by mouth daily    8/1/2018 at AM     oxyCODONE IR (ROXICODONE) 5 MG tablet Take 1 tablet (5 mg) by mouth 3 times daily 90 tablet 0 8/8/2018 at 2100     zolpidem (AMBIEN) 10 MG tablet Take 1 tablet (10 mg) by mouth nightly as needed for sleep (Patient taking differently: Take 5-10 mg by mouth nightly as needed for sleep (0.5 - 1 tablet x 10 mg = 5 mg dose)) 90 tablet 1 Past Month at HS     TESTOSTERONE 2 MG/GM CREAM 15% cream and apply 0.5 ml topically daily (Patient taking differently: Apply topically daily 15% cream and apply 0.5 ml topically daily) 40 g 5 More than a month     [DISCONTINUED] ASPIRIN PO Take 325 mg by mouth daily   8/2/2018 at AM            Medications:        Current Facility-Administered Medications   Medication Last Rate     Current Facility-Administered Medications   Medication Dose Route Frequency     acetaminophen  975 mg Oral Q8H     atorvastatin  40 mg Oral Daily     cholecalciferol  2,000 Units Oral Daily     enoxaparin  40 mg Subcutaneous Q24H     [START ON 8/15/2018] levothyroxine (SYNTHROID/LEVOTHROID) half-tab 187.5 mcg  187.5 mcg Oral Weekly     levothyroxine (SYNTHROID/LEVOTHROID) tablet 125 mcg  125 mcg Oral Once per day on Sun Mon Tue Thu Fri Sat     losartan  25 mg Oral At Bedtime     oxyCODONE  10 mg  Oral Q12H     senna-docusate  1 tablet Oral BID    Or     senna-docusate  2 tablet Oral BID     sodium chloride (PF)  3 mL Intracatheter Q8H     Current Facility-Administered Medications   Medication Dose Route Frequency     [START ON 8/12/2018] acetaminophen  650 mg Oral Q4H PRN     sore throat lozenge  1-2 lozenge Buccal Q1H PRN     bisacodyl  10 mg Rectal Daily PRN     HYDROmorphone  2-4 mg Oral Q3H PRN     HYDROmorphone  0.3-0.5 mg Intravenous Q2H PRN     hydrOXYzine  10 mg Oral Q6H PRN     lidocaine 4%   Topical Q1H PRN     lidocaine (buffered or not buffered)  1 mL Other Q1H PRN     LORazepam  1-2 mg Oral TID PRN     naloxone  0.1-0.4 mg Intravenous Q2 Min PRN     ondansetron  4 mg Oral Q6H PRN    Or     ondansetron  4 mg Intravenous Q6H PRN     oxyCODONE IR  10-15 mg Oral Q3H PRN     phenol  1 spray Mouth/Throat Q1H PRN     polyethylene glycol  17 g Oral Daily PRN     senna-docusate  1 tablet Oral BID PRN    Or     senna-docusate  2 tablet Oral BID PRN     simethicone  80 mg Oral Q6H PRN     sodium chloride (PF)  3 mL Intracatheter Q1H PRN     temazepam  7.5 mg Oral At Bedtime PRN            Data:      Lab data reviewed.     Recent Labs  Lab 08/11/18  0720 08/10/18  0727 08/09/18  0634   HGB 13.7 14.1 14.8   PLT  --   --  181   INR 1.15* 1.09  --    NA  --   --  137   POTASSIUM  --   --  3.9   CHLORIDE  --   --  103   CO2  --   --  22   BUN  --   --  14   CR  --  0.87 1.01   CECY  --   --  8.8   * 118* 92           Imaging:      Imaging data reviewed.     Dr. Casa Murdock D.O.  [facility] Center[facility]  Pager [phone]

## 2018-08-11 NOTE — PLAN OF CARE
Problem: Patient Care Overview  Goal: Plan of Care/Patient Progress Review  Outcome: Improving  Pt is A/O x4. Forgetful. VSS except Tmax 100.2, scheduled tylenol given and IS encouraged. Pain controlled with scheduled tylenol and dilaudid as needed. Dressing is CDI. No IV access. Up with SBA and walker. Tolerating regular diet. +flatus, -BM. Vdg. Plan is for discharge to friends house with assistance at 1700.     Addendum: pt Tmax 100.8 after tylenol. Discharge held. UA/UC sent. PVRs of 644, 475, straight cathed. Plan for discharge to home with friends tomorrow pending progress.

## 2018-08-12 ENCOUNTER — APPOINTMENT (OUTPATIENT)
Dept: PHYSICAL THERAPY | Facility: CLINIC | Age: 73
DRG: 470 | End: 2018-08-12
Attending: ORTHOPAEDIC SURGERY
Payer: MEDICARE

## 2018-08-12 VITALS
HEART RATE: 66 BPM | OXYGEN SATURATION: 96 % | BODY MASS INDEX: 25.84 KG/M2 | HEIGHT: 73 IN | DIASTOLIC BLOOD PRESSURE: 82 MMHG | RESPIRATION RATE: 16 BRPM | TEMPERATURE: 99.5 F | SYSTOLIC BLOOD PRESSURE: 151 MMHG | WEIGHT: 195 LBS

## 2018-08-12 LAB
INR PPP: 1.08 (ref 0.86–1.14)
PLATELET # BLD AUTO: 186 10E9/L (ref 150–450)

## 2018-08-12 PROCEDURE — 85610 PROTHROMBIN TIME: CPT | Performed by: ORTHOPAEDIC SURGERY

## 2018-08-12 PROCEDURE — 36415 COLL VENOUS BLD VENIPUNCTURE: CPT | Performed by: ORTHOPAEDIC SURGERY

## 2018-08-12 PROCEDURE — 25000132 ZZH RX MED GY IP 250 OP 250 PS 637: Mod: GY | Performed by: PHYSICIAN ASSISTANT

## 2018-08-12 PROCEDURE — 40000193 ZZH STATISTIC PT WARD VISIT: Performed by: PHYSICAL THERAPIST

## 2018-08-12 PROCEDURE — A9270 NON-COVERED ITEM OR SERVICE: HCPCS | Mod: GY | Performed by: NURSE PRACTITIONER

## 2018-08-12 PROCEDURE — 25000132 ZZH RX MED GY IP 250 OP 250 PS 637: Mod: GY | Performed by: NURSE PRACTITIONER

## 2018-08-12 PROCEDURE — A9270 NON-COVERED ITEM OR SERVICE: HCPCS | Mod: GY | Performed by: PHYSICIAN ASSISTANT

## 2018-08-12 PROCEDURE — 85049 AUTOMATED PLATELET COUNT: CPT | Performed by: ORTHOPAEDIC SURGERY

## 2018-08-12 PROCEDURE — 99239 HOSP IP/OBS DSCHRG MGMT >30: CPT | Performed by: HOSPITALIST

## 2018-08-12 PROCEDURE — 25000128 H RX IP 250 OP 636: Performed by: ORTHOPAEDIC SURGERY

## 2018-08-12 PROCEDURE — 97116 GAIT TRAINING THERAPY: CPT | Mod: GP | Performed by: PHYSICAL THERAPIST

## 2018-08-12 PROCEDURE — 25000132 ZZH RX MED GY IP 250 OP 250 PS 637: Mod: GY | Performed by: ORTHOPAEDIC SURGERY

## 2018-08-12 PROCEDURE — A9270 NON-COVERED ITEM OR SERVICE: HCPCS | Mod: GY | Performed by: ORTHOPAEDIC SURGERY

## 2018-08-12 PROCEDURE — 97110 THERAPEUTIC EXERCISES: CPT | Mod: GP | Performed by: PHYSICAL THERAPIST

## 2018-08-12 RX ADMIN — HYDROMORPHONE HYDROCHLORIDE 4 MG: 2 TABLET ORAL at 14:34

## 2018-08-12 RX ADMIN — LEVOTHYROXINE SODIUM 125 MCG: 75 TABLET ORAL at 11:07

## 2018-08-12 RX ADMIN — VITAMIN D, TAB 1000IU (100/BT) 2000 UNITS: 25 TAB at 09:42

## 2018-08-12 RX ADMIN — HYDROMORPHONE HYDROCHLORIDE 4 MG: 2 TABLET ORAL at 07:17

## 2018-08-12 RX ADMIN — SENNOSIDES AND DOCUSATE SODIUM 2 TABLET: 8.6; 5 TABLET ORAL at 09:42

## 2018-08-12 RX ADMIN — LORAZEPAM 1 MG: 1 TABLET ORAL at 02:48

## 2018-08-12 RX ADMIN — HYDROMORPHONE HYDROCHLORIDE 4 MG: 2 TABLET ORAL at 02:48

## 2018-08-12 RX ADMIN — HYDROMORPHONE HYDROCHLORIDE 4 MG: 2 TABLET ORAL at 11:07

## 2018-08-12 RX ADMIN — ATORVASTATIN CALCIUM 40 MG: 40 TABLET, FILM COATED ORAL at 09:42

## 2018-08-12 RX ADMIN — ENOXAPARIN SODIUM 40 MG: 40 INJECTION SUBCUTANEOUS at 09:42

## 2018-08-12 RX ADMIN — LORAZEPAM 2 MG: 1 TABLET ORAL at 13:31

## 2018-08-12 RX ADMIN — ACETAMINOPHEN 975 MG: 325 TABLET, FILM COATED ORAL at 07:17

## 2018-08-12 RX ADMIN — OXYCODONE HYDROCHLORIDE 10 MG: 10 TABLET, FILM COATED, EXTENDED RELEASE ORAL at 09:42

## 2018-08-12 ASSESSMENT — ACTIVITIES OF DAILY LIVING (ADL)
ADLS_ACUITY_SCORE: 12

## 2018-08-12 NOTE — PROVIDER NOTIFICATION
Received call from Dr. Ortiz re:  stated patient was supposed to discharge 8/11/18, so patient will discharge today.

## 2018-08-12 NOTE — DISCHARGE SUMMARY
[facility]    Discharge Summary  Hospitalist    Date of Admission:  8/9/2018  Date of Discharge:  8/12/2018  Discharging Provider: Casa Murdock  Date of Service (when I saw the patient): 08/12/18    Hospital Course   Edi Villafana was admitted on 8/9/2018.  The following problems were addressed during his hospitalization:    73-year-old gentleman with past medical history provoked DVT in 11/2017 following hospitalization and rehab stay for severe pancreatitis, s/p anticoagulated for 7 months, chronic left shoulder pain for which he is opioid dependent, hypotestosteronism, hypothyroidism, hyperlipidemia, hypertension, diverticulosis, congenital renal abnormality with solitary kidney who is status post right total hip arthroplasty with direct anterior approach for right hip degenerative joint disease on 08/09/2018 with Dr. Connell.  The Hospitalist Service has been asked to assist with management of medical comorbid conditions in the postoperative period.       Status post right total hip arthroplasty with direct anterior approach on 08/09/2018 with Dr. Connell..   - Defer definitive management including analgesia, mobility, therapies, pharmacologic prophylaxis to the primary surgical service.       Hyperlipidemia.   - PTA simvastatin.       Hypertension.   - Monitor.  - Restart previously established outpatient therapy upon discharge.       Solitary congenital kidney.   - Avoid nephrotoxins.        Hypothyroidism.   - PTA levothyroxine.       History of deep venous thrombosis provoked by acute medical illness in 11/2017, status post 7 months of anticoagulation.   - Defer to Ortho.       Urinary retention: Improving.   - Monitor.   - Consider flomax, patient declined thus far.      Colonized with MRSA. This likely occurred with his November hospitalization for pancreatitis followed by a nursing home stay.  He desires definitive eradicating treatment as he has found on [employer].     - The  patient has been counseled on potential risks of this including antimicrobial resistant C. diff, etc.   - We will defer this to the outpatient setting and he can revisit it with his primary provider.       Prophylaxis:  Defer to Orthopedics.         Code Status   Full Code       Primary Care Physician   Enrique Lucia    Physical Exam   Temp: 99.5  F (37.5  C) Temp src: Oral BP: 151/82   Heart Rate: 74 Resp: 16 SpO2: 96 % O2 Device: None (Room air)    Vitals:    08/09/18 0639   Weight: 88.5 kg (195 lb)     Vital Signs with Ranges  Temp:  [97.9  F (36.6  C)-99.5  F (37.5  C)] 99.5  F (37.5  C)  Heart Rate:  [74-86] 74  Resp:  [14-16] 16  BP: (102-151)/(65-82) 151/82  SpO2:  [95 %-96 %] 96 %  I/O last 3 completed shifts:  In: 440 [P.O.:340; I.V.:100]  Out: 2760 [Urine:2760]    GENERAL: Alert and oriented. NAD. Conversational, appropriate.   HEENT: Normocephalic. EOMI. No icterus or injection. Nares normal.   LUNGS: Clear to auscultation. No dyspnea at rest.   HEART: Regular rate. Extremities perfused.   ABDOMEN: Soft, nontender, and nondistended. Positive bowel sounds.   NEUROLOGIC: Moves extremities x4 on command. No acute focal neurologic abnormalities noted.     Discharge Disposition   Discharged to home  Condition at discharge: Stable    Consultations This Hospital Stay   HOSPITALIST IP CONSULT  OCCUPATIONAL THERAPY ADULT IP CONSULT  PHYSICAL THERAPY ADULT IP CONSULT  PHARMACY TO DOSE WARFARIN  CARE TRANSITION RN/SW IP CONSULT    Time Spent on this Encounter   I, Casa Murdock, personally saw the patient today and spent greater than 30 minutes discharging this patient.    Discharge Orders     Reason for your hospital stay   Minimally invasive total hip replacement     Activity   Your activity upon discharge: activity as tolerated.  You should work on home exercises provided by the physical therapist at the hospital 2-3 times a day.     Physical Therapy: Once you leave the hospital you will not need  outpatient physical therapy for your hip.  Walking is the best exercise after a hip replacement.  Do as much walking as you feel comfortable doing  Remember, you have no hip restrictions or precautions, however you should avoid any twisting or torquing of the hip (no hokie pokie).  You should also avoid any kind of strengthening exercises of the hip and leg for the first 8 weeks after surgery.     Assistive Ambulatory Devices:  Use your walker/crutches until you feel comfortable advancing to a cane.  You should use the cane in the hand opposite your surgery.  You can then advance from the cane as you feel comfortable.     Elevate: Swelling in the leg is normal after a hip replacement.  By keeping your leg elevated with a pillow under your calf, not under the knee, will help with the swelling.  The best position is to have the knee above the level of your heart and your ankle above the level of your knee.  Wearing the compression stockings (Gonzalo hose) can help reduce swelling.  You should wear these until you are seen at Dr. Connell's office post operatively.  You can remove these twice a day for laundering and hygiene.  The swelling in the leg will be present for at least 4-6 weeks.       Ice: Ice the hip 4-5 times a day for 20-30 minutes at a time.  Icing will help reduce swelling and pain.     Pain control: Take the pain medication and/or anti-inflammatory medication as prescribed.  Don't let your pain become severe.     When to contact your care team   EMERGENCY CARE PLAN     1. I have questions or concerns during clinic hours:   - I will call the clinic directly at [phone] and and speak with Dr. Ko Razo's care coordinator.     2. I have questions or concerns outside clinic hours:   - I will call the clinic directly at [phone] and speak with the doctor on-call.     3. I need to schedule an appointment:   - I will call the clinic directly at [phone] for [place] appointments or [phone]  for [place] appointments.     4. I am concerned about symptoms that I am having and think I need same day treatment:   - During clinic hours, I will call the clinic first at [phone] and speak with Danni.   - She will either make an appointment with Dr. Connell or she will direct you to come in to our walk-in urgent care clinic.   - The urgent care is open 7 days a week from 8:00am - 8:00pm at all of our locations.     - After clinic hours, I will call the clinic first at [phone] and speak with the on-call doctor.   - You should NOT go to the emergency room or a urgent care with out being directed to do so by the clinic.     Wound care and dressings   You have a gauze and tape dressing over the incision that you should leave in place for 2 days after leaving the hospital.  You will remove this dressing after 2 days and do not need to replace the dressing.  There is a thin mesh film adhered to your skin over the incision which should stay in place until you are instructed to remove this.  If this comes off you should call Dr. Connell's office for further instruction.  You can get your incision wet in the shower but you should not submerge it.     Discharge Instructions   Upon leaving the hospital you will be discharged with a few different medications:     1. Tylenol 500mg - you will be taking two tablets every six hours for pain.  You can take Tylenol in addition to the pain medication.  You should use Tylenol as a scheduled pain medication as long or longer than you are taking the narcotic pain medication (oxycodone/dilaudid/hydrocodone).  Do not exceed 4,000mg in a day.  2. Oxycodone 10mg - this is a pain medication.  You can take one half or one tablet every four to six hours.  You will need this medication the longest to sleep at night and for physical therapy.  You can wean yourself from this medication as you are able by either increasing the time between tablets or going down to one tablet if you are  taking two at a time.   **REMINDER: If you need a refill of your pain medication prior to your first post-operative appointment please contact our office and allow 24 to 48 hours for refills to be processed. Any refills needed before the weekend will need to be submitted on Thursday.  Also, all narcotic pain medication cannot be called in to the pharmacy and will need to be picked up at one of our clinics ([place] or [place]), this is a Federal Law.  You or a family member will need to allow for time to come to our office to pick these up.  Please let us know who will be picking up the prescription as that person will need to show a photo ID to get the prescription.**    3.  Senokot - this is a stool softener.  You can take one or two pills twice a day as needed for constipation.  You should take this medication as long as you are taking narcotic pain medication and as long as you do not have diarrhea.  As you are more active and taking less pain medication you will be able to stop using this.     4. Lovenox - this is a blood thinning medication you will inject.  You will use these once a day until the syringes are gone.  This medication will not be refilled.    5. Xarelto - this is a blood thinning medication you will take once a day for one month.  Start this medication after the lovenox injections are complete. This medication will not be refilled.    Please contact Dr. Connell's office with any questions.  You can either call Dr. Ko Razo's , at [phone] or email Dr. Ko Avelar's physician assistant, at [email or web address].     Follow-up and recommended labs and tests   Your follow-up appointment is schedule for 4:30pm on Tuesday 8/28 at the [place] office with Dr. Connell.  Please call [phone] if you need to reschedule this appointment.     If you have any questions prior to your follow-up appointment please call Dr. Ko Razo's , at [phone].  You may also email  Rosio with any questions at [email or web address]    Follow up with urology, next available.     Full Code     Diet   Follow this diet upon discharge: Regular       Discharge Medications   Current Discharge Medication List      START taking these medications    Details   acetaminophen (TYLENOL) 500 MG tablet Take 2 tablets (1,000 mg) by mouth every 6 hours as needed for pain  Qty: 100 tablet, Refills: 0    Associated Diagnoses: Status post right hip replacement      HYDROmorphone (DILAUDID) 2 MG tablet Take 1-2 tablets (2-4 mg) by mouth every 3 hours as needed for moderate to severe pain  Qty: 50 tablet, Refills: 0    Associated Diagnoses: Status post right hip replacement      !! oxyCODONE IR (ROXICODONE) 5 MG tablet Take 1 tablet every 4-6 hours for pain rating 3-5,  Take 2 tablets every 4-6 hours for pain 6-8  Take 3 tablets every 4-6 hours for pain rating 9-10  Qty: 70 tablet, Refills: 0    Associated Diagnoses: Status post right hip replacement      rivaroxaban ANTICOAGULANT (XARELTO) 10 MG TABS tablet Take 1 tablet (10 mg) by mouth daily (with dinner)  Qty: 30 tablet, Refills: 0    Associated Diagnoses: Status post right hip replacement      senna-docusate (SENOKOT-S;PERICOLACE) 8.6-50 MG per tablet Take 2 tablets by mouth 2 times daily as needed for constipation  Qty: 60 tablet, Refills: 0    Associated Diagnoses: Status post right hip replacement       !! - Potential duplicate medications found. Please discuss with provider.      CONTINUE these medications which have NOT CHANGED    Details   atorvastatin (LIPITOR) 40 MG tablet TAKE 1 TABLET (40 MG) BY MOUTH DAILY  Qty: 90 tablet, Refills: 1    Associated Diagnoses: Hyperlipidemia LDL goal <100      Cholecalciferol (VITAMIN D3) 2000 UNITS CAPS Take 1 capsule by mouth daily       !! Levothyroxine Sodium (SYNTHROID PO) Take 125 mcg by mouth See Admin Instructions Six days weekly- Monday, Tuesday, Thursday, Friday, Saturday, Sunday (Patient takes 1.5 x 125  mcg on Wednesday)      !! Levothyroxine Sodium (SYNTHROID PO) Take 187.5 mcg by mouth once a week (1.5 x 125 mcg = 187.5 mcg dose) on Wednesday      LORazepam (ATIVAN PO) Take 1-2 mg by mouth 3 times daily as needed for anxiety (0.5 - 1 tablet x 2 mg = 1 - 2 mg dose)      losartan (COZAAR) 50 MG tablet 1/2 tablet (25 mg) once daily  Qty: 45 tablet, Refills: 3    Associated Diagnoses: Essential hypertension with goal blood pressure less than 140/90      Multiple Vitamin (MULTIVITAMIN OR) Take 1 tablet by mouth daily       !! oxyCODONE IR (ROXICODONE) 5 MG tablet Take 1 tablet (5 mg) by mouth 3 times daily  Qty: 90 tablet, Refills: 0    Associated Diagnoses: Chronic left shoulder pain      zolpidem (AMBIEN) 10 MG tablet Take 1 tablet (10 mg) by mouth nightly as needed for sleep  Qty: 90 tablet, Refills: 1    Associated Diagnoses: Persistent disorder of initiating or maintaining sleep      TESTOSTERONE 2 MG/GM CREAM 15% cream and apply 0.5 ml topically daily  Qty: 40 g, Refills: 5    Associated Diagnoses: Hypotestosteronism       !! - Potential duplicate medications found. Please discuss with provider.      STOP taking these medications       enoxaparin (LOVENOX) 40 MG/0.4ML injection Comments:   Reason for Stopping:         ASPIRIN PO Comments:   Reason for Stopping:             Allergies   Allergies   Allergen Reactions     Ace Inhibitors Cough     Ancef [Cefazolin Sodium]      Atenolol Other (See Comments)     Low BP     Compazine      Sulfa Drugs      Tramadol Fatigue     Side effects     Data   Most Recent 3 CBC's:  Recent Labs   Lab Test  08/12/18   0720  08/11/18   0720  08/10/18   0727  08/09/18   0634  07/16/18   1345  03/27/18   1208  03/12/18   1337   WBC   --    --    --    --   8.3  7.5  6.9   HGB   --   13.7  14.1  14.8  15.9  15.8  15.8   MCV   --    --    --    --   88  89  91   PLT  186   --    --   181  227  232  220      Most Recent 3 BMP's:  Recent Labs   Lab Test  08/11/18   0720  08/10/18   0727   08/09/18   0634  07/16/18   1345  03/27/18   1208  03/12/18   1337   NA   --    --   137  136  138  136   POTASSIUM   --    --   3.9  4.4  4.3  4.3   CHLORIDE   --    --   103  102  103  103   CO2   --    --   22  26  28  25   BUN   --    --   14  16  12  15   CR   --   0.87  1.01  1.00  0.97  0.96   ANIONGAP   --    --    --   8  7  8   CECY   --    --   8.8   --   8.8  8.8   GLC  109*  118*  92   --   86  110*     Most Recent 2 LFT's:  Recent Labs   Lab Test  03/27/18   1208  03/12/18   1337   AST  22  23   ALT  23  24   ALKPHOS  77  97   BILITOTAL  0.6  0.8     Most Recent INR's and Anticoagulation Dosing History:  Anticoagulation Dose History     Recent Dosing and Labs Latest Ref Rng & Units 5/4/2018 5/11/2018 5/18/2018 6/1/2018 8/10/2018 8/11/2018 8/12/2018    INR 0.86 - 1.14 - - - - 1.09 1.15(H) 1.08    INR 0.86 - 1.14 1.4(A) 1.8(A) 2.0(A) 1.9(A) - - -        Most Recent 3 Troponin's:  Recent Labs   Lab Test  11/11/17   1452   TROPI  <0.015     Most Recent Cholesterol Panel:  Recent Labs   Lab Test  03/27/18   1208   CHOL  130   LDL  67   HDL  40   TRIG  116     Most Recent 6 Bacteria Isolates From Any Culture (See EPIC Reports for Culture Details):No lab results found.  Most Recent TSH, T4 and A1c Labs:  Recent Labs   Lab Test  05/19/18   1149   TSH  1.85   T4  1.46     Results for orders placed or performed during the hospital encounter of 08/09/18   XR Surgery CATHERINE L/T 5 Min Fluoro w Stills    Narrative    SURGERY C-ARM FLUORO LESS THAN 5 MIN W STILLS  8/9/2018 9:50 AM     COMPARISON: None.    HISTORY: Right anterior hip. C-Arm #2. 39 sec fluoro time. 6 images.  [id number].     NUMBER OF IMAGES ACQUIRED: 6    VIEWS: 2    FLUOROSCOPY TIME: 0.6 minutes.      Impression    IMPRESSION: Total hip arthroplasty with the components in good  position.    DILLON VENTURA MD   XR Pelvis w Hip Port Right 1 View    Narrative    XR PELVIS AD HIP PORTABLE RIGHT 1 VIEW 8/9/2018 11:05 AM    COMPARISON: Intraoperative images on  the same day.    HISTORY: Arthroplasty.      Impression    IMPRESSION: RIGHT total hip arthroplasty. Hardware appears intact. No  fractures are seen. Mild LEFT hip osteoarthritis.    ROSALVA JIMENEZ MD

## 2018-08-12 NOTE — OP NOTE
DATE OF SERVICE:  8/12/2018    SURGEON  DAMIR BASSETT M.D.    ASSISTANT  Rosio Louie PA-C    PREOPERATIVE DIAGNOSIS   Right hip osteoarthritis, failed to respond to conservative management.    POSTOPERATIVE DIAGNOSIS   Right hip osteoarthritis, failed to respond to conservative management.    TITLE OF PROCEDURE   Right total hip arthroplasty, Depuy uncemented components, direct anterior approach.    PROCEDURE  The patient was brought to the operating room and after satisfactory anesthesia was placed on the [place] table. The right  lower extremity was then prepped and draped in the usual sterile fashion. Image intensification was utilized during the case for component positioning as well as leg length assessment. An incision was made just lateral to the ASIS and coursing toward the proximal femur. Dissection was carried down to the TFL. The fascia overlying the TFL was incised and dissection was carried down to the interval between TFL and rectus femoris. This was identified. A lateral cobra retractor was placed followed by a medial cobra around the femoral neck. The leash vessels, anterior circumflex, was identified and was coagulated. The underlying fascia was released. Dissection was carried down to the hip capsule. Capsule was identified and a capsulotomy was performed in a T-type fashion first along the intertrochanteric line and then secondarily up along the femoral neck and head, finally completed by releasing along the saddle of the trochanter. The capsular edges were tagged for later repair. The hip was then externally rotated and medial capsular release was performed such that the lesser trochanter could be palpated. Following this, the femoral neck was osteotomized as per the preoperative plan. The femoral head was removed with a corkscrew without difficulty. The acetabulum was exposed and was reamed sequentially up to 66 mm. This was reamed under both direct visualization as well  as the aid of image intensification. A 66 mm Gription cup was impacted into place in approximately 40 to 45 degrees of abduction, and 15 degrees of anteversion. Three screws were placed and this gave excellent fixation. The 36 mm neutral liner was impacted into place.     Attention was then directed to the femur. The hook was placed underneath the proximal aspect of the femur between the trochanteric ridge and gluteus marie. The hip was then brought into external rotation, adduction, and extension. The femur was then carefully elevated using the appropriate releases off the inside of the greater trochanter. Once the femur was elevated, a starter broach was placed followed by sequential broaches. The broaches were performed up to size 8 Actis, which gave excellent torsinal as well as axial stability. Trial reduction was performed with a  high offset +5mm head. The hip was reduced and with hip reduction the combined anteversion looked excellent. The hip was brought into full extension and external rotation. There was no evidence of instability. As well, x-rays were printed and compared with the opposite side and found to have good length and restoration of offset.  It was felt that an additional stem size could not be placed.  The hip was then dislocated and then the proximal femur was then brought back up into the proximal aspect of the wound. The real size 8 Actis stem, high offset was impacted into place. Again this gave excellent torsion as well as axial stability. The real +5mm ceramic biolox head was impacted into place and the hip was reduced again. The image intensification confirmed excellent position of the components.   A 3 minute dilute betadine soak was performed.  The wound was thoroughly irrigated. The capsule was closed with interrupted 0 Vicryl suture and tissues infiltrated with marcaine. The tensor fascia was closed with a running 0 V-lock suture. The subcutaneous layer was closed with interrupted  2-0 Vicryl, 2-0 V-lock, and 3-0 subcuticular monocryl was placed followed by Dermabond Prineo. Sterile dressing was applied. The patient left the operating room in satisfactory condition. Patient received 1 gm of tranexamic acid pre-op and at closure.

## 2018-08-12 NOTE — PLAN OF CARE
Problem: Patient Care Overview  Goal: Plan of Care/Patient Progress Review  Discharge Planner PT   Patient plan for discharge: Would prefer to disch to the home of friends tomorrow.  Current status: PT: Needs CGA and vc for exercise, bed mobility, transfers, and gait training, WBAT R , 200', and up and down 6 steps supported on sideways walker and hand rail.  Barriers to return to prior living situation: Postop pain, edema, weakness.  Recommendations for discharge: Patient has arranged to disch to a friends home tomorrow.  Rationale for recommendations: The home with his friends is the only place he has arranged to go to.       Entered by: Archie Drake 08/12/2018 1:18 PM       Physical Therapy Discharge Summary    Reason for therapy discharge:    Discharged to Patient disch to home of friends.    Progress towards therapy goal(s). See goals on Care Plan in [facility] electronic health record for goal details.  Goals partially met.  Barriers to achieving goals:   limited tolerance for therapy.    Therapy recommendation(s):    Continue home exercise program.

## 2018-08-12 NOTE — PLAN OF CARE
Problem: Patient Care Overview  Goal: Plan of Care/Patient Progress Review  Outcome: Adequate for Discharge Date Met: 08/12/18  Pain managed with PO Dilaudid, stands at bedside to urinate, up with SBA and walker/GB, VSS on RA, CMS intact, appetite fair, discharged home, friends here to transport.

## 2018-08-12 NOTE — PROGRESS NOTES
Ortho    Chart reviewed, as I am out of town.    Functionally doing well.    Temp yest not unexpected given post-op status.    UA normal    Would expect discharge today, all orders written.    hope

## 2018-08-12 NOTE — PLAN OF CARE
Problem: Patient Care Overview  Goal: Plan of Care/Patient Progress Review  Outcome: Improving  Patient A&Ox4. VSS on room air. CMS intact. Dressing clean, dry, and intact. Pain managed with PRN PO Dilaudid. Up with the assist of one, voiding per urinal. Retention monitored. Patient slept between cares. Plan for possible d/c home today. Will continue to monitor.

## 2018-08-13 ENCOUNTER — TELEPHONE (OUTPATIENT)
Dept: FAMILY MEDICINE | Facility: CLINIC | Age: 73
End: 2018-08-13

## 2018-08-13 NOTE — TELEPHONE ENCOUNTER
"Hospital/TCU/ED for chronic condition Discharge Protocol    \"Hi, my name is Noegatito Carroll, a registered nurse, and I am calling from [facility].  I am calling to follow up and see how things are going for you after your recent emergency visit/hospital/TCU stay.\"    Tell me how you are doing now that you are home?\" Not that great. He reports he fell this morning in the shower. He is not aware of any swelling at a this time or pain. He agreed to contact surgeon with update of fall and pain       Discharge Instructions    \"Let's review your discharge instructions.  What is/are the follow-up recommendations?  Pt. Response: follow up with surgeon     \"Has an appointment with your primary care provider been scheduled?\"   see above    \"When you see the provider, I would recommend that you bring your medications with you.\"    Medications    \"Tell me what changed about your medicines when you discharged?\"    Changes to chronic meds?    2 or more - Epic MTM referral needed    \"What questions do you have about your medications?\"    None he may call with medication updates.     New diagnoses of heart failure, COPD, diabetes, or MI?    No              Medication reconciliation completed? Yes  Was MTM referral placed (*Make sure to put transitions as reason for referral)?   No    Call Summary    \"What questions or concerns do you have about your recent visit and your follow-up care?\"     He may call clinic back as he may want to get some information to PCP later today.  \"If you have questions or things don't continue to improve, we encourage you contact us through the main clinic number (give number).  Even if the clinic is not open, triage nurses are available 24/7 to help you.     We would like you to know that our clinic has extended hours (provide information).  We also have urgent care (provide details on closest location and hours/contact info)\"      \"Thank you for your time and take care!\"             "

## 2018-08-23 ENCOUNTER — TELEPHONE (OUTPATIENT)
Dept: FAMILY MEDICINE | Facility: CLINIC | Age: 73
End: 2018-08-23

## 2018-08-23 NOTE — TELEPHONE ENCOUNTER
The patient called stating that he was positive for MRSA. He had a nasal swab done and the results are below, results are negative, but he claims he wants to come back to the clinic to be treated for MRSA.     Also spoke at length about him having to have antibiotic prophylaxis for dental work and the reasons why. Asked if we prescribe those or not at the clinic. Writer stated that yes, the clinic has for other patients and would likely do that for him.     Routing to provider as an FYI per patient request.   Order   Methicillin Resist/Sens S. aureus PCR [[id number]] (Order [id number])   Exam Information   Exam Date Exam Time Accession # Results    8/6/18 12:00 PM [id number]    Component Results   Component Value Flag Ref Range Units Status Collected Lab   Specimen Description Nares    Final 08/06/2018 12:00 PM [facility]St[facility]Lb   Methicillin Resist/Sens S. aureus PCR Negative  NEG^Negative  Final 08/06/2018 12:00 PM 51   Comment:   MRSA Negative: SA Positive   MRSA target DNA not detected, presumed negative for MRSA colonization or the   number of bacteria present may be below the limit of detection.   Staphylococcus aureus target DNA detected, presumed positive for SA   colonization.   A positive test does not necessarily indicate the presence of viable   organisms.  It is, however, presumptive for the presence of SA.  This result   does not preclude MRSA nasal colonization.   FDA approved assay performed using [employer]

## 2018-08-28 ENCOUNTER — TRANSFERRED RECORDS (OUTPATIENT)
Dept: HEALTH INFORMATION MANAGEMENT | Facility: CLINIC | Age: 73
End: 2018-08-28

## 2018-09-08 ENCOUNTER — TRANSFERRED RECORDS (OUTPATIENT)
Dept: HEALTH INFORMATION MANAGEMENT | Facility: CLINIC | Age: 73
End: 2018-09-08

## 2018-09-25 ENCOUNTER — TRANSFERRED RECORDS (OUTPATIENT)
Dept: HEALTH INFORMATION MANAGEMENT | Facility: CLINIC | Age: 73
End: 2018-09-25

## 2018-09-30 DIAGNOSIS — F41.9 ANXIETY: Primary | ICD-10-CM

## 2018-09-30 DIAGNOSIS — E78.5 HYPERLIPIDEMIA LDL GOAL <100: ICD-10-CM

## 2018-10-01 NOTE — TELEPHONE ENCOUNTER
LORazepam (ATIVAN PO)      Last Written Prescription Date:    Last Fill Quantity: ,   # refills:   Last Office Visit: 07/16/2018  Future Office visit:       Routing refill request to provider for review/approval because:  Medication is reported/historical

## 2018-10-01 NOTE — TELEPHONE ENCOUNTER
"Requested Prescriptions   Pending Prescriptions Disp Refills     atorvastatin (LIPITOR) 40 MG tablet [Pharmacy Med Name: ATORVASTATIN 40 MG TABLET]  Last Written Prescription Date:  12/13/2017  Last Fill Quantity: 90,  # refills: 1   Last office visit: 7/16/2018 with prescribing provider:   DENICE  Future Office Visit:     90 tablet 1     Sig: TAKE ONE TABLET BY MOUTH ONE TIME DAILY    Statins Protocol Passed    9/30/2018 12:44 PM       Passed - LDL on file in past 12 months    Recent Labs   Lab Test  03/27/18   1208   LDL  67            Passed - No abnormal creatine kinase in past 12 months    No lab results found.            Passed - Recent (12 mo) or future (30 days) visit within the authorizing provider's specialty    Patient had office visit in the last 12 months or has a visit in the next 30 days with authorizing provider or within the authorizing provider's specialty.  See \"Patient Info\" tab in inbasket, or \"Choose Columns\" in Meds & Orders section of the refill encounter.           Passed - Patient is age 18 or older          "

## 2018-10-03 RX ORDER — LORAZEPAM 2 MG/1
TABLET ORAL
Qty: 270 TABLET | Refills: 0 | Status: SHIPPED | OUTPATIENT
Start: 2018-10-03 | End: 2019-02-19

## 2018-10-03 RX ORDER — ATORVASTATIN CALCIUM 40 MG/1
TABLET, FILM COATED ORAL
Qty: 90 TABLET | Refills: 1 | Status: SHIPPED | OUTPATIENT
Start: 2018-10-03 | End: 2019-04-01

## 2018-10-03 NOTE — TELEPHONE ENCOUNTER
Routing refill request to provider for review/approval because:  Drug not on the [facility] refill protocol          check 6-8-16-no concerns

## 2018-10-26 ENCOUNTER — TRANSFERRED RECORDS (OUTPATIENT)
Dept: HEALTH INFORMATION MANAGEMENT | Facility: CLINIC | Age: 73
End: 2018-10-26

## 2018-10-30 ENCOUNTER — TRANSFERRED RECORDS (OUTPATIENT)
Dept: HEALTH INFORMATION MANAGEMENT | Facility: CLINIC | Age: 73
End: 2018-10-30

## 2018-11-19 ENCOUNTER — OFFICE VISIT (OUTPATIENT)
Dept: FAMILY MEDICINE | Facility: CLINIC | Age: 73
End: 2018-11-19
Payer: COMMERCIAL

## 2018-11-19 VITALS
TEMPERATURE: 97.2 F | WEIGHT: 195 LBS | SYSTOLIC BLOOD PRESSURE: 110 MMHG | BODY MASS INDEX: 25.73 KG/M2 | HEART RATE: 63 BPM | DIASTOLIC BLOOD PRESSURE: 76 MMHG | OXYGEN SATURATION: 99 %

## 2018-11-19 DIAGNOSIS — E78.5 HYPERLIPIDEMIA LDL GOAL <100: Chronic | ICD-10-CM

## 2018-11-19 DIAGNOSIS — M25.512 CHRONIC LEFT SHOULDER PAIN: ICD-10-CM

## 2018-11-19 DIAGNOSIS — Z12.5 SCREENING FOR PROSTATE CANCER: ICD-10-CM

## 2018-11-19 DIAGNOSIS — I10 HYPERTENSION GOAL BP (BLOOD PRESSURE) < 140/90: Chronic | ICD-10-CM

## 2018-11-19 DIAGNOSIS — G89.29 CHRONIC LEFT SHOULDER PAIN: ICD-10-CM

## 2018-11-19 DIAGNOSIS — E03.9 ACQUIRED HYPOTHYROIDISM: Chronic | ICD-10-CM

## 2018-11-19 DIAGNOSIS — G47.00 PERSISTENT INSOMNIA: Chronic | ICD-10-CM

## 2018-11-19 DIAGNOSIS — I10 ESSENTIAL HYPERTENSION WITH GOAL BLOOD PRESSURE LESS THAN 140/90: ICD-10-CM

## 2018-11-19 DIAGNOSIS — F41.8 DEPRESSION WITH ANXIETY: ICD-10-CM

## 2018-11-19 DIAGNOSIS — E34.9 HYPOTESTOSTERONISM: ICD-10-CM

## 2018-11-19 DIAGNOSIS — F41.9 ANXIETY: Primary | Chronic | ICD-10-CM

## 2018-11-19 PROCEDURE — 99214 OFFICE O/P EST MOD 30 MIN: CPT | Performed by: FAMILY MEDICINE

## 2018-11-19 RX ORDER — MIRTAZAPINE 15 MG/1
7.5 TABLET, FILM COATED ORAL AT BEDTIME
Qty: 30 TABLET | Refills: 1 | Status: SHIPPED | OUTPATIENT
Start: 2018-11-19 | End: 2018-12-11

## 2018-11-19 RX ORDER — LOSARTAN POTASSIUM 50 MG/1
TABLET ORAL
Qty: 90 TABLET | Refills: 3 | Status: SHIPPED | OUTPATIENT
Start: 2018-11-19 | End: 2018-11-28

## 2018-11-19 RX ORDER — OXYCODONE HYDROCHLORIDE 5 MG/1
5 TABLET ORAL 3 TIMES DAILY
Qty: 90 TABLET | Refills: 0 | Status: SHIPPED | OUTPATIENT
Start: 2018-11-19 | End: 2018-12-11

## 2018-11-19 RX ORDER — LOSARTAN POTASSIUM 50 MG/1
TABLET ORAL
Qty: 90 TABLET | Refills: 3 | Status: SHIPPED | OUTPATIENT
Start: 2018-11-19 | End: 2018-11-19

## 2018-11-19 NOTE — PROGRESS NOTES
SUBJECTIVE:   Edi Villafana is a 73 year old male who presents to clinic today for the following health issues:      Musculoskeletal problem/pain      Duration: since going for hip surgery    Description  Location: shoulders    Intensity:  moderate    Accompanying signs and symptoms: pain 10 out 10    History  Previous similar problem: YES  Previous evaluation:  x-ray    Precipitating or alleviating factors:  Trauma or overuse: no   Aggravating factors include: overuse    Therapies tried and outcome:  oxycodone      Anxiety Follow-Up    Status since last visit: No change    Other associated symptoms:None    Complicating factors:   Significant life event: Yes-recent hip replacement surgery   Current substance abuse: None  Depression symptoms: Yes-however, very mild  PRINCESS-7 SCORE 6/13/2016 7/12/2016 12/13/2017   Total Score 9 4 3       PRINCESS-7  Hypothyroidism Follow-up      Since last visit, patient describes the following symptoms: Weight stable, no hair loss, no skin changes, no constipation, no loose stools      Problem list and histories reviewed & adjusted, as indicated.  Additional history: as documented    Patient Active Problem List   Diagnosis     Hypothyroidism     Hyperlipidemia LDL goal <100     Persistent insomnia     Hypertension goal BP (blood pressure) < 140/90     Anxiety     Hypotestosteronism     Trochanteric bursitis     Depression with anxiety     Left shoulder pain     Physical deconditioning     Alcoholism (H)     Chronic left shoulder pain     Pain of left calf     Acute deep vein thrombosis (DVT) of distal vein of left lower extremity (H)     Anticoagulation management encounter     Hip pain, right     History of deep venous thrombosis     Status post right hip replacement     Past Surgical History:   Procedure Laterality Date     ABDOMEN SURGERY  1973    large benign tumor removed with thrombophlebitis post op     APPENDECTOMY  1960     ARTHROPLASTY HIP ANTERIOR Right 8/9/2018    Procedure:  ARTHROPLASTY HIP ANTERIOR;  RIGHT TOTAL HIP ARTHROPLASTY DIRECT ANTERIOR APPROACH ;  Surgeon: Chester Ortiz MD;  Location: [facility] OR     CATARACT IOL, RT/LT      bilateral     EYE SURGERY  5110-1320, 2007    eyelid surgery, 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, trabeculoplasty       Social History   Substance Use Topics     Smoking status: Never Smoker     Smokeless tobacco: Never Used     Alcohol use Yes     Family History   Problem Relation Age of Onset     Hypertension Mother      Lipids Mother      HEART DISEASE Mother      Psychotic Disorder Mother      severe depression     HEART DISEASE Father      Cancer Sister      ovarian     Colon Cancer Paternal Grandfather            Reviewed and updated as needed this visit by clinical staff  Tobacco  Allergies  Meds  Med Hx  Surg Hx  Fam Hx  Soc Hx      Reviewed and updated as needed this visit by Provider         ROS:  Constitutional, HEENT, cardiovascular, pulmonary, gi and gu systems are negative, except as otherwise noted.    OBJECTIVE:                                                    /76 (Cuff Size: Adult Large)  Pulse 63  Temp 97.2  F (36.2  C) (Tympanic)  Wt 195 lb (88.5 kg)  SpO2 99%  BMI 25.73 kg/m2  Body mass index is 25.73 kg/(m^2).  GENERAL APPEARANCE: healthy, alert and no distress         ASSESSMENT/PLAN:                                                        ICD-10-CM    1. Acquired hypothyroidism E03.9 TSH     T4, free   2. Hyperlipidemia LDL goal <100 E78.5 Lipid Profile   3. Persistent insomnia G47.00 mirtazapine (REMERON) 15 MG tablet   4. Hypertension goal BP (blood pressure) < 140/90 I10 UA with Microscopic     CBC with platelets differential     Comprehensive metabolic panel   5. Anxiety F41.9 mirtazapine (REMERON) 15 MG tablet   6. Hypotestosteronism E34.9    7. Depression with anxiety F41.8    8. Screening for prostate cancer Z12.5 Prostate spec antigen screen   9. Essential hypertension with goal blood pressure less than 140/90 I10  losartan (COZAAR) 50 MG tablet     DISCONTINUED: losartan (COZAAR) 50 MG tablet   10. Chronic left shoulder pain M25.512 oxyCODONE IR (ROXICODONE) 5 MG tablet    G89.29      Return in about 3 weeks (around 12/10/2018) for Physical Exam.  Patient Instructions   I spent 35 minutes with the patient going over all of his healthcare issues.  He is due for his full physical.  I refilled his oxycodone 5 mg 3 times daily #90 today.    With respect to his sleeping issues zolpidem has stopped working for him.  We had a discussion about starting mirtazapine 7.5 mg every night at bedtime.  He read up on the mechanism of action and was agreeable to starting that medication.    He is curious about his dose of atorvastatin and if that should be increased.  We have not done his lipids since March so we will get those done first and take a look at those before we decide on whether to increase his dose or not.  I refilled losartan 50 mg.    Follow-up will be in 3-4 weeks for his full physical and he will get lab tests done 2-3 days prior to that.      Enrique Lucia MD  [facility]

## 2018-11-19 NOTE — MR AVS SNAPSHOT
After Visit Summary   11/19/2018    Edi Villafana    MRN: [id number]           Patient Information     Date Of Birth          1945        Visit Information        Provider Department      11/19/2018 2:15 PM Enrique Lucia MD [facility]        Today's Diagnoses     Anxiety    -  1    Hypertension goal BP (blood pressure) < 140/90        Acquired hypothyroidism        Hyperlipidemia LDL goal <100        Persistent insomnia        Hypotestosteronism        Depression with anxiety        Screening for prostate cancer        Essential hypertension with goal blood pressure less than 140/90        Chronic left shoulder pain          Care Instructions    I spent 35 minutes with the patient going over all of his healthcare issues.  He is due for his full physical.  I refilled his oxycodone 5 mg 3 times daily #90 today.    With respect to his sleeping issues zolpidem has stopped working for him.  We had a discussion about starting mirtazapine 7.5 mg every night at bedtime.  He read up on the mechanism of action and was agreeable to starting that medication.    He is curious about his dose of atorvastatin and if that should be increased.  We have not done his lipids since March so we will get those done first and take a look at those before we decide on whether to increase his dose or not.  I refilled losartan 50 mg.    Follow-up will be in 3-4 weeks for his full physical and he will get lab tests done 2-3 days prior to that.          Follow-ups after your visit        Follow-up notes from your care team     Return in about 3 weeks (around 12/10/2018) for Physical Exam.      Your next 10 appointments already scheduled     Dec 07, 2018 11:30 AM CST   LAB with [facility]   [facility] ([facility])    [address] [place] [id number]   [phone]           Please do not eat 10-12 hours  before your appointment if you are coming in fasting for labs on lipids, cholesterol, or glucose (sugar). This does not apply to pregnant women. Water, hot tea and black coffee (with nothing added) are okay. Do not drink other fluids, diet soda or chew gum.            Dec 11, 2018  2:00 PM CST   PHYSICAL with Enrique Lucia MD   [facility] ([facility])    [address] [place] [id number]   [phone]              Future tests that were ordered for you today     Open Future Orders        Priority Expected Expires Ordered    Prostate spec antigen screen Routine  12/22/2018 11/19/2018    TSH Routine  12/22/2018 11/19/2018    T4, free Routine  12/22/2018 11/19/2018    UA with Microscopic Routine  12/22/2018 11/19/2018    CBC with platelets differential Routine  12/22/2018 11/19/2018    Comprehensive metabolic panel Routine  12/22/2018 11/19/2018    Lipid Profile Routine  12/22/2018 11/19/2018            Who to contact     If you have questions or need follow up information about today's clinic visit or your schedule please contact [facility] directly at [phone].  Normal or non-critical lab and imaging results will be communicated to you by [employer], letter or phone within 4 business days after the clinic has received the results. If you do not hear from us within 7 days, please contact the clinic through [employer] or phone. If you have a critical or abnormal lab result, we will notify you by phone as soon as possible.  Submit refill requests through [employer] or call your pharmacy and they will forward the refill request to us. Please allow 3 business days for your refill to be completed.          Additional Information About Your Visit        [employer] Information     [employer] gives you secure access to your electronic health record. If you see a primary care provider, you can also send  messages to your care team and make appointments. If you have questions, please call your primary care clinic.  If you do not have a primary care provider, please call [phone] and they will assist you.        Care EveryWhere ID     This is your Care EveryWhere ID. This could be used by other organizations to access your [facility] medical records  [id number]        Your Vitals Were     Pulse Temperature Pulse Oximetry BMI (Body Mass Index)          63 97.2  F (36.2  C) (Tympanic) 99% 25.73 kg/m2         Blood Pressure from Last 3 Encounters:   11/19/18 110/76   08/12/18 151/82   07/16/18 110/78    Weight from Last 3 Encounters:   11/19/18 195 lb (88.5 kg)   08/09/18 195 lb (88.5 kg)   07/16/18 197 lb (89.4 kg)                 Today's Medication Changes          These changes are accurate as of 11/19/18  3:38 PM.  If you have any questions, ask your nurse or doctor.               Start taking these medicines.        Dose/Directions    losartan 50 MG tablet   Commonly known as:  COZAAR   Used for:  Essential hypertension with goal blood pressure less than 140/90   Started by:  Enrique Lucia MD        As directed   Quantity:  90 tablet   Refills:  3       mirtazapine 15 MG tablet   Commonly known as:  REMERON   Used for:  Anxiety, Persistent insomnia   Started by:  Enrique Lucia MD        Dose:  7.5 mg   Take 0.5 tablets (7.5 mg) by mouth At Bedtime   Quantity:  30 tablet   Refills:  1         These medicines have changed or have updated prescriptions.        Dose/Directions    SYNTHROID PO   This may have changed:  Another medication with the same name was removed. Continue taking this medication, and follow the directions you see here.   Changed by:  Enrique Lucia MD        Dose:  125 mcg   Take 125 mcg by mouth See Admin Instructions Six days weekly- Monday, Tuesday, Thursday, Friday, Saturday, Sunday (Patient takes 1.5 x 125 mcg on Wednesday)   Refills:  0       TESTOSTERONE 2  MG/GM CREAM   This may have changed:    - how to take this  - when to take this  - additional instructions   Used for:  Hypotestosteronism        15% cream and apply 0.5 ml topically daily   Quantity:  40 g   Refills:  5         Stop taking these medicines if you haven't already. Please contact your care team if you have questions.     HYDROmorphone 2 MG tablet   Commonly known as:  DILAUDID   Stopped by:  Enrique Lucia MD           zolpidem 10 MG tablet   Commonly known as:  AMBIEN   Stopped by:  Enrique Lucia MD                Where to get your medicines      These medications were sent to [facility] [id number] [place] [facility] [address]  [address] [address]     Phone:  [phone]     losartan 50 MG tablet    mirtazapine 15 MG tablet         Some of these will need a paper prescription and others can be bought over the counter.  Ask your nurse if you have questions.     Bring a paper prescription for each of these medications     oxyCODONE IR 5 MG tablet               Information about OPIOIDS     PRESCRIPTION OPIOIDS: WHAT YOU NEED TO KNOW   We gave you an opioid (narcotic) pain medicine. It is important to manage your pain, but opioids are not always the best choice. You should first try all the other options your care team gave you. Take this medicine for as short a time (and as few doses) as possible.    Some activities can increase your pain, such as bandage changes or therapy sessions. It may help to take your pain medicine 30 to 60 minutes before these activities. Reduce your stress by getting enough sleep, working on hobbies you enjoy and practicing relaxation or meditation. Talk to your care team about ways to manage your pain beyond prescription opioids.    These medicines have risks:    DO NOT drive when on new or higher doses of pain medicine. These medicines can affect your alertness and reaction times, and you could be arrested for driving under the  influence (DUI). If you need to use opioids long-term, talk to your care team about driving.    DO NOT operate heavy machinery    DO NOT do any other dangerous activities while taking these medicines.    DO NOT drink any alcohol while taking these medicines.     If the opioid prescribed includes acetaminophen, DO NOT take with any other medicines that contain acetaminophen. Read all labels carefully. Look for the word  acetaminophen  or  Tylenol.  Ask your pharmacist if you have questions or are unsure.    You can get addicted to pain medicines, especially if you have a history of addiction (chemical, alcohol or substance dependence). Talk to your care team about ways to reduce this risk.    All opioids tend to cause constipation. Drink plenty of water and eat foods that have a lot of fiber, such as fruits, vegetables, prune juice, apple juice and high-fiber cereal. Take a laxative (Miralax, milk of magnesia, Colace, Senna) if you don t move your bowels at least every other day. Other side effects include upset stomach, sleepiness, dizziness, throwing up, tolerance (needing more of the medicine to have the same effect), physical dependence and slowed breathing.    Store your pills in a secure place, locked if possible. We will not replace any lost or stolen medicine. If you don t finish your medicine, please throw away (dispose) as directed by your pharmacist. The [place] [employer]ution [employer] has more information about safe disposal: https://www.pca.[facility].us/living-green/managing-unwanted-medications         Primary Care Provider Office Phone # Fax #    Enrique Lucia [phone] [address] [facility] [address]        Equal Access to Services     [facility]: Hadii aad keron hadasho Sofrederick, waaxda luqadaha, qaybta kaalmada cathy, abebe alcantar. So [facility] [phone].    ATENCIÓN: Si habla español, tiene a kidd disposición servicios  floridalma de asistencia lingüística. Agus isaac [phone].    We comply with applicable federal civil rights laws and [place] laws. We do not discriminate on the basis of race, color, national origin, age, disability, sex, sexual orientation, or gender identity.            Thank you!     Thank you for choosing [facility]  for your care. Our goal is always to provide you with excellent care. Hearing back from our patients is one way we can continue to improve our services. Please take a few minutes to complete the written survey that you may receive in the mail after your visit with us. Thank you!             Your Updated Medication List - Protect others around you: Learn how to safely use, store and throw away your medicines at www.disposemymeds.org.          This list is accurate as of 11/19/18  3:38 PM.  Always use your most recent med list.                   Brand Name Dispense Instructions for use Diagnosis    acetaminophen 500 MG tablet    TYLENOL    100 tablet    Take 2 tablets (1,000 mg) by mouth every 6 hours as needed for pain    Status post right hip replacement       atorvastatin 40 MG tablet    LIPITOR    90 tablet    TAKE ONE TABLET BY MOUTH ONE TIME DAILY    Hyperlipidemia LDL goal <100       LORazepam 2 MG tablet    ATIVAN    270 tablet    TAKE 1 TABLET BY MOUTH EVERY 8 HOURS AS NEEDED FOR ANXIETY    Anxiety       losartan 50 MG tablet    COZAAR    90 tablet    As directed    Essential hypertension with goal blood pressure less than 140/90       mirtazapine 15 MG tablet    REMERON    30 tablet    Take 0.5 tablets (7.5 mg) by mouth At Bedtime    Anxiety, Persistent insomnia       MULTIVITAMIN PO      Take 1 tablet by mouth daily        oxyCODONE IR 5 MG tablet    ROXICODONE    90 tablet    Take 1 tablet (5 mg) by mouth 3 times daily    Chronic left shoulder pain       rivaroxaban ANTICOAGULANT 10 MG Tabs tablet    XARELTO    30 tablet    Take 1 tablet (10 mg) by mouth  daily (with dinner)    Status post right hip replacement       senna-docusate 8.6-50 MG per tablet    SENOKOT-S;PERICOLACE    60 tablet    Take 2 tablets by mouth 2 times daily as needed for constipation    Status post right hip replacement       SYNTHROID PO      Take 125 mcg by mouth See Admin Instructions Six days weekly- Monday, Tuesday, Thursday, Friday, Saturday, Sunday (Patient takes 1.5 x 125 mcg on Wednesday)        TESTOSTERONE 2 MG/GM CREAM     40 g    15% cream and apply 0.5 ml topically daily    Hypotestosteronism       vitamin D3 2000 units Caps      Take 1 capsule by mouth daily

## 2018-11-19 NOTE — PATIENT INSTRUCTIONS
I spent 35 minutes with the patient going over all of his healthcare issues.  He is due for his full physical.  I refilled his oxycodone 5 mg 3 times daily #90 today.    With respect to his sleeping issues zolpidem has stopped working for him.  We had a discussion about starting mirtazapine 7.5 mg every night at bedtime.  He read up on the mechanism of action and was agreeable to starting that medication.    He is curious about his dose of atorvastatin and if that should be increased.  We have not done his lipids since March so we will get those done first and take a look at those before we decide on whether to increase his dose or not.  I refilled losartan 50 mg.    Follow-up will be in 3-4 weeks for his full physical and he will get lab tests done 2-3 days prior to that.

## 2018-11-20 ENCOUNTER — TELEPHONE (OUTPATIENT)
Dept: FAMILY MEDICINE | Facility: CLINIC | Age: 73
End: 2018-11-20

## 2018-11-20 DIAGNOSIS — I10 ESSENTIAL HYPERTENSION WITH GOAL BLOOD PRESSURE LESS THAN 140/90: ICD-10-CM

## 2018-11-20 NOTE — TELEPHONE ENCOUNTER
Edi started new medication last night- remeron. He felt ill in the night and this morning.    He felt dizzy, brain foggy, dreaming at night.    He would like to stop the medication as he is going out of town for holiday.  He has so much going on. He isn't sure if this is from the medication.  He will try the med again next week after he is home.    Routing to PCP as AR Rivera RN- Triage [employer]

## 2018-11-28 RX ORDER — LOSARTAN POTASSIUM 50 MG/1
TABLET ORAL
Qty: 90 TABLET | Refills: 3 | Status: ON HOLD | OUTPATIENT
Start: 2018-11-28 | End: 2019-02-10

## 2018-12-05 DIAGNOSIS — E03.9 ACQUIRED HYPOTHYROIDISM: Chronic | ICD-10-CM

## 2018-12-05 DIAGNOSIS — Z12.5 SCREENING FOR PROSTATE CANCER: ICD-10-CM

## 2018-12-05 DIAGNOSIS — I10 HYPERTENSION GOAL BP (BLOOD PRESSURE) < 140/90: Chronic | ICD-10-CM

## 2018-12-05 DIAGNOSIS — E78.5 HYPERLIPIDEMIA LDL GOAL <100: Chronic | ICD-10-CM

## 2018-12-05 LAB
ALBUMIN UR-MCNC: NEGATIVE MG/DL
APPEARANCE UR: CLEAR
BASOPHILS # BLD AUTO: 0 10E9/L (ref 0–0.2)
BASOPHILS NFR BLD AUTO: 0.3 %
BILIRUB UR QL STRIP: NEGATIVE
COLOR UR AUTO: YELLOW
DIFFERENTIAL METHOD BLD: NORMAL
EOSINOPHIL # BLD AUTO: 0.2 10E9/L (ref 0–0.7)
EOSINOPHIL NFR BLD AUTO: 3.1 %
ERYTHROCYTE [DISTWIDTH] IN BLOOD BY AUTOMATED COUNT: 13.6 % (ref 10–15)
GLUCOSE UR STRIP-MCNC: NEGATIVE MG/DL
HCT VFR BLD AUTO: 45.5 % (ref 40–53)
HGB BLD-MCNC: 15.7 G/DL (ref 13.3–17.7)
HGB UR QL STRIP: NEGATIVE
KETONES UR STRIP-MCNC: NEGATIVE MG/DL
LEUKOCYTE ESTERASE UR QL STRIP: NEGATIVE
LYMPHOCYTES # BLD AUTO: 1.4 10E9/L (ref 0.8–5.3)
LYMPHOCYTES NFR BLD AUTO: 19.1 %
MCH RBC QN AUTO: 30 PG (ref 26.5–33)
MCHC RBC AUTO-ENTMCNC: 34.5 G/DL (ref 31.5–36.5)
MCV RBC AUTO: 87 FL (ref 78–100)
MONOCYTES # BLD AUTO: 0.6 10E9/L (ref 0–1.3)
MONOCYTES NFR BLD AUTO: 8.3 %
NEUTROPHILS # BLD AUTO: 5.1 10E9/L (ref 1.6–8.3)
NEUTROPHILS NFR BLD AUTO: 69.2 %
NITRATE UR QL: NEGATIVE
PH UR STRIP: 7 PH (ref 5–7)
PLATELET # BLD AUTO: 168 10E9/L (ref 150–450)
RBC # BLD AUTO: 5.24 10E12/L (ref 4.4–5.9)
RBC #/AREA URNS AUTO: NORMAL /HPF
SOURCE: NORMAL
SP GR UR STRIP: 1.01 (ref 1–1.03)
UROBILINOGEN UR STRIP-ACNC: 0.2 EU/DL (ref 0.2–1)
WBC # BLD AUTO: 7.3 10E9/L (ref 4–11)
WBC #/AREA URNS AUTO: NORMAL /HPF

## 2018-12-05 PROCEDURE — 85025 COMPLETE CBC W/AUTO DIFF WBC: CPT | Performed by: FAMILY MEDICINE

## 2018-12-05 PROCEDURE — 80061 LIPID PANEL: CPT | Performed by: FAMILY MEDICINE

## 2018-12-05 PROCEDURE — 84443 ASSAY THYROID STIM HORMONE: CPT | Performed by: FAMILY MEDICINE

## 2018-12-05 PROCEDURE — 81001 URINALYSIS AUTO W/SCOPE: CPT | Performed by: FAMILY MEDICINE

## 2018-12-05 PROCEDURE — 36415 COLL VENOUS BLD VENIPUNCTURE: CPT | Performed by: FAMILY MEDICINE

## 2018-12-05 PROCEDURE — 84439 ASSAY OF FREE THYROXINE: CPT | Performed by: FAMILY MEDICINE

## 2018-12-05 PROCEDURE — 80053 COMPREHEN METABOLIC PANEL: CPT | Performed by: FAMILY MEDICINE

## 2018-12-06 LAB
ALBUMIN SERPL-MCNC: 4.1 G/DL (ref 3.4–5)
ALP SERPL-CCNC: 88 U/L (ref 40–150)
ALT SERPL W P-5'-P-CCNC: 26 U/L (ref 0–70)
ANION GAP SERPL CALCULATED.3IONS-SCNC: 10 MMOL/L (ref 3–14)
AST SERPL W P-5'-P-CCNC: 19 U/L (ref 0–45)
BILIRUB SERPL-MCNC: 0.6 MG/DL (ref 0.2–1.3)
BUN SERPL-MCNC: 14 MG/DL (ref 7–30)
CALCIUM SERPL-MCNC: 8.9 MG/DL (ref 8.5–10.1)
CHLORIDE SERPL-SCNC: 102 MMOL/L (ref 94–109)
CHOLEST SERPL-MCNC: 122 MG/DL
CO2 SERPL-SCNC: 21 MMOL/L (ref 20–32)
CREAT SERPL-MCNC: 0.89 MG/DL (ref 0.66–1.25)
GFR SERPL CREATININE-BSD FRML MDRD: 84 ML/MIN/1.7M2
GLUCOSE SERPL-MCNC: 95 MG/DL (ref 70–99)
HDLC SERPL-MCNC: 39 MG/DL
LDLC SERPL CALC-MCNC: 59 MG/DL
NONHDLC SERPL-MCNC: 83 MG/DL
POTASSIUM SERPL-SCNC: 4.1 MMOL/L (ref 3.4–5.3)
PROT SERPL-MCNC: 7.2 G/DL (ref 6.8–8.8)
PSA SERPL-ACNC: 0.4 UG/L (ref 0–4)
SODIUM SERPL-SCNC: 133 MMOL/L (ref 133–144)
T4 FREE SERPL-MCNC: 1.27 NG/DL (ref 0.76–1.46)
TRIGL SERPL-MCNC: 118 MG/DL
TSH SERPL DL<=0.005 MIU/L-ACNC: 5.42 MU/L (ref 0.4–4)

## 2018-12-09 DIAGNOSIS — E03.9 ACQUIRED HYPOTHYROIDISM: Primary | ICD-10-CM

## 2018-12-11 ENCOUNTER — OFFICE VISIT (OUTPATIENT)
Dept: FAMILY MEDICINE | Facility: CLINIC | Age: 73
End: 2018-12-11
Payer: COMMERCIAL

## 2018-12-11 VITALS
RESPIRATION RATE: 16 BRPM | HEART RATE: 68 BPM | OXYGEN SATURATION: 99 % | WEIGHT: 199 LBS | DIASTOLIC BLOOD PRESSURE: 72 MMHG | SYSTOLIC BLOOD PRESSURE: 110 MMHG | BODY MASS INDEX: 26.25 KG/M2

## 2018-12-11 DIAGNOSIS — E34.9 HYPOTESTOSTERONISM: ICD-10-CM

## 2018-12-11 DIAGNOSIS — G89.29 CHRONIC LEFT SHOULDER PAIN: ICD-10-CM

## 2018-12-11 DIAGNOSIS — G47.00 PERSISTENT INSOMNIA: ICD-10-CM

## 2018-12-11 DIAGNOSIS — F41.8 DEPRESSION WITH ANXIETY: ICD-10-CM

## 2018-12-11 DIAGNOSIS — F41.9 ANXIETY: ICD-10-CM

## 2018-12-11 DIAGNOSIS — E78.5 HYPERLIPIDEMIA LDL GOAL <100: ICD-10-CM

## 2018-12-11 DIAGNOSIS — I10 ESSENTIAL HYPERTENSION WITH GOAL BLOOD PRESSURE LESS THAN 140/90: ICD-10-CM

## 2018-12-11 DIAGNOSIS — Z00.00 ROUTINE MEDICAL EXAM: Primary | ICD-10-CM

## 2018-12-11 DIAGNOSIS — M25.512 CHRONIC LEFT SHOULDER PAIN: ICD-10-CM

## 2018-12-11 DIAGNOSIS — E03.9 ACQUIRED HYPOTHYROIDISM: ICD-10-CM

## 2018-12-11 PROCEDURE — 99397 PER PM REEVAL EST PAT 65+ YR: CPT | Performed by: FAMILY MEDICINE

## 2018-12-11 RX ORDER — OXYCODONE HYDROCHLORIDE 5 MG/1
5 TABLET ORAL 3 TIMES DAILY
Qty: 90 TABLET | Refills: 0 | Status: SHIPPED | OUTPATIENT
Start: 2018-12-11 | End: 2018-12-11

## 2018-12-11 RX ORDER — OXYCODONE HYDROCHLORIDE 5 MG/1
5 TABLET ORAL 3 TIMES DAILY
Qty: 90 TABLET | Refills: 0 | Status: SHIPPED | OUTPATIENT
Start: 2018-12-19 | End: 2019-01-17

## 2018-12-11 ASSESSMENT — ACTIVITIES OF DAILY LIVING (ADL): CURRENT_FUNCTION: NO ASSISTANCE NEEDED

## 2018-12-11 NOTE — PATIENT INSTRUCTIONS
Preventive Health Recommendations:     See your health care provider every year to    Review health changes.     Discuss preventive care.      Review your medicines if your doctor has prescribed any.    Talk with your health care provider about whether you should have a test to screen for prostate cancer (PSA).    Every 3 years, have a diabetes test (fasting glucose). If you are at risk for diabetes, you should have this test more often.    Every 5 years, have a cholesterol test. Have this test more often if you are at risk for high cholesterol or heart disease.     Every 10 years, have a colonoscopy. Or, have a yearly FIT test (stool test). These exams will check for colon cancer.    Talk to with your health care provider about screening for Abdominal Aortic Aneurysm if you have a family history of AAA or have a history of smoking.  Shots:     Get a flu shot each year.     Get a tetanus shot every 10 years.     Talk to your doctor about your pneumonia vaccines. There are now two you should receive - Pneumovax (PPSV 23) and Prevnar (PCV 13).    Talk to your pharmacist about a shingles vaccine.     Talk to your doctor about the hepatitis B vaccine.  Nutrition:     Eat at least 5 servings of fruits and vegetables each day.     Eat whole-grain bread, whole-wheat pasta and brown rice instead of white grains and rice.     Get adequate Calcium and Vitamin D.   Lifestyle    Exercise for at least 150 minutes a week (30 minutes a day, 5 days a week). This will help you control your weight and prevent disease.     Limit alcohol to one drink per day.     No smoking.     Wear sunscreen to prevent skin cancer.     See your dentist every six months for an exam and cleaning.     See your eye doctor every 1 to 2 years to screen for conditions such as glaucoma, macular degeneration and cataracts.    Personalized Prevention Plan  You are due for the preventive services outlined below.  Your care team is available to assist you in  scheduling these services.  If you have already completed any of these items, please share that information with your care team to update in your medical record.    Health Maintenance Due   Topic Date Due     URINE DRUG SCREEN Q1 YR  03/06/1960     AORTIC ANEURYSM SCREENING (SYSTEM ASSIGNED)  03/06/2010     Zoster (Chicken Pox) Vaccine (2 of 3) 04/08/2010     Diptheria Tetanus Pertussis (DTAP/TDAP/TD) Vaccine (3 - Td) 11/13/2014     FALL RISK ASSESSMENT  12/12/2017     PRINCESS QUESTIONNAIRE 1 YEAR  12/13/2018

## 2018-12-11 NOTE — PROGRESS NOTES
"SUBJECTIVE:   Edi Villafana is a 73 year old male who presents for Preventive Visit.    Are you in the first 12 months of your Medicare coverage?  No    Annual Wellness Visit     In general, how would you rate your overall health?  Good    Frequency of exercise:  4-5 days/week    Duration of exercise:  Less than 15 minutes    Do you usually eat at least 4 servings of fruit and vegetables a day, include whole grains    & fiber and avoid regularly eating high fat or \"junk\" foods?  No    Taking medications regularly:  Yes    Medication side effects:  None    Ability to successfully perform activities of daily living:  No assistance needed    Home Safety:  No safety concerns identified    Hearing Impairment:  No hearing concerns    In the past 6 months, have you been bothered by leaking of urine?  No    In general, how would you rate your overall mental or emotional health?  Excellent    PHQ-2 Total Score: 0    Additional concerns today:  No    Do you feel safe in your environment? No    Do you have a Health Care Directive? No: Advance care planning was reviewed with patient; patient declined at this time.      Fall risk  Fallen 2 or more times in the past year?: No  Any fall with injury in the past year?: No    Cognitive Screening   1) Repeat 3 items (Leader, Season, Table)    2) Clock draw: patient refused  3) 3 item recall: refused  Results: patient refused last two parts    Mini-CogTM Copyright [employer]. Licensed by the author for use in [facility]; reprinted with permission ([email or web address]). All rights reserved.      Do you have sleep apnea, excessive snoring or daytime drowsiness?: no    Reviewed and updated as needed this visit by clinical staff  Tobacco  Allergies  Meds  Med Hx  Surg Hx  Fam Hx  Soc Hx        Reviewed and updated as needed this visit by Provider        Social History     Tobacco Use     Smoking status: Never Smoker     Smokeless tobacco: Never Used   Substance Use Topics     " Alcohol use: Yes       Alcohol Use 12/11/2018   If you drink alcohol do you typically have greater than 3 drinks per day OR greater than 7 drinks per week? Not Applicable               Current providers sharing in care for this patient include:   Patient Care Team:  Enrique Lucia MD as PCP - General (Family Practice)    The following health maintenance items are reviewed in Epic and correct as of today:  Health Maintenance   Topic Date Due     URINE DRUG SCREEN Q1 YR  03/06/1960     AORTIC ANEURYSM SCREENING (SYSTEM ASSIGNED)  03/06/2010     ZOSTER IMMUNIZATION (2 of 3) 04/08/2010     DTAP/TDAP/TD IMMUNIZATION (3 - Td) 11/13/2014     FALL RISK ASSESSMENT  12/12/2017     PRINCESS QUESTIONNAIRE 1 YEAR  12/13/2018     PHQ-9 Q6 MONTHS  01/16/2019     ADVANCE DIRECTIVE PLANNING Q5 YRS  08/10/2023     LIPID SCREEN Q5 YR MALE (SYSTEM ASSIGNED)  12/05/2023     COLON CANCER SCREEN (SYSTEM ASSIGNED)  10/25/2026     DEPRESSION ACTION PLAN  Completed     INFLUENZA VACCINE  Completed     PNEUMOVAX IMMUNIZATION 65+ LOW/MEDIUM RISK  Completed     HEPATITIS C SCREENING  Completed     IPV IMMUNIZATION  Aged Out     MENINGITIS IMMUNIZATION  Aged Out     Patient Active Problem List   Diagnosis     Hypothyroidism     Hyperlipidemia LDL goal <100     Persistent insomnia     Hypertension goal BP (blood pressure) < 140/90     Anxiety     Hypotestosteronism     Trochanteric bursitis     Depression with anxiety     Left shoulder pain     Physical deconditioning     Alcoholism (H)     Chronic left shoulder pain     Pain of left calf     Acute deep vein thrombosis (DVT) of distal vein of left lower extremity (H)     Anticoagulation management encounter     Hip pain, right     History of deep venous thrombosis     Status post right hip replacement     Past Surgical History:   Procedure Laterality Date     ABDOMEN SURGERY  1973    large benign tumor removed with thrombophlebitis post op     APPENDECTOMY  1960     ARTHROPLASTY HIP ANTERIOR  "Right 8/9/2018    Procedure: ARTHROPLASTY HIP ANTERIOR;  RIGHT TOTAL HIP ARTHROPLASTY DIRECT ANTERIOR APPROACH ;  Surgeon: Chester Ortiz MD;  Location: [facility]     CATARACT IOL, RT/LT      bilateral     EYE SURGERY  [id number], 2007    eyelid surgery, 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, trabeculoplasty       Social History     Tobacco Use     Smoking status: Never Smoker     Smokeless tobacco: Never Used   Substance Use Topics     Alcohol use: Yes     Family History   Problem Relation Age of Onset     Hypertension Mother      Lipids Mother      Heart Disease Mother      Psychotic Disorder Mother         severe depression     Heart Disease Father      Ovarian Cancer Sister      Colon Cancer Paternal Grandfather              Review of Systems  CONSTITUTIONAL: NEGATIVE for fever, chills, change in weight  INTEGUMENTARY/SKIN: NEGATIVE for worrisome rashes, moles or lesions  EYES: NEGATIVE for vision changes or irritation  ENT/MOUTH: NEGATIVE for ear, mouth and throat problems  RESP: NEGATIVE for significant cough or SOB  BREAST: NEGATIVE for masses, tenderness or discharge  CV: NEGATIVE for chest pain, palpitations or peripheral edema  GI: NEGATIVE for nausea, abdominal pain, heartburn, or change in bowel habits  : NEGATIVE for frequency, dysuria, or hematuria  MUSCULOSKELETAL:POSITIVE  for back pain chronically  NEURO: NEGATIVE for weakness, dizziness or paresthesias  ENDOCRINE: NEGATIVE for temperature intolerance, skin/hair changes  HEME: NEGATIVE for bleeding problems  PSYCHIATRIC: NEGATIVE for changes in mood or affect    OBJECTIVE:   /72   Pulse 68   Resp 16   Wt 90.3 kg (199 lb)   SpO2 99%   BMI 26.25 kg/m   Estimated body mass index is 26.25 kg/m  as calculated from the following:    Height as of 8/9/18: 1.854 m (6' 1\").    Weight as of this encounter: 90.3 kg (199 lb).  Physical Exam  GENERAL: healthy, alert and no distress  EYES: Eyes grossly normal to inspection, PERRL and conjunctivae and sclerae " normal  HENT: ear canals and TM's normal, nose and mouth without ulcers or lesions  NECK: no adenopathy, no asymmetry, masses, or scars and thyroid normal to palpation  RESP: lungs clear to auscultation - no rales, rhonchi or wheezes  CV: regular rate and rhythm, normal S1 S2, no S3 or S4, no murmur, click or rub, no peripheral edema and peripheral pulses strong  ABDOMEN: soft, nontender, no hepatosplenomegaly, no masses and bowel sounds normal   (male): normal male genitalia without lesions or urethral discharge, no hernia  RECTAL: normal sphincter tone, no rectal masses, prostate normal size, smooth, nontender without nodules or masses  MS: no gross musculoskeletal defects noted, no edema  SKIN: no suspicious lesions or rashes  NEURO: Normal strength and tone, mentation intact and speech normal  PSYCH: mentation appears normal, affect normal/bright    Results for orders placed or performed in visit on 12/05/18   TSH   Result Value Ref Range    TSH 5.42 (H) 0.40 - 4.00 mU/L   T4, free   Result Value Ref Range    T4 Free 1.27 0.76 - 1.46 ng/dL   UA with Microscopic   Result Value Ref Range    Color Urine Yellow     Appearance Urine Clear     Glucose Urine Negative NEG^Negative mg/dL    Bilirubin Urine Negative NEG^Negative    Ketones Urine Negative NEG^Negative mg/dL    Specific Gravity Urine 1.015 1.003 - 1.035    pH Urine 7.0 5.0 - 7.0 pH    Protein Albumin Urine Negative NEG^Negative mg/dL    Urobilinogen Urine 0.2 0.2 - 1.0 EU/dL    Nitrite Urine Negative NEG^Negative    Blood Urine Negative NEG^Negative    Leukocyte Esterase Urine Negative NEG^Negative    Source Midstream Urine     WBC Urine 0 - 5 OTO5^0 - 5 /HPF    RBC Urine O - 2 OTO2^O - 2 /HPF   CBC with platelets differential   Result Value Ref Range    WBC 7.3 4.0 - 11.0 10e9/L    RBC Count 5.24 4.4 - 5.9 10e12/L    Hemoglobin 15.7 13.3 - 17.7 g/dL    Hematocrit 45.5 40.0 - 53.0 %    MCV 87 78 - 100 fl    MCH 30.0 26.5 - 33.0 pg    MCHC 34.5 31.5 - 36.5  g/dL    RDW 13.6 10.0 - 15.0 %    Platelet Count 168 150 - 450 10e9/L    % Neutrophils 69.2 %    % Lymphocytes 19.1 %    % Monocytes 8.3 %    % Eosinophils 3.1 %    % Basophils 0.3 %    Absolute Neutrophil 5.1 1.6 - 8.3 10e9/L    Absolute Lymphocytes 1.4 0.8 - 5.3 10e9/L    Absolute Monocytes 0.6 0.0 - 1.3 10e9/L    Absolute Eosinophils 0.2 0.0 - 0.7 10e9/L    Absolute Basophils 0.0 0.0 - 0.2 10e9/L    Diff Method Automated Method    Comprehensive metabolic panel   Result Value Ref Range    Sodium 133 133 - 144 mmol/L    Potassium 4.1 3.4 - 5.3 mmol/L    Chloride 102 94 - 109 mmol/L    Carbon Dioxide 21 20 - 32 mmol/L    Anion Gap 10 3 - 14 mmol/L    Glucose 95 70 - 99 mg/dL    Urea Nitrogen 14 7 - 30 mg/dL    Creatinine 0.89 0.66 - 1.25 mg/dL    GFR Estimate 84 >60 mL/min/1.7m2    GFR Estimate If Black >90 >60 mL/min/1.7m2    Calcium 8.9 8.5 - 10.1 mg/dL    Bilirubin Total 0.6 0.2 - 1.3 mg/dL    Albumin 4.1 3.4 - 5.0 g/dL    Protein Total 7.2 6.8 - 8.8 g/dL    Alkaline Phosphatase 88 40 - 150 U/L    ALT 26 0 - 70 U/L    AST 19 0 - 45 U/L   Lipid Profile   Result Value Ref Range    Cholesterol 122 <200 mg/dL    Triglycerides 118 <150 mg/dL    HDL Cholesterol 39 (L) >39 mg/dL    LDL Cholesterol Calculated 59 <100 mg/dL    Non HDL Cholesterol 83 <130 mg/dL   Prostate spec antigen screen   Result Value Ref Range    PSA 0.40 0 - 4 ug/L       ASSESSMENT / PLAN:       ICD-10-CM    1. Routine medical exam Z00.00    2. Acquired hypothyroidism E03.9    3. Essential hypertension with goal blood pressure less than 140/90 I10    4. Anxiety F41.9    5. Hyperlipidemia LDL goal <100 E78.5    6. Hypotestosteronism E34.9    7. Persistent insomnia G47.00    8. Depression with anxiety F41.8    9. Chronic left shoulder pain M25.512     G89.29        End of Life Planning:  Patient currently has an advanced directive: No.  I have verified the patient's ablity to prepare an advanced directive/make health care decisions.  Literature was  "provided to assist patient in preparing an advanced directive.    COUNSELING:  Reviewed preventive health counseling, as reflected in patient instructions       Regular exercise       Healthy diet/nutrition    BP Readings from Last 1 Encounters:   12/11/18 110/72     Estimated body mass index is 26.25 kg/m  as calculated from the following:    Height as of 8/9/18: 1.854 m (6' 1\").    Weight as of this encounter: 90.3 kg (199 lb).           reports that  has never smoked. he has never used smokeless tobacco.      Appropriate preventive services were discussed with this patient, including applicable screening as appropriate for cardiovascular disease, diabetes, osteopenia/osteoporosis, and glaucoma.  As appropriate for age/gender, discussed screening for colorectal cancer, prostate cancer, breast cancer, and cervical cancer. Checklist reviewing preventive services available has been given to the patient.    Reviewed patients plan of care and provided an AVS. The Basic Care Plan (routine screening as documented in Health Maintenance) for Edi meets the Care Plan requirement. This Care Plan has been established and reviewed with the Patient.    Counseling Resources:  ATP IV Guidelines  Pooled Cohorts Equation Calculator  Breast Cancer Risk Calculator  FRAX Risk Assessment  ICSI Preventive Guidelines  Dietary Guidelines for Americans, 2010  USDA's MyPlate  ASA Prophylaxis  Lung CA Screening    Enrique Lucia MD  [facility]  "

## 2019-01-01 ENCOUNTER — HOSPITAL ENCOUNTER (OUTPATIENT)
Facility: CLINIC | Age: 74
Setting detail: SPECIMEN
Discharge: HOME | End: 2019-08-14
Attending: UROLOGY | Admitting: UROLOGY
Payer: MEDICARE

## 2019-01-01 ENCOUNTER — PATIENT OUTREACH (OUTPATIENT)
Dept: ONCOLOGY | Facility: CLINIC | Age: 74
End: 2019-01-01

## 2019-01-01 ENCOUNTER — TELEPHONE (OUTPATIENT)
Dept: FAMILY MEDICINE | Facility: CLINIC | Age: 74
End: 2019-01-01

## 2019-01-01 ENCOUNTER — OFFICE VISIT (OUTPATIENT)
Dept: FAMILY MEDICINE | Facility: CLINIC | Age: 74
End: 2019-01-01
Payer: MEDICARE

## 2019-01-01 ENCOUNTER — TRANSFERRED RECORDS (OUTPATIENT)
Dept: HEALTH INFORMATION MANAGEMENT | Facility: CLINIC | Age: 74
End: 2019-01-01

## 2019-01-01 ENCOUNTER — HOSPITAL ENCOUNTER (OUTPATIENT)
Dept: PHYSICAL THERAPY | Facility: CLINIC | Age: 74
Setting detail: THERAPIES SERIES
End: 2019-09-10
Attending: ORTHOPAEDIC SURGERY
Payer: MEDICARE

## 2019-01-01 ENCOUNTER — PATIENT OUTREACH (OUTPATIENT)
Dept: CARE COORDINATION | Facility: CLINIC | Age: 74
End: 2019-01-01

## 2019-01-01 ENCOUNTER — HEALTH MAINTENANCE LETTER (OUTPATIENT)
Age: 74
End: 2019-01-01

## 2019-01-01 ENCOUNTER — HOSPITAL ENCOUNTER (OUTPATIENT)
Dept: PHYSICAL THERAPY | Facility: CLINIC | Age: 74
Setting detail: THERAPIES SERIES
End: 2019-08-22
Attending: ORTHOPAEDIC SURGERY
Payer: MEDICARE

## 2019-01-01 ENCOUNTER — ONCOLOGY VISIT (OUTPATIENT)
Dept: ONCOLOGY | Facility: CLINIC | Age: 74
End: 2019-01-01
Attending: UROLOGY
Payer: MEDICARE

## 2019-01-01 VITALS
RESPIRATION RATE: 16 BRPM | SYSTOLIC BLOOD PRESSURE: 126 MMHG | DIASTOLIC BLOOD PRESSURE: 70 MMHG | WEIGHT: 208 LBS | BODY MASS INDEX: 25.32 KG/M2 | TEMPERATURE: 97 F | HEART RATE: 71 BPM

## 2019-01-01 VITALS
SYSTOLIC BLOOD PRESSURE: 130 MMHG | WEIGHT: 209 LBS | HEART RATE: 69 BPM | RESPIRATION RATE: 14 BRPM | DIASTOLIC BLOOD PRESSURE: 70 MMHG | HEIGHT: 76 IN | OXYGEN SATURATION: 97 % | TEMPERATURE: 97.5 F | BODY MASS INDEX: 25.45 KG/M2

## 2019-01-01 VITALS
OXYGEN SATURATION: 96 % | RESPIRATION RATE: 16 BRPM | BODY MASS INDEX: 25.21 KG/M2 | TEMPERATURE: 97 F | WEIGHT: 207 LBS | HEIGHT: 76 IN

## 2019-01-01 DIAGNOSIS — M25.512 CHRONIC PAIN OF BOTH SHOULDERS: Primary | ICD-10-CM

## 2019-01-01 DIAGNOSIS — G89.29 CHRONIC PAIN OF BOTH SHOULDERS: Primary | ICD-10-CM

## 2019-01-01 DIAGNOSIS — N40.1 BPH WITH URINARY OBSTRUCTION: Primary | ICD-10-CM

## 2019-01-01 DIAGNOSIS — N13.8 BPH WITH URINARY OBSTRUCTION: Primary | ICD-10-CM

## 2019-01-01 DIAGNOSIS — M25.512 CHRONIC LEFT SHOULDER PAIN: ICD-10-CM

## 2019-01-01 DIAGNOSIS — N13.39 OTHER HYDRONEPHROSIS: ICD-10-CM

## 2019-01-01 DIAGNOSIS — M25.511 CHRONIC PAIN OF BOTH SHOULDERS: Primary | ICD-10-CM

## 2019-01-01 DIAGNOSIS — G89.29 CHRONIC LEFT SHOULDER PAIN: ICD-10-CM

## 2019-01-01 DIAGNOSIS — K59.03 DRUG-INDUCED CONSTIPATION: ICD-10-CM

## 2019-01-01 DIAGNOSIS — F41.8 DEPRESSION WITH ANXIETY: ICD-10-CM

## 2019-01-01 LAB
ALT SERPL-CCNC: 27 U/L (ref 7–55)
ANION GAP SERPL CALCULATED.3IONS-SCNC: 4 MMOL/L (ref 3–14)
AST SERPL-CCNC: 23 U/L (ref 8–48)
BUN SERPL-MCNC: 14 MG/DL (ref 7–30)
CALCIUM SERPL-MCNC: 8.9 MG/DL (ref 8.5–10.1)
CHLORIDE SERPL-SCNC: 103 MMOL/L (ref 94–109)
CO2 SERPL-SCNC: 27 MMOL/L (ref 20–32)
CREAT SERPL-MCNC: 0.92 MG/DL (ref 0.66–1.25)
CREAT SERPL-MCNC: 1.06 MG/DL (ref 0.74–1.35)
GFR SERPL CREATININE-BSD FRML MDRD: 69 ML/MIN/BSA
GFR SERPL CREATININE-BSD FRML MDRD: 82 ML/MIN/{1.73_M2}
GLUCOSE SERPL-MCNC: 107 MG/DL (ref 70–140)
GLUCOSE SERPL-MCNC: 97 MG/DL (ref 70–99)
POTASSIUM SERPL-SCNC: 4.4 MMOL/L (ref 3.4–5.3)
POTASSIUM SERPL-SCNC: 4.5 MMOL/L (ref 3.6–5.2)
SODIUM SERPL-SCNC: 134 MMOL/L (ref 133–144)
TSH SERPL-ACNC: 3 MIU/L (ref 0.3–4.2)

## 2019-01-01 PROCEDURE — 97110 THERAPEUTIC EXERCISES: CPT | Mod: GP | Performed by: PHYSICAL THERAPIST

## 2019-01-01 PROCEDURE — 99214 OFFICE O/P EST MOD 30 MIN: CPT | Performed by: FAMILY MEDICINE

## 2019-01-01 PROCEDURE — 80048 BASIC METABOLIC PNL TOTAL CA: CPT | Performed by: UROLOGY

## 2019-01-01 PROCEDURE — 97750 PHYSICAL PERFORMANCE TEST: CPT | Mod: GP | Performed by: PHYSICAL THERAPIST

## 2019-01-01 PROCEDURE — G0463 HOSPITAL OUTPT CLINIC VISIT: HCPCS

## 2019-01-01 PROCEDURE — 99213 OFFICE O/P EST LOW 20 MIN: CPT | Performed by: UROLOGY

## 2019-01-01 PROCEDURE — 36415 COLL VENOUS BLD VENIPUNCTURE: CPT

## 2019-01-01 RX ORDER — OXYCODONE HYDROCHLORIDE 5 MG/1
5 TABLET ORAL 4 TIMES DAILY PRN
Qty: 120 TABLET | Refills: 0 | Status: CANCELLED | OUTPATIENT
Start: 2019-01-01

## 2019-01-01 RX ORDER — OXYCODONE HYDROCHLORIDE 5 MG/1
5 TABLET ORAL 4 TIMES DAILY PRN
Qty: 120 TABLET | Refills: 0 | Status: SHIPPED | OUTPATIENT
Start: 2019-01-01 | End: 2019-01-01

## 2019-01-01 RX ORDER — OXYCODONE HYDROCHLORIDE 5 MG/1
5-10 TABLET ORAL EVERY 4 HOURS PRN
Qty: 300 TABLET | Refills: 0 | Status: SHIPPED | OUTPATIENT
Start: 2019-01-01 | End: 2020-01-01

## 2019-01-01 RX ORDER — OXYCODONE HYDROCHLORIDE 5 MG/1
5 TABLET ORAL 4 TIMES DAILY PRN
Qty: 120 TABLET | Refills: 0 | OUTPATIENT
Start: 2019-01-01

## 2019-01-01 ASSESSMENT — MIFFLIN-ST. JEOR
SCORE: 1789.52
SCORE: 1780.45

## 2019-01-01 ASSESSMENT — ACTIVITIES OF DAILY LIVING (ADL): DEPENDENT_IADLS:: INDEPENDENT

## 2019-01-01 ASSESSMENT — PAIN SCALES - GENERAL: PAINLEVEL: SEVERE PAIN (6)

## 2019-01-14 DIAGNOSIS — M25.512 CHRONIC LEFT SHOULDER PAIN: ICD-10-CM

## 2019-01-14 DIAGNOSIS — G89.29 CHRONIC LEFT SHOULDER PAIN: ICD-10-CM

## 2019-01-14 NOTE — TELEPHONE ENCOUNTER
Reason for Call:  Medication or medication refill:    Do you use a [facility]?  Name of the pharmacy and phone number for the current request:     [facility] [address] [place] [facility], [address]    Name of the medication requested: oxyCODONE (ROXICODONE) 5 MG table    Other request:     Can we leave a detailed message on this number? YES    Phone number patient can be reached at: Home number on file [phone] (home)    Best Time: anytime     Call taken on 1/14/2019 at 2:34 PM by Jessa Barillas

## 2019-01-15 NOTE — TELEPHONE ENCOUNTER
oxyCODONE (ROXICODONE) 5 MG tablet      Last Written Prescription Date:  12/19/2018  Last Fill Quantity: 90,   # refills: 0  Last Office Visit: 12/11/2018  Future Office visit:       Routing refill request to provider for review/approval because:  Drug not on the [facility], [facility] or [facility] refill protocol or controlled substance

## 2019-01-16 PROBLEM — M25.512 CHRONIC LEFT SHOULDER PAIN: Status: ACTIVE | Noted: 2017-11-21

## 2019-01-16 PROBLEM — G89.29 CHRONIC LEFT SHOULDER PAIN: Status: ACTIVE | Noted: 2017-11-21

## 2019-01-16 NOTE — TELEPHONE ENCOUNTER
Controlled Substance Refill Request for oxyCODONE (ROXICODONE) 5 MG tablet  Problem List Complete:  No     PROVIDER TO CONSIDER COMPLETION OF PROBLEM LIST AND OVERVIEW/CONTROLLED SUBSTANCE AGREEMENT    Last Written Prescription Date:  See below  Last Fill Quantity: see below,   # refills: see below    THE MOST RECENT OFFICE VISIT MUST BE WITHIN THE PAST 3 MONTHS. (AT LEAST ONE FACE TO FACE VISIT MUST OCCUR EVERY 6 MONTHS. ADDITIONAL VISITS CAN BE VIRTUAL.)    Last Office Visit with [facility] primary care provider: 12/11/18    Future Office visit:     Controlled substance agreement: [unfilled]    Last Urine Drug Screen: No results found for: CDAUT, No results found for: COMDAT, No results found for: THC13, PCP13, COC13, MAMP13, OPI13, AMP13, BZO13, TCA13, MTD13, BAR13, OXY13, PPX13, BUP13     Processing:  Patient will  in clinic     [email or web address]       checked in past 3 months?  Yes 1/16/19 no concerns

## 2019-01-17 RX ORDER — OXYCODONE HYDROCHLORIDE 5 MG/1
5 TABLET ORAL 3 TIMES DAILY
Qty: 90 TABLET | Refills: 0 | Status: SHIPPED | OUTPATIENT
Start: 2019-01-18 | End: 2019-02-18

## 2019-01-17 NOTE — TELEPHONE ENCOUNTER
Edi is calling back to check on status of refill request. Is quite upset that he has not gotten his refill yet. Please fill asap.

## 2019-01-17 NOTE — TELEPHONE ENCOUNTER
Called pt to let them know that the Rx for oxycodone is ready for  at [facility]. Left VM

## 2019-01-26 ENCOUNTER — OFFICE VISIT (OUTPATIENT)
Dept: FAMILY MEDICINE | Facility: CLINIC | Age: 74
End: 2019-01-26
Payer: MEDICARE

## 2019-01-26 VITALS
OXYGEN SATURATION: 97 % | HEART RATE: 76 BPM | DIASTOLIC BLOOD PRESSURE: 70 MMHG | SYSTOLIC BLOOD PRESSURE: 120 MMHG | TEMPERATURE: 96.6 F

## 2019-01-26 DIAGNOSIS — M25.551 HIP PAIN, RIGHT: ICD-10-CM

## 2019-01-26 DIAGNOSIS — J06.9 VIRAL URI: ICD-10-CM

## 2019-01-26 DIAGNOSIS — Z86.718 HISTORY OF DEEP VENOUS THROMBOSIS: Primary | ICD-10-CM

## 2019-01-26 LAB — D DIMER PPP FEU-MCNC: 0.7 UG/ML FEU (ref 0–0.5)

## 2019-01-26 PROCEDURE — 36415 COLL VENOUS BLD VENIPUNCTURE: CPT | Performed by: PHYSICIAN ASSISTANT

## 2019-01-26 PROCEDURE — 99214 OFFICE O/P EST MOD 30 MIN: CPT | Performed by: PHYSICIAN ASSISTANT

## 2019-01-26 PROCEDURE — 85379 FIBRIN DEGRADATION QUANT: CPT | Performed by: PHYSICIAN ASSISTANT

## 2019-01-26 ASSESSMENT — ANXIETY QUESTIONNAIRES
3. WORRYING TOO MUCH ABOUT DIFFERENT THINGS: SEVERAL DAYS
GAD7 TOTAL SCORE: 2
GAD7 TOTAL SCORE: 2
7. FEELING AFRAID AS IF SOMETHING AWFUL MIGHT HAPPEN: NOT AT ALL
6. BECOMING EASILY ANNOYED OR IRRITABLE: NOT AT ALL
1. FEELING NERVOUS, ANXIOUS, OR ON EDGE: SEVERAL DAYS
GAD7 TOTAL SCORE: 2
4. TROUBLE RELAXING: NOT AT ALL
5. BEING SO RESTLESS THAT IT IS HARD TO SIT STILL: NOT AT ALL
2. NOT BEING ABLE TO STOP OR CONTROL WORRYING: NOT AT ALL

## 2019-01-26 ASSESSMENT — PATIENT HEALTH QUESTIONNAIRE - PHQ9
SUM OF ALL RESPONSES TO PHQ QUESTIONS 1-9: 2
10. IF YOU CHECKED OFF ANY PROBLEMS, HOW DIFFICULT HAVE THESE PROBLEMS MADE IT FOR YOU TO DO YOUR WORK, TAKE CARE OF THINGS AT HOME, OR GET ALONG WITH OTHER PEOPLE: NOT DIFFICULT AT ALL
SUM OF ALL RESPONSES TO PHQ QUESTIONS 1-9: 2

## 2019-01-26 NOTE — RESULT ENCOUNTER NOTE
Frank Daley,    I just wanted to let you know that your lab results have been reviewed and are attached.    Even though your d dimer is slightly elevated, it is within normal limits and not as high as we would see with a DVT.  I do not think we need to get an ultrasound at this time.  If your pain starts getting worse with the hot packs or you start having swelling, let me know and I'll put the order in.    Please let me know if you have any questions and have a great week!    Sincerely,    Cathy Malik PA-C    [facility]  [address]  [place] [address]  [phone] (p)

## 2019-01-26 NOTE — PROGRESS NOTES
SUBJECTIVE:   Edi Villafana is a 73 year old male who presents to clinic today for the following health issues:      Musculoskeletal problem/pain      Duration: hip surgery aug 9th 2018    Description  Location: right hip    Intensity:  moderate    Accompanying signs and symptoms: pain    History  Previous similar problem: YES  Previous evaluation:  orthopedic evaluation    Precipitating or alleviating factors:  Trauma or overuse: YES- walking  Aggravating factors include: none    Therapies tried and outcome: nothing    Reviewed and updated as needed this visit by clinical staff  Tobacco  Allergies  Meds  Problems  Med Hx  Surg Hx  Fam Hx  Soc Hx        Reviewed and updated as needed this visit by Provider  Tobacco  Allergies  Meds  Problems  Med Hx  Surg Hx  Fam Hx  Soc Hx        Additional complaints: URI symptoms over the last couple days without fever or significant cough.  Some sinus pressure and congestion.    HPI additional notes: Edi presents today with   Chief Complaint   Patient presents with     Musculoskeletal Problem   Starting have right hip pain near the inguinal region yesterday while walking at the mall.  Talked to orthopedics who thought it may be a muscle spasm.  Pt has complicated history of DVTs and concerned that the specific location of his pain may be a  new clot.  He has no swelling in his right leg and the pain does not radiate.  He is not currently on any blood thinners other than aspirin.       ROS:  C: Negative for fever, chills, recent change in weight  Skin: Negative for worrisome rashes or lesions  Resp: Negative for significant cough or SOB  CV: Negative for chest pain or peripheral edema  GI: Negative for nausea, abdominal pain, heartburn, or change in bowel habits  MS: Positive for right hip pain  NEURO: NEGATIVE for numbness, tingling, or radicular pain.  P: Negative for changes in mood or affect  ROS otherwise negative.    Chart Review:  History   Smoking Status      Never Smoker   Smokeless Tobacco     Never Used     Patient Active Problem List   Diagnosis     Hypothyroidism     Hyperlipidemia LDL goal <100     Persistent insomnia     Hypertension goal BP (blood pressure) < 140/90     Anxiety     Hypotestosteronism     Trochanteric bursitis     Depression with anxiety     Left shoulder pain     Physical deconditioning     Alcoholism (H)     Chronic left shoulder pain     Pain of left calf     Acute deep vein thrombosis (DVT) of distal vein of left lower extremity (H)     Anticoagulation management encounter     Hip pain, right     History of deep venous thrombosis     Status post right hip replacement     Past Surgical History:   Procedure Laterality Date     ABDOMEN SURGERY  1973    large benign tumor removed with thrombophlebitis post op     APPENDECTOMY  1960     ARTHROPLASTY HIP ANTERIOR Right 8/9/2018    Procedure: ARTHROPLASTY HIP ANTERIOR;  RIGHT TOTAL HIP ARTHROPLASTY DIRECT ANTERIOR APPROACH ;  Surgeon: Chester Ortiz MD;  Location: [facility] OR     CATARACT IOL, RT/LT      bilateral     EYE SURGERY  [id number], 2007    eyelid surgery, 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   OBJECTIVE:                                                    /70 (Cuff Size: Adult Large)   Pulse 76   Temp 96.6  F (35.9  C) (Tympanic)   SpO2 97%   There is no height or weight on file to calculate BMI.  GENERAL: healthy, alert, in no acute distress  HENT: Mucous mebranes moist.  No serous effusions, clear post-nasal drainage.  No tenderness to palpation of sinuses.  MS: tenderness to palpation of right anterior medial thigh without radiation, no swelling or erythema.  No calf  or ankle swelling.  SKIN: no suspicious lesions, no rashes  PSYCH: Alert and oriented times 3;  Able to articulate logical thoughts. Affect is normal.    Diagnostic test results:   Results for  orders placed or performed in visit on 01/26/19 (from the past 24 hour(s))   D dimer, quantitative   Result Value Ref Range    D Dimer 0.7 (H) 0.0 - 0.50 ug/ml FEU        ASSESSMENT/PLAN:                                                          ICD-10-CM    1. History of deep venous thrombosis Z86.718 D dimer, quantitative   2. Hip pain, right M25.551      D dimer only slightly elevated.  Suspect pain is due to muscle strain or spasm rather than a clot.  Will hold off on US for now.  If pt has worsening pain or swelling in the next two days, I will put in an US order.    Use heat to massage to help with the pain.  He is already on chronic pain medications.    Please see patient instructions for treatment details.    Return in about 1 week (around 2/2/2019) for if not improving.    Nicole Malik PA-C  [facility]

## 2019-01-26 NOTE — PATIENT INSTRUCTIONS
Patient Education     Deep Vein Thrombosis (DVT)    Deep vein thrombosis (DVT) occurs when a blood clot (thrombus) forms in a deep vein. This happens most often in the leg. It can also happen in the arms or other parts of the body. A part of the clot called an embolus can break off and travel to the lungs. When this happens, it s called a pulmonary embolism (PE). PE is a medical emergency. It can cut off blood flow and lead to death. Both DVT and PE are closely related. Together, they are often referred to by the term venous thromboembolism (VTE).  Risk factors for DVT  Anything that slows blood flow, injures the lining of a vein, or increases blood clotting can make you more prone to having DVT. This includes the following:    Long periods without movement (such as when sitting for many hours at a time or when recovering from major surgery or illness)    Estrogen (female hormone) therapy, such as hormone replacement therapy (HRT) or birth control pills    Fractured hip or leg    Major surgery or joint replacement    Major trauma or spinal cord injury    Cancer    Family history    Excess weight or obesity    Smoking    Older age  Symptoms  DVT does not always cause symptoms. When symptoms do occur, they may appear around the site of the DVT, such as in the leg. Possible symptoms include:    Swelling    Pain    Warmth    Redness    Tenderness  Home care    You were likely prescribed blood thinners (anticoagulants). They may be given as pills (oral) or shots (injections). Follow all instructions when using these medicines. Note: Don't take blood thinners with other medicines, herbal remedies, or supplements without talking to your provider first. Certain medicines or products can affect how blood thinners work.    Follow your provider s instructions about activity and rest.    If support or compression stockings are prescribed, wear them as directed. These may help improve blood flow in the legs.    When sitting or  lying down, move your ankles, toes and knees often. This may also help improve blood flow in the legs.  Follow-up care  Follow up with your healthcare provider, or as advised. If imaging tests were done, they may need further review by a doctor. You will be told of any new findings that may affect your care.  When to seek medical advice  Call your healthcare provider right away if any of these occur:    New or increased swelling, pain, tenderness, warmth, or redness, in the leg, arm, or other area    Blood in the urine    Bleeding with bowel movements  Call 911  Call 911 if any of these occur:    Bleeding from the nose, gums, a cut, or vagina    Heavy or uncontrolled bleeding    Trouble breathing    Chest pain or discomfort that worsens with deep breathing or coughing    Coughing (may cough up blood)    Fast heartbeat    Sweating    Anxiety    Lightheadedness, dizziness, or fainting   Date Last Reviewed: 4/1/2018 2000-2018 [employer]. [address] [place] [address], [place] [address]. All rights reserved. This information is not intended as a substitute for professional medical care. Always follow your healthcare professional's instructions.

## 2019-01-27 ASSESSMENT — PATIENT HEALTH QUESTIONNAIRE - PHQ9: SUM OF ALL RESPONSES TO PHQ QUESTIONS 1-9: 2

## 2019-01-27 ASSESSMENT — ANXIETY QUESTIONNAIRES: GAD7 TOTAL SCORE: 2

## 2019-02-09 ENCOUNTER — NURSE TRIAGE (OUTPATIENT)
Dept: NURSING | Facility: CLINIC | Age: 74
End: 2019-02-09

## 2019-02-09 ENCOUNTER — APPOINTMENT (OUTPATIENT)
Dept: CT IMAGING | Facility: CLINIC | Age: 74
End: 2019-02-09
Attending: EMERGENCY MEDICINE
Payer: MEDICARE

## 2019-02-09 ENCOUNTER — HOSPITAL ENCOUNTER (OUTPATIENT)
Facility: CLINIC | Age: 74
Setting detail: OBSERVATION
Discharge: HOME | End: 2019-02-11
Attending: EMERGENCY MEDICINE | Admitting: INTERNAL MEDICINE
Payer: MEDICARE

## 2019-02-09 DIAGNOSIS — K59.03 DRUG-INDUCED CONSTIPATION: Primary | ICD-10-CM

## 2019-02-09 DIAGNOSIS — K59.00 OBSTIPATION: ICD-10-CM

## 2019-02-09 LAB
ALBUMIN SERPL-MCNC: 4.3 G/DL (ref 3.4–5)
ALBUMIN UR-MCNC: NEGATIVE MG/DL
ALP SERPL-CCNC: 95 U/L (ref 40–150)
ALT SERPL W P-5'-P-CCNC: 27 U/L (ref 0–70)
ANION GAP SERPL CALCULATED.3IONS-SCNC: 12 MMOL/L (ref 3–14)
APPEARANCE UR: CLEAR
AST SERPL W P-5'-P-CCNC: 28 U/L (ref 0–45)
BACTERIA #/AREA URNS HPF: ABNORMAL /HPF
BASOPHILS # BLD AUTO: 0 10E9/L (ref 0–0.2)
BASOPHILS NFR BLD AUTO: 0.3 %
BILIRUB SERPL-MCNC: 1.3 MG/DL (ref 0.2–1.3)
BILIRUB UR QL STRIP: NEGATIVE
BUN SERPL-MCNC: 16 MG/DL (ref 7–30)
CALCIUM SERPL-MCNC: 9.2 MG/DL (ref 8.5–10.1)
CHLORIDE SERPL-SCNC: 99 MMOL/L (ref 94–109)
CO2 SERPL-SCNC: 22 MMOL/L (ref 20–32)
COLOR UR AUTO: YELLOW
CREAT BLD-MCNC: 0.9 MG/DL (ref 0.66–1.25)
CREAT SERPL-MCNC: 0.9 MG/DL (ref 0.66–1.25)
DIFFERENTIAL METHOD BLD: ABNORMAL
EOSINOPHIL # BLD AUTO: 0.1 10E9/L (ref 0–0.7)
EOSINOPHIL NFR BLD AUTO: 0.6 %
ERYTHROCYTE [DISTWIDTH] IN BLOOD BY AUTOMATED COUNT: 13.2 % (ref 10–15)
GFR SERPL CREATININE-BSD FRML MDRD: 83 ML/MIN/{1.73_M2}
GFR SERPL CREATININE-BSD FRML MDRD: 83 ML/MIN/{1.73_M2}
GLUCOSE SERPL-MCNC: 108 MG/DL (ref 70–99)
GLUCOSE UR STRIP-MCNC: NEGATIVE MG/DL
HCT VFR BLD AUTO: 47 % (ref 40–53)
HGB BLD-MCNC: 16.2 G/DL (ref 13.3–17.7)
HGB UR QL STRIP: NEGATIVE
IMM GRANULOCYTES # BLD: 0.1 10E9/L (ref 0–0.4)
IMM GRANULOCYTES NFR BLD: 0.3 %
KETONES UR STRIP-MCNC: 5 MG/DL
LEUKOCYTE ESTERASE UR QL STRIP: NEGATIVE
LYMPHOCYTES # BLD AUTO: 1 10E9/L (ref 0.8–5.3)
LYMPHOCYTES NFR BLD AUTO: 6.2 %
MCH RBC QN AUTO: 29.6 PG (ref 26.5–33)
MCHC RBC AUTO-ENTMCNC: 34.5 G/DL (ref 31.5–36.5)
MCV RBC AUTO: 86 FL (ref 78–100)
MONOCYTES # BLD AUTO: 0.9 10E9/L (ref 0–1.3)
MONOCYTES NFR BLD AUTO: 5.5 %
MUCOUS THREADS #/AREA URNS LPF: PRESENT /LPF
NEUTROPHILS # BLD AUTO: 13.6 10E9/L (ref 1.6–8.3)
NEUTROPHILS NFR BLD AUTO: 87.1 %
NITRATE UR QL: NEGATIVE
NRBC # BLD AUTO: 0 10*3/UL
NRBC BLD AUTO-RTO: 0 /100
PH UR STRIP: 6 PH (ref 5–7)
PLATELET # BLD AUTO: 274 10E9/L (ref 150–450)
POTASSIUM SERPL-SCNC: 3.7 MMOL/L (ref 3.4–5.3)
PROT SERPL-MCNC: 7.7 G/DL (ref 6.8–8.8)
RBC # BLD AUTO: 5.48 10E12/L (ref 4.4–5.9)
RBC #/AREA URNS AUTO: <1 /HPF (ref 0–2)
SODIUM SERPL-SCNC: 133 MMOL/L (ref 133–144)
SOURCE: ABNORMAL
SP GR UR STRIP: 1.01 (ref 1–1.03)
UROBILINOGEN UR STRIP-MCNC: 0 MG/DL (ref 0–2)
WBC # BLD AUTO: 15.6 10E9/L (ref 4–11)
WBC #/AREA URNS AUTO: <1 /HPF (ref 0–5)

## 2019-02-09 PROCEDURE — 51798 US URINE CAPACITY MEASURE: CPT

## 2019-02-09 PROCEDURE — 25000128 H RX IP 250 OP 636: Performed by: EMERGENCY MEDICINE

## 2019-02-09 PROCEDURE — 25000132 ZZH RX MED GY IP 250 OP 250 PS 637: Mod: GY | Performed by: EMERGENCY MEDICINE

## 2019-02-09 PROCEDURE — 85025 COMPLETE CBC W/AUTO DIFF WBC: CPT | Performed by: EMERGENCY MEDICINE

## 2019-02-09 PROCEDURE — 96374 THER/PROPH/DIAG INJ IV PUSH: CPT | Mod: 59

## 2019-02-09 PROCEDURE — 96375 TX/PRO/DX INJ NEW DRUG ADDON: CPT | Mod: 59

## 2019-02-09 PROCEDURE — 51702 INSERT TEMP BLADDER CATH: CPT

## 2019-02-09 PROCEDURE — 81001 URINALYSIS AUTO W/SCOPE: CPT | Performed by: EMERGENCY MEDICINE

## 2019-02-09 PROCEDURE — 74177 CT ABD & PELVIS W/CONTRAST: CPT

## 2019-02-09 PROCEDURE — 80053 COMPREHEN METABOLIC PANEL: CPT | Performed by: EMERGENCY MEDICINE

## 2019-02-09 PROCEDURE — A9270 NON-COVERED ITEM OR SERVICE: HCPCS | Mod: GY | Performed by: EMERGENCY MEDICINE

## 2019-02-09 PROCEDURE — 96361 HYDRATE IV INFUSION ADD-ON: CPT

## 2019-02-09 PROCEDURE — 99285 EMERGENCY DEPT VISIT HI MDM: CPT | Mod: 25

## 2019-02-09 PROCEDURE — 82565 ASSAY OF CREATININE: CPT

## 2019-02-09 RX ORDER — LORAZEPAM 2 MG/ML
0.5 INJECTION INTRAMUSCULAR ONCE
Status: COMPLETED | OUTPATIENT
Start: 2019-02-09 | End: 2019-02-09

## 2019-02-09 RX ORDER — HYDROMORPHONE HYDROCHLORIDE 1 MG/ML
0.5 INJECTION, SOLUTION INTRAMUSCULAR; INTRAVENOUS; SUBCUTANEOUS
Status: DISCONTINUED | OUTPATIENT
Start: 2019-02-09 | End: 2019-02-10

## 2019-02-09 RX ORDER — HYDROMORPHONE HYDROCHLORIDE 1 MG/ML
0.5 INJECTION, SOLUTION INTRAMUSCULAR; INTRAVENOUS; SUBCUTANEOUS
Status: DISCONTINUED | OUTPATIENT
Start: 2019-02-09 | End: 2019-02-09

## 2019-02-09 RX ORDER — IOPAMIDOL 755 MG/ML
500 INJECTION, SOLUTION INTRAVASCULAR ONCE
Status: COMPLETED | OUTPATIENT
Start: 2019-02-09 | End: 2019-02-09

## 2019-02-09 RX ADMIN — MAGNESIUM CITRATE 286 ML: 1.75 LIQUID ORAL at 21:14

## 2019-02-09 RX ADMIN — SODIUM CHLORIDE 1000 ML: 9 INJECTION, SOLUTION INTRAVENOUS at 18:50

## 2019-02-09 RX ADMIN — Medication 0.5 MG: at 20:31

## 2019-02-09 RX ADMIN — LORAZEPAM 0.5 MG: 2 INJECTION INTRAMUSCULAR; INTRAVENOUS at 18:54

## 2019-02-09 RX ADMIN — SODIUM CHLORIDE 65 ML: 9 INJECTION, SOLUTION INTRAVENOUS at 19:11

## 2019-02-09 RX ADMIN — IOPAMIDOL 100 ML: 755 INJECTION, SOLUTION INTRAVENOUS at 19:11

## 2019-02-09 ASSESSMENT — ENCOUNTER SYMPTOMS
CONSTIPATION: 1
ABDOMINAL PAIN: 1

## 2019-02-09 NOTE — TELEPHONE ENCOUNTER
"Last BM 2-3 days ago. Taking oxycodone. Taking daily fiber supplement and stool softener. C/o rectal pain attempting to have a bowel movement today. Unable to have BM. Took MOM and suppository 1 hr ago w/ no results. Small amount of stool leakage. Advised see provider within 24h. Disc'd home care advice. Pt declined to try warm bath. Advised give MOM and suppository more time to work. Declined enema if suppos & MOM do not work. States he feels this is \"too dangerous\". Advised pt may then need to go to ED..     Reason for Disposition    Leaking stool    Additional Information    Negative: [1] Abdominal pain is main symptom AND [2] adult male    Negative: [1] Abdominal pain is main symptom AND [2] adult female    Negative: Rectal bleeding or blood in stool is main symptom    Negative: Rectal pain or itching is main symptom    Negative: Patient sounds very sick or weak to the triager    Negative: [1] Vomiting AND [2] abdomen looks much more swollen than usual    Negative: [1] Vomiting AND [2] contains bile (green color)    Negative: [1] Constant abdominal pain AND [2] present > 2 hours    Negative: [1] Sudden onset rectal pain (straining, rectal pressure or fullness) AND [2] NOT better after SITZ bath, suppository or enema    Negative: [1] Intermittent mild abdominal pain AND [2] fever    Negative: Abdomen is more swollen than usual    Negative: Last bowel movement (BM) > 4 days ago    Protocols used: CONSTIPATION-ADULT-      "

## 2019-02-09 NOTE — ED TRIAGE NOTES
Pt arrives with constipation x3 days. States takes oxy for shoulder pain, has taken glycerin, milk of magnesia today with no results.  States decreased urination as well with pelvic pressure. ABCs intact.

## 2019-02-09 NOTE — TELEPHONE ENCOUNTER
Edi has called [employer] nurse line 3 times today.  This morning Edi is constipated.  Edi is calling with questions regarding what he can try at home.

## 2019-02-09 NOTE — ED PROVIDER NOTES
History     Chief Complaint:  Constipation    HPI   Edi Villafana is an anxious 73 year old male who presents with constipation and urinary retention along with shaking. The patient complains of abdomen and anus pain along with abdominal cramping. He states this constipation began 0700 and he does not have any history of constipation. He was able to urinate yesterday.The patient is waiting for surgery on his shoulders so he has been taking 5 mg Oxy multiple times a day along with fiber. The patient states that he can't tell if the Griffith is helping him right now and that he was treated for bladder retention after a previous hip surgery.     Allergies:  Ace inhibitors  Ancef  Atenolol  Compazine  Sulfa drugs  Tramadol    Medications:    Tylenol  Lipitor  Vitamin D3  Synthroid  Ativan  Cozaar  Roxicodone  Senokot  Testosterone    Past Medical History:    Decreased libido  Diverticulosis of colon  Generalized anxiety disorder  Insomnia  Hyperlipidemia  Congenital anomaly of kidney  Testicular hypofunction  Subjective visual disturbance  Unspecified cataract  Unspecified essential hypertension  Unspecified hypothyroidism    Past Surgical History:    Abdomen Surgery  Appendectomy  Cataract IOl  Eye Surgery    Family History:    Hypertension  Lipids  Heart Disease  Psychotic disorder  Heart disease  Ovarian Cancer  Colon Cancer    Social History:  The patient is not a smoker and does use alcohol.   Marital Status:   [2]     Review of Systems   Constitutional:        Shaking     Gastrointestinal: Positive for abdominal pain and constipation.   Genitourinary: Positive for decreased urine volume.   All other systems reviewed and are negative.      Physical Exam     Patient Vitals for the past 24 hrs:   BP Temp Temp src Pulse Heart Rate Resp SpO2   02/09/19 2213 -- -- -- -- -- -- 99 %   02/09/19 1900 (!) 141/91 -- -- 79 -- -- 99 %   02/09/19 1845 145/73 -- -- 81 -- -- 100 %   02/09/19 1830 138/84 -- -- -- -- -- --    02/09/19 1800 153/79 -- -- 87 -- -- --   02/09/19 1745 145/73 -- -- -- -- -- 97 %   02/09/19 1732 (!) 182/96 98  F (36.7  C) Oral -- 98 24 94 %     Physical Exam  Constitutional: Anxious appearing   HENT:    Nose: Nose normal.    Mouth/Throat: Oropharynx is clear, mucous membranes are moist  Eyes: EOM are normal, anicteric, conjugate gaze  CV: regular rate and rhythm; no murmurs  Chest: Effort normal and breath sounds clear without wheezing or rales, symmetric bilaterally   GI:  diffuse lower abdominal pain. No distension. No guarding or rebound.    : new dumont in place draining gonsalo urine, no clots.   MSK: No LE edema, no tenderness to palpation of BLE.  Neurological: Alert, attentive, moving all extremities equally.   Skin: Skin is warm and dry.    Emergency Department Course   Imaging:  Radiographic findings were communicated with the patient who voiced understanding of the findings.  Abd/pelvis CT, IV contrast only   IMPRESSION:   1. Moderate to large amount of stool in the rectum, with a moderate  amount stool throughout the remainder of the colon.  Findings suggest  constipation.   2. 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  3. The mid and distal portion of the left ureter appears mildly  dilated, possibly chronic, with no definite cause identified.  As read by Radiology.    Laboratory:  Laboratory findings were communicated to the patient who voiced understanding of the findings.  Creatinine POCT: All WNL  UA with Micro: Ketones Urine 5, Bacteria Urine few, Mucous Urine present o/w WNL  CMP: Glucose 108, o/w WNL  (Creatinine 0.90)  CBC: WBC 15.6 o/w WNL (HGB 16.2, )    Interventions:  1850: NS 1L IV Bolus  1854: Ativan 0.5 mg IV  2031: Dilaudid 0.5 mg IV  2114: Pink Lade Enema 286 mL Rectal    Emergency Department Course:  Past medical records, nursing notes, and vitals reviewed.  1812: I performed an exam of the patient and obtained history, as documented above.   1839: IV  inserted and blood drawn for laboratory tests, findings above.    Findings and plan explained to the Patient who consents to admission.   2330: Discussed the patient with Dr. Fink, who will admit the patient to a obs bed for further monitoring, evaluation, and treatment.     Impression & Plan    Medical Decision Makin-year-old male with past medical history significant for long-standing left shoulder pain for which she is supposed to undergo orthopedic surgery who is been on long-standing opiates presenting for evaluation of obstipation and difficulty voiding.  Vital signs on arrival notable for elevated blood pressure, otherwise within normal limits on exam, patient is uncomfortable appearing while attempting to strain on the commode.  His history is suggestive of likely medication induced constipation despite his use of home bowel regimen and he is even attempted digital rectal stimulation in addition to milk of magnesia at home.  However, he does have a history of intra-abdominal tumor and appendectomy and his presentation was concerning for possible obstruction as such, CT scan of the abdomen was obtained which shows only significant stool burden and solitary kidney.  Patient was also noted to be unable to void and bladder scan showed greater than 600 cc and after failure of trial of voiding, Griffith catheter was placed with almost 1000 cc of urine output.  This however did not relieve his constipation and he was given an enema with only small amount of stool output.  His urine is without evidence of UTI and exact etiology of his urinary retention is unclear if it is secondary to constipation or the constipation is due to urinary obstruction.  Given patient has limited his ability to perform suppositories and enemas at at home, especially with Griffith and likely need for further aggressive resolution of his obstipation, he is felt best served with admission to observation for continued therapy.      Diagnosis:    ICD-10-CM    1. Obstipation K59.00    2. Urinary Retention     Disposition:  Admitted to obs  Enrique Pinedo MD   [employer]  12:23 AM 02/10/19     Robert Tejada  2/9/2019   [facility] EMERGENCY DEPARTMENT  IRobert, am serving as a scribe at 6:12 PM on 2/9/2019 to document services personally performed by Enrique Pinedo MD based on my observations and the provider's statements to me.       Enrique Pinedo MD  02/10/19 0023

## 2019-02-10 PROBLEM — K59.00 CONSTIPATION: Status: ACTIVE | Noted: 2019-02-10

## 2019-02-10 LAB
ANION GAP SERPL CALCULATED.3IONS-SCNC: 7 MMOL/L (ref 3–14)
BUN SERPL-MCNC: 15 MG/DL (ref 7–30)
CALCIUM SERPL-MCNC: 7.9 MG/DL (ref 8.5–10.1)
CHLORIDE SERPL-SCNC: 105 MMOL/L (ref 94–109)
CO2 SERPL-SCNC: 24 MMOL/L (ref 20–32)
CREAT SERPL-MCNC: 0.95 MG/DL (ref 0.66–1.25)
GFR SERPL CREATININE-BSD FRML MDRD: 79 ML/MIN/{1.73_M2}
GLUCOSE SERPL-MCNC: 93 MG/DL (ref 70–99)
LACTATE BLD-SCNC: 2.4 MMOL/L (ref 0.7–2)
POTASSIUM SERPL-SCNC: 4.1 MMOL/L (ref 3.4–5.3)
SODIUM SERPL-SCNC: 136 MMOL/L (ref 133–144)
WBC # BLD AUTO: 10 10E9/L (ref 4–11)

## 2019-02-10 PROCEDURE — A9270 NON-COVERED ITEM OR SERVICE: HCPCS | Mod: GY | Performed by: PHYSICIAN ASSISTANT

## 2019-02-10 PROCEDURE — 25000132 ZZH RX MED GY IP 250 OP 250 PS 637: Mod: GY | Performed by: INTERNAL MEDICINE

## 2019-02-10 PROCEDURE — 36415 COLL VENOUS BLD VENIPUNCTURE: CPT | Performed by: INTERNAL MEDICINE

## 2019-02-10 PROCEDURE — 25000128 H RX IP 250 OP 636: Performed by: INTERNAL MEDICINE

## 2019-02-10 PROCEDURE — 80048 BASIC METABOLIC PNL TOTAL CA: CPT | Performed by: INTERNAL MEDICINE

## 2019-02-10 PROCEDURE — 40000893 ZZH STATISTIC PT IP EVAL DEFER

## 2019-02-10 PROCEDURE — 83605 ASSAY OF LACTIC ACID: CPT | Performed by: INTERNAL MEDICINE

## 2019-02-10 PROCEDURE — A9270 NON-COVERED ITEM OR SERVICE: HCPCS | Mod: GY | Performed by: INTERNAL MEDICINE

## 2019-02-10 PROCEDURE — 99219 ZZC INITIAL OBSERVATION CARE,LEVL II: CPT | Performed by: INTERNAL MEDICINE

## 2019-02-10 PROCEDURE — 85048 AUTOMATED LEUKOCYTE COUNT: CPT | Performed by: INTERNAL MEDICINE

## 2019-02-10 PROCEDURE — G0378 HOSPITAL OBSERVATION PER HR: HCPCS

## 2019-02-10 PROCEDURE — 25000132 ZZH RX MED GY IP 250 OP 250 PS 637: Mod: GY | Performed by: PHYSICIAN ASSISTANT

## 2019-02-10 PROCEDURE — 99207 ZZC APP CREDIT; MD BILLING SHARED VISIT: CPT | Performed by: PHYSICIAN ASSISTANT

## 2019-02-10 RX ORDER — LORAZEPAM 1 MG/1
1 TABLET ORAL 3 TIMES DAILY PRN
Status: DISCONTINUED | OUTPATIENT
Start: 2019-02-10 | End: 2019-02-11 | Stop reason: HOSPADM

## 2019-02-10 RX ORDER — LEVOTHYROXINE SODIUM 125 UG/1
125 TABLET ORAL
Status: DISCONTINUED | OUTPATIENT
Start: 2019-02-10 | End: 2019-02-11 | Stop reason: HOSPADM

## 2019-02-10 RX ORDER — ONDANSETRON 2 MG/ML
4 INJECTION INTRAMUSCULAR; INTRAVENOUS EVERY 6 HOURS PRN
Status: DISCONTINUED | OUTPATIENT
Start: 2019-02-10 | End: 2019-02-11 | Stop reason: HOSPADM

## 2019-02-10 RX ORDER — ZOLPIDEM TARTRATE 5 MG/1
5 TABLET ORAL
Status: DISCONTINUED | OUTPATIENT
Start: 2019-02-10 | End: 2019-02-11 | Stop reason: HOSPADM

## 2019-02-10 RX ORDER — ATORVASTATIN CALCIUM 40 MG/1
40 TABLET, FILM COATED ORAL DAILY
Status: DISCONTINUED | OUTPATIENT
Start: 2019-02-10 | End: 2019-02-11 | Stop reason: HOSPADM

## 2019-02-10 RX ORDER — ONDANSETRON 4 MG/1
4 TABLET, ORALLY DISINTEGRATING ORAL EVERY 6 HOURS PRN
Status: DISCONTINUED | OUTPATIENT
Start: 2019-02-10 | End: 2019-02-11 | Stop reason: HOSPADM

## 2019-02-10 RX ORDER — LEVOTHYROXINE SODIUM 125 UG/1
187.5 TABLET ORAL WEEKLY
COMMUNITY
End: 2019-05-08

## 2019-02-10 RX ORDER — LOSARTAN POTASSIUM 25 MG/1
25 TABLET ORAL DAILY
Status: DISCONTINUED | OUTPATIENT
Start: 2019-02-10 | End: 2019-02-11 | Stop reason: HOSPADM

## 2019-02-10 RX ORDER — LOSARTAN POTASSIUM 50 MG/1
25 TABLET ORAL DAILY
COMMUNITY
End: 2020-01-01

## 2019-02-10 RX ORDER — HYDROMORPHONE HYDROCHLORIDE 2 MG/1
2-4 TABLET ORAL EVERY 6 HOURS PRN
Status: DISCONTINUED | OUTPATIENT
Start: 2019-02-10 | End: 2019-02-11 | Stop reason: HOSPADM

## 2019-02-10 RX ORDER — LEVOTHYROXINE SODIUM 125 UG/1
125 TABLET ORAL SEE ADMIN INSTRUCTIONS
Status: DISCONTINUED | OUTPATIENT
Start: 2019-02-10 | End: 2019-02-10

## 2019-02-10 RX ORDER — POLYETHYLENE GLYCOL 3350 17 G/17G
17 POWDER, FOR SOLUTION ORAL
Status: DISCONTINUED | OUTPATIENT
Start: 2019-02-10 | End: 2019-02-11 | Stop reason: HOSPADM

## 2019-02-10 RX ORDER — POLYETHYLENE GLYCOL 3350 17 G/17G
17 POWDER, FOR SOLUTION ORAL ONCE
Status: COMPLETED | OUTPATIENT
Start: 2019-02-10 | End: 2019-02-10

## 2019-02-10 RX ORDER — ACETAMINOPHEN 325 MG/1
650 TABLET ORAL EVERY 4 HOURS PRN
Status: DISCONTINUED | OUTPATIENT
Start: 2019-02-10 | End: 2019-02-11 | Stop reason: HOSPADM

## 2019-02-10 RX ORDER — LEVOTHYROXINE SODIUM 125 UG/1
125 TABLET ORAL SEE ADMIN INSTRUCTIONS
COMMUNITY
End: 2019-03-15

## 2019-02-10 RX ORDER — SODIUM CHLORIDE 9 MG/ML
INJECTION, SOLUTION INTRAVENOUS CONTINUOUS
Status: DISCONTINUED | OUTPATIENT
Start: 2019-02-10 | End: 2019-02-10

## 2019-02-10 RX ORDER — NALOXONE HYDROCHLORIDE 0.4 MG/ML
.1-.4 INJECTION, SOLUTION INTRAMUSCULAR; INTRAVENOUS; SUBCUTANEOUS
Status: DISCONTINUED | OUTPATIENT
Start: 2019-02-10 | End: 2019-02-11 | Stop reason: HOSPADM

## 2019-02-10 RX ORDER — BISACODYL 10 MG
10 SUPPOSITORY, RECTAL RECTAL ONCE
Status: DISCONTINUED | OUTPATIENT
Start: 2019-02-10 | End: 2019-02-11 | Stop reason: HOSPADM

## 2019-02-10 RX ORDER — SENNOSIDES 8.6 MG
1 TABLET ORAL DAILY
Status: ON HOLD | COMMUNITY
End: 2019-02-11

## 2019-02-10 RX ADMIN — ACETAMINOPHEN 650 MG: 325 TABLET, FILM COATED ORAL at 14:35

## 2019-02-10 RX ADMIN — Medication 1 LOZENGE: at 08:21

## 2019-02-10 RX ADMIN — POLYETHYLENE GLYCOL 3350 17 G: 17 POWDER, FOR SOLUTION ORAL at 09:51

## 2019-02-10 RX ADMIN — LOSARTAN POTASSIUM 25 MG: 25 TABLET, FILM COATED ORAL at 09:51

## 2019-02-10 RX ADMIN — SODIUM CHLORIDE: 9 INJECTION, SOLUTION INTRAVENOUS at 01:52

## 2019-02-10 RX ADMIN — LEVOTHYROXINE SODIUM 125 MCG: 125 TABLET ORAL at 09:51

## 2019-02-10 RX ADMIN — POLYETHYLENE GLYCOL 3350 17 G: 17 POWDER, FOR SOLUTION ORAL at 00:09

## 2019-02-10 RX ADMIN — HYDROMORPHONE HYDROCHLORIDE 4 MG: 2 TABLET ORAL at 14:35

## 2019-02-10 RX ADMIN — MAGNESIUM CITRATE 286 ML: 1.75 LIQUID ORAL at 13:34

## 2019-02-10 RX ADMIN — POLYETHYLENE GLYCOL 3350 17 G: 17 POWDER, FOR SOLUTION ORAL at 13:37

## 2019-02-10 RX ADMIN — ACETAMINOPHEN 650 MG: 325 TABLET, FILM COATED ORAL at 20:41

## 2019-02-10 RX ADMIN — ATORVASTATIN CALCIUM 40 MG: 40 TABLET, FILM COATED ORAL at 09:51

## 2019-02-10 RX ADMIN — POLYETHYLENE GLYCOL 3350 17 G: 17 POWDER, FOR SOLUTION ORAL at 06:28

## 2019-02-10 RX ADMIN — HYDROMORPHONE HYDROCHLORIDE 4 MG: 2 TABLET ORAL at 08:22

## 2019-02-10 RX ADMIN — ZOLPIDEM TARTRATE 5 MG: 5 TABLET, COATED ORAL at 22:18

## 2019-02-10 RX ADMIN — HYDROMORPHONE HYDROCHLORIDE 4 MG: 2 TABLET ORAL at 20:41

## 2019-02-10 RX ADMIN — ZOLPIDEM TARTRATE 5 MG: 5 TABLET, COATED ORAL at 02:01

## 2019-02-10 RX ADMIN — POLYETHYLENE GLYCOL 3350 17 G: 17 POWDER, FOR SOLUTION ORAL at 18:07

## 2019-02-10 RX ADMIN — HYDROMORPHONE HYDROCHLORIDE 4 MG: 2 TABLET ORAL at 01:53

## 2019-02-10 RX ADMIN — LORAZEPAM 1 MG: 1 TABLET ORAL at 09:51

## 2019-02-10 RX ADMIN — POLYETHYLENE GLYCOL 3350 17 G: 17 POWDER, FOR SOLUTION ORAL at 22:18

## 2019-02-10 ASSESSMENT — MIFFLIN-ST. JEOR: SCORE: 1778.19

## 2019-02-10 NOTE — PLAN OF CARE
PRIMARY DIAGNOSIS: Constipation   OUTPATIENT/OBSERVATION GOALS TO BE MET BEFORE DISCHARGE:  ADLs back to baseline: Yes    Activity and level of assistance: Up with standby assistance.    Pain status: Improved-controlled with oral pain medications.    Return to near baseline physical activity: Yes     Discharge Planner Nurse   Safe discharge environment identified: Yes  Barriers to discharge: Yes, pt stating he still feels constipated.        Entered by: Saniya Higuera 02/10/2019 2:21 PM     Please review provider order for any additional goals.   Nurse to notify provider when observation goals have been met and patient is ready for discharge.    VS: Temp: 99  F (37.2  C) Temp src: Oral BP: 127/74 Pulse: 69 Heart Rate: 69 Resp: 16 SpO2: 94 %, pt reporting pain in abd, 4 mg dilaudid given x2 w/ relief, ordered heating pad   Cardio: WNL, denies chest pain   Neuro: Very anxious, PO Ativan given x1, A&O x4, CMS intact   Resp: LS clear, denies sob, on RA   GI/: LBM today, very large, formed, frequent smears, incontinent at times, BS+, passing flatus; Griffith patent w/ good UOP   Skin: Buttocks reddened blanchable, groin reddened blanchable; ta cream and sween applied to buttocks and antifungal cream applied to groin   Activity: SBA, ambulated in hallway x1   Diet: Reg, denies n/v   IVs/lines: IVF discontinued   Meds: Pink lady edema given x1, on scheduled miralax   Plan: TBD, most likely tomorrow; continue plan of care

## 2019-02-10 NOTE — ED NOTES
[facility]  ED Nurse Handoff Report    Edi Villafana is a 73 year old male   ED Chief complaint: Constipation  . ED Diagnosis:   Final diagnoses:   Obstipation     Allergies:   Allergies   Allergen Reactions     Ace Inhibitors Cough     Ancef [Cefazolin Sodium]      Atenolol Other (See Comments)     Low BP     Compazine      Sulfa Drugs      Tramadol Fatigue     Side effects       Code Status: Full Code  Activity level - Baseline/Home:  Independent. Activity Level - Current:   Independent. Lift room needed: No. Bariatric: No   Needed: No   Isolation: No. Infection: Not Applicable.     Vital Signs:   Vitals:    02/09/19 1830 02/09/19 1845 02/09/19 1900 02/09/19 2213   BP: 138/84 145/73 (!) 141/91    Pulse:  81 79    Resp:       Temp:       TempSrc:       SpO2:  100% 99% 99%       Cardiac Rhythm:  ,      Pain level: 0-10 Pain Scale: 6  Patient confused: No. Patient Falls Risk: Yes.   Elimination Status: Griffith catheter in place  Patient Report - Initial Complaint: Constipation. Focused Assessment:    21:13 Pain Assessment Pain/Comfort - Preferred Pain Scale: number (Numeric Rating Pain Scale) 0-10 Pain Scale: 6  Pain - Pain Body Location: abdomen SB     20:58 Gastrointestinal Gastrointestinal - GI WDL: -WDL except; bowel sounds; stool Bowel Sounds: All Quadrants Stool Amount: small All Quadrants Bowel Sounds: audible and active in all quadrants All Quadrants Abdominal Palpation: tender Last Bowel Movement: 02/06/19 Stool Consistency: hard Stool Color: brown Gastrointestinal Comment: intermittent abdominal cramping incontient of liquid stool in bed        Tests Performed:   Labs Ordered and Resulted from Time of ED Arrival Up to the Time of Departure from the ED   ROUTINE UA WITH MICROSCOPIC - Abnormal; Notable for the following components:       Result Value    Ketones Urine 5 (*)     Bacteria Urine Few (*)     Mucous Urine Present (*)     All other components within normal limits   CBC WITH  PLATELETS DIFFERENTIAL - Abnormal; Notable for the following components:    WBC 15.6 (*)     Absolute Neutrophil 13.6 (*)     All other components within normal limits   COMPREHENSIVE METABOLIC PANEL - Abnormal; Notable for the following components:    Glucose 108 (*)     All other components within normal limits   CREATININE POCT   ISTAT CREATININE NURSING POCT     Abd/pelvis CT,  IV  contrast only TRAUMA / AAA   Final Result   IMPRESSION:    1. Moderate to large amount of stool in the rectum, with a moderate   amount stool throughout the remainder of the colon.  Findings suggest   constipation.    2. 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   3. The mid and distal portion of the left ureter appears mildly   dilated, possibly chronic, with no definite cause identified.      AREN ROBLES MD        Treatments provided: See MAR  Family Comments: Friend at bedside  OBS brochure/video discussed/provided to patient:  Yes  ED Medications:   Medications   HYDROmorphone (PF) (DILAUDID) injection 0.5 mg (0.5 mg Intravenous Given 2/9/19 2031)   0.9% sodium chloride BOLUS (0 mLs Intravenous Stopped 2/9/19 2057)   LORazepam (ATIVAN) injection 0.5 mg (0.5 mg Intravenous Given 2/9/19 1854)   0.9% sodium chloride BOLUS (0 mLs Intravenous Stopped 2/9/19 1914)   iopamidol (ISOVUE-370) solution 500 mL (100 mLs Intravenous Given 2/9/19 1911)   pink lady enema (COMPOUNDED: docusate, magnesium citrate, mineral oil, sodium phosphate) (286 mLs Rectal Given 2/9/19 2114)     Drips infusing:  No  For the majority of the shift, the patient's behavior Green. Interventions performed were Monitor.     Severe Sepsis OR Septic Shock Diagnosis Present: No      ED Nurse Name/Phone Number: Ethan Ko,   11:26 PM    RECEIVING UNIT ED HANDOFF REVIEW    Above ED Nurse Handoff Report was reviewed: yes  Reviewed by: Marya Teague on February 10, 2019 at 12:51 AM

## 2019-02-10 NOTE — PHARMACY-ADMISSION MEDICATION HISTORY
Admission medication history interview status for this patient is complete. See [facility] admission navigator for allergy information, prior to admission medications and immunization status.     Medication history interview source(s):Patient  Medication history resources (including written lists, pill bottles, clinic record):None  Primary pharmacy: [facility]    Changes made to PTA medication list:  Added: none  Deleted: none  Changed: senna PRN --> PRN + once daily, vitamin D daily --> every other day, losartan 50mg --> 25mg    Actions taken by pharmacist (provider contacted, etc):None     Additional medication history information:None    Medication reconciliation/reorder completed by provider prior to medication history? Yes    Do you take OTC medications (eg tylenol, ibuprofen, fish oil, eye/ear drops, etc)? Y(Y/N)    For patients on insulin therapy: N (Y/N)      Prior to Admission medications    Medication Sig Last Dose Taking? Auth Provider   acetaminophen (TYLENOL) 500 MG tablet Take 2 tablets (1,000 mg) by mouth every 6 hours as needed for pain Past Month at Unknown time Yes Rosio Perry PA-C   atorvastatin (LIPITOR) 40 MG tablet TAKE ONE TABLET BY MOUTH ONE TIME DAILY 2/8/2019 Yes Enrique Lucia MD   Cholecalciferol (VITAMIN D3) 2000 UNITS CAPS Take 1 capsule by mouth every other day  2/9/2019 at Unknown time Yes Reported, Patient   levothyroxine (SYNTHROID/LEVOTHROID) 125 MCG tablet Take 125 mcg by mouth See Admin Instructions 6 days weekly - Sunday, Monday, Tuesday, Thursday, Friday, Saturday 2/9/2019 at Unknown time Yes Unknown, Entered By History   levothyroxine (SYNTHROID/LEVOTHROID) 125 MCG tablet Take 187.5 mcg by mouth once a week On Wednesdays Past Week at Unknown time Yes Unknown, Entered By History   LORazepam (ATIVAN) 2 MG tablet TAKE 1 TABLET BY MOUTH EVERY 8 HOURS AS NEEDED FOR ANXIETY Past Week at Unknown time Yes Enrique Lucia MD   losartan (COZAAR) 50 MG  tablet Take 25 mg by mouth daily 2/9/2019 at Unknown time Yes Unknown, Entered By History   Multiple Vitamin (MULTIVITAMIN OR) Take 1 tablet by mouth daily  2/9/2019 at Unknown time Yes Reported, Patient   oxyCODONE (ROXICODONE) 5 MG tablet Take 1 tablet (5 mg) by mouth 3 times daily 2/9/2019 at Unknown time Yes Nicole Malik PA-C   sennosides (SENOKOT) 8.6 MG tablet Take 1 tablet by mouth daily 2/9/2019 at Unknown time Yes Unknown, Entered By History   TESTOSTERONE 2 MG/GM CREAM Apply topically daily as needed Apply 0.5ml daily as needed Past Month at Unknown time Yes Unknown, Entered By History   senna-docusate (SENOKOT-S;PERICOLACE) 8.6-50 MG per tablet Take 2 tablets by mouth 2 times daily as needed for constipation   Rosio Perry PA-C

## 2019-02-10 NOTE — H&P
[facility]    Hospitalist History and Physical    Name: Edi Villafana    MRN: [id number]  YOB: 1945    Age: 73 year old  Date of Admission:  2/9/2019    Assessment & Plan   Edi Villafana is a 73 year old male with chronic shoulder pain on chronic oxycodone who presented to the [facility] ER with increased pelvic/abdominal discomfort.  He was noted to have urinary retention and severe constipation on ER evaluation.    Obstipation:  -  Patient already had various attempts at relief in the ER.  He had a small output but still has significant constipation.  It is now after midnight and he is tired.  I will let the ER treatments have some further time to work and him to sleep a few hours.  In the AM I will start scheduled additional miralax doses and a suppository.  If no output after the suppository I would then try another enema.  Mag citrate/full golytely prep also an option, but initially will try the more conservative approach.  -  Long term he needs a better baseline bowel regimen was his acute constipation is dealt with.  -  Initially clear liquid diet until bowels clearly moving    Urinary retention:  -  Noted on presentation.  Likely related to narcotics and obstipation.  He also had retention issues after a prior surgery.  A dumont was placed in the ER.  I explained to him I would treat the obstipation first before trying removal.  Once constipation better, consider a voiding trial.    Chronic left shoulder pain on chronic narcotics:  -  Patient with increased shoulder and ABD pain.  Chronically on oxycodone 5 mg TID.  He requests dilaudid oral instead of oxycodone and up to one extra dose for his flared pain.  I have explained the narcotics can exacerbate the constipation, but he feels he cannot tolerate the enema's/bowel treatments without the option for a little extra pain medication.  I therefore left an order for dilaudid 2-4 mg up to 4x daily PRN.  His oxycodone is on hold, it should  not be used with the oral dilaudid.    Anxiety:  -  Continue baseline TID PRN lorazepam    Levothyroxine:  -  Hold until tolerating po intake    HTN:  -  Continue PTA meds with parameters    Leukocytosis:  -  Likely stress response with retention/constipation.  Recheck in the AM.    Solitary kidney    DVT Prophylaxis: PCD's (has a distant history of DVT)  Code Status: Full Code    Disposition: Expected discharge in 1-2 days once constipation improved.    Primary Care Physician   Enrique Lucia    Chief Complaint   Abdominal pain    History is obtained from the patient.  I also spoke with the ER provider about the history.     History of Present Illness   Edi Villafana is a 73 year old male on chronic narcotics for chronic left shoulder pain who presents with severe abdominal pain that increased throughout 2/9.  He states the pain was a severe ache.  He had nausea associated with it.  His last BM was 3-4 days ago and was quite firm.  He normally is regular but less the past week.  He spent most of 2/9 on the toilet trying to go with straining and suppositories without relief.  In the ER he was imaged and noted to have marked constipation.  Despite an enema, he had only a small output and continues having some discomfort/nausea.  Urinary retention was also noted during ER eval.  He mentions he normally voids well, but couldn't go throughout today.  No fevers, chest pain, sob.    Past Medical History    Past Medical History:   Diagnosis Date     Decreased libido      Diverticulosis of colon (without mention of hemorrhage)      Generalized anxiety disorder      Insomnia, unspecified      Other and unspecified hyperlipidemia      Other specified congenital anomaly of kidney      Other testicular hypofunction      Subjective visual disturbance, unspecified      Unspecified cataract      Unspecified essential hypertension      Unspecified hypothyroidism          Past Surgical History   Past Surgical History:    Procedure Laterality Date     ABDOMEN SURGERY  1973    large benign tumor removed with thrombophlebitis post op     APPENDECTOMY  1960     ARTHROPLASTY HIP ANTERIOR Right 8/9/2018    Procedure: ARTHROPLASTY HIP ANTERIOR;  RIGHT TOTAL HIP ARTHROPLASTY DIRECT ANTERIOR APPROACH ;  Surgeon: Chester Ortiz MD;  Location: [facility] OR     CATARACT IOL, RT/LT      bilateral     EYE SURGERY  [id number], 2007    eyelid surgery, 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 Taking?   Cholecalciferol (VITAMIN D3) 2000 UNITS CAPS  Self Yes No   Sig: Take 1 capsule by mouth daily    LORazepam (ATIVAN) 2 MG tablet   No No   Sig: TAKE 1 TABLET BY MOUTH EVERY 8 HOURS AS NEEDED FOR ANXIETY   Levothyroxine Sodium (SYNTHROID PO)  Self Yes No   Sig: Take 125 mcg by mouth See Admin Instructions Six days weekly- Monday, Tuesday, Thursday, Friday, Saturday, Sunday (Patient takes 1.5 x 125 mcg on Wednesday)   Multiple Vitamin (MULTIVITAMIN OR)  Self Yes No   Sig: Take 1 tablet by mouth daily    TESTOSTERONE 2 MG/GM CREAM  Self No No   Sig: 15% cream and apply 0.5 ml topically daily   Patient taking differently: Apply topically daily 15% cream and apply 0.5 ml topically daily   acetaminophen (TYLENOL) 500 MG tablet   No No   Sig: Take 2 tablets (1,000 mg) by mouth every 6 hours as needed for pain   atorvastatin (LIPITOR) 40 MG tablet   No No   Sig: TAKE ONE TABLET BY MOUTH ONE TIME DAILY   losartan (COZAAR) 50 MG tablet   No No   Sig: Take one tablet (50mg) by mouth daily   oxyCODONE (ROXICODONE) 5 MG tablet   No No   Sig: Take 1 tablet (5 mg) by mouth 3 times daily   senna-docusate (SENOKOT-S;PERICOLACE) 8.6-50 MG per tablet   No No   Sig: Take 2 tablets by mouth 2 times daily as needed for constipation      Facility-Administered Medications: None     Allergies   Allergies   Allergen Reactions     Ace Inhibitors Cough      Ancef [Cefazolin Sodium]      Atenolol Other (See Comments)     Low BP     Compazine      Sulfa Drugs      Tramadol Fatigue     Side effects       Social History   Social History     Tobacco Use     Smoking status: Never Smoker     Smokeless tobacco: Never Used   Substance Use Topics     Alcohol use: Yes       Family History     Family History   Problem Relation Age of Onset     Hypertension Mother      Lipids Mother      Heart Disease Mother      Psychotic Disorder Mother         severe depression     Heart Disease Father      Ovarian Cancer Sister      Colon Cancer Paternal Grandfather        Review of Systems   A Comprehensive greater than 10 system review of systems was carried out.  Pertinent positives and negatives are noted above.  Otherwise negative for contributory information.    Physical Exam   Temp: 98  F (36.7  C) Temp src: Oral BP: (!) 141/91 Pulse: 79 Heart Rate: 98 Resp: 24 SpO2: 99 % O2 Device: None (Room air)    Vital Signs with Ranges  Temp:  [98  F (36.7  C)] 98  F (36.7  C)  Pulse:  [79-87] 79  Heart Rate:  [98] 98  Resp:  [24] 24  BP: (138-182)/(73-96) 141/91  SpO2:  [94 %-100 %] 99 %  0 lbs 0 oz    GEN:  Alert, oriented x 3, appears mildly uncomfortable, no overt distress.  HEENT:  Normocephalic/atraumatic, no scleral icterus, no nasal discharge, mouth moist.  CV:  Regular rate and rhythm, no loud murmur/rub.  LUNGS:  Clear to auscultation bilaterally without rales/rhonchi/wheezing/retractions.  Symmetric chest rise on inhalation noted.  ABD:  Active bowel sounds, mild generalized tenderness, moderately distended.  No rebound/guarding/rigidity.  EXT:  No edema.  No cyanosis.  No acute joint synovitis noted.  SKIN:  Dry to touch, no exanthems noted in the visualized areas.  NEURO:  Moves extremities well on general exam, sensation to touch grossly intact.  No new focal deficits appreciated.    Data   Data reviewed today:  I personally reviewed no images or EKG's today.    Recent Labs   Lab  02/09/19  1839   WBC 15.6*   HGB 16.2   HCT 47.0   MCV 86        Recent Labs   Lab 02/09/19  1850 02/09/19  1839   NA  --  133   POTASSIUM  --  3.7   CHLORIDE  --  99   CO2  --  22   ANIONGAP  --  12   GLC  --  108*   BUN  --  16   CR  --  0.90   GFRESTIMATED 83 83   GFRESTBLACK >90 >90   CECY  --  9.2   PROTTOTAL  --  7.7   ALBUMIN  --  4.3   BILITOTAL  --  1.3   ALKPHOS  --  95   AST  --  28   ALT  --  27     Recent Labs   Lab 02/09/19  1839   COLOR Yellow   APPEARANCE Clear   URINEGLC Negative   URINEBILI Negative   URINEKETONE 5*   SG 1.010   UBLD Negative   URINEPH 6.0   PROTEIN Negative   NITRITE Negative   LEUKEST Negative   RBCU <1   WBCU <1       Recent Results (from the past 24 hour(s))   Abd/pelvis CT,  IV  contrast only TRAUMA / AAA    Narrative    CT ABDOMEN AND PELVIS WITH CONTRAST   2/9/2019 7:18 PM     HISTORY: Lower abdominal pain.    TECHNIQUE: 100 mL Isovue-370 IV were administered. After contrast  administration, volumetric helical sections were acquired from the  lung bases to the ischial tuberosities. Coronal images were also  reconstructed. Radiation dose for this scan was reduced using  automated exposure control, adjustment of the mA and/or kV according  to patient size, or iterative reconstruction technique.    COMPARISON: None.     FINDINGS: Moderate to large amount of stool in the rectum. Moderate  amount of stool is noted in the remainder of the colon. Scattered  colonic diverticulosis. No convincing evidence for colitis or  diverticulitis. No free fluid in the pelvis. Griffith catheter in the  bladder. Mild atherosclerotic aortoiliac calcification. The liver,  gallbladder, spleen, adrenal glands, and pancreas are unremarkable.  Small cortical cyst in the upper pole of the right kidney. The right  kidney is otherwise unremarkable. No hydronephrosis. The left kidney  is absent, possibly on a congenital basis. The mid and distal portions  of the right ureter appear mildly dilated, with  no definite cause  identified. Small hiatal hernia. Coronary artery calcification. The  visualized lung bases are clear. Degenerative changes are noted  throughout the visualized thoracolumbar spine. Right total hip  arthroplasty.      Impression    IMPRESSION:   1. Moderate to large amount of stool in the rectum, with a moderate  amount stool throughout the remainder of the colon.  Findings suggest  constipation.   2. 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  3. The mid and distal portion of the left ureter appears mildly  dilated, possibly chronic, with no definite cause identified.    AREN ROBLES MD

## 2019-02-10 NOTE — ED NOTES
Up to commode small results from enema abdominal cramping decreased requesting something to drink.

## 2019-02-10 NOTE — PROGRESS NOTES
Patient walked 500ft. Independently NST was at his side, but patient needed no assistance.  He did not feel lightheaded or SOB.

## 2019-02-10 NOTE — PLAN OF CARE
PRIMARY DIAGNOSIS: Constipation   OUTPATIENT/OBSERVATION GOALS TO BE MET BEFORE DISCHARGE:  ADLs back to baseline: Yes    Activity and level of assistance: Up with standby assistance.    Pain status: Improved-controlled with oral pain medications.    Return to near baseline physical activity: Yes     Discharge Planner Nurse   Safe discharge environment identified: Yes  Barriers to discharge: Pt does not feel he is ready to go home.         Entered by: Anabel Hightower 02/10/2019 4:48 PM   Pt alert and orientated. Up SBA. On voiding trial. Bowel sounds active, stomach soft to touch. Will continue to monitor.   Please review provider order for any additional goals.   Nurse to notify provider when observation goals have been met and patient is ready for discharge.

## 2019-02-10 NOTE — PLAN OF CARE
"PRIMARY DIAGNOSIS: ACUTE PAIN  OUTPATIENT/OBSERVATION GOALS TO BE MET BEFORE DISCHARGE:  1. Pain Status: Improved-controlled with oral pain medications.    2. Return to near baseline physical activity: No    3. Cleared for discharge by consultants (if involved): No    Discharge Planner Nurse   Safe discharge environment identified: Yes  Barriers to discharge: Yes       Entered by: Frankie Cohen 02/10/2019 2:15 AM   /70   Pulse 69   Temp 99.4  F (37.4  C) (Oral)   Resp 16   Ht 1.93 m (6' 4\")   Wt 93.2 kg (205 lb 6.4 oz)   SpO2 97%   BMI 25.00 kg/m   Lactic: 2.4. MD notified. Hasn't had BM since 2/6/19. Had enema given in ED w/ minimal result. Abdominal tenderness. Griffith placed in ED for retention. Has chronic shoulder pain bilateral. Given Po dilaudid for pain. Has NS running at 75 ml/hr. Pt states feeling weak. Continue with current cares.  Please review provider order for any additional goals.   Nurse to notify provider when observation goals have been met and patient is ready for discharge.  "

## 2019-02-10 NOTE — PLAN OF CARE
PRIMARY DIAGNOSIS: Constipation   OUTPATIENT/OBSERVATION GOALS TO BE MET BEFORE DISCHARGE:  1. ADLs back to baseline: No, generalized weakness.     2. Activity and level of assistance: Up with standby assistance.    3. Pain status: Improved-controlled with oral pain medications.    4. Return to near baseline physical activity: No     Discharge Planner Nurse   Safe discharge environment identified: Yes  Barriers to discharge: Yes, increased bowel motility        Entered by: Saniya Higuera 02/10/2019 10:12 AM     Please review provider order for any additional goals.   Nurse to notify provider when observation goals have been met and patient is ready for discharge.    VSS, BS+, passing flatus, small smear this morning, 7/10 lower abd pain reported

## 2019-02-10 NOTE — PLAN OF CARE
"PRIMARY DIAGNOSIS: ACUTE PAIN  OUTPATIENT/OBSERVATION GOALS TO BE MET BEFORE DISCHARGE:  1. Pain Status: Improved-controlled with oral pain medications.     2. Return to near baseline physical activity: No     3. Cleared for discharge by consultants (if involved): No     Discharge Planner Nurse   Safe discharge environment identified: Yes  Barriers to discharge: Yes       Entered by: Frankie Cohen 02/10/2019 2:15 AM   Blood pressure 127/70, pulse 69, temperature 99.4  F (37.4  C), temperature source Oral, resp. rate 16, height 1.93 m (6' 4\"), weight 93.2 kg (205 lb 6.4 oz), SpO2 97 %.  Lactic: 2.4. MD notified. Had large BM in bedside commode 2/10/19. Had enema given in ED w/ minimal result. Abdominal tenderness. Griffith placed in ED for retention. Has chronic shoulder pain bilateral. Given Po dilaudid for pain. Has NS running at 75 ml/hr. Pt states feeling weak. Assist x 1. Continue with current cares.    "

## 2019-02-10 NOTE — PLAN OF CARE
ROOM # 229    Living Situation (if not independent, order SW consult):  Facility name:  : Wife    Activity level at baseline: Independent  Activity level on admit: assist x 1 due to weakness      Patient registered to observation; given Patient Bill of Rights; given the opportunity to ask questions about observation status and their plan of care.  Patient has been oriented to the observation room, bathroom and call light is in place.    Discussed discharge goals and expectations with patient/family.

## 2019-02-11 VITALS
DIASTOLIC BLOOD PRESSURE: 67 MMHG | OXYGEN SATURATION: 96 % | HEART RATE: 69 BPM | BODY MASS INDEX: 25.01 KG/M2 | SYSTOLIC BLOOD PRESSURE: 124 MMHG | RESPIRATION RATE: 16 BRPM | TEMPERATURE: 97.3 F | WEIGHT: 205.4 LBS | HEIGHT: 76 IN

## 2019-02-11 PROCEDURE — A9270 NON-COVERED ITEM OR SERVICE: HCPCS | Mod: GY | Performed by: INTERNAL MEDICINE

## 2019-02-11 PROCEDURE — A9270 NON-COVERED ITEM OR SERVICE: HCPCS | Mod: GY | Performed by: PHYSICIAN ASSISTANT

## 2019-02-11 PROCEDURE — G0378 HOSPITAL OBSERVATION PER HR: HCPCS

## 2019-02-11 PROCEDURE — 99217 ZZC OBSERVATION CARE DISCHARGE: CPT | Performed by: PHYSICIAN ASSISTANT

## 2019-02-11 PROCEDURE — 25000132 ZZH RX MED GY IP 250 OP 250 PS 637: Mod: GY | Performed by: INTERNAL MEDICINE

## 2019-02-11 PROCEDURE — 25000132 ZZH RX MED GY IP 250 OP 250 PS 637: Mod: GY | Performed by: PHYSICIAN ASSISTANT

## 2019-02-11 RX ORDER — POLYETHYLENE GLYCOL 3350 17 G/17G
17 POWDER, FOR SOLUTION ORAL 2 TIMES DAILY PRN
Qty: 60 PACKET | Refills: 0 | Status: SHIPPED | OUTPATIENT
Start: 2019-02-11 | End: 2019-02-13

## 2019-02-11 RX ADMIN — HYDROMORPHONE HYDROCHLORIDE 4 MG: 2 TABLET ORAL at 02:54

## 2019-02-11 RX ADMIN — HYDROMORPHONE HYDROCHLORIDE 4 MG: 2 TABLET ORAL at 14:21

## 2019-02-11 RX ADMIN — ACETAMINOPHEN 650 MG: 325 TABLET, FILM COATED ORAL at 12:28

## 2019-02-11 RX ADMIN — POLYETHYLENE GLYCOL 3350 17 G: 17 POWDER, FOR SOLUTION ORAL at 08:44

## 2019-02-11 RX ADMIN — ACETAMINOPHEN 650 MG: 325 TABLET, FILM COATED ORAL at 08:43

## 2019-02-11 RX ADMIN — LORAZEPAM 1 MG: 1 TABLET ORAL at 12:28

## 2019-02-11 RX ADMIN — DEXTRAN 70, AND HYPROMELLOSE 2910 3 DROP: 1; 3 SOLUTION/ DROPS OPHTHALMIC at 11:14

## 2019-02-11 RX ADMIN — HYDROMORPHONE HYDROCHLORIDE 4 MG: 2 TABLET ORAL at 08:41

## 2019-02-11 RX ADMIN — ACETAMINOPHEN 650 MG: 325 TABLET, FILM COATED ORAL at 02:57

## 2019-02-11 RX ADMIN — LORAZEPAM 1 MG: 1 TABLET ORAL at 08:42

## 2019-02-11 RX ADMIN — LEVOTHYROXINE SODIUM 125 MCG: 125 TABLET ORAL at 08:47

## 2019-02-11 RX ADMIN — LOSARTAN POTASSIUM 25 MG: 25 TABLET, FILM COATED ORAL at 08:42

## 2019-02-11 RX ADMIN — ATORVASTATIN CALCIUM 40 MG: 40 TABLET, FILM COATED ORAL at 08:41

## 2019-02-11 NOTE — PLAN OF CARE
PRIMARY DIAGNOSIS: Constipation   OUTPATIENT/OBSERVATION GOALS TO BE MET BEFORE DISCHARGE:  1. ADLs back to baseline: Yes    2. Activity and level of assistance: Ambulating independently.    3. Pain status: Improved-controlled with oral pain medications.    4. Return to near baseline physical activity: Yes     Discharge Planner Nurse   Safe discharge environment identified: Yes  Barriers to discharge: No       Entered by: Sandie Comer 02/11/2019 6:03 AM  A&O, VSS. Receiving PO dilaudid and PRN tylenol for abdominal/shoulder pain. Independent in room. BS active x4, + gas. Voiding adequately. Plan for discharge today. Will continue to monitor.   Please review provider order for any additional goals.   Nurse to notify provider when observation goals have been met and patient is ready for discharge.

## 2019-02-11 NOTE — PLAN OF CARE
PRIMARY DIAGNOSIS: Constipation   OUTPATIENT/OBSERVATION GOALS TO BE MET BEFORE DISCHARGE:  1. ADLs back to baseline: Yes    2. Activity and level of assistance: Ambulating independently.    3. Pain status: Improved-controlled with oral pain medications.    4. Return to near baseline physical activity: Yes     Discharge Planner Nurse   Safe discharge environment identified: Yes  Barriers to discharge: No       Entered by: Sandie Comer 02/11/2019 12:31 AM  A&Ox, VSS. Receiving PO dilaudid for abd pain. Independent in room. BS active x4, + gas. Voiding adequately. Pt using tucks pads for discomfort. Will continue to monitor.   Please review provider order for any additional goals.   Nurse to notify provider when observation goals have been met and patient is ready for discharge.

## 2019-02-11 NOTE — PLAN OF CARE
PRIMARY DIAGNOSIS: Constipation  OUTPATIENT/OBSERVATION GOALS TO BE MET BEFORE DISCHARGE:  1. ADLs back to baseline: Yes    2. Activity and level of assistance: Ambulating independently.    3. Pain status: Improved-controlled with oral pain medications.    4. Return to near baseline physical activity: Yes     Discharge Planner Nurse   Safe discharge environment identified: Yes  Barriers to discharge: Yes       Entered by: Wilfred Lee 02/11/2019 2:20 PM     Please review provider order for any additional goals.   Nurse to notify provider when observation goals have been met and patient is ready for discharge.    RN - VSS. Pain well controlled with PO dilaudid. Pt up ad yecenia. Voiding and stooling adequately. Expressing concerns over observation status. Receiving discharge orders.    Patient's After Visit Summary was reviewed with patient.   Patient verbalized understanding of After Visit Summary, recommended follow up and was given an opportunity to ask questions.   Discharge medications sent home with patient/family: YES   Discharged with friend.    OBSERVATION patient END time: [id number]

## 2019-02-11 NOTE — PLAN OF CARE
PRIMARY DIAGNOSIS: Constipation  OUTPATIENT/OBSERVATION GOALS TO BE MET BEFORE DISCHARGE:  ADLs back to baseline: Yes    Activity and level of assistance: Ambulating independently.    Pain status: Improved-controlled with oral pain medications.    Return to near baseline physical activity: Yes     Discharge Planner Nurse   Safe discharge environment identified: Yes  Barriers to discharge: Yes       Entered by: Wilfred Lee 02/11/2019 9:54 AM     Please review provider order for any additional goals.   Nurse to notify provider when observation goals have been met and patient is ready for discharge.    RN - VSS. Pt c/o abdominal pain - continues to take PO dilaudid for relief. Up ad yecenia in room. 1x bowel movement this morning. Remains on miralax. Utilizing PRN ativan. Voiding adequately. Expecting discharge this afternoon.

## 2019-02-11 NOTE — DISCHARGE SUMMARY
[facility] Outpatient / Observation Unit  Discharge Summary        Edi Villafana MRN# [id number]   YOB: 1945 Age: 73 year old     Date of Admission:  2/9/2019  Date of Discharge:  2/11/2019  Admitting Physician:  Ezequiel Fink, DO  Discharge Physician: Melani Boggs PA-C  Discharging Service: Hospitalist      Primary Provider: Enrique Lucia  Primary Care Physician Phone Number: [phone]         Primary Discharge Diagnoses:    Edi Villafana was admitted on 2/9/2019 for concerns of abdominal pain due to constipation.     1. Abdominal pain - due to constipation, improving  2. Constipation due to narcotics - on chronic narcotics for shoulder for past several months. Bowels moving after enema x2 and scheduled Miralax, recommend continuing Miralax 1-2 times daily while on narcotics. May continue senna as needed. Follow up with PCP in 1-2 weeks.   3. Urinary retention - likely due to constipation and narcotic use. Griffith placed initially in ED, removed once bowels were moving and he was able to void without difficulty. Consider follow up with Urology.         Secondary Discharge Diagnoses:     Past Medical History:   Diagnosis Date     Decreased libido      Diverticulosis of colon (without mention of hemorrhage)      Generalized anxiety disorder      Insomnia, unspecified      Other and unspecified hyperlipidemia      Other specified congenital anomaly of kidney      Other testicular hypofunction      Subjective visual disturbance, unspecified      Unspecified cataract      Unspecified essential hypertension      Unspecified hypothyroidism                 Code Status:      Full Code        Brief Hospital Summary:        Reason for your hospital stay      Constipation. Resolved with enemas and scheduled Miralax. Continue daily   Miralax             Please refer to initial admission history and physical for further details.   Briefly, Edi Villafana was admitted on 2/9/2019 for concerns of acute  abdominal pain and constipation. Work up in ED did not show any evidence of bowel obstruction. Pt was registered to the Observation Unit for continued supportive therapy for acute constipation.     Pt was resuscitated with IV fluids and continued on supportive measures including anti-emetics and pain control as needed. He was started on a bowel regimen that included scheduled Miralax and PRN enemas. Labs were reviewed and significant results addressed.  On the day of discharge, symptoms were resolving and pt was able to tolerate PO intake. Vitals were stable, without evidence of orthostasis. Medications were reviewed and adjustments made as necessary. Pt is instructed to follow up as below.            Significant Lab During Hospitalization:      Recent Labs   Lab 02/10/19  0628 02/09/19  1839   WBC 10.0 15.6*   HGB  --  16.2   HCT  --  47.0   MCV  --  86   PLT  --  274     Recent Labs   Lab 02/10/19  0628 02/09/19  1850 02/09/19  1839     --  133   POTASSIUM 4.1  --  3.7   CHLORIDE 105  --  99   CO2 24  --  22   ANIONGAP 7  --  12   GLC 93  --  108*   BUN 15  --  16   CR 0.95  --  0.90   GFRESTIMATED 79 83 83   GFRESTBLACK >90 >90 >90   CECY 7.9*  --  9.2   PROTTOTAL  --   --  7.7   ALBUMIN  --   --  4.3   BILITOTAL  --   --  1.3   ALKPHOS  --   --  95   AST  --   --  28   ALT  --   --  27     Lactic acid - 2.4    Recent Labs   Lab 02/09/19  1839   COLOR Yellow   APPEARANCE Clear   URINEGLC Negative   URINEBILI Negative   URINEKETONE 5*   SG 1.010   UBLD Negative   URINEPH 6.0   PROTEIN Negative   NITRITE Negative   LEUKEST Negative   RBCU <1   WBCU <1                Significant Imaging During Hospitalization:      Recent Results (from the past 48 hour(s))   Abd/pelvis CT,  IV  contrast only TRAUMA / AAA    Narrative    CT ABDOMEN AND PELVIS WITH CONTRAST   2/9/2019 7:18 PM     HISTORY: Lower abdominal pain.    TECHNIQUE: 100 mL Isovue-370 IV were administered. After contrast  administration, volumetric helical  sections were acquired from the  lung bases to the ischial tuberosities. Coronal images were also  reconstructed. Radiation dose for this scan was reduced using  automated exposure control, adjustment of the mA and/or kV according  to patient size, or iterative reconstruction technique.    COMPARISON: None.     FINDINGS: Moderate to large amount of stool in the rectum. Moderate  amount of stool is noted in the remainder of the colon. Scattered  colonic diverticulosis. No convincing evidence for colitis or  diverticulitis. No free fluid in the pelvis. Griffith catheter in the  bladder. Mild atherosclerotic aortoiliac calcification. The liver,  gallbladder, spleen, adrenal glands, and pancreas are unremarkable.  Small cortical cyst in the upper pole of the right kidney. The right  kidney is otherwise unremarkable. No hydronephrosis. The left kidney  is absent, possibly on a congenital basis. The mid and distal portions  of the right ureter appear mildly dilated, with no definite cause  identified. Small hiatal hernia. Coronary artery calcification. The  visualized lung bases are clear. Degenerative changes are noted  throughout the visualized thoracolumbar spine. Right total hip  arthroplasty.      Impression    IMPRESSION:   1. Moderate to large amount of stool in the rectum, with a moderate  amount stool throughout the remainder of the colon.  Findings suggest  constipation.   2. 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  3. The mid and distal portion of the left ureter appears mildly  dilated, possibly chronic, with no definite cause identified.    AREN ROBLES MD              Pending Results:        Unresulted Labs Ordered in the Past 30 Days of this Admission     No orders found from 12/11/2018 to 2/10/2019.              Consultations This Hospital Stay:      No consultations were requested during this admission         Discharge Instructions and Follow-Up:      Follow-up Appointments      Follow-up and recommended labs and tests       Follow up with primary care provider, Enrique Lucia, within 7   days for hospital follow- up.  No follow up labs or test are needed.  Continue Miralax 1-2 times daily as needed to have BM daily.           Pt instructed to follow up with PCP in 7 days.   Follow-up Labs None        Discharge Disposition:      Discharged to home         Discharge Medications:        Current Discharge Medication List      START taking these medications    Details   polyethylene glycol (MIRALAX/GLYCOLAX) packet Take 17 g by mouth 2 times daily as needed for constipation  Qty: 60 packet, Refills: 0    Associated Diagnoses: Drug-induced constipation         CONTINUE these medications which have NOT CHANGED    Details   acetaminophen (TYLENOL) 500 MG tablet Take 2 tablets (1,000 mg) by mouth every 6 hours as needed for pain  Qty: 100 tablet, Refills: 0    Associated Diagnoses: Status post right hip replacement      atorvastatin (LIPITOR) 40 MG tablet TAKE ONE TABLET BY MOUTH ONE TIME DAILY  Qty: 90 tablet, Refills: 1    Associated Diagnoses: Hyperlipidemia LDL goal <100      Cholecalciferol (VITAMIN D3) 2000 UNITS CAPS Take 1 capsule by mouth every other day       !! levothyroxine (SYNTHROID/LEVOTHROID) 125 MCG tablet Take 125 mcg by mouth See Admin Instructions 6 days weekly - Sunday, Monday, Tuesday, Thursday, Friday, Saturday      !! levothyroxine (SYNTHROID/LEVOTHROID) 125 MCG tablet Take 187.5 mcg by mouth once a week On Wednesdays      LORazepam (ATIVAN) 2 MG tablet TAKE 1 TABLET BY MOUTH EVERY 8 HOURS AS NEEDED FOR ANXIETY  Qty: 270 tablet, Refills: 0    Comments: Not to exceed 5 additional fills before 12/10/2018.  Associated Diagnoses: Anxiety      losartan (COZAAR) 50 MG tablet Take 25 mg by mouth daily      Multiple Vitamin (MULTIVITAMIN OR) Take 1 tablet by mouth daily       oxyCODONE (ROXICODONE) 5 MG tablet Take 1 tablet (5 mg) by mouth 3 times daily  Qty: 90 tablet,  "Refills: 0    Associated Diagnoses: Chronic left shoulder pain      TESTOSTERONE 2 MG/GM CREAM Apply topically daily as needed Apply 0.5ml daily as needed      senna-docusate (SENOKOT-S;PERICOLACE) 8.6-50 MG per tablet Take 2 tablets by mouth 2 times daily as needed for constipation  Qty: 60 tablet, Refills: 0    Associated Diagnoses: Status post right hip replacement       !! - Potential duplicate medications found. Please discuss with provider.      STOP taking these medications       sennosides (SENOKOT) 8.6 MG tablet Comments:   Reason for Stopping:                   Allergies:         Allergies   Allergen Reactions     Ace Inhibitors Cough     Ancef [Cefazolin Sodium]      Atenolol Other (See Comments)     Low BP     Compazine      Sulfa Drugs      Tramadol Fatigue     Side effects           Condition and Physical on Discharge:      Discharge condition: Stable   Vitals: Blood pressure 124/67, pulse 69, temperature 97.3  F (36.3  C), temperature source Oral, resp. rate 16, height 1.93 m (6' 4\"), weight 93.2 kg (205 lb 6.4 oz), SpO2 96 %.  205 lbs 6.4 oz      GENERAL:  Comfortable.  PSYCH: pleasant, oriented, No acute distress.  HEART:  RRR.  LUNGS:  Normal Respiratory effort.   ABDOMEN:  Soft, normal bowel sounds. LLQ abdominal tenderness, non distended.   EXTREMITIES:  No pedal edema, +2 pulses bilateral and equal.  SKIN:  Dry to touch, No rash, wound or ulcerations.  NEUROLOGIC:  Grossly intact    Melani Boggs PA-C  "

## 2019-02-11 NOTE — PLAN OF CARE
PRIMARY DIAGNOSIS: Constipation   OUTPATIENT/OBSERVATION GOALS TO BE MET BEFORE DISCHARGE:  ADLs back to baseline: Yes    Activity and level of assistance: Ambulating independently.    Pain status: Improved-controlled with oral pain medications.    Return to near baseline physical activity: Yes     Discharge Planner Nurse   Safe discharge environment identified: Yes  Barriers to discharge: No       Entered by: Anabel Hightower 02/10/2019 10:31 PM   Pt alert and orientated. Up independently. Walked the hallway. Voiding WNL. Miralax given on shift. Tolerating PO diet. Will continue to monitor.   Please review provider order for any additional goals.   Nurse to notify provider when observation goals have been met and patient is ready for discharge.

## 2019-02-12 ENCOUNTER — TELEPHONE (OUTPATIENT)
Dept: FAMILY MEDICINE | Facility: CLINIC | Age: 74
End: 2019-02-12

## 2019-02-12 NOTE — TELEPHONE ENCOUNTER
"ED/Discharge Protocol    \"Hi, my name is Tracy Young, a registered nurse, and I am calling on behalf of Dr. Lucia's office at [place].  I am calling to follow up and see how things are going for you after your recent visit.\"    \"I see that you were in the (ER/UC/IP) on 2/9/19.     How are you doing now that you are home?\"     Patient said he feels exahusted and fatigued. He is getting up to use the toilet every hour. He had has a few liquid stools with a few solid pieces and urinating when having stools. He might be retaining some urine, but it's hard to tell. He is taking miralax 2 times daily and senna - docusate. Color of urine is normal. Probably still     Is patient experiencing symptoms that may require a hospital visit?  No     Discharge Instructions    \"Let's review your discharge instructions.  What is/are the follow-up recommendations?  Pt. Response: Patient to follow up with PCP and Urology.    \"Were you instructed to make a follow-up appointment?\"  Pt. Response: Yes.  Has appointment been made?   Yes      \"When you see the provider, I would recommend that you bring your discharge instructions with you.    Medications    \"How many new medications are you on since your hospitalization/ED visit?\"    0-1  \"How many of your current medicines changed (dose, timing, name, etc.) while you were in the hospital/ED visit?\"   0-1  \"Do you have questions about your medications?\"   No  \"Were you newly diagnosed with heart failure, COPD, diabetes or did you have a heart attack?\"   No  For patients on insulin: \"Did you start on insulin in the hospital or did you have your insulin dose changed?\"   No    Medication reconciliation completed? Yes    Was MTM referral placed (*Make sure to put transitions as reason for referral)?   No    Call Summary    \"Do you have any questions or concerns about your condition or care plan at the moment?\"    No  Triage nurse advice given: Follow up with PCP. Call back triage " "with new or different symptoms.     Patient was in ER 1x in the past year (assess appropriateness of ER visits.)      \"If you have questions or things don't continue to improve, we encourage you contact us through the main clinic number,  [phone].  Even if the clinic is not open, triage nurses are available 24/7 to help you.     We would like you to know that our clinic has extended hours (provide information).  We also have urgent care (provide details on closest location and hours/contact info)\"      \"Thank you for your time and take care!\"        "

## 2019-02-12 NOTE — TELEPHONE ENCOUNTER
Patient Contact    Attempt # 1    Was call answered?  No.  Left message on voicemail with information to call the clinic and ask to talk with triage.

## 2019-02-13 ENCOUNTER — OFFICE VISIT (OUTPATIENT)
Dept: FAMILY MEDICINE | Facility: CLINIC | Age: 74
End: 2019-02-13
Payer: MEDICARE

## 2019-02-13 VITALS
SYSTOLIC BLOOD PRESSURE: 130 MMHG | HEART RATE: 80 BPM | RESPIRATION RATE: 18 BRPM | TEMPERATURE: 98.4 F | DIASTOLIC BLOOD PRESSURE: 70 MMHG | OXYGEN SATURATION: 98 % | WEIGHT: 202 LBS | BODY MASS INDEX: 24.6 KG/M2 | HEIGHT: 76 IN

## 2019-02-13 DIAGNOSIS — K59.03 DRUG-INDUCED CONSTIPATION: ICD-10-CM

## 2019-02-13 DIAGNOSIS — E03.9 ACQUIRED HYPOTHYROIDISM: ICD-10-CM

## 2019-02-13 PROCEDURE — 36415 COLL VENOUS BLD VENIPUNCTURE: CPT | Performed by: FAMILY MEDICINE

## 2019-02-13 PROCEDURE — 84439 ASSAY OF FREE THYROXINE: CPT | Performed by: FAMILY MEDICINE

## 2019-02-13 PROCEDURE — 84443 ASSAY THYROID STIM HORMONE: CPT | Performed by: FAMILY MEDICINE

## 2019-02-13 PROCEDURE — 99495 TRANSJ CARE MGMT MOD F2F 14D: CPT | Performed by: FAMILY MEDICINE

## 2019-02-13 RX ORDER — POLYETHYLENE GLYCOL 3350 17 G/17G
17 POWDER, FOR SOLUTION ORAL DAILY
Qty: 60 PACKET | Refills: 0 | COMMUNITY
Start: 2019-02-13

## 2019-02-13 ASSESSMENT — MIFFLIN-ST. JEOR: SCORE: 1762.77

## 2019-02-13 NOTE — LETTER
February 15, 2019      Edi Villafana  [address]  [place] [address]        Dear ,    We are writing to inform you of your test results.    Your test results fall within the expected range(s) or remain unchanged from previous results.  Please continue with current treatment plan.  We can repeat her tests in 1 year.    Resulted Orders   TSH   Result Value Ref Range    TSH 3.32 0.40 - 4.00 mU/L   T4 FREE   Result Value Ref Range    T4 Free 1.38 0.76 - 1.46 ng/dL       If you have any questions or concerns, please call the clinic at the number listed above.       Sincerely,        Enrique Lucia MD

## 2019-02-13 NOTE — PATIENT INSTRUCTIONS
The patient will continue with daily MiraLAX.  He will also continue with his fiber supplementation.  He may use the senna tablets as needed.  We will follow-up in 1 month on his bowels.  So far he is going 3 or 4 times a day and it is mostly liquid.    The patient has been on his increased dose of thyroid medication.  We will check his TSH and free T4 today.  We will follow-up on that through my chart.

## 2019-02-13 NOTE — NURSING NOTE
"Chief Complaint   Patient presents with     Hospital F/U     Abdominal Pain     /70   Pulse 80   Temp 98.4  F (36.9  C) (Tympanic)   Resp 18   Ht 1.93 m (6' 4\")   Wt 91.6 kg (202 lb)   SpO2 98%   BMI 24.59 kg/m   Estimated body mass index is 24.59 kg/m  as calculated from the following:    Height as of this encounter: 1.93 m (6' 4\").    Weight as of this encounter: 91.6 kg (202 lb).  BP completed using cuff size: jessica Roach CMA    Health Maintenance Due   Topic Date Due     URINE DRUG SCREEN Q1 YR  03/06/1960     ZOSTER IMMUNIZATION (2 of 3) 04/08/2010     Health Maintenance reviewed at today's visit patient asked to schedule/complete:   Immunizations:  Patient agrees to schedule    "

## 2019-02-13 NOTE — PROGRESS NOTES
SUBJECTIVE:   Edi Villafana is a 73 year old male who presents to clinic today for the following health issues:    Hospital Follow-up Visit:    Hospital/Nursing Home/IP Rehab Facility: [facility]  Date of Admission: 02/09/19  Date of Discharge: 02/11/19  Reason(s) for Admission: Abdominal Pain, Constipation and Urinary Retention            Problems taking medications regularly:  None       Medication changes since discharge: Yes       Problems adhering to non-medication therapy:  None    Summary of hospitalization:  [facility] discharge summary reviewed  Diagnostic Tests/Treatments reviewed.  Follow up needed: none  Other Healthcare Providers Involved in Patient s Care:         None  Update since discharge: improved.  But still having some fatigue.    Post Discharge Medication Reconciliation: discharge medications reconciled and changed, per note/orders (see AVS).  Plan of care communicated with patient     Coding guidelines for this visit:  Type of Medical   Decision Making Face-to-Face Visit       within 7 Days of discharge Face-to-Face Visit        within 14 days of discharge   Moderate Complexity [id number]   High Complexity [id number]        Abdominal Pain      Duration: 02/09/19    Description (location/character/radiation):        Associated flank pain: Yes    Intensity:  severe    Accompanying signs and symptoms:        Fever/Chills: YES       Gas/Bloating: YES       Nausea/vomitting: no        Diarrhea: no-Constipation        Dysuria or Hematuria: YES    History (previous similar pain/trauma/previous testing): None    Precipitating or alleviating factors:       Pain worse with eating/BM/urination: None       Pain relieved by BM: no     Therapies tried and outcome: ER Visit and Mirilax/Relief        Problem list and histories reviewed & adjusted, as indicated.  Additional history: as documented    Patient Active Problem List   Diagnosis     Hypothyroidism     Hyperlipidemia LDL goal  <100     Persistent insomnia     Hypertension goal BP (blood pressure) < 140/90     Anxiety     Hypotestosteronism     Trochanteric bursitis     Depression with anxiety     Left shoulder pain     Physical deconditioning     Alcoholism (H)     Chronic left shoulder pain     Pain of left calf     Acute deep vein thrombosis (DVT) of distal vein of left lower extremity (H)     Anticoagulation management encounter     Hip pain, right     History of deep venous thrombosis     Status post right hip replacement     Constipation     Solitary kidney     BPH with urinary obstruction     Past Surgical History:   Procedure Laterality Date     ABDOMEN SURGERY  1973    large benign tumor removed with thrombophlebitis post op     APPENDECTOMY  1960     ARTHROPLASTY HIP ANTERIOR Right 8/9/2018    Procedure: ARTHROPLASTY HIP ANTERIOR;  RIGHT TOTAL HIP ARTHROPLASTY DIRECT ANTERIOR APPROACH ;  Surgeon: Chester Ortiz MD;  Location: [facility] OR     CATARACT IOL, RT/LT      bilateral     EYE SURGERY  [id number], 2007    eyelid surgery, 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, trabeculoplasty       Social History     Tobacco Use     Smoking status: Never Smoker     Smokeless tobacco: Never Used   Substance Use Topics     Alcohol use: Yes     Family History   Problem Relation Age of Onset     Hypertension Mother      Lipids Mother      Heart Disease Mother      Psychotic Disorder Mother         severe depression     Heart Disease Father      Ovarian Cancer Sister      Colon Cancer Paternal Grandfather            Reviewed and updated as needed this visit by clinical staff  Tobacco  Allergies  Meds  Problems  Med Hx  Surg Hx  Fam Hx  Soc Hx        Reviewed and updated as needed this visit by Provider         ROS:  Constitutional, HEENT, cardiovascular, pulmonary, gi and gu systems are negative, except as otherwise noted.    OBJECTIVE:                                                    /70   Pulse 80   Temp 98.4  F (36.9  C) (Tympanic)    "Resp 18   Ht 1.93 m (6' 4\")   Wt 91.6 kg (202 lb)   SpO2 98%   BMI 24.59 kg/m    Body mass index is 24.59 kg/m .  GENERAL APPEARANCE: healthy, alert and no distress  ABDOMEN: soft, nontender, without hepatosplenomegaly or masses and bowel sounds normal         ASSESSMENT/PLAN:                                                        ICD-10-CM    1. Drug-induced constipation K59.03 polyethylene glycol (MIRALAX/GLYCOLAX) packet   2. Acquired hypothyroidism E03.9 TSH     T4 FREE       Patient Instructions   The patient will continue with daily MiraLAX.  He will also continue with his fiber supplementation.  He may use the senna tablets as needed.  We will follow-up in 1 month on his bowels.  So far he is going 3 or 4 times a day and it is mostly liquid.    The patient has been on his increased dose of thyroid medication.  We will check his TSH and free T4 today.  We will follow-up on that through my chart.      Enrique Lucia MD  [facility] [address]    "

## 2019-02-14 ENCOUNTER — MYC MEDICAL ADVICE (OUTPATIENT)
Dept: FAMILY MEDICINE | Facility: CLINIC | Age: 74
End: 2019-02-14

## 2019-02-14 ENCOUNTER — TELEPHONE (OUTPATIENT)
Dept: FAMILY MEDICINE | Facility: CLINIC | Age: 74
End: 2019-02-14

## 2019-02-14 DIAGNOSIS — Z87.898 HISTORY OF URINARY RETENTION: Primary | ICD-10-CM

## 2019-02-14 LAB
T4 FREE SERPL-MCNC: 1.38 NG/DL (ref 0.76–1.46)
TSH SERPL DL<=0.005 MIU/L-ACNC: 3.32 MU/L (ref 0.4–4)

## 2019-02-14 NOTE — TELEPHONE ENCOUNTER
Pt was called with providers message below. Sent my chart message with urology information. He also asked were he could have his next enema. Advised he call [facility].  Call clinic if GI request a referral from PCP. Pt verbalize agreement with plan.

## 2019-02-14 NOTE — TELEPHONE ENCOUNTER
Forgot to ask for in appointment with provider yesterday. Hospitalist wanted patient to:  1. Flomax: Hospitalist thought this patient should be on this medication, but wanted him to talk it over with his provider for their throughts.   2. Referral to urology- Wanted patient to talk with provider about a referral to urology.     Patient has been out of hospital for 5 days and states he has not had a substantial bowel movement since. Light and watery only    Concerned that may happen again in the future. Would like to know what/if any alternatives there are to an enema in the long term to make sure this does not happen again? Also asks if there is a place he can go to have an enema done if he thinks it needed? He refuses to use home care methods, does not want to go to the ED, not done in Urgent Care or Clinics.     Currently taking senna 2 tabs twice daily and Miralax twice days PRN.

## 2019-02-14 NOTE — TELEPHONE ENCOUNTER
I will send a referral for urology.  I would not start the tamsulosin until after seen them.  He is now on a daily dose of MiraLAX and I think that will help with his bowels.  It sounded yesterday when he was in the office that he was actually on too much and that is why he is having frequent, small, watery stools.

## 2019-02-15 NOTE — RESULT ENCOUNTER NOTE
Dear Edi,    Your tests were all normal. A copy of your tests are available in My Chart.     No change in the dose of thyroid medicine.  We can check that again in 1 year.    Glad to see you at your appointment.  If you have any questions feel free to call.      Sincerely,      CANDELARIO Nunez.

## 2019-02-18 DIAGNOSIS — M25.512 CHRONIC LEFT SHOULDER PAIN: ICD-10-CM

## 2019-02-18 DIAGNOSIS — G89.29 CHRONIC LEFT SHOULDER PAIN: ICD-10-CM

## 2019-02-18 RX ORDER — OXYCODONE HYDROCHLORIDE 5 MG/1
5 TABLET ORAL 3 TIMES DAILY
Qty: 90 TABLET | Refills: 0 | Status: SHIPPED | OUTPATIENT
Start: 2019-02-18 | End: 2019-03-15

## 2019-02-18 NOTE — TELEPHONE ENCOUNTER
Requested Prescriptions   Pending Prescriptions Disp Refills     oxyCODONE (ROXICODONE) 5 MG tablet  Last Written Prescription Date:  1/18/19  Last Fill Quantity: 90,  # refills: 0   Last office visit: 2/13/2019 with prescribing provider:     Future Office Visit:     90 tablet 0     Sig: Take 1 tablet (5 mg) by mouth 3 times daily    There is no refill protocol information for this order        Controlled Substance Refill Request: oxyCODONE (ROXICODONE) 5 MG tablet  Problem List Complete:  No     PROVIDER TO CONSIDER COMPLETION OF PROBLEM LIST AND OVERVIEW/CONTROLLED SUBSTANCE AGREEMENT    Last Written Prescription Date:  See above  Last Fill Quantity: see above ,   # refills: see    THE MOST RECENT OFFICE VISIT MUST BE WITHIN THE PAST 3 MONTHS. AT LEAST ONE FACE TO FACE VISIT MUST OCCUR EVERY 6 MONTHS. ADDITIONAL VISITS CAN BE VIRTUAL.  (THIS STATEMENT SHOULD BE DELETED.)    [place] Visit with [facility] [place]: SEE ABOVE     Future Office visit:     Controlled substance agreement:   Encounter-Level CSA - 07/16/2018:    Controlled Substance Agreement - Scan on 7/26/2018 12:19 PM: CONTROLLED SUBSTANCE AGREEMENT (below)       Patient-Level CSA:    There are no patient-level csa.         Last Urine Drug Screen: No results found for: CDAUT, No results found for: COMDAT, No results found for: THC13, PCP13, COC13, MAMP13, OPI13, AMP13, BZO13, TCA13, MTD13, BAR13, OXY13, PPX13, BUP13     Processing:  Patient will  in clinic     [email or web address]       checked in past 3 months?  Yes 1/6/19 NO CONCERNS

## 2019-02-18 NOTE — TELEPHONE ENCOUNTER
Pt states ran out of med -states needs asap-states it only takes 5 seconds I dont understand why he cant stop and take care of it-call pt michael jolly

## 2019-02-18 NOTE — TELEPHONE ENCOUNTER
The patient called and wanted to know where he could go and get an enema administered to him without going to the ER. He is not having any symptoms, but is anxious as he had such a large amount of stool when he went to the ED.     Writer called [facility], they do not offer this service.     Huddled with clinic administrator, we can not administer home medications in clinic. We are unable to do this for him in clinic.    Call made back to the patient, over 15 minutes of time spent explaining how to self administer an enema. The patient is in agreement with the plan and agrees he will self administer an enema if he feels that he needs one in the future.     Writer also repeated Dr. Lyons advice on taking the miralax (once a day verses two times a day).

## 2019-03-13 DIAGNOSIS — E03.9 HYPOTHYROIDISM, UNSPECIFIED TYPE: ICD-10-CM

## 2019-03-13 NOTE — TELEPHONE ENCOUNTER
"Requested Prescriptions   Pending Prescriptions Disp Refills     levothyroxine (SYNTHROID/LEVOTHROID) 125 MCG tablet [Pharmacy Med Name: LEVOTHYROXIN 125MCG TAB]  Last Written Prescription Date:  na  Last Fill Quantity: na,  # refills: na   Last office visit: 2/13/2019 with prescribing provider:  Rea   Future Office Visit:     90 tablet 3     Sig: TAKE 1 TABLET BY MOUTH ONCE DAILY    Thyroid Protocol Passed - 3/13/2019  9:57 AM       Passed - Patient is 12 years or older       Passed - Recent (12 mo) or future (30 days) visit within the authorizing provider's specialty    Patient had office visit in the last 12 months or has a visit in the next 30 days with authorizing provider or within the authorizing provider's specialty.  See \"Patient Info\" tab in inbasket, or \"Choose Columns\" in Meds & Orders section of the refill encounter.             Passed - Medication is active on med list       Passed - Normal TSH on file in past 12 months    Recent Labs   Lab Test 02/13/19  1439   TSH 3.32                 "

## 2019-03-15 DIAGNOSIS — G89.29 CHRONIC LEFT SHOULDER PAIN: ICD-10-CM

## 2019-03-15 DIAGNOSIS — M25.512 CHRONIC LEFT SHOULDER PAIN: ICD-10-CM

## 2019-03-15 RX ORDER — LEVOTHYROXINE SODIUM 125 UG/1
TABLET ORAL
Qty: 90 TABLET | Refills: 3 | Status: SHIPPED | OUTPATIENT
Start: 2019-03-15 | End: 2019-05-08

## 2019-03-15 RX ORDER — LEVOTHYROXINE SODIUM 125 UG/1
125 TABLET ORAL SEE ADMIN INSTRUCTIONS
Qty: 18 TABLET | Refills: 1 | Status: SHIPPED | OUTPATIENT
Start: 2019-03-15 | End: 2019-06-06

## 2019-03-15 RX ORDER — OXYCODONE HYDROCHLORIDE 5 MG/1
5 TABLET ORAL 3 TIMES DAILY
Qty: 90 TABLET | Refills: 0 | Status: SHIPPED | OUTPATIENT
Start: 2019-03-15 | End: 2019-04-17

## 2019-03-15 NOTE — TELEPHONE ENCOUNTER
Pt states is out of med and needs asap-very angry not filled yet and demands another provider look at this as well as hisoxyCODONE (ROXICODONE) 5 MG tablet script

## 2019-03-15 NOTE — TELEPHONE ENCOUNTER
Last OV 02/13/2019.    Controlled Substance Refill Request for oxyCODONE (ROXICODONE) 5 MG tablet  Problem List Complete:  No     PROVIDER TO CONSIDER COMPLETION OF PROBLEM LIST AND OVERVIEW/CONTROLLED SUBSTANCE AGREEMENT      THE MOST RECENT OFFICE VISIT MUST BE WITHIN THE PAST 3 MONTHS. AT LEAST ONE FACE TO FACE VISIT MUST OCCUR EVERY 6 MONTHS. ADDITIONAL VISITS CAN BE VIRTUAL.  (THIS STATEMENT SHOULD BE DELETED.)    [place] [facility] [place]09/2019    Future Office visit:     Controlled substance agreement:   Encounter-Level CSA - 07/16/2018:    Controlled Substance Agreement - Scan on 7/26/2018 12:19 PM: CONTROLLED SUBSTANCE AGREEMENT (below)       Patient-Level CSA:    There are no patient-level csa.         Last Urine Drug Screen: No results found for: CDAUT, No results found for: COMDAT, No results found for: THC13, PCP13, COC13, MAMP13, OPI13, AMP13, BZO13, TCA13, MTD13, BAR13, OXY13, PPX13, BUP13     Processing:  Patient will  in clinic     [email or web address]       checked in past 3 months?  Yes  check 1/16/19 no concerns.

## 2019-03-15 NOTE — TELEPHONE ENCOUNTER
Reason for Call:  Medication or medication refill:    Do you use a [facility]?  Name of the pharmacy and phone number for the current request:  pu at [facility]    Name of the medication requested: oxyCODONE (ROXICODONE) 5 MG tablet    Other request: pt states needs asap will be out on 3/19    Can we leave a detailed message on this number? YES    Phone number patient can be reached at: Home number on file [phone] (home)    Best Time:     Call taken on 3/15/2019 at 9:52 AM by XOCHITL BRAR

## 2019-04-01 DIAGNOSIS — E78.5 HYPERLIPIDEMIA LDL GOAL <100: ICD-10-CM

## 2019-04-02 NOTE — TELEPHONE ENCOUNTER
"Requested Prescriptions   Pending Prescriptions Disp Refills     atorvastatin (LIPITOR) 40 MG tablet [Pharmacy Med Name: ATORVASTATIN 40 MG TABLET]  Last Written Prescription Date:  10/3/2018  Last Fill Quantity: 90 tablet,  # refills: 1   Last office visit: 2/13/2019 with prescribing provider:  Rea   Future Office Visit:     90 tablet 1     Sig: TAKE ONE TABLET BY MOUTH ONE TIME DAILY    Statins Protocol Passed - 4/1/2019  6:46 PM       Passed - LDL on file in past 12 months    Recent Labs   Lab Test 12/05/18  1136   LDL 59            Passed - No abnormal creatine kinase in past 12 months    No lab results found.            Passed - Recent (12 mo) or future (30 days) visit within the authorizing provider's specialty    Patient had office visit in the last 12 months or has a visit in the next 30 days with authorizing provider or within the authorizing provider's specialty.  See \"Patient Info\" tab in inbasket, or \"Choose Columns\" in Meds & Orders section of the refill encounter.             Passed - Medication is active on med list       Passed - Patient is age 18 or older           "

## 2019-04-03 RX ORDER — ATORVASTATIN CALCIUM 40 MG/1
TABLET, FILM COATED ORAL
Qty: 90 TABLET | Refills: 3 | Status: SHIPPED | OUTPATIENT
Start: 2019-04-03 | End: 2020-01-01 | Stop reason: ALTCHOICE

## 2019-04-17 DIAGNOSIS — M25.512 CHRONIC LEFT SHOULDER PAIN: ICD-10-CM

## 2019-04-17 DIAGNOSIS — G89.29 CHRONIC LEFT SHOULDER PAIN: ICD-10-CM

## 2019-04-17 RX ORDER — OXYCODONE HYDROCHLORIDE 5 MG/1
5 TABLET ORAL 3 TIMES DAILY
Qty: 90 TABLET | Refills: 0 | Status: SHIPPED | OUTPATIENT
Start: 2019-04-17 | End: 2019-05-16

## 2019-04-17 NOTE — TELEPHONE ENCOUNTER
Reason for Call:  Medication or medication refill:    Do you use a [facility]?  Name of the pharmacy and phone number for the current request:  Will pu at [email or web address]    Name of the medication requested: oxyCODONE (ROXICODONE) 5 MG tablet    Other request: pt states will be out on   4/19 so needs asap  Can we leave a detailed message on this number? YES    Phone number patient can be reached at: Home number on file [phone] (home)    Best Time: asap    Call taken on 4/17/2019 at 9:45 AM by XOCHITL BRAR

## 2019-05-03 NOTE — TELEPHONE ENCOUNTER
RECORDS STATUS - ALL OTHER DIAGNOSIS      RECORDS RECEIVED FROM: [facility]   DATE RECEIVED: 05/08/19   NOTES STATUS DETAILS   OFFICE NOTE from referring provider [facility] Dr. Lucia 02/13/19     OFFICE NOTE from medical oncologist n/a    DISCHARGE SUMMARY from hospital n/a    DISCHARGE REPORT from the ER [facility] 02/09/19 Dr. Fink   OPERATIVE REPORT n/a    MEDICATION LIST [facility]   CLINICAL TRIAL TREATMENTS TO DATE n/a    LABS     PATHOLOGY REPORTS n/a    ANYTHING RELATED TO DIAGNOSIS Complete Epic   GENONOMIC TESTING     TYPE: Epic    IMAGING (NEED IMAGES & REPORT)     CT SCANS Complete PACS   MRI n/a    MAMMO n/a    ULTRASOUND Complete PACS   XR Complete PACS

## 2019-05-08 ENCOUNTER — HOSPITAL ENCOUNTER (OUTPATIENT)
Facility: CLINIC | Age: 74
Setting detail: SPECIMEN
Discharge: HOME | End: 2019-05-08
Attending: UROLOGY | Admitting: UROLOGY
Payer: MEDICARE

## 2019-05-08 ENCOUNTER — ONCOLOGY VISIT (OUTPATIENT)
Dept: ONCOLOGY | Facility: CLINIC | Age: 74
End: 2019-05-08
Attending: UROLOGY
Payer: MEDICARE

## 2019-05-08 ENCOUNTER — PRE VISIT (OUTPATIENT)
Dept: ONCOLOGY | Facility: CLINIC | Age: 74
End: 2019-05-08

## 2019-05-08 VITALS
BODY MASS INDEX: 25.21 KG/M2 | SYSTOLIC BLOOD PRESSURE: 138 MMHG | HEART RATE: 74 BPM | HEIGHT: 76 IN | OXYGEN SATURATION: 94 % | WEIGHT: 207 LBS | DIASTOLIC BLOOD PRESSURE: 89 MMHG | RESPIRATION RATE: 18 BRPM | TEMPERATURE: 98.4 F

## 2019-05-08 DIAGNOSIS — N13.8 BPH WITH URINARY OBSTRUCTION: Primary | ICD-10-CM

## 2019-05-08 DIAGNOSIS — N40.1 BPH WITH URINARY OBSTRUCTION: Primary | ICD-10-CM

## 2019-05-08 LAB
ALBUMIN UR-MCNC: NEGATIVE MG/DL
APPEARANCE UR: CLEAR
BILIRUB UR QL STRIP: NEGATIVE
COLOR UR AUTO: YELLOW
GLUCOSE UR STRIP-MCNC: NEGATIVE MG/DL
HGB UR QL STRIP: NEGATIVE
KETONES UR STRIP-MCNC: NEGATIVE MG/DL
LEUKOCYTE ESTERASE UR QL STRIP: NEGATIVE
NITRATE UR QL: NEGATIVE
PH UR STRIP: 5.5 PH (ref 5–7)
SOURCE: NORMAL
SP GR UR STRIP: 1.02 (ref 1–1.03)
UROBILINOGEN UR STRIP-MCNC: NORMAL MG/DL (ref 0–2)

## 2019-05-08 PROCEDURE — 81003 URINALYSIS AUTO W/O SCOPE: CPT | Performed by: UROLOGY

## 2019-05-08 PROCEDURE — 51798 US URINE CAPACITY MEASURE: CPT | Performed by: UROLOGY

## 2019-05-08 PROCEDURE — G0463 HOSPITAL OUTPT CLINIC VISIT: HCPCS

## 2019-05-08 PROCEDURE — 99204 OFFICE O/P NEW MOD 45 MIN: CPT | Mod: 25 | Performed by: UROLOGY

## 2019-05-08 RX ORDER — TAMSULOSIN HYDROCHLORIDE 0.4 MG/1
0.4 CAPSULE ORAL DAILY
Qty: 90 CAPSULE | Refills: 3 | Status: SHIPPED | OUTPATIENT
Start: 2019-05-08

## 2019-05-08 ASSESSMENT — PAIN SCALES - GENERAL: PAINLEVEL: NO PAIN (0)

## 2019-05-08 ASSESSMENT — MIFFLIN-ST. JEOR: SCORE: 1780.45

## 2019-05-08 NOTE — PROGRESS NOTES
Chief Complaint:   Weak urinary stream; solitary kidney         Consult or Referral:     Mr. Edi Villafana is a 74 year old male seen at the request of Dr. Lucia.         History of Present Illness:   Edi Villafana is a very pleasant 74 year old male who presents with a history of BPH with urinary retention. 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 Per patient, this was benign. He also has a congenital solitary right kidney. Historically, patient also states he has had some ureteral dilation noted in the past and has has cystoscopy and workup for this. These records are not available for review today.     More recently, he reports having urinary retention while he was in the hospital in February. Passed TOV prior to discharge and here for evaluation. At baseline, he reports noting weak stream and feeling of incomplete emptying. He has not previously tried medications for this. No hematuria or dysuria. No flank pain or abdominal pain currently. Is bothered by his chronic shoulder pain. Cr at time of last admission was 0.95, which is at his baseline.         Past Medical History:     Past Medical History:   Diagnosis Date     Decreased libido      Diverticulosis of colon (without mention of hemorrhage)      Generalized anxiety disorder      Insomnia, unspecified      Other and unspecified hyperlipidemia      Other specified congenital anomaly of kidney      Other testicular hypofunction      Subjective visual disturbance, unspecified      Unspecified cataract      Unspecified essential hypertension      Unspecified hypothyroidism           Past Surgical History:     Past Surgical History:   Procedure Laterality Date     ABDOMEN SURGERY  1973    large benign tumor removed with thrombophlebitis post op     APPENDECTOMY  1960     ARTHROPLASTY HIP ANTERIOR Right 8/9/2018    Procedure: ARTHROPLASTY HIP ANTERIOR;  RIGHT  TOTAL HIP ARTHROPLASTY DIRECT ANTERIOR APPROACH ;  Surgeon: Chester Ortiz MD;  Location: [facility] OR     CATARACT IOL, RT/LT      bilateral     EYE SURGERY  [id number], 2007    eyelid surgery, 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, trabeculoplasty          Medications     Current Outpatient Medications   Medication     acetaminophen (TYLENOL) 500 MG tablet     atorvastatin (LIPITOR) 40 MG tablet     Cholecalciferol (VITAMIN D3) 2000 UNITS CAPS     levothyroxine (SYNTHROID/LEVOTHROID) 125 MCG tablet     LORazepam (ATIVAN) 2 MG tablet     losartan (COZAAR) 50 MG tablet     Multiple Vitamin (MULTIVITAMIN OR)     oxyCODONE (ROXICODONE) 5 MG tablet     polyethylene glycol (MIRALAX/GLYCOLAX) packet     TESTOSTERONE 2 MG/GM CREAM     senna-docusate (SENOKOT-S;PERICOLACE) 8.6-50 MG per tablet     No current facility-administered medications for this visit.           Family History:     Family History   Problem Relation Age of Onset     Hypertension Mother      Lipids Mother      Heart Disease Mother      Psychotic Disorder Mother         severe depression     Heart Disease Father      Ovarian Cancer Sister      Colon Cancer Paternal Grandfather           Social History:     Social History     Socioeconomic History     Marital status: Single     Spouse name: Not on file     Number of children: Not on file     Years of education: Not on file     Highest education level: Not on file   Occupational History     Not on file   Social Needs     Financial resource strain: Not on file     Food insecurity:     Worry: Not on file     Inability: Not on file     Transportation needs:     Medical: Not on file     Non-medical: Not on file   Tobacco Use     Smoking status: Never Smoker     Smokeless tobacco: Never Used   Substance and Sexual Activity     Alcohol use: Yes     Drug use: No     Sexual activity: Never   Lifestyle     Physical activity:     Days per week: Not on file     Minutes per session: Not on file     Stress: Not on file  "  Relationships     Social connections:     Talks on phone: Not on file     Gets together: Not on file     Attends Mandaeism service: Not on file     Active member of club or organization: Not on file     Attends meetings of clubs or organizations: Not on file     Relationship status: Not on file     Intimate partner violence:     Fear of current or ex partner: Not on file     Emotionally abused: Not on file     Physically abused: Not on file     Forced sexual activity: Not on file   Other Topics Concern     Parent/sibling w/ CABG, MI or angioplasty before 65F 55M? Not Asked   Social History Narrative     Not on file          Allergies:   Ace inhibitors; Ancef [cefazolin sodium]; Atenolol; Compazine; Sulfa drugs; and Tramadol         Review of Systems:  From intake questionnaire     Skin: negative  Eyes: negative  Ears/Nose/Throat: negative  Respiratory: No shortness of breath, dyspnea on exertion, cough, or hemoptysis  Cardiovascular: No chest pain or palpitations  Gastrointestinal: negative; no nausea/vomiting, constipation or diarrhea  Genitourinary: as per HPI  Musculoskeletal: notes bilateral shoulder pain  Neurologic: negative  Psychiatric: negative  Hematologic/Lymphatic/Immunologic: negative  Endocrine: negative         Physical Exam:     Patient is a 74 year old  male   Vitals: Blood pressure 138/89, pulse 74, temperature 98.4  F (36.9  C), temperature source Oral, resp. rate 18, height 1.93 m (6' 4\"), weight 93.9 kg (207 lb), SpO2 94 %.  Constitutional: Body mass index is 25.2 kg/m .  Alert, no acute distress, oriented, conversant  Eyes: no scleral icterus; extraocular muscles intact, moist conjunctivae  Neck: trachea midline, no thyromegaly  Ears/nose/mouth: throat/mouth:normal, good dentition  Respiratory: no respiratory distress, or pursed lip breathing  Cardiovascular: no obvious jugular venous distension present  Gastrointestinal: soft, nontender, no organomegaly or masses; LLQ incision well-healed "   Lymphatics: No inguinal adenopathy  Musculoskeltal: extremities normal, no peripheral edema  Skin: no suspicious lesions or rashes  Neuro: Alert, oriented, speech and mentation normal  Psych: affect and mood normal, alert and oriented to person, place and time  Gait: Normal  : penis, scrotum, testes normal  TERRELL anodular, symmetric    PVR = 26 ml      Labs and Pathology:    I reviewed all applicable laboratory and pathology data and went over findings with patient  Significant for   Lab Results   Component Value Date    CR 0.95 02/10/2019    CR 0.90 02/09/2019    CR 0.89 12/05/2018    CR 0.87 08/10/2018    CR 1.01 08/09/2018    CR 1.00 07/16/2018    CR 0.97 03/27/2018    CR 0.96 03/12/2018    CR 0.94 01/03/2018    CR 0.95 11/29/2017     PSA   Date Value Ref Range Status   12/05/2018 0.40 0 - 4 ug/L Final     Comment:     Assay Method:  Chemiluminescence using Siemens Vista analyzer   03/12/2018 0.58 0 - 4 ug/L Final     Comment:     Assay Method:  Chemiluminescence using Siemens Vista analyzer   12/12/2016 0.57 0 - 4 ug/L Final   06/14/2016 0.52 0 - 4 ug/L Final   12/15/2014 0.51 0 - 4 ug/L Final   05/13/2014 0.50 0 - 4 ug/L Final   12/18/2013 0.72 0 - 4 ug/L Final   10/23/2013 0.74 0 - 4 ug/L Final   05/01/2013 1.22 0 - 4 ug/L Final   01/25/2012 0.62 0.00 - 4.00 ng/ml Final       Imaging:    I reviewed all applicable imaging and went over findings with patient.    CT abd/pelvis 2/9/2019  IMPRESSION:   1. Moderate to large amount of stool in the rectum, with a moderate  amount stool throughout the remainder of the colon.  Findings suggest  constipation.   2. 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  3. The mid and distal portion of the left ureter appears mildly  dilated, possibly chronic, with no definite cause identified.      Outside and Past Medical records:    I spent 10 minutes reviewing outside and past medical records.         Assessment and Plan:     Assessment: 74 year old male with  solitary right kidney and mild mid-ureteral dilation without hydronephrosis. No lesions noted on CT and no hematuria. Will send UA today. The ureteral dilation appears to be chronic, based on history and he notes no flank pain and has no evidence of renal functional decline at this time. Regarding his urinary symptoms, we discussed option of tamsulosin and the risks/side effects of this medication. He expresses interest in a trial.    Plan:  PVR and UA today  Tamsulosin 0.4 mg daily  Follow-up 3 months for PVR and symptom review    Orders  Orders Placed This Encounter   Procedures     UA reflex to Microscopic and Culture [[id number]]     Ramon Acuña MD  Urology  [facility]  Clinic Phone [phone]

## 2019-05-08 NOTE — NURSING NOTE
"Oncology Rooming Note    May 8, 2019 3:29 PM   Edi Villafana is a 74 year old male who presents for:    Chief Complaint   Patient presents with     Oncology Clinic Visit     new patient consult     Initial Vitals: /89   Pulse 74   Temp 98.4  F (36.9  C) (Oral)   Resp 18   Ht 1.93 m (6' 4\")   Wt 93.9 kg (207 lb)   SpO2 94%   BMI 25.20 kg/m   Estimated body mass index is 25.2 kg/m  as calculated from the following:    Height as of this encounter: 1.93 m (6' 4\").    Weight as of this encounter: 93.9 kg (207 lb). Body surface area is 2.24 meters squared.  No Pain (0) Comment: Data Unavailable   No LMP for male patient.  Allergies reviewed: Yes  Medications reviewed: Yes    Medications: Medication refills not needed today.  Pharmacy name entered into [employer]:    [id number] [place] [facility] [address] [place] [address] PHARMACY [address] [place] [address] [address] [facility] [address]    Clinical concerns: new patient consult       Deneen Ashley CMA              "

## 2019-05-08 NOTE — LETTER
5/8/2019         RE: Edi Villafana  [address]  [place] [address]        Dear Colleague,    Thank you for referring your patient, Edi Villafana, to the [facility]ARE. Please see a copy of my visit note below.          Chief Complaint:   Weak urinary stream; solitary kidney         Consult or Referral:     Mr. Edi Villafana is a 74 year old male seen at the request of Dr. Lucia.         History of Present Illness:   Edi Villafana is a very pleasant 74 year old male who presents with a history of BPH with urinary retention. 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 Per patient, this was benign. He also has a congenital solitary right kidney. Historically, patient also states he has had some ureteral dilation noted in the past and has has cystoscopy and workup for this. These records are not available for review today.     More recently, he reports having urinary retention while he was in the hospital in February. Passed TOV prior to discharge and here for evaluation. At baseline, he reports noting weak stream and feeling of incomplete emptying. He has not previously tried medications for this. No hematuria or dysuria. No flank pain or abdominal pain currently. Is bothered by his chronic shoulder pain. Cr at time of last admission was 0.95, which is at his baseline.         Past Medical History:     Past Medical History:   Diagnosis Date     Decreased libido      Diverticulosis of colon (without mention of hemorrhage)      Generalized anxiety disorder      Insomnia, unspecified      Other and unspecified hyperlipidemia      Other specified congenital anomaly of kidney      Other testicular hypofunction      Subjective visual disturbance, unspecified      Unspecified cataract      Unspecified essential hypertension      Unspecified hypothyroidism           Past Surgical  History:     Past Surgical History:   Procedure Laterality Date     ABDOMEN SURGERY  1973    large benign tumor removed with thrombophlebitis post op     APPENDECTOMY  1960     ARTHROPLASTY HIP ANTERIOR Right 8/9/2018    Procedure: ARTHROPLASTY HIP ANTERIOR;  RIGHT TOTAL HIP ARTHROPLASTY DIRECT ANTERIOR APPROACH ;  Surgeon: Chesetr Ortiz MD;  Location: [facility] OR     CATARACT IOL, RT/LT      bilateral     EYE SURGERY  [id number], 2007    eyelid surgery, 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, trabeculoplasty          Medications     Current Outpatient Medications   Medication     acetaminophen (TYLENOL) 500 MG tablet     atorvastatin (LIPITOR) 40 MG tablet     Cholecalciferol (VITAMIN D3) 2000 UNITS CAPS     levothyroxine (SYNTHROID/LEVOTHROID) 125 MCG tablet     LORazepam (ATIVAN) 2 MG tablet     losartan (COZAAR) 50 MG tablet     Multiple Vitamin (MULTIVITAMIN OR)     oxyCODONE (ROXICODONE) 5 MG tablet     polyethylene glycol (MIRALAX/GLYCOLAX) packet     TESTOSTERONE 2 MG/GM CREAM     senna-docusate (SENOKOT-S;PERICOLACE) 8.6-50 MG per tablet     No current facility-administered medications for this visit.           Family History:     Family History   Problem Relation Age of Onset     Hypertension Mother      Lipids Mother      Heart Disease Mother      Psychotic Disorder Mother         severe depression     Heart Disease Father      Ovarian Cancer Sister      Colon Cancer Paternal Grandfather           Social History:     Social History     Socioeconomic History     Marital status: Single     Spouse name: Not on file     Number of children: Not on file     Years of education: Not on file     Highest education level: Not on file   Occupational History     Not on file   Social Needs     Financial resource strain: Not on file     Food insecurity:     Worry: Not on file     Inability: Not on file     Transportation needs:     Medical: Not on file     Non-medical: Not on file   Tobacco Use     Smoking status: Never Smoker      "Smokeless tobacco: Never Used   Substance and Sexual Activity     Alcohol use: Yes     Drug use: No     Sexual activity: Never   Lifestyle     Physical activity:     Days per week: Not on file     Minutes per session: Not on file     Stress: Not on file   Relationships     Social connections:     Talks on phone: Not on file     Gets together: Not on file     Attends Muslim service: Not on file     Active member of club or organization: Not on file     Attends meetings of clubs or organizations: Not on file     Relationship status: Not on file     Intimate partner violence:     Fear of current or ex partner: Not on file     Emotionally abused: Not on file     Physically abused: Not on file     Forced sexual activity: Not on file   Other Topics Concern     Parent/sibling w/ CABG, MI or angioplasty before 65F 55M? Not Asked   Social History Narrative     Not on file          Allergies:   Ace inhibitors; Ancef [cefazolin sodium]; Atenolol; Compazine; Sulfa drugs; and Tramadol         Review of Systems:  From intake questionnaire     Skin: negative  Eyes: negative  Ears/Nose/Throat: negative  Respiratory: No shortness of breath, dyspnea on exertion, cough, or hemoptysis  Cardiovascular: No chest pain or palpitations  Gastrointestinal: negative; no nausea/vomiting, constipation or diarrhea  Genitourinary: as per HPI  Musculoskeletal: notes bilateral shoulder pain  Neurologic: negative  Psychiatric: negative  Hematologic/Lymphatic/Immunologic: negative  Endocrine: negative         Physical Exam:     Patient is a 74 year old  male   Vitals: Blood pressure 138/89, pulse 74, temperature 98.4  F (36.9  C), temperature source Oral, resp. rate 18, height 1.93 m (6' 4\"), weight 93.9 kg (207 lb), SpO2 94 %.  Constitutional: Body mass index is 25.2 kg/m .  Alert, no acute distress, oriented, conversant  Eyes: no scleral icterus; extraocular muscles intact, moist conjunctivae  Neck: trachea midline, no " thyromegaly  Ears/nose/mouth: throat/mouth:normal, good dentition  Respiratory: no respiratory distress, or pursed lip breathing  Cardiovascular: no obvious jugular venous distension present  Gastrointestinal: soft, nontender, no organomegaly or masses; LLQ incision well-healed   Lymphatics: No inguinal adenopathy  Musculoskeltal: extremities normal, no peripheral edema  Skin: no suspicious lesions or rashes  Neuro: Alert, oriented, speech and mentation normal  Psych: affect and mood normal, alert and oriented to person, place and time  Gait: Normal  : penis, scrotum, testes normal  TERERLL anodular, symmetric    PVR = 26 ml      Labs and Pathology:    I reviewed all applicable laboratory and pathology data and went over findings with patient  Significant for   Lab Results   Component Value Date    CR 0.95 02/10/2019    CR 0.90 02/09/2019    CR 0.89 12/05/2018    CR 0.87 08/10/2018    CR 1.01 08/09/2018    CR 1.00 07/16/2018    CR 0.97 03/27/2018    CR 0.96 03/12/2018    CR 0.94 01/03/2018    CR 0.95 11/29/2017     PSA   Date Value Ref Range Status   12/05/2018 0.40 0 - 4 ug/L Final     Comment:     Assay Method:  Chemiluminescence using Siemens Vista analyzer   03/12/2018 0.58 0 - 4 ug/L Final     Comment:     Assay Method:  Chemiluminescence using Siemens Vista analyzer   12/12/2016 0.57 0 - 4 ug/L Final   06/14/2016 0.52 0 - 4 ug/L Final   12/15/2014 0.51 0 - 4 ug/L Final   05/13/2014 0.50 0 - 4 ug/L Final   12/18/2013 0.72 0 - 4 ug/L Final   10/23/2013 0.74 0 - 4 ug/L Final   05/01/2013 1.22 0 - 4 ug/L Final   01/25/2012 0.62 0.00 - 4.00 ng/ml Final       Imaging:    I reviewed all applicable imaging and went over findings with patient.    CT abd/pelvis 2/9/2019  IMPRESSION:   1. Moderate to large amount of stool in the rectum, with a moderate  amount stool throughout the remainder of the colon.  Findings suggest  constipation.   2. 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  3. The mid and distal  portion of the left ureter appears mildly  dilated, possibly chronic, with no definite cause identified.      Outside and Past Medical records:    I spent 10 minutes reviewing outside and past medical records.         Assessment and Plan:     Assessment: 74 year old male with solitary right kidney and mild mid-ureteral dilation without hydronephrosis. No lesions noted on CT and no hematuria. Will send UA today. The ureteral dilation appears to be chronic, based on history and he notes no flank pain and has no evidence of renal functional decline at this time. Regarding his urinary symptoms, we discussed option of tamsulosin and the risks/side effects of this medication. He expresses interest in a trial.    Plan:  PVR and UA today  Tamsulosin 0.4 mg daily  Follow-up 3 months for PVR and symptom review    Orders  Orders Placed This Encounter   Procedures     UA reflex to Microscopic and Culture [[id number]]     Ramon Acuña MD  Urology  [facility]  Clinic Phone [phone]        Again, thank you for allowing me to participate in the care of your patient.        Sincerely,        Ramon Acuña MD

## 2019-05-09 ENCOUNTER — TELEPHONE (OUTPATIENT)
Dept: ONCOLOGY | Facility: CLINIC | Age: 74
End: 2019-05-09

## 2019-05-09 NOTE — TELEPHONE ENCOUNTER
Patient calling with some medication questions, would like a call back to discuss.    Alicia Waite, EVELIO on 5/9/2019 at 1:58 PM

## 2019-05-09 NOTE — TELEPHONE ENCOUNTER
S-(situation): Questions about medication Flomax. When to take, if it should be taken with or without food.     B-(background): BPH, with single kidney    A-(assessment): Pt expressed concerns taking Flomax with BP medication. Pt has hypertension but has experienced orthostatic hypotension in the past. Pt wants to be cautious and communicate with Dr. Acuña these concerns. Pt asks if there is any further instructions or guidance form Dr. Acuña?     Pt wants to protect kidney and issues with urinary retention. Pt encouraged to take Flomax medication in the morning and his hypertension medication in the evening as per usual. Educated Pt to monitor for signs and symptoms of orthostatic hypotension such a dizziness, light-headedness, faint feeling and to report. Pt educated to stand and wait for 10 to 15 seconds after lying or prolonged sitting to allow his body a moment for equalibrium.     R-(recommendations): RNCC reviewed Pt education from Up-to-Date for Tamsulosin. Pt may take medication with or without food. States to take the same time of day each day. If taking BP medication, Tamsulosin medication may lower blood pressure. Pt states he takes BP medication in the evening before bedtime. Pt instructed to take Tamsulosin in morning the same time every day.     This communication will be forwarded to Dr. Acuañ to review and advise if any further instruction or education necessary.     No further questions or concerns.     Paige Arzate, COCON, RN, OCN  RN Cancer Care Coordinator  [facility]  [phone]

## 2019-05-13 NOTE — TELEPHONE ENCOUNTER
RNCC received communication from Dr. Acuña stating to reinforce as Pt was counseled.     Writer called Pt and reinforced as previously counseled. Pt extended gratitude for follow-up call to address his concerns and questions. States he exercises an abundance of caution as he only has one kidney remaining. No further concerns. Pt instructed to report any side effects or symptoms potentially related to new medication. Pt agrees with plan.     COCO GrandeN, RN, OCN  RN Cancer Care Coordinator  [facility]  [phone]

## 2019-05-14 ENCOUNTER — TELEPHONE (OUTPATIENT)
Dept: FAMILY MEDICINE | Facility: CLINIC | Age: 74
End: 2019-05-14

## 2019-05-14 DIAGNOSIS — G89.29 CHRONIC LEFT SHOULDER PAIN: ICD-10-CM

## 2019-05-14 DIAGNOSIS — M25.512 CHRONIC LEFT SHOULDER PAIN: ICD-10-CM

## 2019-05-14 NOTE — TELEPHONE ENCOUNTER
Reason for Call:  Medication or medication refill:    Do you use a [facility]?  Name of the pharmacy and phone number for the current request:     [facility] [id number] [place] [facility], [address]    Name of the medication requested: oxyCODONE (ROXICODONE) 5 MG tablet    Other request:     Can we leave a detailed message on this number? YES    Phone number patient can be reached at: Home number on file [phone] (home)    Best Time: anytime     Call taken on 5/14/2019 at 9:53 AM by Jessa Barillas

## 2019-05-14 NOTE — TELEPHONE ENCOUNTER
Controlled Substance Refill Request for oxyCODONE (ROXICODONE) 5 MG tablet  Problem List Complete:  Yes    No CSA on file   check 1/16/19 no concerns    Last Written Prescription Date:  4/17/2019  Last Fill Quantity: 90 tablet,  # refills: 0   Last office visit: 2/13/2019 with prescribing provider:  Rea   Future Office Visit:        Controlled substance agreement:   Encounter-Level CSA - 07/16/2018:    Controlled Substance Agreement - Scan on 7/26/2018 12:19 PM: CONTROLLED SUBSTANCE AGREEMENT (below)       Patient-Level CSA:    There are no patient-level csa.         Last Urine Drug Screen: No results found for: CDAUT, No results found for: COMDAT, No results found for: THC13, PCP13, COC13, MAMP13, OPI13, AMP13, BZO13, TCA13, MTD13, BAR13, OXY13, PPX13, BUP13     Processing:  Patient will  in clinic    [email or web address]   checked in past 3 months?  No, route to RN

## 2019-05-15 ENCOUNTER — TELEPHONE (OUTPATIENT)
Dept: FAMILY MEDICINE | Facility: CLINIC | Age: 74
End: 2019-05-15

## 2019-05-15 DIAGNOSIS — M25.512 CHRONIC LEFT SHOULDER PAIN: ICD-10-CM

## 2019-05-15 DIAGNOSIS — G89.29 CHRONIC LEFT SHOULDER PAIN: ICD-10-CM

## 2019-05-15 NOTE — TELEPHONE ENCOUNTER
Pt would like to talk to PCP re: personal information. He was offered a phone visit and e-visit but pt declined. He would prefer to discuss information with provider only without an appointment. Notes call is non urgent but would appreciate response. Routing message to provider.

## 2019-05-15 NOTE — TELEPHONE ENCOUNTER
Controlled Substance Refill Request for oxyCODONE (ROXICODONE) 5 MG tablet  Problem List Complete:  No     PROVIDER TO CONSIDER COMPLETION OF PROBLEM LIST AND OVERVIEW/CONTROLLED SUBSTANCE AGREEMENT  oxyCODONE (ROXICODONE) 5 MG tablet 90 tablet 0 4/17/2019          THE MOST RECENT OFFICE VISIT MUST BE WITHIN THE PAST 3 MONTHS. AT LEAST ONE FACE TO FACE VISIT MUST OCCUR EVERY 6 MONTHS. ADDITIONAL VISITS CAN BE VIRTUAL.  (THIS STATEMENT SHOULD BE DELETED.)    Last Office Visit with [facility] [place] care provider: 02/13/2019    Future Office visit:     Controlled substance agreement:   Encounter-Level CSA - 07/16/2018:    Controlled Substance Agreement - Scan on 7/26/2018 12:19 PM: CONTROLLED SUBSTANCE AGREEMENT (below)       Patient-Level CSA:    There are no patient-level csa.         Last Urine Drug Screen: No results found for: CDAUT, No results found for: COMDAT, No results found for: THC13, PCP13, COC13, MAMP13, OPI13, AMP13, BZO13, TCA13, MTD13, BAR13, OXY13, PPX13, BUP13     Processing:  Patient will  in clinic or e-scribed to clinic.     [email or web address]       checked in past 3 months?  Yes  check 05/15/2019 no concerns

## 2019-05-15 NOTE — TELEPHONE ENCOUNTER
Reason for Call:  Other call back    Detailed comments: has questions re medical info-personal for JRB    Phone Number Patient can be reached at: Home number on file [phone] (home)    Best Time:     Can we leave a detailed message on this number? YES    Call taken on 5/15/2019 at 3:16 PM by XOCHITL BRAR

## 2019-05-16 ENCOUNTER — PATIENT OUTREACH (OUTPATIENT)
Dept: ONCOLOGY | Facility: CLINIC | Age: 74
End: 2019-05-16

## 2019-05-16 RX ORDER — OXYCODONE HYDROCHLORIDE 5 MG/1
5 TABLET ORAL 4 TIMES DAILY PRN
Qty: 90 TABLET | Refills: 0
Start: 2019-05-16 | End: 2019-06-11

## 2019-05-16 RX ORDER — OXYCODONE HYDROCHLORIDE 5 MG/1
5 TABLET ORAL 3 TIMES DAILY
Qty: 90 TABLET | Refills: 0 | Status: SHIPPED | OUTPATIENT
Start: 2019-05-16 | End: 2019-05-16

## 2019-05-16 NOTE — PROGRESS NOTES
Called patient to let him know that MD was fine with him taking Ibuprofin on occasion. His Kidney function tests are wnl and he can take 400mg on occasion and for no more than 2 days in a row. Patient verbalized understanding and will contact clinic with additional questions or concerns.  Will route information to Dr. Acuña's CC.  Keo Cano RN, BSN, OCN  [facility] [place]  Patient Care Coordinator

## 2019-05-16 NOTE — PROGRESS NOTES
S: Patient called with medication question ~ use of Ibuprofin  B: Patient has one kidney and urinary retention and is seen by   A: Patient reports being told by 3 different physicians that he should not use Ibuprofin or other NSAIDs due to having only one kidney.  Reports he has not heard this advice from  so would like to know if it would be OK to take Ibuprofin occassionally and if so how much?   R:  Writer will send question to MD and f/u with patient with response.  Writer did review rational for avoiding NSAID's and patient verbalized understanding of rational, but states that Ibuprofin did provide good relief when he used it in the past.  Keo Cano, RN, BSN, OCN  [facility] [place]  Patient Care Coordinator

## 2019-05-16 NOTE — TELEPHONE ENCOUNTER
I talked with the patient about his pain medication.  On occasions he needs to take a fourth pill a day.  He will need a refill earlier than usual.  Since I already sent that to the pharmacy today when he calls next time we will adjust his prescription.

## 2019-06-05 ENCOUNTER — TELEPHONE (OUTPATIENT)
Dept: FAMILY MEDICINE | Facility: CLINIC | Age: 74
End: 2019-06-05

## 2019-06-06 DIAGNOSIS — E03.9 HYPOTHYROIDISM, UNSPECIFIED TYPE: ICD-10-CM

## 2019-06-06 RX ORDER — LEVOTHYROXINE SODIUM 125 UG/1
TABLET ORAL
Qty: 96 TABLET | Refills: 3 | Status: SHIPPED | OUTPATIENT
Start: 2019-06-06 | End: 2020-01-01

## 2019-06-11 ENCOUNTER — TELEPHONE (OUTPATIENT)
Dept: FAMILY MEDICINE | Facility: CLINIC | Age: 74
End: 2019-06-11

## 2019-06-11 DIAGNOSIS — G89.29 CHRONIC LEFT SHOULDER PAIN: Primary | ICD-10-CM

## 2019-06-11 DIAGNOSIS — E03.9 HYPOTHYROIDISM, UNSPECIFIED TYPE: ICD-10-CM

## 2019-06-11 DIAGNOSIS — M25.512 CHRONIC LEFT SHOULDER PAIN: ICD-10-CM

## 2019-06-11 DIAGNOSIS — M25.512 CHRONIC LEFT SHOULDER PAIN: Primary | ICD-10-CM

## 2019-06-11 DIAGNOSIS — Z96.641 STATUS POST RIGHT HIP REPLACEMENT: ICD-10-CM

## 2019-06-11 DIAGNOSIS — G89.29 CHRONIC LEFT SHOULDER PAIN: ICD-10-CM

## 2019-06-11 RX ORDER — OXYCODONE HYDROCHLORIDE 5 MG/1
5 TABLET ORAL 4 TIMES DAILY PRN
Qty: 90 TABLET | Refills: 0 | Status: SHIPPED | OUTPATIENT
Start: 2019-06-11 | End: 2019-06-12

## 2019-06-11 NOTE — TELEPHONE ENCOUNTER
Controlled Substance Refill Request for: oxyCODONE (ROXICODONE) 5 MG tablet  Problem List Complete:  No     PROVIDER TO CONSIDER COMPLETION OF PROBLEM LIST AND OVERVIEW/CONTROLLED SUBSTANCE AGREEMENT    Last Written Prescription Date:  5/16/2019  Last Fill Quantity: 90,   # refills: 0    No CSA on file   check 05/15/2019 no concerns    THE MOST RECENT OFFICE VISIT MUST BE WITHIN THE PAST 3 MONTHS. AT LEAST ONE FACE TO FACE VISIT MUST OCCUR EVERY 6 MONTHS. ADDITIONAL VISITS CAN BE VIRTUAL.  (THIS STATEMENT SHOULD BE DELETED.)    [place] [facility] [place] 2/13/2019, Rea    Future Office visit:   Next 5 appointments (look out 90 days)    Aug 14, 2019  3:30 PM CDT  Return Visit with Ramon Acuña MD  [facility]  [address]  [address]  [facility] [address]  [phone]          Controlled substance agreement:   Encounter-Level CSA - 07/16/2018:    Controlled Substance Agreement - Scan on 7/26/2018 12:19 PM: CONTROLLED SUBSTANCE AGREEMENT (below)       Patient-Level CSA:    There are no patient-level csa.         Last Urine Drug Screen: No results found for: CDAUT, No results found for: COMDAT, No results found for: THC13, PCP13, COC13, MAMP13, OPI13, AMP13, BZO13, TCA13, MTD13, BAR13, OXY13, PPX13, BUP13     Processing:  eprescribe, or  in clinic     [email or web address]       checked in past 3 months?  Yes 5/16/2019     Routing refill request to provider for review/approval because:  Please see patient message:

## 2019-06-11 NOTE — TELEPHONE ENCOUNTER
Reason for Call:  Other call back    Detailed comments: states needs to discuss upcoming surgery and referrals he may need-refused referral line. Stated no point in telling me when he needs to discuss with nurse as that would be a waste of time    Phone Number Patient can be reached at: Cell number on file:    Telephone Information:   Mobile [phone]       Best Time:     Can we leave a detailed message on this number? NO    Call taken on 6/11/2019 at 10:45 AM by XOCHITL BRAR

## 2019-06-11 NOTE — TELEPHONE ENCOUNTER
Note in chart with JRMARCO ANTONIO says he sometimes takes it qid, not qid everyday so I do not feel comfortable making that change.

## 2019-06-11 NOTE — TELEPHONE ENCOUNTER
Routing to provider to approve care coordination referral.     Patient calling with detailed information regarding his past surgical history. States he has had treatment for 4 years for deteriorating shoulder. Managing with pain medications with Dr. Lucia. States he is unsure where he should have surgery - expresses concerns over [facility] due to bad experiences. Is unsure if he wants second opinion from [facility], although recommended by surgeon.     Pt states medicare requires a 3 night stay to pay for TCU and he doubts they will keep him 3 nights. However, he expresses concerns over being able to manage his care at home post surgery (no family in place, co-morbidities). Surgery is not currently scheduled, but he wants plan in place before he schedules. Pt requesting assistance from care coordinator.     Referral pended for care coordinator. Pt questioning refill request. Reassured it is being processed.

## 2019-06-11 NOTE — TELEPHONE ENCOUNTER
Patient states that he would like this sent back to Dr. Lucia. He wants 120 tablets as he says he takes 4 a day. He also does not want to drive here to  the script and wants it sent electronically.

## 2019-06-11 NOTE — TELEPHONE ENCOUNTER
Patient states he is out of medication and that is due to the fact he was increased from 3 a day to 4 a day.

## 2019-06-11 NOTE — TELEPHONE ENCOUNTER
Reason for Call:  Medication or medication refill:    Do you use a [facility]?  Name of the pharmacy and phone number for the current request:  [facility]    Name of the medication requested: oxyCODONE (ROXICODONE) 5 MG tablet    Other request: states JRB increased to 4 every day and told to call when running out-needs new script for oxyCODONE (ROXICODONE) 5 MG tablet qid    Can we leave a detailed message on this number? YES    Phone number patient can be reached at: Cell number on file:    Telephone Information:   Mobile [phone]       Best Time: needs asap    Call taken on 6/11/2019 at 10:40 AM by XOCHITL BRAR

## 2019-06-11 NOTE — TELEPHONE ENCOUNTER
Is patient able to get a full months worth, 120 tablets instead of 90 as he is taking this QID? Please advise.

## 2019-06-12 ENCOUNTER — PATIENT OUTREACH (OUTPATIENT)
Dept: CARE COORDINATION | Facility: CLINIC | Age: 74
End: 2019-06-12

## 2019-06-12 DIAGNOSIS — G89.29 CHRONIC LEFT SHOULDER PAIN: ICD-10-CM

## 2019-06-12 DIAGNOSIS — M25.512 CHRONIC LEFT SHOULDER PAIN: ICD-10-CM

## 2019-06-12 RX ORDER — OXYCODONE HYDROCHLORIDE 5 MG/1
5 TABLET ORAL 4 TIMES DAILY PRN
Qty: 120 TABLET | Refills: 0 | Status: SHIPPED | OUTPATIENT
Start: 2019-06-12 | End: 2019-07-09

## 2019-06-12 RX ORDER — OXYCODONE HYDROCHLORIDE 5 MG/1
5 TABLET ORAL 4 TIMES DAILY PRN
Qty: 120 TABLET | Refills: 0 | Status: SHIPPED | OUTPATIENT
Start: 2019-06-12 | End: 2019-01-01

## 2019-06-12 ASSESSMENT — ACTIVITIES OF DAILY LIVING (ADL): DEPENDENT_IADLS:: INDEPENDENT

## 2019-06-12 NOTE — LETTER
[place] CARE COORDINATION  [facility]  [address], [place] [address]    June 12, 2019    Edi Villafana  [address]  [facility] [facility] [address]      Dear Edi,    I am a clinic care coordinator who works with Enrique Lucia MD at [facility]. I wanted to thank you for spending the time to talk with me and provide you with my contact information so that you can call me with questions or concerns about your health care. Below is a description of clinic care coordination and how I can further assist you.     The clinic care coordinator is a registered nurse and/or  who understand the health care system. The goal of clinic care coordination is to help you manage your health and improve access to the [facility] system in the most efficient manner. The registered nurse can assist you in meeting your health care goals by providing education, coordinating services, and strengthening the communication among your providers. The  can assist you with financial, behavioral, psychosocial, chemical dependency, counseling, and/or psychiatric resources.    Please feel free to contact me at [phone] with any questions or concerns. We at [facility] are focused on providing you with the highest-quality healthcare experience possible and that all starts with you.     Sincerely,     Leo Bello RN  Clinic Care Coordinator  [facility]  Ph: [phone]    Enclosed: I have enclosed a copy of the Complex Care Plan. This has helpful information and goals that we have talked about. Please keep this in an easy to access place to use as needed.

## 2019-06-12 NOTE — TELEPHONE ENCOUNTER
Patient called clinic to request that [facility] [place]. He's out of meds and would like a call when this is sent over.

## 2019-06-12 NOTE — TELEPHONE ENCOUNTER
Looks like Rx was generated but sent as E-scribe and came back as error. Dr. Lucia: can you print out and sign?

## 2019-06-12 NOTE — TELEPHONE ENCOUNTER
Patient calling again and requesting that the prescription be sent to [facility] in [place]. Pended pharmacy.

## 2019-06-12 NOTE — TELEPHONE ENCOUNTER
Order sent by Dr Lucia  to [facility] in [place], electronically.     Call made to the pharmacy to make sure they received the medication.   Pharmacy states it should take an hour or so to get filled.      The patient then called back, I told him the status of this order, to his apparent satisfaction.

## 2019-06-12 NOTE — LETTER
[facility]  Complex Care Plan  About Me:    Patient Name:  Edi Villafana    YOB: 1945  Age:         74 year old   Nataliya MRN:    [id number] Telephone Information:  Home Phone [phone]   Mobile [phone]       Address:  [address]  [facility] [address] Email address:  [email or web address]      Emergency Contact(s)    Name Relationship Lgl Grd Work Phone Home Phone Mobile Phone   1. SID SYLVESTER* Relative No   [phone]   2. SANDRINE ARVIZU Friend No  [phone]   3. LARA NELSON Other No      4. SLY ARVIZU Friend    [phone]           Primary language:  English     needed? No   [place] [employer]:  [phone] op. 1  Other communication barriers: None  Preferred Method of Communication:  Mail  Current living arrangement: I live alone  Mobility Status/ Medical Equipment: Independent    Health Maintenance  Health Maintenance Reviewed: Due/Overdue   Health Maintenance Due   Topic Date Due     URINE DRUG SCREEN  1945     ZOSTER IMMUNIZATION (2 of 3) 04/08/2010     My Access Plan  Medical Emergency 911   Primary Clinic Line [facility] [phone]   24 Hour Appointment Line [phone] or  [phone]) (toll-free)   24 Hour Nurse Line [phone] (toll-free)   Preferred Urgent Care [facility] [phone]   Preferred Hospital [facility]  [phone]   Preferred Pharmacy [facility] [address]     Behavioral Health Crisis Line The National Suicide Prevention Lifeline at 1-365.700.7529 or 911     My Care Team Members  Patient Care Team       Relationship Specialty Notifications Start End    Enrique Lucia MD PCP - General Family Practice  10/17/12     Phone: [phone] Fax: [phone]         [address] [facility] [address]    Enrique Lucia MD Assigned  PCP   4/26/19     Phone: [phone] Fax: [phone]         [address] [facility] [address]    Leo Bello, RN Lead Care Coordinator Primary Care - CC  6/12/19     Phone: [phone]                 My Care Plans  Self Management and Treatment Plan  Goals and (Comments)  Goals        General    1. Psychosocial (pt-stated)     Notes - Note created  6/12/2019  9:53 AM by Leo Bello, RN    Goal Statement: I will help to ensure successful recovery from shoulder surgery by having a plan in place for my care.  Measure of Success: The patient will verbalize plan for his care following his shoulder surgery.  Supportive Steps to Achieve: The patient will decide where he would like to have his shoulder surgery and will inquire about options for TCU admission following hospitalization.  The patient will update CCC RN regarding shoulder surgery plans.  CCC RN will continue routine patient outreaches to assist patient with resources for support following shoulder surgery.  Barriers: The patient reports no friend/family support.  Strengths: The patient is motivated to have a safe plan in place for his care following his shoulder surgery.  Date to Achieve By: 7/31/19  Patient expressed understanding of goal: Yes           My Medical and Care Information  Problem List   Patient Active Problem List   Diagnosis     Hypothyroidism     Hyperlipidemia LDL goal <100     Persistent insomnia     Hypertension goal BP (blood pressure) < 140/90     Anxiety     Hypotestosteronism     Trochanteric bursitis     Depression with anxiety     Left shoulder pain     Physical deconditioning     Alcoholism (H)     Chronic left shoulder pain     Pain of left calf     Acute deep vein thrombosis (DVT) of distal vein of left lower extremity (H)     Anticoagulation management encounter     Hip pain, right     History of deep venous thrombosis     Status post right hip replacement     Constipation     Solitary kidney     BPH with urinary  obstruction      Current Medications:  Current Outpatient Medications   Medication     acetaminophen (TYLENOL) 500 MG tablet     atorvastatin (LIPITOR) 40 MG tablet     Cholecalciferol (VITAMIN D3) 2000 UNITS CAPS     levothyroxine (SYNTHROID/LEVOTHROID) 125 MCG tablet     LORazepam (ATIVAN) 2 MG tablet     losartan (COZAAR) 50 MG tablet     Multiple Vitamin (MULTIVITAMIN OR)     oxyCODONE (ROXICODONE) 5 MG tablet     polyethylene glycol (MIRALAX/GLYCOLAX) packet     senna-docusate (SENOKOT-S;PERICOLACE) 8.6-50 MG per tablet     tamsulosin (FLOMAX) 0.4 MG capsule     TESTOSTERONE 2 MG/GM CREAM          Care Coordination Start Date: 6/12/2019   Frequency of Care Coordination: 2 weeks   Form Last Updated: 06/12/2019

## 2019-06-12 NOTE — PROGRESS NOTES
"Clinic Care Coordination Contact    Clinic Care Coordination Contact  OUTREACH    Referral Information:  Referral Source: PCP  Primary Diagnosis: Psychosocial  Chief Complaint   Patient presents with     Clinic Care Coordination - Initial     PCP referral     Clinical Concerns:  CCC RN received referral from PCP clinic staff requesting outreach to patient to discuss his concerns about after-care following shoulder surgery that he needs to have done.    [facility] RN called and spoke with the patient who updated that he needs to have surgery on his left shoulder but has a lot of anxiety surrounding how he would manage his care following the procedure.  His preference would be to go to a TCU but isn't confident that he would be able to have the 3-day hospital stay that would allow his insurance to cover the rehab stay.    The patient stated he has met with several surgeons in efforts to decide who he would like to perform his surgery.  He stated one of the surgeons referred him to [facility], so he may look into that option yet.  He did report that one of the surgeons he met with did agree to keep in the hospital for 3 days following his surgery in order to allow for his discharge to TCU, but the patient isn't confident that the surgeon would have ultimate control over that.    CCC RN spoke with the patient at length about alternative options such as home care, meal programs, housekeeping services, and transportation resources.  At this time, the patient stated he \"just has a bad connotation with home care\" and would like to avoid that option, if possible.  He also mentioned that his financial situation is tight, so he isn't interested in any private-pay options (i.e. housekeeping, etc.)    For the time being, no decisions/actions will be made until the patient knows more about where he will have his shoulder surgery performed and when.  The patient agreed to contact CCC RN with any updates about this, and was agreeable to " "continued CCC RN outreaches to provide ongoing support and assistance as needed.    Pain  Chronic left shoulder pain    Medication Management:  Chronic pain medication use    Functional Status:  Independent    Living Situation:  Lives alone in an apartment    Transportation:  The patient drives himself independently.  He stated he would have a friend that can drive him home following his shoulder surgery, but doesn't have options for transportation to appointments after that.     Psychosocial:  The patient reports that he has no family, and has very limited access to friends that might be able to help as most of them \"aren't interested\".     Goals Addressed                 This Visit's Progress       Patient Stated      1. Psychosocial (pt-stated)   New     Goal Statement: I will help to ensure successful recovery from shoulder surgery by having a plan in place for my care.  Measure of Success: The patient will verbalize plan for his care following his shoulder surgery.  Supportive Steps to Achieve: The patient will decide where he would like to have his shoulder surgery and will inquire about options for TCU admission following hospitalization.  The patient will update CCC RN regarding shoulder surgery plans.  CCC RN will continue routine patient outreaches to assist patient with resources for support following shoulder surgery.  Barriers: The patient reports no friend/family support.  Strengths: The patient is motivated to have a safe plan in place for his care following his shoulder surgery.  Date to Achieve By: 7/31/19  Patient expressed understanding of goal: Yes          Patient/Caregiver understanding: Yes  Outreach Frequency: 2 weeks  Future Appointments              In 2 months Ramon Acuña MD [facility]        Plan: The patient will continue evaluating options for surgeons/facilities for his shoulder surgery and will update CCC RN with any solidified " plans.  The patient will continue thinking about option of home care in case discharge to TCU is not feasible.  CCC RN will outreach to patient in approximately 2 weeks to get updates on patient status, assess goal progress, and offer additional support and resources as indicated.    Leo Bello RN  Clinic Care Coordinator  [facility]  Ph: [phone]

## 2019-07-01 ENCOUNTER — HOSPITAL ENCOUNTER (OUTPATIENT)
Dept: PHYSICAL THERAPY | Facility: CLINIC | Age: 74
Setting detail: THERAPIES SERIES
End: 2019-07-01
Attending: ORTHOPAEDIC SURGERY
Payer: MEDICARE

## 2019-07-01 PROCEDURE — 97162 PT EVAL MOD COMPLEX 30 MIN: CPT | Mod: GP | Performed by: PHYSICAL THERAPIST

## 2019-07-01 PROCEDURE — 97110 THERAPEUTIC EXERCISES: CPT | Mod: GP | Performed by: PHYSICAL THERAPIST

## 2019-07-01 ASSESSMENT — 6 MINUTE WALK TEST (6MWT): TOTAL DISTANCE WALKED (FT): 1485

## 2019-07-01 NOTE — PROGRESS NOTES
[facility]        OUTPATIENT PHYSICAL THERAPY FUNCTIONAL EVALUATION  PLAN OF TREATMENT FOR OUTPATIENT REHABILITATION  (COMPLETE FOR INITIAL CLAIMS ONLY)  Patient's Last Name, First Name, M.I.  YOB: 1945  Edi Villafana     Provider's Name   [facility]   Medical Record No.  [id number]     Start of Care Date:  07/01/19   Onset Date:  08/09/18   Type:     _X__PT   ____OT  ____SLP Medical Diagnosis:   S/p R CATRACHITO     PT Diagnosis:  Weakness, pain, Difficulty walking Visits from SOC:  1                              __________________________________________________________________________________  Plan of Treatment/Functional Goals:  balance training, gait training, joint mobilization, neuromuscular re-education, ROM, strengthening, stretching           GOALS  6MWT  Client will score > 1750 ft to be closer to previous baseline on 6MWT and demonstrate improved activity tolerance  09/28/19    FGA  Client will improve score to a 27/30 or better on FGA to demonstrate improved balance and decrease risk for falls   09/28/19    HEP  Client will be independent in HEP to help towards improving strength and functional mobility to meet the above goals.   09/28/19         Therapy Frequency:  other (see comments)(1x every 2 weeks)   Predicted Duration of Therapy Intervention:  90 days    Maine Harrington, PT                                    I CERTIFY THE NEED FOR THESE SERVICES FURNISHED UNDER        THIS PLAN OF TREATMENT AND WHILE UNDER MY CARE .             Physician Signature               Date    X_____________________________________________________                      Certification Date From:    7/1/19  Certification Date To:   9/28/19    Referring Provider:  Dr. Chester Connell    Initial Assessment  See Epic Evaluation- Start of Care Date: 07/01/19

## 2019-07-01 NOTE — PROGRESS NOTES
07/01/19 1400   Quick Adds   Type of Visit Initial OP PT Evaluation   General Information   Start of Care Date 07/01/19   Referring Physician Dr. Chester Connell   Orders Evaluate and Treat as Indicated   Order Date 06/07/19   Medical Diagnosis s/p R CATRACHITO   Onset of illness/injury or Date of Surgery 08/09/18   Surgical/Medical history reviewed Yes   Pertinent history of current problem (include personal factors and/or comorbidities that impact the POC) L Hip replacement Aug. 2018.  Has had a few incidents of weakness, instability and pain.  1 episode at [facility]  after he had been walking for a while, where it seized up and couldn't walk but stil had pain for a while afterwards.  Another episode where he leaned down and hip popped with a sharp pain.  3rd episode stepping down off a boat.  These episodes were concerning and seeking PT for strengthening and stretching.  Recently renewed gym membership and needs guidance on what he can do.  B shoulder issues L worse than R.  Planning to have TSR but is waiting as long as he can. Concerned about after care.  PMH: Generalized anxiety disorder, pancreas, kidney resection, cataracts   Pertinent Visual History  Wears glasses.    Prior level of function comment Independent with all functional mobility.  Reports he walks a lot but not a regular exercise program.  Record of steps range from 1000 to 3000 steps   Living environment Apartment/condo   Home/Community Accessibility Comments Lives alone   Patient/Family Goals Statement increase strength and decrease pain in R hip.    Fall Risk Screen   Fall screen completed by PT   Have you fallen 2 or more times in the past year? No   Have you fallen and had an injury in the past year? No   Timed Up and Go score (seconds) 7.28   Is patient a fall risk? Yes   Fall screen comments according to FGA he is a fall risk but according to TUG he is not.    Pain   Pain comments Pain R hip flexor and in R groin. Increased with walking. Before  walking 1-2/10 and after walking 5/10.    Cognitive Status Examination   Orientation orientation to person, place and time   Level of Consciousness alert   Range of Motion (ROM)   ROM Comment PROM of R hip WFL but hip IR and ER not tested today.    Strength   Strength Comments B hip flexors 4/5 with pain on the R, B hip abductors 4/5, B hip extensors 4/5 on R and 4+/5 on L, B knee extensors 5/5, B dorsiflexors 5/5, B knee flxors 5/5.    Bed Mobility   Bed Mobility Comments moves slow due to pain in B shoulders with laying down on them.    Transfer Skills   Transfer Comments independent   Gait   Gait Comments Ambulates wtihout an AD.  Appears to progressively get stiiffer with his gait pattern with longer distance. R arm swings more than L  Good step length and speed.    Gait Special Tests Functional Gait Assessment Score out of 30   Score out of 30 23   Comments indicates at increased risk for falls. At discharge last time he scored 28/30.    Gait Special Tests Six Minute Walk Test   Feet 1485 Feet   Comments At discharge last time he scored 1880 ft   Balance Special Tests Single Leg Stance Right,   Right, seconds 30 Seconds   Comments 8 sec on varied surface   Planned Therapy Interventions   Planned Therapy Interventions balance training;gait training;joint mobilization;neuromuscular re-education;ROM;strengthening;stretching   Clinical Impression   Criteria for Skilled Therapeutic Interventions Met yes, treatment indicated   PT Diagnosis Weakness, pain, Difficulty walking   Influenced by the following impairments decreased strength, pain,    Functional limitations due to impairments decreased activity tolerance with walking longer distances   Clinical Presentation Evolving/Changing   Clinical Presentation Rationale varied pain, 6MWT, FGA, SLS on varied surface.    Clinical Decision Making (Complexity) Moderate complexity   Therapy Frequency other (see comments)  (1x every 2 weeks)   Predicted Duration of Therapy  Intervention (days/wks) 90 days   Risk & Benefits of therapy have been explained Yes   Patient, Family & other staff in agreement with plan of care Yes   GOALS   PT Eval Goals 1;2;3   Goal 1   Goal Identifier 6MWT   Goal Description Client will score > 1750 ft to be closer to previous baseline on 6MWT and demonstrate improved activity tolerance   Target Date 09/28/19   Goal 2   Goal Identifier FGA   Goal Description Client will improve score to a 27/30 or better on FGA to demonstrate improved balance and decrease risk for falls    Target Date 09/28/19   Goal 3   Goal Identifier HEP   Goal Description Client will be independent in HEP to help towards improving strength and functional mobility to meet the above goals.    Target Date 09/28/19   Total Evaluation Time   PT Eval, Moderate Complexity Minutes (57122) 35

## 2019-07-08 ENCOUNTER — TELEPHONE (OUTPATIENT)
Dept: FAMILY MEDICINE | Facility: CLINIC | Age: 74
End: 2019-07-08

## 2019-07-08 DIAGNOSIS — G89.29 CHRONIC LEFT SHOULDER PAIN: ICD-10-CM

## 2019-07-08 DIAGNOSIS — M25.512 CHRONIC LEFT SHOULDER PAIN: ICD-10-CM

## 2019-07-08 NOTE — TELEPHONE ENCOUNTER
Reason for Call:  Medication or medication refill:  Do you use a [facility]?  Name of the pharmacy and phone number for the current request:  [facility] [address] [place] [address]  [phone]  Name of the medication requested: oxyCODONE (ROXICODONE) 5 MG tablet  Other request: none  Can we leave a detailed message on this number? YES  Phone number patient can be reached at: Home number on file [phone] (home)  Best Time: any  Call taken on 7/8/2019 at 3:15 PM by ODETTE JOSHUA

## 2019-07-09 RX ORDER — OXYCODONE HYDROCHLORIDE 5 MG/1
5 TABLET ORAL 4 TIMES DAILY PRN
Qty: 120 TABLET | Refills: 0 | Status: SHIPPED | OUTPATIENT
Start: 2019-07-12 | End: 2019-01-01

## 2019-07-09 NOTE — TELEPHONE ENCOUNTER
Controlled Substance Refill Request for oxyCODONE (ROXICODONE) 5 MG tablet  Problem List Complete:  No     PROVIDER TO CONSIDER COMPLETION OF PROBLEM LIST AND OVERVIEW/CONTROLLED SUBSTANCE AGREEMENT    Last Written Prescription Date: 6/12/19  Last Fill Quantity: 120,   # refills: 0    THE MOST RECENT OFFICE VISIT MUST BE WITHIN THE PAST 3 MONTHS. AT LEAST ONE FACE TO FACE VISIT MUST OCCUR EVERY 6 MONTHS. ADDITIONAL VISITS CAN BE VIRTUAL.  (THIS STATEMENT SHOULD BE DELETED.)    Last Office Visit with [facility] primary care provider: 2/13/19 With Dr. Lucia     Future Office visit:   Next 5 appointments (look out 90 days)    Aug 14, 2019  3:30 PM CDT  Return Visit with Ramon Acuña MD  [facility] ([facility]) [facility]  [address]  [facility] [address]  [facility] [address]  [phone]          Controlled substance agreement:   Encounter-Level CSA - 07/16/2018:    Controlled Substance Agreement - Scan on 7/26/2018 12:19 PM: CONTROLLED SUBSTANCE AGREEMENT (below)       Patient-Level CSA:    There are no patient-level csa.         Last Urine Drug Screen: No results found for: CDAUT, No results found for: COMDAT, No results found for: THC13, PCP13, COC13, MAMP13, OPI13, AMP13, BZO13, TCA13, MTD13, BAR13, OXY13, PPX13, BUP13     Processing:  Provider to determine     [email or web address]       checked in past 3 months?  Yes 5/15/19

## 2019-07-11 ENCOUNTER — TELEPHONE (OUTPATIENT)
Dept: FAMILY MEDICINE | Facility: CLINIC | Age: 74
End: 2019-07-11

## 2019-07-11 NOTE — TELEPHONE ENCOUNTER
General medication questions:    Lipitor and Flomax side effect medication questions patient was assisted with.     Also was wondering if Oxycodone had been sent to pharmacy.       Start date for 7/12/2019

## 2019-07-25 ENCOUNTER — HOSPITAL ENCOUNTER (OUTPATIENT)
Dept: PHYSICAL THERAPY | Facility: CLINIC | Age: 74
Setting detail: THERAPIES SERIES
End: 2019-07-25
Attending: ORTHOPAEDIC SURGERY
Payer: MEDICARE

## 2019-07-25 PROCEDURE — 97110 THERAPEUTIC EXERCISES: CPT | Mod: GP | Performed by: PHYSICAL THERAPIST

## 2019-08-08 NOTE — TELEPHONE ENCOUNTER
Reason for Call:  Other call back    Detailed comments: The patient called and stated that he would like to talk to a nurse about an upcoming surgery that he will be having. He also stated that he has some questions regarding this procedure. He also would like to discuss a referral that he has to be seen at [facility]. He would like a call back to discuss all of these things.     Phone Number Patient can be reached at: Home number on file [phone] (home)    Best Time: Any    Can we leave a detailed message on this number? YES    Call taken on 8/8/2019 at 10:03 AM by Lauren Elizabeth

## 2019-08-08 NOTE — TELEPHONE ENCOUNTER
Reason for Call:  Medication or medication refill:    Do you use a [facility]?  Name of the pharmacy and phone number for the current request:  [facility] [address] [place] [facility], [address]      Name of the medication requested: oxyCODONE (ROXICODONE) 5 MG tablet       Other request: The patient called and stated that he needs this filled by 8/12 as he cannot go a day without it. He would prefer that it be sent to his preferred pharmacy if possible since Dr. Lucia is able to send it electronically.     Can we leave a detailed message on this number? YES    Phone number patient can be reached at: Home number on file [phone] (home)    Best Time: Any    Call taken on 8/8/2019 at 10:00 AM by Lauren Elizabeth

## 2019-08-08 NOTE — TELEPHONE ENCOUNTER
Controlled Substance Refill Request for oxyCODONE (ROXICODONE) 5 MG tablet  Problem List Complete:  Yes    No CSA on file   check 05/15/2019 no concerns    Last Written Prescription Date:  7/12/2019  Last Fill Quantity: 120 tablet,  # refills: 0   Last office visit: 2/13/2019 with prescribing provider:  Rea   Future Office Visit:   Next 5 appointments (look out 90 days)    Aug 14, 2019  3:30 PM CDT  Return Visit with Ramon Acuña MD  [facility]  [address]  [address]  [facility] [address]  [phone]             Controlled substance agreement:   Encounter-Level CSA - 07/16/2018:    Controlled Substance Agreement - Scan on 7/26/2018 12:19 PM: CONTROLLED SUBSTANCE AGREEMENT (below)       Patient-Level CSA:    There are no patient-level csa.         Last Urine Drug Screen: No results found for: CDAUT, No results found for: COMDAT, No results found for: THC13, PCP13, COC13, MAMP13, OPI13, AMP13, BZO13, TCA13, MTD13, BAR13, OXY13, PPX13, BUP13     Processing:  Fax Rx to pended pharmacy    [email or web address]   checked in past 3 months?  Yes 5/15/2019

## 2019-08-08 NOTE — TELEPHONE ENCOUNTER
Pt wants PCP aware his orthopedic doctor  Db has referred him to [facility].     He also asked for status of his Oxycodone refill. He will be out of medication 08/12/2019. Pt was informed OV needed for review since it has been over 6 moths since last OV. Pt would only see PCP and declined other available appointment dates dates. Asked if he could be squeezed in Monday. Routing message to provider for review. Please advice on refill and if ok to double book office visit 08/12/2019.

## 2019-08-14 NOTE — PROGRESS NOTES
"  CHIEF COMPLAINT   It was my pleasure to see Edi Villafana who is a 74 year old male for follow-up of BPH with urinary retention.      HPI  Edi Villafana is a very pleasant 74 year old male who presents with a history of BPH with urinary retention. 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 Per patient, this was benign. He also has a congenital solitary right kidney. Historically, patient also states he has had some ureteral dilation noted in the past and has has cystoscopy and workup for this.     Saw me initially with urinary retention during inpatient hospitalization. Passed TOV prior to that visit. Was started on tamsulosin and states this has helped his urination. Currently feels he empties well. No hematuria. No flank pain or evidence of urinary obstruction Cr stable at time of last admission, and stable today.    PHYSICAL EXAM  Patient is a 74 year old  male   Vitals: Temperature 97  F (36.1  C), temperature source Tympanic, resp. rate 16, height 1.93 m (6' 4\"), weight 93.9 kg (207 lb), SpO2 96 %.  General Appearance Adult: Body mass index is 25.2 kg/m .  Alert, no acute distress, oriented  HENT: throat/mouth:normal, good dentition  Lungs: no respiratory distress, or pursed lip breathing  Heart: No obvious jugular venous distension present  Abdomen: soft, nontender, no organomegaly or masses  Back: no CVAT  Musculoskeltal: extremities normal, no peripheral edema  Skin: no suspicious lesions or rashes  Neuro: Alert, oriented, speech and mentation normal  Psych: affect and mood normal  Gait: Normal    ASSESSMENT and PLAN  74 year old male with solitary right kidney and mild mid-ureteral dilation without hydronephrosis. No lesions noted on CT and no hematuria. The ureteral dilation is chronic, based on history and he notes no flank pain and has no evidence of renal functional decline at this time. Cr remains stable. " Voiding well on tamsulosin.    - Continue tamsulosin 0.4 mg daily  - Cr today  - Follow-up 1 year for symptom review and PVR    I spent over 15 minutes with the patient.  Over half this time was spent on counseling regarding BPH with urinary retention.    Ramon Acuña MD  Urology  [facility]  Clinic Phone [phone]

## 2019-08-14 NOTE — NURSING NOTE
"Oncology Rooming Note    August 14, 2019 3:30 PM   Edi Villafana is a 74 year old male who presents for:    Chief Complaint   Patient presents with     Oncology Clinic Visit     follow up     Initial Vitals: Temp 97  F (36.1  C) (Tympanic)   Resp 16   Ht 1.93 m (6' 4\")   Wt 93.9 kg (207 lb)   SpO2 96%   BMI 25.20 kg/m   Estimated body mass index is 25.2 kg/m  as calculated from the following:    Height as of this encounter: 1.93 m (6' 4\").    Weight as of this encounter: 93.9 kg (207 lb). Body surface area is 2.24 meters squared.  Severe Pain (6) Comment: Data Unavailable   No LMP for male patient.  Allergies reviewed: Yes  Medications reviewed: Yes    Medications: Medication refills not needed today.  Pharm[facility] into [id number] [place] [address]    Clinical concerns: follow up       Julissa Cueto [facility]              "

## 2019-08-16 NOTE — PROGRESS NOTES
Subjective     Edi Villafana is a 74 year old male who presents to clinic today for the following health issues:    HPI   Chronic Pain Follow-Up       Type / Location of Pain: shoulders  Analgesia/pain control:       Recent changes:  worse      Overall control: Inadequate pain control  Activity level/function:      Daily activities:  Able to do moderate activities    Work:  Able to work with limitations  Adverse effects:  No  Adherance    Taking medication as directed?  Yes    Participating in other treatments: RT shoulder-steroid injection Wednesday-helped  Risk Factors:    Sleep:  Fair    Mood/anxiety:  controlled    Recent family or social stressors:  none noted    Other aggravating factors: none  PHQ-9 SCORE 12/13/2017 7/16/2018 1/26/2019   PHQ-9 Total Score MyChart - 3 (Minimal depression) 2 (Minimal depression)   PHQ-9 Total Score 1 3 2     PRINCESS-7 SCORE 7/12/2016 12/13/2017 1/26/2019   Total Score - - 2 (minimal anxiety)   Total Score 4 3 2     Encounter-Level CSA - 07/16/2018:    Controlled Substance Agreement - Scan on 7/26/2018 12:19 PM: CONTROLLED SUBSTANCE AGREEMENT (below)       Patient-Level CSA:    There are no patient-level csa.           How many servings of fruits and vegetables do you eat daily?  0-1    On average, how many sweetened beverages do you drink each day (soda, juice, sweet tea, etc)?   0    How many days per week do you miss taking your medication? 0             Patient Active Problem List   Diagnosis     Hypothyroidism     Hyperlipidemia LDL goal <100     Persistent insomnia     Hypertension goal BP (blood pressure) < 140/90     Anxiety     Hypotestosteronism     Trochanteric bursitis     Depression with anxiety     Chronic pain of both shoulders     Physical deconditioning     Alcoholism (H)     Chronic left shoulder pain     Pain of left calf     Acute deep vein thrombosis (DVT) of distal vein of left lower extremity (H)     Anticoagulation management encounter     Hip pain, right      History of deep venous thrombosis     Status post right hip replacement     Constipation     Solitary kidney     BPH with urinary obstruction     Past Surgical History:   Procedure Laterality Date     ABDOMEN SURGERY  1973    large benign tumor removed with thrombophlebitis post op     APPENDECTOMY  1960     ARTHROPLASTY HIP ANTERIOR Right 8/9/2018    Procedure: ARTHROPLASTY HIP ANTERIOR;  RIGHT TOTAL HIP ARTHROPLASTY DIRECT ANTERIOR APPROACH ;  Surgeon: Chester Ortiz MD;  Location: [facility]     CATARACT IOL, RT/LT      bilateral     EYE SURGERY  [id number], 2007    eyelid surgery, 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, trabeculoplasty       Social History     Tobacco Use     Smoking status: Never Smoker     Smokeless tobacco: Never Used   Substance Use Topics     Alcohol use: Yes     Family History   Problem Relation Age of Onset     Hypertension Mother      Lipids Mother      Heart Disease Mother      Psychotic Disorder Mother         severe depression     Heart Disease Father      Ovarian Cancer Sister      Colon Cancer Paternal Grandfather            Reviewed and updated as needed this visit by Provider         Review of Systems   ROS COMP: Constitutional, HEENT, cardiovascular, pulmonary, gi and gu systems are negative, except as otherwise noted.  I reviewed all of the recent notes from orthopedics.  He has been referred to [facility] for consultation.  That visit is coming up towards the end of September.  He also has drug induced constipation which is now fairly well controlled with MiraLAX and sennosides.  He also has anxiety and depression which is well controlled with his medication regimen.      Objective    /70   Pulse 71   Temp 97  F (36.1  C) (Tympanic)   Resp 16   Wt 94.3 kg (208 lb)   BMI 25.32 kg/m    Body mass index is 25.32 kg/m .  Physical Exam   GENERAL APPEARANCE: healthy, alert and no distress            Assessment & Plan       ICD-10-CM    1. Chronic pain of both shoulders M25.511      "G89.29     M25.512    2. Solitary kidney Q60.0    3. Drug-induced constipation K59.03    4. Depression with anxiety F41.8         BMI:   Estimated body mass index is 25.32 kg/m  as calculated from the following:    Height as of 8/14/19: 1.93 m (6' 4\").    Weight as of this encounter: 94.3 kg (208 lb).           Patient Instructions   I reviewed all of the patient's recent orthopedic office visits with the patient.  He has been referred to [facility] for a consultation about his shoulders.  His pain at present is \"tolerable\".  His bowels are moving pretty regularly now with his regimen of MiraLAX and sennosides.  He is pretty content with his current situation but is eager to get his consultation done at [facility].    He recently saw urology about his solid with some mild dilation.  This is considered to be chronic.  He has started a course of tamsulosin and is having complete imaging of his bladder with that.  He is denying any flank pain.      Return in about 3 months (around 11/16/2019).    Enrique Lucia MD  [facility]    "

## 2019-08-17 NOTE — PATIENT INSTRUCTIONS
"I reviewed all of the patient's recent orthopedic office visits with the patient.  He has been referred to [facility] for a consultation about his shoulders.  His pain at present is \"tolerable\".  His bowels are moving pretty regularly now with his regimen of MiraLAX and sennosides.  He is pretty content with his current situation but is eager to get his consultation done at [facility].    He recently saw urology about his solid with some mild dilation.  This is considered to be chronic.  He has started a course of tamsulosin and is having complete imaging of his bladder with that.  He is denying any flank pain.  "

## 2019-08-26 NOTE — PROGRESS NOTES
RNCC instructed by Urologist Dr. Acuña labs are normal and to follow-up in 1 year. Noted PT is scheduled 8/2020. No further questions or concerns.     Paige Arzate, BSN, RN, OCN  RN Cancer Care Coordinator  [facility]  [phone]

## 2019-09-04 NOTE — PROGRESS NOTES
"  S-(situation): Edi called in reporting he is experiencing bilateral swelling in ankles and feet. The swelling started about a week ago. He is inquiring to see if Dr. Acuña would approve of him holding his Flomax to see if it would improve the swelling.     B-(background): Edi reports he has \"one kidney and had a problem with bladder retention after surgery\" so Dr. Acuña prescribed Flomax. Edi also reports he has a history of left femoral DVT in the 1970's and another DVT a few years ago in the left leg.     A-(assessment): Edi reports the swelling is mild, bilaterally and there is no pain. He will try the ALIYA socks to help with the swelling but would like to try being off the Flomax. Per chart review, Flomax was prescribed 5/8/2019 by Dr. Acuña.    R-(recommendations): Writer will send a staff message to Dr. Acuña to find out his recommendations. Writer or Dr. Acuña's RNCC will reach out to to the patient with Dr. Acuña's recommendations.     Edi verbalized understanding and is in agreement with the plan.     Melani Murdock, RN, BSN  Patient Care Coordinator  [facility]  [phone]      "

## 2019-09-04 NOTE — PROGRESS NOTES
Writer received a staff message from Dr. Acuña:     Ramon Acuña MD  You 28 minutes ago (2:08 PM)      It is fine for him to stop his tamsulosin to see if the swelling improves.     Elton          Writer called Edi to notify him, ok to stop Flomax to see if there is improvement on his swelling. Edi verbalized understanding and will call and update us with results.     Melani Murdock, RN, BSN  Patient Care Coordinator  [facility] [place]  [phone]

## 2019-09-10 NOTE — PROGRESS NOTES
Outpatient Physical Therapy Discharge Note     Patient: Edi Villafana  : 1945    Beginning/End Dates of Reporting Period:  19 to 9/10/2019    Referring Provider: Dr. Chester Ortiz    Therapy Diagnosis: R hip weakness, Difficulty with mobility      Client Self Report: My feet and ankle seemed to swell last week.  Decided to swtich Flomax snce that was the last drug added but also ate pickles that I was craving.     Objective Measurements:  Objective Measure: 6MWT  Details: 1800 ft (initially 1485 ft)   Objective Measure: FGA  Details:  (initially 23/30)     Goals:  Goal Identifier 6MWT   Goal Description Client will score > 1750 ft to be closer to previous baseline on 6MWT and demonstrate improved activity tolerance   Target Date 19   Date Met      Progress:  Goal met     Goal Identifier FGA   Goal Description Client will improve score to a 27/30 or better on FGA to demonstrate improved balance and decrease risk for falls    Target Date 19   Date Met      Progress: Goal met.      Goal Identifier HEP   Goal Description Client will be independent in HEP to help towards improving strength and functional mobility to meet the above goals.    Target Date 19   Date Met      Progress: Goal met     Progress Toward Goals:   Progress this reporting period: Client has made good progress and was able to meet all of his goals. He was strongly encouraged to try and walk more and just take more steps on a daily basis in addition to continuing his HEP for strengthening and balance.     Plan:  Discharge from therapy.    Discharge:    Reason for Discharge: Patient has met all goals.    Equipment Issued: none    Discharge Plan: Patient to continue home program.

## 2019-09-11 NOTE — TELEPHONE ENCOUNTER
Controlled Substance Refill Request for oxyCODONE (ROXICODONE) 5 MG tablet  Problem List Complete:  Yes    No CSA on file   check 09/11/2019 no concerns    Last Written Prescription Date:  6/12/2019  Last Fill Quantity: 120 tablet,  # refills: 0   Last office visit: 8/16/2019 with prescribing provider:  Rea   Future Office Visit:         Controlled substance agreement:   Encounter-Level CSA - 07/16/2018:    Controlled Substance Agreement - Scan on 7/26/2018 12:19 PM: CONTROLLED SUBSTANCE AGREEMENT (below)       Patient-Level CSA:    There are no patient-level csa.         Last Urine Drug Screen: No results found for: CDAUT, No results found for: COMDAT, No results found for: THC13, PCP13, COC13, MAMP13, OPI13, AMP13, BZO13, TCA13, MTD13, BAR13, OXY13, PPX13, BUP13     Processing:  Fax Rx to pended pharmacy    [email or web address]   checked in past 3 months?  Yes 9/11/2019

## 2019-09-11 NOTE — TELEPHONE ENCOUNTER
Pt called requesting Oxycodone refill. Notes he is out of medication and asked if he could be called back today with providers response.    Pt wanted PCP aware of the followin)He is having bilateral ankle for edema 3-4 days. He stopped Flomax after contacting oncology and symptoms have improved. Denies breathing problems, chest pain or cough.    2) Reports muscle pain on quadricepts. Will monitor symptoms for now until seen with [facility] doctor 2016.    3) Increased anxiety. Pt declined appt now but agreed to schedule OV if symptoms worsen.     Controlled Substance Refill Request for Oxycodone  Problem List Complete:  No     PROVIDER TO CONSIDER COMPLETION OF PROBLEM LIST AND OVERVIEW/CONTROLLED SUBSTANCE AGREEMENT    oxyCODONE (ROXICODONE) 5 MG tablet 120 tablet 0 2019         THE MOST RECENT OFFICE VISIT MUST BE WITHIN THE PAST 3 MONTHS. AT LEAST ONE FACE TO FACE VISIT MUST OCCUR EVERY 6 MONTHS. ADDITIONAL VISITS CAN BE VIRTUAL.  (THIS STATEMENT SHOULD BE DELETED.)    Last Office Visit with [facility] primary care provider:     Future Office visit: 2019    Controlled substance agreement:   Encounter-Level CSA - 2018:    Controlled Substance Agreement - Scan on 2018 12:19 PM: CONTROLLED SUBSTANCE AGREEMENT (below)       Patient-Level CSA:    There are no patient-level csa.         Last Urine Drug Screen: No results found for: CDAUT, No results found for: COMDAT, No results found for: THC13, PCP13, COC13, MAMP13, OPI13, AMP13, BZO13, TCA13, MTD13, BAR13, OXY13, PPX13, BUP13     Processing:  Patient will  in clinic at pharmacy    [email or web address]       checked in past 3 months?  Yes 2019- No concerns.

## 2019-09-11 NOTE — TELEPHONE ENCOUNTER
Reason for Call:  Medication or medication refill:    Do you use a [facility]?  [facility]    Name of the medication requested:   oxyCODONE (ROXICODONE) 5 MG tablet 120 tablet         Other request: Patient states he will be out of his medication.    Can we leave a detailed message on this number? YES    Phone number patient can be reached at: Home number on file [phone] (home)    Best Time:     Call taken on 9/11/2019 at 9:31 AM by JOSE TAVARES

## 2019-10-07 NOTE — TELEPHONE ENCOUNTER
Controlled Substance Refill Request for oxyCODONE (ROXICODONE) 5 MG tablet  Problem List Complete:  Yes    No CSA on file   check 09/11/2019 no concerns    Last Written Prescription Date:  9/11/2019  Last Fill Quantity: 120 tablet,  # refills: 0   Last office visit: 8/16/2019 with prescribing provider:  Rea   Future Office Visit:        Controlled substance agreement:   Encounter-Level CSA - 07/16/2018:    Controlled Substance Agreement - Scan on 7/26/2018 12:19 PM: CONTROLLED SUBSTANCE AGREEMENT     Patient-Level CSA:    There are no patient-level csa.         Last Urine Drug Screen: No results found for: CDAUT, No results found for: COMDAT, No results found for: THC13, PCP13, COC13, MAMP13, OPI13, AMP13, BZO13, TCA13, MTD13, BAR13, OXY13, PPX13, BUP13       [email or web address]   checked in past 3 months?  Yes 9/11/2019

## 2019-10-07 NOTE — TELEPHONE ENCOUNTER
Reason for Call:  Other pt update    Detailed comments: Edi wanted to let Dr Lucia know he has tentative surgery scheduled at [facility] for his left shoulder replacement.  FYI  Call with questions.     Phone Number Patient can be reached at: Home number on file [phone] (home)    Best Time: any    Can we leave a detailed message on this number? YES    Call taken on 10/7/2019 at 3:42 PM by ANNIE SELLERS

## 2019-10-07 NOTE — TELEPHONE ENCOUNTER
Reason for Call:  Medication or medication refill:    Do you use a [facility]?  [facility] [place]    Name of the medication requested: oxycodone    Other request: none    Can we leave a detailed message on this number? YES    Phone number patient can be reached at: Home number on file [phone] (home)    Best Time: asap    Call taken on 10/7/2019 at 3:40 PM by ANNIE SELLERS

## 2019-10-07 NOTE — TELEPHONE ENCOUNTER
Routing refill request to provider for review/approval because:  Drug not on the [facility] refill protocol     COCO VargasN, RN  [employer] Triage

## 2019-10-08 NOTE — TELEPHONE ENCOUNTER
The patient called and stated that he will be out of the medication by 10/10 so he will need it filled as soon as possible.

## 2019-10-08 NOTE — TELEPHONE ENCOUNTER
Patient Contact    Attempt # 1    Was call answered?  No.  Left message to notify patient that rx was sent to pharmacy. Instructed to call back with questions.

## 2019-10-10 NOTE — PROGRESS NOTES
Clinic Care Coordination Contact    Situation: Patient chart reviewed by care coordinator.    Background: Previous CCC RN outreach to patient on 6/12/19 to discuss patient concerns about after-care following anticipated shoulder surgery.    Assessment: Per chart review, patient met with SW with Case Management team at [facility] yesterday 10/9/19 to discuss discharge planning for his upcoming shoulder surgery scheduled for 11/11/19.  The patient reported his plan of staying in a rehab facility following his discharge from [facility].    Plan/Recommendations: Due to patient currently having adequate support and follow-up, CCC RN will close patient and make no further patient outreaches at this time.  CCC RN will remain available should any patient needs arise.      Leo Bello RN  Clinic Care Coordinator  [facility]  Ph: [phone]

## 2019-10-30 NOTE — TELEPHONE ENCOUNTER
Reason for Call:  Other call back    Detailed comments: Edi called to inform Dr. Lucia on Edi's upcoming shoulder surgery at [facility].    11/8/19 Edi has imaging and testing, pre-op, at the [facility] in [place].    11/11/19 Surgery for left shoulder total replacement at [facility] in [place].    Edi anticipates skilled nursing after the surgery.    Edi says Dr. Lucia can follow through Care Everywhere. If not able to follow, please let Edi know and he will have [facility] send information.    Phone Number Patient can be reached at: Cell number on file:    Telephone Information:   Mobile [phone]       Best Time: no return call needed.     Can we leave a detailed message on this number? YES    Call taken on 10/30/2019 at 4:15 PM by Linda Miranda

## 2019-12-13 NOTE — TELEPHONE ENCOUNTER
Pt is requesting refill for Oxycodone 5 mg tablets to take 4 or 5 tablets daily. He will be out of medication 12/18/2019 and will be out of town 12/20/2019.  Reports he had surgery on his left shoulder at [facility] 11/11/2019. He has appt 12/17/2019 with surgeon but that MD will no longer manage his for pain medication. Pt also complains of pain on his right shoulder for why he needs Rx. Writer advised pt schedule an OV to discuss refill. Pt is unable to schedule morning appts available and provider only has 15 minute appts next week.  Ok to schedule short appt for refill? Pt wants to see PCP only.    Please advice on request.     Last OV 08/16/2019.    oxyCODONE (ROXICODONE) 5 MG tablet 120 tablet 0 10/10/2019

## 2019-12-13 NOTE — TELEPHONE ENCOUNTER
Reason for Call:  Other prescription    Detailed comments: wants to discuss med refill with nurse.  He will be leaving [facility] soon.     Phone Number Patient can be reached at: Home number on file [phone] (home)    Best Time: asap    Can we leave a detailed message on this number? YES    Call taken on 12/13/2019 at 3:05 PM by ANNIE SELLERS

## 2019-12-16 NOTE — TELEPHONE ENCOUNTER
Patient calling back and refusing to schedule appointment. Discussed reason for appointment. He kept appointment 12/18 at 4:30 in hopes that his sling will be removed tomorrow at [facility]. Instructed to call back if unable to make appointment. He questioned being able to see other provider Thursday or Friday if unable to make appointment Wednesday. No further action needed at this time.

## 2019-12-18 NOTE — PROGRESS NOTES
Subjective     Edi Villafana is a 74 year old male who presents to clinic today for the following health issues:    HPI   Chronic Pain Follow-Up       Type / Location of Pain: Bilateral shoulder pain and hip pain  Analgesia/pain control:       Recent changes:  Worse--post surgery      Overall control: Tolerable with discomfort  Activity level/function:      Daily activities:  Able to do all daily activities    Work:  Unable to work  Adverse effects:  No  Adherance    Taking medication as directed?  Yes    Participating in other treatments: yes  Risk Factors:    Sleep:  Fair    Mood/anxiety:  controlled    Recent family or social stressors:  none noted    Other aggravating factors: ROM however, he is just 2 days out of his shoulder immobilizer after his recent surgery at [facility].  He has been alternating every 4 hours taking 10 mg of oxycodone and then the next time he takes 5 mg.  Most days he gets by with less than 10 oxycodone but some days he does not.  PHQ-9 SCORE 12/13/2017 7/16/2018 1/26/2019   PHQ-9 Total Score MyChart - 3 (Minimal depression) 2 (Minimal depression)   PHQ-9 Total Score 1 3 2     PRINCESS-7 SCORE 7/12/2016 12/13/2017 1/26/2019   Total Score - - 2 (minimal anxiety)   Total Score 4 3 2     Encounter-Level CSA - 07/16/2018:    Controlled Substance Agreement - Scan on 7/26/2018 12:19 PM: CONTROLLED SUBSTANCE AGREEMENT     Patient-Level CSA:    There are no patient-level csa.         How many servings of fruits and vegetables do you eat daily?  2-3    On average, how many sweetened beverages do you drink each day (Examples: soda, juice, sweet tea, etc.  Do NOT count diet or artificially sweetened beverages)?   0    How many days per week do you miss taking your medication? 0        Patient Active Problem List   Diagnosis     Hypothyroidism     Hyperlipidemia LDL goal <100     Persistent insomnia     Hypertension goal BP (blood pressure) < 140/90     Anxiety     Hypotestosteronism     Trochanteric  "bursitis     Depression with anxiety     Chronic pain of both shoulders     Physical deconditioning     Alcoholism (H)     Chronic left shoulder pain     Pain of left calf     Acute deep vein thrombosis (DVT) of distal vein of left lower extremity (H)     Anticoagulation management encounter     Hip pain, right     History of deep venous thrombosis     Status post right hip replacement     Constipation     Solitary kidney     BPH with urinary obstruction     Past Surgical History:   Procedure Laterality Date     ABDOMEN SURGERY  1973    large benign tumor removed with thrombophlebitis post op     APPENDECTOMY  1960     ARTHROPLASTY HIP ANTERIOR Right 8/9/2018    Procedure: ARTHROPLASTY HIP ANTERIOR;  RIGHT TOTAL HIP ARTHROPLASTY DIRECT ANTERIOR APPROACH ;  Surgeon: Chester Ortiz MD;  Location: [facility]     CATARACT IOL, RT/LT      bilateral     EYE SURGERY  [id number], 2007    eyelid surgery, 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, trabeculoplasty       Social History     Tobacco Use     Smoking status: Never Smoker     Smokeless tobacco: Never Used   Substance Use Topics     Alcohol use: Yes     Family History   Problem Relation Age of Onset     Hypertension Mother      Lipids Mother      Heart Disease Mother      Psychotic Disorder Mother         severe depression     Heart Disease Father      Ovarian Cancer Sister      Colon Cancer Paternal Grandfather            Reviewed and updated as needed this visit by Provider         Review of Systems   ROS COMP: Constitutional, HEENT, cardiovascular, pulmonary, gi and gu systems are negative, except as otherwise noted.      Objective    /70 (Patient Position: Sitting, Cuff Size: Adult Regular)   Pulse 69   Temp 97.5  F (36.4  C) (Tympanic)   Resp 14   Ht 1.93 m (6' 4\")   Wt 94.8 kg (209 lb)   SpO2 97%   BMI 25.44 kg/m    Body mass index is 25.44 kg/m .  Physical Exam   GENERAL APPEARANCE: healthy, alert and no distress            Assessment & Plan       ICD-10-CM    1. " Chronic left shoulder pain M25.512 oxyCODONE (ROXICODONE) 5 MG tablet    G89.29           Patient Instructions   Since his surgery he has had to use his almost exclusively due to his left being in an immobilizer.  Because of that his right shoulder is now much more painful than it has been in the past.  I refilled his pain medication of oxycodone 5 mg tablets to take up to 10/day.  He has a follow-up appointment at [facility] coming up.  Eventually he is going to need to have surgery done on his right shoulder also.  I will see him back in 1 month.  I did spend 25 minutes with the patient going over his medications and his recent course at [facility].  He had a wonderfully good experience at [facility].  He thought they were extraordinarily thorough and did a great job on his surgery.      Return in about 4 weeks (around 1/15/2020) for chronic pain.    Enrique Lucia MD  [facility]

## 2019-12-20 NOTE — PATIENT INSTRUCTIONS
Since his surgery he has had to use his almost exclusively due to his left being in an immobilizer.  Because of that his right shoulder is now much more painful than it has been in the past.  I refilled his pain medication of oxycodone 5 mg tablets to take up to 10/day.  He has a follow-up appointment at [facility] coming up.  Eventually he is going to need to have surgery done on his right shoulder also.  I will see him back in 1 month.  I did spend 25 minutes with the patient going over his medications and his recent course at [facility].  He had a wonderfully good experience at [facility].  He thought they were extraordinarily thorough and did a great job on his surgery.

## 2020-01-01 ENCOUNTER — TELEPHONE (OUTPATIENT)
Dept: FAMILY MEDICINE | Facility: CLINIC | Age: 75
End: 2020-01-01

## 2020-01-01 ENCOUNTER — VIRTUAL VISIT (OUTPATIENT)
Dept: FAMILY MEDICINE | Facility: CLINIC | Age: 75
End: 2020-01-01
Payer: MEDICARE

## 2020-01-01 ENCOUNTER — MYC MEDICAL ADVICE (OUTPATIENT)
Dept: FAMILY MEDICINE | Facility: CLINIC | Age: 75
End: 2020-01-01

## 2020-01-01 ENCOUNTER — TRANSFERRED RECORDS (OUTPATIENT)
Dept: HEALTH INFORMATION MANAGEMENT | Facility: CLINIC | Age: 75
End: 2020-01-01

## 2020-01-01 ENCOUNTER — OFFICE VISIT (OUTPATIENT)
Dept: FAMILY MEDICINE | Facility: CLINIC | Age: 75
End: 2020-01-01
Payer: MEDICARE

## 2020-01-01 ENCOUNTER — HEALTH MAINTENANCE LETTER (OUTPATIENT)
Age: 75
End: 2020-01-01

## 2020-01-01 ENCOUNTER — NURSE TRIAGE (OUTPATIENT)
Dept: NURSING | Facility: CLINIC | Age: 75
End: 2020-01-01

## 2020-01-01 VITALS
BODY MASS INDEX: 25.09 KG/M2 | HEIGHT: 76 IN | SYSTOLIC BLOOD PRESSURE: 138 MMHG | HEART RATE: 68 BPM | OXYGEN SATURATION: 97 % | TEMPERATURE: 97.6 F | WEIGHT: 206 LBS | DIASTOLIC BLOOD PRESSURE: 70 MMHG | RESPIRATION RATE: 14 BRPM

## 2020-01-01 DIAGNOSIS — F41.8 DEPRESSION WITH ANXIETY: ICD-10-CM

## 2020-01-01 DIAGNOSIS — E78.5 HYPERLIPIDEMIA LDL GOAL <100: ICD-10-CM

## 2020-01-01 DIAGNOSIS — G89.29 CHRONIC LEFT SHOULDER PAIN: ICD-10-CM

## 2020-01-01 DIAGNOSIS — M25.512 CHRONIC LEFT SHOULDER PAIN: ICD-10-CM

## 2020-01-01 DIAGNOSIS — F41.9 ANXIETY: Primary | ICD-10-CM

## 2020-01-01 DIAGNOSIS — E03.9 HYPOTHYROIDISM, UNSPECIFIED TYPE: ICD-10-CM

## 2020-01-01 DIAGNOSIS — M25.511 CHRONIC PAIN OF BOTH SHOULDERS: ICD-10-CM

## 2020-01-01 DIAGNOSIS — E03.9 ACQUIRED HYPOTHYROIDISM: ICD-10-CM

## 2020-01-01 DIAGNOSIS — E78.5 HYPERLIPIDEMIA LDL GOAL <100: Primary | ICD-10-CM

## 2020-01-01 DIAGNOSIS — E29.1 MALE HYPOGONADISM: ICD-10-CM

## 2020-01-01 DIAGNOSIS — I10 HYPERTENSION GOAL BP (BLOOD PRESSURE) < 140/90: ICD-10-CM

## 2020-01-01 DIAGNOSIS — G89.29 CHRONIC PAIN OF BOTH SHOULDERS: ICD-10-CM

## 2020-01-01 DIAGNOSIS — E03.9 ACQUIRED HYPOTHYROIDISM: Primary | ICD-10-CM

## 2020-01-01 DIAGNOSIS — E34.9 HYPOTESTOSTERONISM: ICD-10-CM

## 2020-01-01 DIAGNOSIS — F41.9 ANXIETY: ICD-10-CM

## 2020-01-01 DIAGNOSIS — G47.00 PERSISTENT INSOMNIA: ICD-10-CM

## 2020-01-01 DIAGNOSIS — E29.1 MALE HYPOGONADISM: Primary | ICD-10-CM

## 2020-01-01 DIAGNOSIS — I10 ESSENTIAL HYPERTENSION WITH GOAL BLOOD PRESSURE LESS THAN 140/90: Primary | ICD-10-CM

## 2020-01-01 DIAGNOSIS — K59.03 DRUG-INDUCED CONSTIPATION: ICD-10-CM

## 2020-01-01 DIAGNOSIS — M25.512 CHRONIC PAIN OF BOTH SHOULDERS: ICD-10-CM

## 2020-01-01 DIAGNOSIS — H34.8112 CENTRAL RETINAL VEIN OCCLUSION OF RIGHT EYE, UNSPECIFIED COMPLICATION STATUS (H): Primary | ICD-10-CM

## 2020-01-01 LAB
ALBUMIN SERPL-MCNC: 3.9 G/DL (ref 3.4–5)
ALP SERPL-CCNC: 95 U/L (ref 40–150)
ALT SERPL W P-5'-P-CCNC: 21 U/L (ref 0–70)
ANION GAP SERPL CALCULATED.3IONS-SCNC: 6 MMOL/L (ref 3–14)
AST SERPL W P-5'-P-CCNC: 13 U/L (ref 0–45)
BILIRUB SERPL-MCNC: 0.7 MG/DL (ref 0.2–1.3)
BUN SERPL-MCNC: 9 MG/DL (ref 7–30)
CALCIUM SERPL-MCNC: 8.4 MG/DL (ref 8.5–10.1)
CHLORIDE SERPL-SCNC: 101 MMOL/L (ref 94–109)
CHOLEST SERPL-MCNC: 113 MG/DL
CHOLEST SERPL-MCNC: 141 MG/DL
CO2 SERPL-SCNC: 25 MMOL/L (ref 20–32)
CREAT SERPL-MCNC: 1.04 MG/DL (ref 0.66–1.25)
ERYTHROCYTE [DISTWIDTH] IN BLOOD BY AUTOMATED COUNT: 13.9 % (ref 10–15)
GFR SERPL CREATININE-BSD FRML MDRD: 70 ML/MIN/{1.73_M2}
GLUCOSE SERPL-MCNC: 88 MG/DL (ref 70–99)
HCT VFR BLD AUTO: 48.3 % (ref 40–53)
HDLC SERPL-MCNC: 39 MG/DL
HDLC SERPL-MCNC: 46 MG/DL
HGB BLD-MCNC: 15.6 G/DL (ref 13.3–17.7)
LDLC SERPL CALC-MCNC: 45 MG/DL
LDLC SERPL CALC-MCNC: 69 MG/DL
MCH RBC QN AUTO: 29 PG (ref 26.5–33)
MCHC RBC AUTO-ENTMCNC: 32.3 G/DL (ref 31.5–36.5)
MCV RBC AUTO: 90 FL (ref 78–100)
NONHDLC SERPL-MCNC: 102 MG/DL
NONHDLC SERPL-MCNC: 67 MG/DL
PLATELET # BLD AUTO: 281 10E9/L (ref 150–450)
POTASSIUM SERPL-SCNC: 4.2 MMOL/L (ref 3.4–5.3)
PROT SERPL-MCNC: 7.3 G/DL (ref 6.8–8.8)
RBC # BLD AUTO: 5.38 10E12/L (ref 4.4–5.9)
SHBG SERPL-SCNC: 43 NMOL/L (ref 11–80)
SHBG SERPL-SCNC: 48 NMOL/L (ref 11–80)
SODIUM SERPL-SCNC: 132 MMOL/L (ref 133–144)
T4 FREE SERPL-MCNC: 1.23 NG/DL (ref 0.76–1.46)
T4 FREE SERPL-MCNC: 1.23 NG/DL (ref 0.76–1.46)
TESTOST FREE SERPL-MCNC: 34.43 NG/DL (ref 4.7–24.4)
TESTOST FREE SERPL-MCNC: 36.63 NG/DL (ref 4.7–24.4)
TESTOST SERPL-MCNC: 1553 NG/DL (ref 240–950)
TESTOST SERPL-MCNC: 1561 NG/DL (ref 240–950)
TRIGL SERPL-MCNC: 108 MG/DL
TRIGL SERPL-MCNC: 164 MG/DL
TSH SERPL DL<=0.005 MIU/L-ACNC: 10.24 MU/L (ref 0.4–4)
TSH SERPL DL<=0.005 MIU/L-ACNC: 9.44 MU/L (ref 0.4–4)
WBC # BLD AUTO: 7.8 10E9/L (ref 4–11)

## 2020-01-01 PROCEDURE — 84439 ASSAY OF FREE THYROXINE: CPT | Performed by: FAMILY MEDICINE

## 2020-01-01 PROCEDURE — 84443 ASSAY THYROID STIM HORMONE: CPT | Performed by: FAMILY MEDICINE

## 2020-01-01 PROCEDURE — 84270 ASSAY OF SEX HORMONE GLOBUL: CPT | Performed by: FAMILY MEDICINE

## 2020-01-01 PROCEDURE — 84403 ASSAY OF TOTAL TESTOSTERONE: CPT | Performed by: FAMILY MEDICINE

## 2020-01-01 PROCEDURE — 36415 COLL VENOUS BLD VENIPUNCTURE: CPT | Performed by: FAMILY MEDICINE

## 2020-01-01 PROCEDURE — 80061 LIPID PANEL: CPT | Performed by: FAMILY MEDICINE

## 2020-01-01 PROCEDURE — 85027 COMPLETE CBC AUTOMATED: CPT | Performed by: FAMILY MEDICINE

## 2020-01-01 PROCEDURE — 99443 ZZC PHYSICIAN TELEPHONE EVALUATION 21-30 MIN: CPT | Performed by: FAMILY MEDICINE

## 2020-01-01 PROCEDURE — 99442 ZZC PHYSICIAN TELEPHONE EVALUATION 11-20 MIN: CPT | Performed by: INTERNAL MEDICINE

## 2020-01-01 PROCEDURE — 80053 COMPREHEN METABOLIC PANEL: CPT | Performed by: FAMILY MEDICINE

## 2020-01-01 PROCEDURE — 99214 OFFICE O/P EST MOD 30 MIN: CPT | Performed by: FAMILY MEDICINE

## 2020-01-01 RX ORDER — LOSARTAN POTASSIUM 25 MG/1
TABLET ORAL
COMMUNITY
Start: 2020-01-01

## 2020-01-01 RX ORDER — LORAZEPAM 2 MG/1
2 TABLET ORAL 2 TIMES DAILY PRN
Qty: 180 TABLET | Refills: 0 | Status: SHIPPED | OUTPATIENT
Start: 2020-01-01

## 2020-01-01 RX ORDER — OXYCODONE HYDROCHLORIDE 5 MG/1
5-10 TABLET ORAL EVERY 4 HOURS PRN
Qty: 300 TABLET | Refills: 0 | Status: SHIPPED | OUTPATIENT
Start: 2020-01-01 | End: 2020-01-01

## 2020-01-01 RX ORDER — ATORVASTATIN CALCIUM 40 MG/1
TABLET, FILM COATED ORAL
Qty: 90 TABLET | Refills: 3 | OUTPATIENT
Start: 2020-01-01

## 2020-01-01 RX ORDER — INFLUENZA VACCINE, ADJUVANTED 15; 15; 15 UG/.5ML; UG/.5ML; UG/.5ML
INJECTION, SUSPENSION INTRAMUSCULAR
Refills: 0 | COMMUNITY
Start: 2019-01-01

## 2020-01-01 RX ORDER — BUSPIRONE HYDROCHLORIDE 15 MG/1
TABLET ORAL
Qty: 60 TABLET | Refills: 11 | Status: SHIPPED | OUTPATIENT
Start: 2020-01-01 | End: 2020-01-01

## 2020-01-01 RX ORDER — LORAZEPAM 2 MG/1
2 TABLET ORAL
Qty: 270 TABLET | Refills: 0
Start: 2020-01-01 | End: 2020-01-01

## 2020-01-01 RX ORDER — TESTOSTERONE CYPIONATE 1000 MG/10ML
100 INJECTION, SOLUTION INTRAMUSCULAR WEEKLY
Qty: 1 VIAL | Refills: 3 | Status: SHIPPED | OUTPATIENT
Start: 2020-01-01 | End: 2020-01-01

## 2020-01-01 RX ORDER — BUSPIRONE HYDROCHLORIDE 10 MG/1
TABLET ORAL
COMMUNITY
Start: 2020-01-01 | End: 2020-01-01 | Stop reason: DRUGHIGH

## 2020-01-01 RX ORDER — ATORVASTATIN CALCIUM 80 MG/1
80 TABLET, FILM COATED ORAL DAILY
Qty: 90 TABLET | Refills: 1 | Status: SHIPPED | OUTPATIENT
Start: 2020-01-01

## 2020-01-01 RX ORDER — ATORVASTATIN CALCIUM 20 MG/1
20 TABLET, FILM COATED ORAL DAILY
Qty: 90 TABLET | Refills: 1 | Status: SHIPPED | OUTPATIENT
Start: 2020-01-01 | End: 2020-01-01

## 2020-01-01 RX ORDER — ROSUVASTATIN CALCIUM 20 MG/1
20 TABLET, COATED ORAL DAILY
Qty: 90 TABLET | Refills: 1 | Status: SHIPPED | OUTPATIENT
Start: 2020-01-01 | End: 2020-01-01

## 2020-01-01 RX ORDER — ATORVASTATIN CALCIUM 40 MG/1
40 TABLET, FILM COATED ORAL DAILY
Qty: 90 TABLET | Refills: 1 | Status: SHIPPED | OUTPATIENT
Start: 2020-01-01 | End: 2020-01-01

## 2020-01-01 RX ORDER — DOCUSATE SODIUM 100 MG/1
100 CAPSULE, LIQUID FILLED ORAL 2 TIMES DAILY
COMMUNITY
Start: 2020-01-01

## 2020-01-01 RX ORDER — TESTOSTERONE CYPIONATE 200 MG/ML
200 INJECTION, SOLUTION INTRAMUSCULAR
Qty: 6 ML | Refills: 1
Start: 2020-01-01

## 2020-01-01 RX ORDER — TESTOSTERONE CYPIONATE 1000 MG/10ML
100 INJECTION, SOLUTION INTRAMUSCULAR WEEKLY
Qty: 12 VIAL | Refills: 0 | Status: CANCELLED | OUTPATIENT
Start: 2020-01-01

## 2020-01-01 RX ORDER — BUSPIRONE HYDROCHLORIDE 15 MG/1
15 TABLET ORAL 2 TIMES DAILY
Qty: 60 TABLET | Refills: 1 | Status: SHIPPED | OUTPATIENT
Start: 2020-01-01 | End: 2020-01-01

## 2020-01-01 RX ORDER — BUSPIRONE HYDROCHLORIDE 10 MG/1
10 TABLET ORAL 2 TIMES DAILY
Qty: 60 TABLET | Refills: 1 | Status: SHIPPED | OUTPATIENT
Start: 2020-01-01 | End: 2020-01-01

## 2020-01-01 RX ORDER — LEVOTHYROXINE SODIUM 125 UG/1
TABLET ORAL
Qty: 96 TABLET | Refills: 3
Start: 2020-01-01

## 2020-01-01 RX ORDER — OXYCODONE HYDROCHLORIDE 10 MG/1
5-10 TABLET ORAL EVERY 4 HOURS PRN
Qty: 150 TABLET | Refills: 0 | Status: SHIPPED | OUTPATIENT
Start: 2020-01-01

## 2020-01-01 RX ORDER — LOSARTAN POTASSIUM 50 MG/1
TABLET ORAL
Qty: 90 TABLET | Refills: 3 | Status: SHIPPED | OUTPATIENT
Start: 2020-01-01

## 2020-01-01 RX ORDER — BUSPIRONE HYDROCHLORIDE 15 MG/1
15 TABLET ORAL 3 TIMES DAILY
Qty: 90 TABLET | Refills: 1 | Status: SHIPPED | OUTPATIENT
Start: 2020-01-01

## 2020-01-01 RX ORDER — TESTOSTERONE CYPIONATE 1000 MG/10ML
100 INJECTION, SOLUTION INTRAMUSCULAR WEEKLY
Qty: 12 ML | Refills: 1 | Status: SHIPPED | OUTPATIENT
Start: 2020-01-01 | End: 2020-01-01 | Stop reason: ALTCHOICE

## 2020-01-01 RX ORDER — TESTOSTERONE CYPIONATE 200 MG/ML
200 INJECTION, SOLUTION INTRAMUSCULAR
Qty: 6 ML | Refills: 1 | Status: SHIPPED | OUTPATIENT
Start: 2020-01-01 | End: 2020-01-01

## 2020-01-01 RX ORDER — ATORVASTATIN CALCIUM 20 MG/1
20 TABLET, FILM COATED ORAL DAILY
Qty: 90 TABLET | Refills: 1 | Status: CANCELLED | OUTPATIENT
Start: 2020-01-01

## 2020-01-01 ASSESSMENT — MIFFLIN-ST. JEOR: SCORE: 1770.91

## 2020-01-14 NOTE — TELEPHONE ENCOUNTER
"Requested Prescriptions   Pending Prescriptions Disp Refills     losartan (COZAAR) 50 MG tablet [Pharmacy Med Name: LOSARTAN POTASSIUM 50 MG TAB]  Last Written Prescription Date:  na  Last Fill Quantity: na,  # refills: na   Last office visit: 12/18/2019 with prescribing provider:  Rea   Future Office Visit:     90 tablet 3     Sig: TAKE ONE TABLET (50MG) BY MOUTH DAILY       Angiotensin-II Receptors Passed - 1/14/2020 10:20 AM        Passed - Last blood pressure under 140/90 in past 12 months     BP Readings from Last 3 Encounters:   12/18/19 130/70   08/16/19 126/70   05/08/19 138/89                 Passed - Recent (12 mo) or future (30 days) visit within the authorizing provider's specialty     Patient has had an office visit with the authorizing provider or a provider within the authorizing providers department within the previous 12 mos or has a future within next 30 days. See \"Patient Info\" tab in inbasket, or \"Choose Columns\" in Meds & Orders section of the refill encounter.              Passed - Medication is active on med list        Passed - Patient is age 18 or older        Passed - Normal serum creatinine on file in past 12 months     Recent Labs   Lab Test 11/14/19 02/09/19  1850   CR 1.06   < >  --    CREAT  --   --  0.9    < > = values in this interval not displayed.             Passed - Normal serum potassium on file in past 12 months     Recent Labs   Lab Test 11/14/19   POTASSIUM 4.5                       "

## 2020-01-15 NOTE — TELEPHONE ENCOUNTER
Controlled Substance Refill Request for oxyCODONE (ROXICODONE) 5 MG tablet  Problem List Complete:  Yes    No CSA on file   check 09/11/2019 no concerns      Last Written Prescription Date:  12/18/2019  Last Fill Quantity: 300 tablet,  # refills: 0   Last office visit: 12/18/2019 with prescribing provider:  Rea   Future Office Visit:        Controlled substance agreement:   Encounter-Level CSA - 07/16/2018:    Controlled Substance Agreement - Scan on 7/26/2018 12:19 PM: CONTROLLED SUBSTANCE AGREEMENT     Patient-Level CSA:    There are no patient-level csa.         Last Urine Drug Screen: No results found for: CDAUT, No results found for: COMDAT, No results found for: THC13, PCP13, COC13, MAMP13, OPI13, AMP13, BZO13, TCA13, MTD13, BAR13, OXY13, PPX13, BUP13         [email or web address]   checked in past 3 months?  No, route to RN

## 2020-01-15 NOTE — TELEPHONE ENCOUNTER
Reason for Call:  Medication or medication refill:    Do you use a [facility]?  Name of the pharmacy and phone number for the current request:  [facility] [id number] [place] [facility], [address]      Name of the medication requested: oxyCODONE (ROXICODONE) 5 MG tablet       Other request: The patient called and stated that he would like a refill for this medication.  He stated that he would like Dr. Lucia to know that this plan is working okay for him and he would like to continue as is. The patient is aware that he can not fill the medication until 1/18 but he would like the refill to be ready for him when he needs to pick it up.     Can we leave a detailed message on this number? YES    Phone number patient can be reached at: Home number on file [phone] (home)    Best Time: Any    Call taken on 1/15/2020 at 11:21 AM by Lauren Stevenson

## 2020-01-16 NOTE — TELEPHONE ENCOUNTER
Routing refill request to provider for review/approval because:  Drug not on the [facility] refill protocol      check 01/16/20 no concerns

## 2020-02-14 NOTE — TELEPHONE ENCOUNTER
Last office visit 12/18/2019.    oxyCODONE (ROXICODONE) 5 MG tablet 300 tablet 0 1/17/2020  No   Sig - Route: Take 1-2 tablets (5-10 mg) by mouth every 4 hours as needed for severe pain Up to 10/day - Oral      check 01/16/20 no concerns

## 2020-02-14 NOTE — TELEPHONE ENCOUNTER
Reason for Call:  Medication or medication refill:    Do you use a [facility]?  Name of the pharmacy and phone number for the current request:   [facility], [address]    Name of the medication requested:   oxyCODONE (ROXICODONE) 5 MG tablet    Other request: n/a    Can we leave a detailed message on this number? YES    Phone number patient can be reached at: Cell number on file:    Telephone Information:   Mobile [phone]       Best Time: amu    Call taken on 2/14/2020 at 4:12 PM by Linda Miranda

## 2020-03-09 NOTE — PATIENT INSTRUCTIONS
"The patient has been asked a lot of questions when he is been down at [place] about being on lorazepam.  They have and suggested maybe a trial of buspirone.  He is here to discuss that.  He would like to try buspirone.  He said during the day the lorazepam does not work anyway.  However, at night when he takes it for sleep he does get a few hours of sleep.  He is also here to follow-up on his blood pressure.  He is not having any issues with his medications at present.  He is currently in therapy for his operative shoulder.  He is expecting not to get his second shoulder done until probably July.  The pain medication that he is taking is getting him \"through it\".  We had a discussion about lorazepam and buspirone.  I placed him on buspirone 10 mg twice daily.  He will discontinue his 2 daytime doses of lorazepam but can  continue to use lorazepam at bedtime for sleep.  He will follow-up in 1 month.  He would also like to get back on his testosterone as his general health was much better and he felt much more energy when he was on the testosterone.  I did do a refill of that medication today.  "

## 2020-03-09 NOTE — PROGRESS NOTES
Subjective     Edi Villafana is a 75 year old male who presents to clinic today for the following health issues:    HPI   Review Medications  Consult      Duration: would like to discuss changes in medication and eye surgery    Description (location/character/radiation): NA    Intensity:  NA    Accompanying signs and symptoms: None    History (similar episodes/previous evaluation): No    Precipitating or alleviating factors: None    Therapies tried and outcome: None         Patient Active Problem List   Diagnosis     Hypothyroidism     Hyperlipidemia LDL goal <100     Persistent insomnia     Hypertension goal BP (blood pressure) < 140/90     Anxiety     Hypotestosteronism     Trochanteric bursitis     Depression with anxiety     Chronic pain of both shoulders     Physical deconditioning     Chronic left shoulder pain     Pain of left calf     Acute deep vein thrombosis (DVT) of distal vein of left lower extremity (H)     Anticoagulation management encounter     Hip pain, right     History of deep venous thrombosis     Status post right hip replacement     Constipation     Solitary kidney     BPH with urinary obstruction     Past Surgical History:   Procedure Laterality Date     ABDOMEN SURGERY  1973    large benign tumor removed with thrombophlebitis post op     APPENDECTOMY  1960     ARTHROPLASTY HIP ANTERIOR Right 8/9/2018    Procedure: ARTHROPLASTY HIP ANTERIOR;  RIGHT TOTAL HIP ARTHROPLASTY DIRECT ANTERIOR APPROACH ;  Surgeon: Chester Ortiz MD;  Location: [facility] OR     CATARACT IOL, RT/LT      bilateral     EYE SURGERY  [id number], 2007    eyelid surgery, 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 Yes     Are you following a low salt diet? Yes    Are your blood pressures ever more than 140 on the top number (systolic) OR more   than 90 on the bottom number (diastolic), for example 140/90? No    Reviewed and updated as needed this visit by Provider         Review of Systems   ROS COMP: Constitutional, HEENT, cardiovascular, pulmonary, gi and gu systems are negative, except as otherwise noted.      Objective    /70 (Patient Position: Sitting, Cuff Size: Adult Regular)   Pulse 68   Temp 97.6  F (36.4  C) (Tympanic)   Resp 14   Ht 1.93 m (6' 4\")   Wt 93.4 kg (206 lb)   SpO2 97%   BMI 25.08 kg/m    Body mass index is 25.08 kg/m .  Physical Exam   GENERAL APPEARANCE: healthy, alert and no distress            Assessment & Plan       ICD-10-CM    1. Anxiety  F41.9 busPIRone (BUSPAR) 10 MG tablet     LORazepam (ATIVAN) 2 MG tablet   2. Drug-induced constipation  K59.03 docusate sodium (COLACE) 100 MG capsule   3. Hyperlipidemia LDL goal <100  E78.5 Lipid Profile   4. Hypertension goal BP (blood pressure) < 140/90  I10    5. Depression with anxiety  F41.8    6. Chronic pain of both shoulders  M25.511     G89.29     M25.512    7. Hypotestosteronism  E34.9         BMI:   Estimated body mass index is 25.08 kg/m  as calculated from the following:    Height as of this encounter: 1.93 m (6' 4\").    Weight as of this encounter: 93.4 kg (206 lb).           Patient Instructions   The patient has been asked a lot of questions when he is been down at [place] about being on lorazepam.  They have and suggested maybe a trial of buspirone.  He is here to discuss that.  He would like to try buspirone.  He said during the day the lorazepam does not work anyway.  However, at night when he takes it for sleep he does get a " "few hours of sleep.  He is also here to follow-up on his blood pressure.  He is not having any issues with his medications at present.  He is currently in therapy for his operative shoulder.  He is expecting not to get his second shoulder done until probably July.  The pain medication that he is taking is getting him \"through it\".  We had a discussion about lorazepam and buspirone.  I placed him on buspirone 10 mg twice daily.  He will discontinue his 2 daytime doses of lorazepam but can  continue to use lorazepam at bedtime for sleep.  He will follow-up in 1 month.  He would also like to get back on his testosterone as his general health was much better and he felt much more energy when he was on the testosterone.  I did do a refill of that medication today.      Return in about 4 weeks (around 4/6/2020) for anxiety, chronic pain.    Enrique Lucia MD  [facility]      "

## 2020-03-16 NOTE — TELEPHONE ENCOUNTER
Reason for Call:  Medication or medication refill:    Do you use a [facility]?  Name of the pharmacy and phone number for the current request:  [facility] [id number] [place] [facility], [address]    Name of the medication requested: oxyCODONE (ROXICODONE) 5 MG tablet       Other request: The patient called and stated that he needs this medication filled. He stated that he is close to being out so he will need this filled as soon as possible.     Can we leave a detailed message on this number? YES    Phone number patient can be reached at: Home number on file [phone] (home)    Best Time: Any    Call taken on 3/16/2020 at 4:35 PM by Lauren Elizabeth

## 2020-03-16 NOTE — TELEPHONE ENCOUNTER
Controlled Substance Refill Request for Oxycodone  Problem List Complete:  No     PROVIDER TO CONSIDER COMPLETION OF PROBLEM LIST AND OVERVIEW/CONTROLLED SUBSTANCE AGREEMENT    oxyCODONE (ROXICODONE) 5 MG tablet  300 tablet  0  2/17/2020   No    Sig - Route: Take 1-2 tablets (5-10 mg) by mouth every 4 hours as needed for severe pain Up to 10/day - Oral        THE MOST RECENT OFFICE VISIT MUST BE WITHIN THE PAST 3 MONTHS. AT LEAST ONE FACE TO FACE VISIT MUST OCCUR EVERY 6 MONTHS. ADDITIONAL VISITS CAN BE VIRTUAL.  (THIS STATEMENT SHOULD BE DELETED.)    Last Office Visit with [facility] primary care provider: 03/09/2020    Future Office visit:   Next 5 appointments (look out 90 days)    Apr 07, 2020  2:30 PM CDT  SHORT with Enrique Lucia MD  [facility] [address] [place] [id number]  [phone]          Controlled substance agreement:   Encounter-Level CSA - 07/16/2018:    Controlled Substance Agreement - Scan on 7/26/2018 12:19 PM: CONTROLLED SUBSTANCE AGREEMENT     Patient-Level CSA:    There are no patient-level csa.         Last Urine Drug Screen: No results found for: CDAUT, No results found for: COMDAT, No results found for: THC13, PCP13, COC13, MAMP13, OPI13, AMP13, BZO13, TCA13, MTD13, BAR13, OXY13, PPX13, BUP13         [email or web address]       checked in past 3 months?  Yes 03/16/2020- No concerns.

## 2020-04-07 NOTE — PROGRESS NOTES
"Subjective     Edi Villafana is a 75 year old male who is being evaluated via a billable telephone visit.      The patient has been notified of following:     \"This telephone visit will be conducted via a call between you and your physician/provider. We have found that certain health care needs can be provided without the need for a physical exam.  This service lets us provide the care you need with a short phone conversation.  If a prescription is necessary we can send it directly to your pharmacy.  If lab work is needed we can place an order for that and you can then stop by our lab to have the test done at a later time.    Telephone visits are billed at different rates depending on your insurance coverage. During this emergency period, for some insurers they may be billed the same as an in-person visit.  Please reach out to your insurance provider with any questions.    If during the course of the call the physician/provider feels a telephone visit is not appropriate, you will not be charged for this service.\"    Patient has given verbal consent for Telephone visit?  Yes    Edi Villafana complains of   Chief Complaint   Patient presents with     Recheck Medication       ALLERGIES  Ace inhibitors; Ancef [cefazolin sodium]; Atenolol; Compazine; Sulfa drugs; and Tramadol    Medication check      Duration:     Description (location/character/radiation): pt checking in after starting new medication 30 days ago. Pt has a list of questions to discuss    Intensity:  moderate    Accompanying signs and symptoms: none    History (similar episodes/previous evaluation): None    Precipitating or alleviating factors: None    Therapies tried and outcome: None          Hyperlipidemia Follow-Up      Are you regularly taking any medication or supplement to lower your cholesterol?   Yes- Would like to switch back to Crestor    Are you having muscle aches or other side effects that you think could be caused by your cholesterol " lowering medication?  No    Anxiety Follow-Up    How are you doing with your anxiety since your last visit? Improved slightly with change to buspirone    Are you having other symptoms that might be associated with anxiety? Yes:  Sometimes having panic attacks    Have you had a significant life event? OTHER: Social isolation due to coronavirus is driving him crazy     Are you feeling depressed? No    Do you have any concerns with your use of alcohol or other drugs? No    Social History     Tobacco Use     Smoking status: Never Smoker     Smokeless tobacco: Never Used   Substance Use Topics     Alcohol use: Yes     Drug use: No     PRINCESS-7 SCORE 7/12/2016 12/13/2017 1/26/2019   Total Score - - 2 (minimal anxiety)   Total Score 4 3 2     PHQ 12/13/2017 7/16/2018 1/26/2019   PHQ-9 Total Score 1 3 2   Q9: Thoughts of better off dead/self-harm past 2 weeks Not at all Not at all Not at all         Chronic Pain Follow-Up    Where in your body do you have pain?  Shoulder  How has your pain affected your ability to work? Not applicable  Which of these pain treatments have you tried since your last clinic visit? Physical Therapy and Surgery  How well are you sleeping? Poor  How has your mood been since your last visit? Much worse  Have you had a significant life event? Other: Social isolation secondary to coronavirus  Other aggravating factors: none  Taking medication as directed? Yes    PHQ-9 SCORE 12/13/2017 7/16/2018 1/26/2019   PHQ-9 Total Score MyChart - 3 (Minimal depression) 2 (Minimal depression)   PHQ-9 Total Score 1 3 2     PRINCESS-7 SCORE 7/12/2016 12/13/2017 1/26/2019   Total Score - - 2 (minimal anxiety)   Total Score 4 3 2     No flowsheet data found.  Encounter-Level CSA - 07/16/2018:    Controlled Substance Agreement - Scan on 7/26/2018 12:19 PM: CONTROLLED SUBSTANCE AGREEMENT     Patient-Level CSA:    There are no patient-level csa.         Patient Active Problem List   Diagnosis     Hypothyroidism      Hyperlipidemia LDL goal <100     Persistent insomnia     Hypertension goal BP (blood pressure) < 140/90     Anxiety     Hypotestosteronism     Trochanteric bursitis     Depression with anxiety     Chronic pain of both shoulders     Physical deconditioning     Chronic left shoulder pain     Pain of left calf     Acute deep vein thrombosis (DVT) of distal vein of left lower extremity (H)     Anticoagulation management encounter     Hip pain, right     History of deep venous thrombosis     Status post right hip replacement     Constipation     Solitary kidney     BPH with urinary obstruction     Past Surgical History:   Procedure Laterality Date     ABDOMEN SURGERY  1973    large benign tumor removed with thrombophlebitis post op     APPENDECTOMY  1960     ARTHROPLASTY HIP ANTERIOR Right 8/9/2018    Procedure: ARTHROPLASTY HIP ANTERIOR;  RIGHT TOTAL HIP ARTHROPLASTY DIRECT ANTERIOR APPROACH ;  Surgeon: Chester Ortiz MD;  Location: [facility]     CATARACT IOL, RT/LT      bilateral     EYE SURGERY  [id number], 2007    eyelid surgery, 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, trabeculoplasty       Social History     Tobacco Use     Smoking status: Never Smoker     Smokeless tobacco: Never Used   Substance Use Topics     Alcohol use: Yes     Family History   Problem Relation Age of Onset     Hypertension Mother      Lipids Mother      Heart Disease Mother      Psychotic Disorder Mother         severe depression     Heart Disease Father      Ovarian Cancer Sister      Colon Cancer Paternal Grandfather            Reviewed and updated as needed this visit by Provider         Review of Systems   ROS COMP: Constitutional, HEENT, cardiovascular, pulmonary, gi and gu systems are negative, except as otherwise noted.       Objective   Reported vitals:  There were no vitals taken for this visit.   alert  Psych: Alert and oriented times 3; coherent speech, normal   rate and volume, able to articulate logical thoughts, able   to abstract reason, no  tangential thoughts, no hallucinations   or delusions  His affect is normal            Assessment/Plan:  1. Anxiety  I will increase the patient's buspirone to 15 mg twice daily.  He will continue with lorazepam once or twice at night to help with his chronic insomnia.    2. Chronic left shoulder pain  He continues on oxycodone with tolerable pain.    3. Hypotestosteronism  He would like to go on testosterone injections for his low testosterone rather than any of the creams.    4. Hypertension goal BP (blood pressure) < 140/90  His blood pressures at home have been averaging around 115/73.    5. Persistent insomnia  See above about lorazepam.    6. Drug-induced constipation  On MiraLAX with pretty good control.    7. Hyperlipidemia LDL goal <100  Patient would like to switch from atorvastatin to rosuvastatin.  He has been on that in the past without difficulties at 20 mg daily.    8. Depression with anxiety  We will continue with buspirone.      Return in about 4 weeks (around 5/5/2020) for labs, anxiety, telephone visit.      Phone call duration:  22 minutes    Enrique Lucia MD

## 2020-04-08 NOTE — TELEPHONE ENCOUNTER
PRIOR AUTHORIZATION DENIED    Medication: testosterone cypionate (DEPOTESTOSTERONE) 100 MG/ML injection- DENIED    Denial Date: 4/8/2020    Denial Rational: patient's diagnosis did not meet the requirement of a medically accepted indication.      Appeal Information: If provider would like to appeal please provide a letter of medical necessity.

## 2020-04-08 NOTE — TELEPHONE ENCOUNTER
Central Prior Authorization Team  Phone: [phone]    PA Initiation    Medication: testosterone cypionate (DEPOTESTOSTERONE) 100 MG/ML injection  Insurance Company: [employer] - Phone [phone] Fax [phone]  [id number] [place] [facility] [address] [place] [address] [address]  Filling Pharmacy Phone: [phone]  Filling Pharmacy Fax:    Start Date: 4/8/2020

## 2020-04-08 NOTE — TELEPHONE ENCOUNTER
Prior Authorization Retail Medication Request    Medication/Dose: testosterone cypionate (DEPOTESTOSTERONE) 100 MG/ML injection  ICD code (if different than what is on RX):      Previously Tried and Failed:      Rationale:      Insurance Name:      Insurance ID:      Pharmacy Information (if different than what is on RX)  Name:      Phone:

## 2020-04-09 PROBLEM — I82.4Z2 ACUTE DEEP VEIN THROMBOSIS (DVT) OF DISTAL VEIN OF LEFT LOWER EXTREMITY (H): Status: RESOLVED | Noted: 2017-11-27 | Resolved: 2020-01-01

## 2020-04-09 PROBLEM — M79.662 PAIN OF LEFT CALF: Status: RESOLVED | Noted: 2017-11-24 | Resolved: 2020-01-01

## 2020-04-09 PROBLEM — Z86.718 HISTORY OF DEEP VENOUS THROMBOSIS: Status: RESOLVED | Noted: 2018-07-16 | Resolved: 2020-01-01

## 2020-04-09 NOTE — TELEPHONE ENCOUNTER
"Last office visit 03/09/2020.    Virtual visit 04/07/2020.    Drug change during virtual visit Rx denied.     Requested Prescriptions   Refused Prescriptions Disp Refills     atorvastatin (LIPITOR) 40 MG tablet [Pharmacy Med Name: ATORVASTATIN 40 MG TABLET] 90 tablet 3     Sig: TAKE ONE TABLET BY MOUTH ONE TIME DAILY       Statins Protocol Failed - 4/8/2020  6:47 PM        Failed - Medication is active on med list        Passed - LDL on file in past 12 months     Recent Labs   Lab Test 03/09/20  1425   LDL 69             Passed - No abnormal creatine kinase in past 12 months     No lab results found.             Passed - Recent (12 mo) or future (30 days) visit within the authorizing provider's specialty     Patient has had an office visit with the authorizing provider or a provider within the authorizing providers department within the previous 12 mos or has a future within next 30 days. See \"Patient Info\" tab in inbasket, or \"Choose Columns\" in Meds & Orders section of the refill encounter.              Passed - Patient is age 18 or older             "

## 2020-04-09 NOTE — TELEPHONE ENCOUNTER
"RN called back to pt.    1) Pt states he would like RN to relay to PCP, \"I wish he would be more aggressive about it, I need to be on 60 mg\". He states that he does not have any negative effects from his atorvastatin at 40 and thinks he can handle 60.    2) In regards to testosterone, pt is not sure why the insurance will not cover it. If need be, we can start a PA. Pt would like the 3 mo sent over to pharmacy. He will also call insurance to see if anything needs to be completed to get this covered  "

## 2020-04-09 NOTE — TELEPHONE ENCOUNTER
I placed him back on 40 mg of atorvastatin daily.  We can check his tests again in 3 months and then decide if we need to adjust his dose.  His insurance company did not cover the testosterone injections.

## 2020-04-09 NOTE — TELEPHONE ENCOUNTER
All of his numbers are just fine with the exception of his triglycerides and a atorvastatin does not do anything to those.  So I think it is perfectly appropriate for him to stay on the 40 mg.  He can try limiting the amount of bread, potatoes, sweets and pasta to see if his triglycerides will go back down.

## 2020-04-09 NOTE — TELEPHONE ENCOUNTER
Dr. Lucia    Pt has questions about the Lipitor dosing & his recent eye surgery.  In Feb (see notes in chart) he had Emergency eye surgery at [facility] in [place].  And pt was to treat symptoms as if he had a heart attack.   He was 1st put on Crestor, but the co-pay was $200.  Then switched to atorvastatin (LIPITOR) 40 MG tablet, with a $0 co-pay.   When the pharmacist suggested a 60mg dose (rather than the 40mg tablet), pt is calling asking PCP if he should be on Lipitor 60mg? Is there any risk with increasing the Lipitor dose from 40mg to 60mg?   He says the reasoning for the dose increase would be to help control/stabilize his vascular disease, which ends up affecting his eyes.  By reducing the amount of plaque build up inside his arteries   or reducing the chances of him having a clot that could break off and affect his eyes - is why he would like to inc the Lipitor dose.          Please advise, and call pt back @ [phone].

## 2020-04-09 NOTE — TELEPHONE ENCOUNTER
testosterone cypionate (DEPOTESTOSTERONE) 100 MG/ML injection  100mg/gm = 1 vial this will last 1 week, can you send a new rx for 4 or 12 vials? They are single use vials.  thanks

## 2020-04-09 NOTE — TELEPHONE ENCOUNTER
rosuvastatin (CRESTOR) 20 MG tablet is extremely expensive. Pt is hoping to change back to atorvastatin.  If need be increase to 60 mg with rx 40 mg, and rx for 20 mg.  thanks

## 2020-04-09 NOTE — TELEPHONE ENCOUNTER
Dr. Lucia,     Pt spoke to his insurance company ([employer]) appeals department, About getting the testosterone injection approved - since it looks like it was denied 4/8/2020.    For example, diagnosis: Hypotestosteronism [E34.9] was not approved.  However, would you be willing to try a diagnosis such as:  -- Testosterone deficiency, E34.9   -- Hypogonadism male, E29.1   And see if these are covered?    Routing to PCP.

## 2020-04-10 NOTE — TELEPHONE ENCOUNTER
The patient is going to do some more research about a atorvastatin and get back to me in about a month of what he wants to do with the dose.  Apparently his testosterone got approved.  He will start doing that on a weekly basis and we will recheck his testosterone levels again in 2 months.

## 2020-04-10 NOTE — TELEPHONE ENCOUNTER
Prior Authorization Approval    Please disregard previous pa denial on 04/8. Received fax from insurance stating that denial reason was incorrect. PA has been approved for this medication, but  pharmacy will need to process medication using the approval NDC number. Informed pharmacy to process medication using the approval ndc and got a paid claim.    Authorization Effective Date: 1/1/2020  Authorization Expiration Date: 4/10/2021  Medication: testosterone cypionate (DEPOTESTOSTERONE) 100 MG/ML injection- APPROVED   Approved Dose/Quantity:   Reference #:   NDC: [id number]  Insurance Company: [employer] [phone] Fax [phone]  Expected CoPay:       CoPay Card Available:      Foundation Assistance Needed:    Which Pharmacy is filling the prescription (Not needed for infusion/clinic administered): [facility] [address] [place] [address]  Pharmacy Notified: Yes  Patient Notified: Comment:

## 2020-04-10 NOTE — TELEPHONE ENCOUNTER
Central Prior Authorization Team  Phone: [phone]    Received fax from insurance needing additional info. Filled out form and faxed it back to insurance.

## 2020-04-14 NOTE — TELEPHONE ENCOUNTER
testosterone cypionate (DEPOTESTOSTERONE) 100 MG/ML injection is covered however expensive $45 per month.  Can we try 2000mg/ml and cut dose in half?  We could fill 1 ml vials and might be more cost efficient.  Thanks

## 2020-04-15 NOTE — TELEPHONE ENCOUNTER
Controlled Substance Refill Request for   Problem List Complete:  No     PROVIDER TO CONSIDER COMPLETION OF PROBLEM LIST AND OVERVIEW/CONTROLLED SUBSTANCE AGREEMENT    Last Written Prescription Date:  3/16/2020  Last Fill Quantity: 300,   # refills: 0    THE MOST RECENT OFFICE VISIT MUST BE WITHIN THE PAST 3 MONTHS. AT LEAST ONE FACE TO FACE VISIT MUST OCCUR EVERY 6 MONTHS. ADDITIONAL VISITS CAN BE VIRTUAL.  (THIS STATEMENT SHOULD BE DELETED.)    Last Office Visit with [facility] primary care provider: Rea    Future Office visit:     Chronic left shoulder pain   11/21/2017     Overview    No CSA on file   check 01/16/20 no concerns         Controlled substance agreement:   Encounter-Level CSA - 07/16/2018:    Controlled Substance Agreement - Scan on 7/26/2018 12:19 PM: CONTROLLED SUBSTANCE AGREEMENT     Patient-Level CSA:    There are no patient-level csa.         Last Urine Drug Screen: No results found for: CDAUT, No results found for: COMDAT, No results found for: THC13, PCP13, COC13, MAMP13, OPI13, AMP13, BZO13, TCA13, MTD13, BAR13, OXY13, PPX13, BUP13     Processing:  Send to patient pharmacy via e-prescribe     [email or web address]       checked in past 3 months?  Yes 1/16/2020, no concerns.      Routing refill request to provider for review/approval because:  Drug not on the [facility] refill protocol

## 2020-04-21 NOTE — TELEPHONE ENCOUNTER
Reason for Call:  Other call back    Detailed comments: Edi phoned saying Dr. Lucia had asked Edi to get a report back to a triage nurse regarding Lipitor medication.    Phone Number Patient can be reached at: Cell number on file:    Telephone Information:   Mobile [phone]       Best Time: any    Can we leave a detailed message on this number? YES    Call taken on 4/21/2020 at 2:54 PM by Linda Miranda

## 2020-04-21 NOTE — TELEPHONE ENCOUNTER
"Triage spoke with patient.     Per 4/10/2020 TE- he was going to do some research on statin drugs.    Patient reports he \"did a lot a research\"; according to some research by NIH an article has indicating that Lipitor, 'especially in high doses, has a anti-inflammatory effect' which patient states this could possibly help him given hx of RA.     Pt. interested in increasing his Lipitor dose to 80mg in hope that he may benefit. He is not worried about any side effects, so far reports he has had none. Otherwise patient stated he is ok with providers opinion of 60mg dose as well.     Patient states if ok for 60mg then would need a new prescription for 20mg. Otherwise if provider is ok with 80mg he is ok with that and would like a script sent.     Patient overall stated he wanted to be proactive about protecting his cardiovascular system.    fyi-Pt. aware to come back from for testosterone follow up in 2 month.     PCP, please advise of next Lipitor dosing. Pharmacy Td'up      Dipti SEPULVEDAN, RN, PHN      "

## 2020-05-01 NOTE — TELEPHONE ENCOUNTER
"Clinic Action Needed: Yes, please call patient after 10AM only    Presenting Problem: See below, please call him to follow up and advise if any further action needs to be done    Routed to: RN pool    Pt states he made a mistake this evening, he used a single-use needle to inject himself with his testosterone twice.  Pt states when he was drawing up his testosterone, the needle got stuck in the vial, the plunger came loose and some of the medication spilled out.  He injected himself with the medication in the vial.  He was concerned that he didn't receive the full dose so he used the same needle to draw up the remaining medication and injected himself again.  Pt states the vial has the exact amount of medication per dose, no concerns for overdose.      Pt states he used an alcohol wipe prior to injecting each time.     No triage guideline available.  Advised pt to monitor for signs of infection (redness, warmth, fever, pain, swelling, drainage) and call back with any new/worsening symptoms.     Vivi Cloud RN/WILL    Reason for Disposition    Caller has NON-URGENT medication question about med that PCP prescribed and triager unable to answer question    Additional Information    Negative: MORE THAN A DOUBLE DOSE of a prescription or over-the-counter (OTC) drug    Negative: [1] DOUBLE DOSE (an extra dose or lesser amount) of over-the-counter (OTC) drug AND [2] any symptoms (e.g., dizziness, nausea, pain, sleepiness)    Negative: [1] DOUBLE DOSE (an extra dose or lesser amount) of prescription drug AND [2] any symptoms (e.g., dizziness, nausea, pain, sleepiness)    Negative: Took another person's prescription drug    Negative: [1] DOUBLE DOSE (an extra dose or lesser amount) of prescription drug AND [2] NO symptoms (Exception: a double dose of antibiotics)    Negative: Diabetes drug error or overdose (e.g., insulin or extra dose)    Negative: [1] Request for URGENT new prescription or refill of \"essential\" " medication (i.e., likelihood of harm to patient if not taken) AND [2] triager unable to fill per unit policy    Negative: [1] Prescription not at pharmacy AND [2] was prescribed today by PCP    Negative: Pharmacy calling with prescription questions and triager unable to answer question    Negative: Caller has URGENT medication question about med that PCP prescribed and triager unable to answer question    Protocols used: MEDICATION QUESTION CALL-A-AH

## 2020-05-14 NOTE — TELEPHONE ENCOUNTER
Controlled Substance Refill Request for oxyCODONE (ROXICODONE) 5 MG tablet     Problem List Complete:  Yes    Chronic left shoulder pain   11/21/2017    Overview    No CSA on file   check 01/16/20 no concerns        checked in past 3 months?  No, route to RN

## 2020-05-14 NOTE — TELEPHONE ENCOUNTER
Routing refill request to provider for review/approval because:  Drug not on the [facility] refill protocol       RX [place] reviewed:  reviewed May 14, 2020- one outside provider noted    [place]  [email or web address]

## 2020-05-14 NOTE — TELEPHONE ENCOUNTER
Reason for Call:  Medication or medication refill:    Do you use a [facility]?  Name of the [facility] and [facility] [place]    Name of the medication requested: oxyCODONE (ROXICODONE) 5 MG tablet    Other request:   Patient only has two days left of this medication    Can we leave a detailed message on this number? YES    Phone number patient can be reached at: Home number on file [phone] (home)    Best Time: anytime    Call taken on 5/14/2020 at 11:56 AM by ILENE DELGADILLO

## 2020-05-15 NOTE — TELEPHONE ENCOUNTER
The patient called and stated that he sees Dr. Lucia and he wanted to let him know that he is waiting a little longer than Dr. Lucia wanted to get his blood work done. The patient state that he is scheduled to see his orthopedic surgeon soon and he wants to wait and see what they say before he schedules a lab only appointment for the blood work that Dr. Lucia wants him to do.  He said that he wanted to make sure Dr. Lucia knows that he is not ignoring him and he will calling to schedule that appointment soon.

## 2020-06-09 NOTE — TELEPHONE ENCOUNTER
See other TE from today, pt looking for lab orders from PCP.   Message sent to Dr. Lucia requesting lab orders.

## 2020-06-09 NOTE — TELEPHONE ENCOUNTER
See other TE - pt has questions re: lab results.  Labs pending for PCP to sign, and then pt to schedule a lab appt.

## 2020-06-09 NOTE — TELEPHONE ENCOUNTER
Reason for Call:  Medication or medication refill:    Do you use a [facility]?  [facility]    Name of the medication requested: oxyCODONE (ROXICODONE) 5 MG tablet    Other request:     Can we leave a detailed message on this number? YES    Phone number patient can be reached at: Home number on file [phone] (home)    Best Time:     Call taken on 6/9/2020 at 4:48 PM by XOCHITL BRAR

## 2020-06-09 NOTE — TELEPHONE ENCOUNTER
Controlled Substance Refill Request for oxyCODONE (ROXICODONE) 5 MG tablet   Problem List Complete:  No     PROVIDER TO CONSIDER COMPLETION OF PROBLEM LIST AND OVERVIEW/CONTROLLED SUBSTANCE AGREEMENT    Future Office visit:   Next 5 appointments (look out 90 days)    Aug 12, 2020  3:30 PM CDT  Return Visit with Ramon Acuña MD  [facility] [address]  [address]  [facility] [address]  [phone]          Controlled substance agreement:   Encounter-Level CSA - 07/16/2018:    Controlled Substance Agreement - Scan on 7/26/2018 12:19 PM: CONTROLLED SUBSTANCE AGREEMENT     Patient-Level CSA:    There are no patient-level csa.         Last Urine Drug Screen: No results found for: CDAUT, No results found for: COMDAT, No results found for: THC13, PCP13, COC13, MAMP13, OPI13, AMP13, BZO13, TCA13, MTD13, BAR13, OXY13, PPX13, BUP13     Processing:  Rx to be electronically transmitted to pharmacy by provider      [email or web address]     checked in past 3 months?  Yes 5/14/20

## 2020-06-09 NOTE — TELEPHONE ENCOUNTER
Reason for Call: Request for an order or referral:    Order or referral being requested: order labs lipids etc -patient seems to have multiple requests for  knowing if meds are working patrick busbar  -will schedule visit with JRB when done  Also wants testosterone level checked  Date needed: as soon as possible    Has the patient been seen by the PCP for this problem? YES    Additional comments: please call pt so he cn set up lab     Phone number Patient can be reached at:  Home number on file [phone] (home)    Best Time:      Can we leave a detailed message on this number?  YES    Call taken on 6/9/2020 at 4:42 PM by XOCHITL BRAR

## 2020-06-09 NOTE — TELEPHONE ENCOUNTER
Patient called anticoagulation nurse requesting to speak to a triage nurse about his labs and other issues. The most recent lab result was a lipid profile with a provider's message:     Notes recorded by Enrique Lucia MD on 3/11/2020 at 11:43 AM CDT   Edi,     Your triglycerides are just a little bit high.  We should repeat these tests again in about 3 months.  The foods that you can eat that will raise your triglycerides are alcohol, sweets, potatoes, pasta and bread.  If you have other questions about these feel free to contact me.     Sincerely,     Enrique Lucia M.D.     Triage nurse to return phone call to patient.

## 2020-06-11 NOTE — TELEPHONE ENCOUNTER
Pt informed that labs ordered in his chart, and he will call clinic back to schedule fasting lab appt.   None

## 2020-07-06 PROBLEM — M16.9 OSTEOARTHRITIS OF HIP: Status: ACTIVE | Noted: 2018-04-04

## 2020-07-06 PROBLEM — Z86.718 PERSONAL HISTORY OF OTHER VENOUS THROMBOSIS AND EMBOLISM: Status: ACTIVE | Noted: 2018-04-04

## 2020-07-06 PROBLEM — Z96.641 PRESENCE OF RIGHT ARTIFICIAL HIP JOINT: Status: ACTIVE | Noted: 2018-08-09

## 2020-07-06 PROBLEM — I82.502 CHRONIC DEEP VEIN THROMBOSIS (DVT) OF LEFT LOWER EXTREMITY (H): Status: ACTIVE | Noted: 2017-11-27

## 2020-07-06 PROBLEM — I25.10 CORONARY ARTERY DISEASE WITHOUT ANGINA PECTORIS: Status: ACTIVE | Noted: 2017-02-24

## 2020-07-06 PROBLEM — F10.20 ALCOHOL DEPENDENCE (H): Status: ACTIVE | Noted: 2017-11-17

## 2020-07-06 PROBLEM — H34.8112 CENTRAL RETINAL VEIN OCCLUSION OF RIGHT EYE (H): Status: ACTIVE | Noted: 2020-01-01

## 2020-07-06 PROBLEM — R53.81 DEBILITY: Status: ACTIVE | Noted: 2019-01-01

## 2020-07-06 PROBLEM — M79.662 PAIN IN LEFT LOWER LEG: Status: ACTIVE | Noted: 2017-11-24

## 2020-07-06 PROBLEM — I82.4Z2: Status: ACTIVE | Noted: 2017-11-27

## 2020-07-06 PROBLEM — R53.81 MALAISE: Status: ACTIVE | Noted: 2017-11-17

## 2020-07-06 NOTE — ASSESSMENT & PLAN NOTE
This is a chronic health problem that is well controlled with current medications and lifestyle measures. Currently on Atorvastatin 60 mg daily which is cumbersome for him to take 2 tablets of 40 mg and 20 mg daily. Pt states that his current Retinal occlusion and discussion with his Ophthalmologist needs meticulous control of CV risks and opting for a higher dose and asking our suggestion.

## 2020-07-06 NOTE — PROGRESS NOTES
"Edi Villafana is a 75 year old male who is being evaluated via a billable telephone visit.      The patient has been notified of following:     \"This telephone visit will be conducted via a call between you and your physician/provider. We have found that certain health care needs can be provided without the need for a physical exam.  This service lets us provide the care you need with a short phone conversation.  If a prescription is necessary we can send it directly to your pharmacy.  If lab work is needed we can place an order for that and you can then stop by our lab to have the test done at a later time.    Telephone visits are billed at different rates depending on your insurance coverage. During this emergency period, for some insurers they may be billed the same as an in-person visit.  Please reach out to your insurance provider with any questions.    If during the course of the call the physician/provider feels a telephone visit is not appropriate, you will not be charged for this service.\"    Patient has given verbal consent for Telephone visit?  Yes    What phone number would you like to be contacted at? [phone]    How would you like to obtain your AVS? Territ    Dee     Edi Villafana is a 75 year old male who presents via phone visit today for the following health issues:    HPI  Hyperlipidemia Follow-Up      Are you regularly taking any medication or supplement to lower your cholesterol?   Yes- lipitor    Are you having muscle aches or other side effects that you think could be caused by your cholesterol lowering medication?  No      How many servings of fruits and vegetables do you eat daily?  2-3    On average, how many sweetened beverages do you drink each day (Examples: soda, juice, sweet tea, etc.  Do NOT count diet or artificially sweetened beverages)?   0    How many days per week do you exercise enough to make your heart beat faster? 4    How many minutes a day do you exercise " enough to make your heart beat faster? 20 - 29    How many days per week do you miss taking your medication? 0    Central retinal vein occlusion of right eye  This is a new problem added to the problem list. Reviewed the recent scanned document of the ophthalmologist , Meticulous cardiovascular risk factors to be modified are recommended to by the ophthalmologist. Discussed on all the previous risk factors of the patient regarding his previous Hx of CAD, DVT lower extremity on his chart. Patient not on any OAC at this time only Aspirin. Need of checking with his hematologist on thie new CRVO of the Rt eye discussed with the patient and possible need of anticoagulant if needed he will need to F/U with his hematology. Pt understood with the plan.    Hypothyroidism  This is a chronic health problem that is uncontrolled with current medications and lifestyle measures. Recent TSH in 06/2020 was at 9.4. Pt has been taking Levothyroxine 125 mcg daily with extra 1/2 tab every Wednesday since one year. No recent changes on the dose. Pt states that he has a repeat TSH orders with him and will chcck with his PCP on further plans.     Hyperlipidemia LDL goal <100  This is a chronic health problem that is well controlled with current medications and lifestyle measures. Currently on Atorvastatin 60 mg daily which is cumbersome for him to take 2 tablets of 40 mg and 20 mg daily. Pt states that his current Retinal occlusion and discussion with his Ophthalmologist needs meticulous control of CV risks and opting for a higher dose and asking our suggestion.    Patient Active Problem List   Diagnosis     Hypothyroidism     Hyperlipidemia LDL goal <100     Persistent insomnia     Hypertension goal BP (blood pressure) < 140/90     Anxiety     Hypotestosteronism     Trochanteric bursitis     Depression with anxiety     Chronic pain of both shoulders     Physical deconditioning     Chronic left shoulder pain     Anticoagulation management  encounter     Hip pain, right     Status post right hip replacement     Constipation     Solitary kidney     BPH with urinary obstruction     Chronic deep vein thrombosis (DVT) of left lower extremity (H)     Alcohol dependence (H)     Coronary artery disease without angina pectoris     Debility     Endocrine disorder     Left anterior fascicular block     Malaise     Osteoarthritis of hip     Pain in left lower leg     Personal history of other venous thrombosis and embolism     Presence of right artificial hip joint     Renal agenesis     Seasonal allergic rhinitis     Central retinal vein occlusion of right eye     Past Surgical History:   Procedure Laterality Date     ABDOMEN SURGERY  1973    large benign tumor removed with thrombophlebitis post op     APPENDECTOMY  1960     ARTHROPLASTY HIP ANTERIOR Right 8/9/2018    Procedure: ARTHROPLASTY HIP ANTERIOR;  RIGHT TOTAL HIP ARTHROPLASTY DIRECT ANTERIOR APPROACH ;  Surgeon: Chester Ortiz MD;  Location: [facility] OR     CATARACT IOL, RT/LT      bilateral     EYE SURGERY  [id number], 2007    eyelid surgery, 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, trabeculoplasty       Social History     Tobacco Use     Smoking status: Never Smoker     Smokeless tobacco: Never Used   Substance Use Topics     Alcohol use: Yes     Family History   Problem Relation Age of Onset     Hypertension Mother      Lipids Mother      Heart Disease Mother      Psychotic Disorder Mother         severe depression     Heart Disease Father      Ovarian Cancer Sister      Colon Cancer Paternal Grandfather            Reviewed and updated as needed this visit by Provider         Review of Systems   Constitutional, HEENT, cardiovascular, pulmonary, GI, , musculoskeletal, neuro, skin, endocrine and psych systems are negative, except as otherwise noted.       Objective   Reported vitals:  There were no vitals taken for this visit.   healthy, alert and no distress  PSYCH: Alert and oriented times 3; coherent speech,  normal   rate and volume, able to articulate logical thoughts, able   to abstract reason, no tangential thoughts, no hallucinations   or delusions  His affect is normal  RESP: No cough, no audible wheezing, able to talk in full sentences  Remainder of exam unable to be completed due to telephone visits    Diagnostic Test Results:  Labs reviewed in Epic          Assessment and Plan  1. Central retinal vein occlusion of right eye, unspecified complication status  New problem, reviewed Ophthalmology records and discussed with the patient on his hematology visit which he understood and agreed.    2. Hypothyroidism, unspecified type  Uncontrolled, to keep up repeat TSH check lab appointment for further care as recommended by PCP which patient is opting.     3. Hyperlipidemia LDL goal <100  Well controlled, given patient risk factors it is reasonable to be on Lipitor 80 mg daily instead of current 60 mg daliy. Sent in the medication to his pharmacy.  - atorvastatin (LIPITOR) 80 MG tablet; Take 1 tablet (80 mg) by mouth daily  Dispense: 90 tablet; Refill: 1     Patient Instructions   Sent in Lipitor to your pharmacy.     Please follow up with your Ophthalmology and Hematology for further recommendations on anticoagulation if needed.    =======================    Retinal Vein Occlusion    What is retinal vein occlusion?    The eye is often compared to a camera. The front of the eye contains a lens that focuses images on the inside of the back of the eye. This area, called the retina, is covered with special nerve cells that react to light.  Nerve cells need a constant supply of blood to deliver oxygen and nutrients. Most people understand what happens in a  stroke.  A small blood clot blocks the flow of blood through one of the arteries in the brain, and the area that is not getting blood becomes damaged. This same type of damage can happen anywhere in the body, not just the brain.  When the flow of blood from the retina is  blocked, it is often because of a retinal vein occlusion. If this happens, the nerve cells of the retina can die and vision may be lost. Because all of the blood from the retina drains through one large vein, a blockage of that vein can affect all the vision in that eye.    Why do people get retinal vein occlusion?    Retinal vein occlusion happens when a blood clot blocks the vein. Sometimes it happens because the veins of the eye are too narrow. It is more likely to occur in people with diabetes, and possibly high blood pressure, high cholesterol levels, or other health problems that affect blood flow.    How does the doctor know whether someone has a retinal vein occlusion?    The symptoms of retinal vein occlusion range from subtle to very obvious. There is painless blurring or loss of vision. It almost always happens in just one eye. At first, the blurring or loss of vision might be slight, but it gets worse over the next few hours or days. Sometimes there is a complete loss of vision almost immediately.  If these symptoms occur, it is important to schedule an appointment with your doctor as soon as possible. Retinal vein occlusion often causes permanent damage to the retina and loss of vision. It can also lead to other eye problems.    How is retinal vein occlusion treated?    Unfortunately, there is no way actually to unblock retinal veins. However, the doctor can treat any health problems that seem to be related to the retinal vein occlusion.  Vision may come back in some eyes that have had a retinal vein occlusion. About one-third have some improvement, about one-third stay the same and about one-third gradually improve, but it can take a year or more to learn the final outcome.  In some cases the blocked vessels will lead to fluid accumulation in the retina like a sponge absorbing water.  In some patients with certain patterns of vein occlusion, this fluid may be treated with focal laser photocoagulation  or injections into the eye of medications.  These medications include steroids or other drugs, including Avastin, Lucentis, or Eylea.  Sometimes the retinal vein occlusion will cause a dangerous condition called neovascular glaucoma. In neovascular glaucoma, abnormal blood vessels start growing inside the eye, and the pressure in the eye starts increasing. This can permanently destroy all vision in the eye. It can also cause significant pain and cause the eye to deteriorate physically.  If this condition seems likely to develop, your doctor might recommend a treatment called panretinal laser photocoagulation.  In addition, injection of Avastin, Lucentis, or Eylea may be recommended as well.    What happens in laser photocoagulation?    In laser photocoagulation, the surgeon focuses a laser beam onto a small spot on the retina where the abnormal blood vessels are growing. The laser beam heats up that spot, creating a tiny burn in the blood vessels and stopping their growth or leading to fluid resorption.  This treatment is usually effective in stopping the growth of the blood vessels and fluid leakage, but it will not bring back any vision that has been lost. Unfortunately, there is no known way to reverse the damage that is done by the more severe forms of retinal vein occlusion and neovascular glaucoma. The goal of treatment is to prevent further damage and possible loss of the eye itself.  There are new drugs being used in some patients to treat new blood vessels or leaking fluid.  Microscopic quantities of the drugs are injected into the eye.  The treatments are new so ask your eye doctor about the latest information.         Return in about 3 months (around 10/6/2020), or if symptoms worsen or fail to improve.    Ashley Clement MD  [facility]    Phone call duration:  11  minutes

## 2020-07-06 NOTE — TELEPHONE ENCOUNTER
Controlled Substance Refill Request for   Requested Prescriptions   Pending Prescriptions Disp Refills     oxyCODONE (ROXICODONE) 5 MG tablet  Last Written Prescription Date:  6/10/2020  Last Fill Quantity: 300,  # refills: 0   Last office visit: 4/7/2020 (VIRTUAL) with prescribing provider:  Rea   Future Office Visit:   Next 5 appointments (look out 90 days)    Aug 12, 2020  3:30 PM CDT  Return Visit with Ramon Acuña MD  [facility]) [address]  [facility] [address]  [phone]        300 tablet 0     Sig: Take 1-2 tablets (5-10 mg) by mouth every 4 hours as needed for severe pain Up to 10/day       There is no refill protocol information for this order          Problem List Complete:  No     PROVIDER TO CONSIDER COMPLETION OF PROBLEM LIST AND OVERVIEW/CONTROLLED SUBSTANCE AGREEMENT      Future Office visit:   Next 5 appointments (look out 90 days)    Aug 12, 2020  3:30 PM CDT  Return Visit with Ramon Acuña MD  [facility]) [address]  [facility] [address]  [phone]          Controlled substance agreement:   Encounter-Level CSA - 07/16/2018:    Controlled Substance Agreement - Scan on 7/26/2018 12:19 PM: CONTROLLED SUBSTANCE AGREEMENT     Patient-Level CSA:    There are no patient-level csa.         Last Urine Drug Screen: No results found for: CDAUT, No results found for: COMDAT, No results found for: THC13, PCP13, COC13, MAMP13, OPI13, AMP13, BZO13, TCA13, MTD13, BAR13, OXY13, PPX13, BUP13     Processing:  Rx to be electronically transmitted to pharmacy by provider      [email or web address]       checked in past 3 months?  Yes 5/14/2020, no concerns   [place] reviewed:  reviewed- no concerns    [place]:  [email or web address]  Routing refill  request to provider for review/approval because:  Drug not on the [facility] refill protocol

## 2020-07-06 NOTE — ASSESSMENT & PLAN NOTE
This is a new problem added to the problem list. Reviewed the recent scanned document of the ophthalmologist , Meticulous cardiovascular risk factors to be modified are recommended to by the ophthalmologist. Discussed on all the previous risk factors of the patient regarding his previous Hx of CAD, DVT lower extremity on his chart. Patient not on any OAC at this time only Aspirin. Need of checking with his hematologist on thie new CRVO of the Rt eye discussed with the patient and possible need of anticoagulant if needed he will need to F/U with his hematology. Pt understood with the plan.

## 2020-07-06 NOTE — TELEPHONE ENCOUNTER
Reason for Call:  Medication or medication refill:    Do you use a [facility]?  Name of the pharmacy and phone number for the current request:  [facility] [address] [place] [facility], [address]      Name of the medication requested: oxyCODONE (ROXICODONE) 5 MG tablet    The patient called and stated that he needs a refill for this medication.      Can we leave a detailed message on this number? YES    Phone number patient can be reached at: Home number on file [phone] (home)    Best Time: Any    Call taken on 7/6/2020 at 11:53 AM by Lauren Stevenson

## 2020-07-06 NOTE — TELEPHONE ENCOUNTER
Reason for Call:  Other prescription    Detailed comments: The patient called and stated that he would like to increase his atorvastatin (LIPITOR) 60 MG tablet to 80 MG.  He stated that he wants to keep his cholesterol low and is concerned about going blind. He spoke with his pharmacist multiple times and they suggested to inquire about increase this medication dosage to 80 MG.  He stated that it also would be cheaper with his insurance because then he can get one pill versus 2 separate prescriptions to equate to the 60 MG dosage.      Phone Number Patient can be reached at: Home number on file [phone] (home)    Best Time: Any    Can we leave a detailed message on this number? YES    Call taken on 7/6/2020 at 11:55 AM by Lauren Stevenson

## 2020-07-06 NOTE — ASSESSMENT & PLAN NOTE
This is a chronic health problem that is uncontrolled with current medications and lifestyle measures. Recent TSH in 06/2020 was at 9.4. Pt has been taking Levothyroxine 125 mcg daily with extra 1/2 tab every Wednesday since one year. No recent changes on the dose. Pt states that he has a repeat TSH orders with him and will chcck with his PCP on further plans.

## 2020-07-06 NOTE — PATIENT INSTRUCTIONS
Sent in Lipitor to your pharmacy.     Please follow up with your Ophthalmology and Hematology for further recommendations on anticoagulation if needed.    =======================    Retinal Vein Occlusion    What is retinal vein occlusion?    The eye is often compared to a camera. The front of the eye contains a lens that focuses images on the inside of the back of the eye. This area, called the retina, is covered with special nerve cells that react to light.  Nerve cells need a constant supply of blood to deliver oxygen and nutrients. Most people understand what happens in a  stroke.  A small blood clot blocks the flow of blood through one of the arteries in the brain, and the area that is not getting blood becomes damaged. This same type of damage can happen anywhere in the body, not just the brain.  When the flow of blood from the retina is blocked, it is often because of a retinal vein occlusion. If this happens, the nerve cells of the retina can die and vision may be lost. Because all of the blood from the retina drains through one large vein, a blockage of that vein can affect all the vision in that eye.    Why do people get retinal vein occlusion?    Retinal vein occlusion happens when a blood clot blocks the vein. Sometimes it happens because the veins of the eye are too narrow. It is more likely to occur in people with diabetes, and possibly high blood pressure, high cholesterol levels, or other health problems that affect blood flow.    How does the doctor know whether someone has a retinal vein occlusion?    The symptoms of retinal vein occlusion range from subtle to very obvious. There is painless blurring or loss of vision. It almost always happens in just one eye. At first, the blurring or loss of vision might be slight, but it gets worse over the next few hours or days. Sometimes there is a complete loss of vision almost immediately.  If these symptoms occur, it is important to schedule an appointment  with your doctor as soon as possible. Retinal vein occlusion often causes permanent damage to the retina and loss of vision. It can also lead to other eye problems.    How is retinal vein occlusion treated?    Unfortunately, there is no way actually to unblock retinal veins. However, the doctor can treat any health problems that seem to be related to the retinal vein occlusion.  Vision may come back in some eyes that have had a retinal vein occlusion. About one-third have some improvement, about one-third stay the same and about one-third gradually improve, but it can take a year or more to learn the final outcome.  In some cases the blocked vessels will lead to fluid accumulation in the retina like a sponge absorbing water.  In some patients with certain patterns of vein occlusion, this fluid may be treated with focal laser photocoagulation or injections into the eye of medications.  These medications include steroids or other drugs, including Avastin, Lucentis, or Eylea.  Sometimes the retinal vein occlusion will cause a dangerous condition called neovascular glaucoma. In neovascular glaucoma, abnormal blood vessels start growing inside the eye, and the pressure in the eye starts increasing. This can permanently destroy all vision in the eye. It can also cause significant pain and cause the eye to deteriorate physically.  If this condition seems likely to develop, your doctor might recommend a treatment called panretinal laser photocoagulation.  In addition, injection of Avastin, Lucentis, or Eylea may be recommended as well.    What happens in laser photocoagulation?    In laser photocoagulation, the surgeon focuses a laser beam onto a small spot on the retina where the abnormal blood vessels are growing. The laser beam heats up that spot, creating a tiny burn in the blood vessels and stopping their growth or leading to fluid resorption.  This treatment is usually effective in stopping the growth of the blood  vessels and fluid leakage, but it will not bring back any vision that has been lost. Unfortunately, there is no known way to reverse the damage that is done by the more severe forms of retinal vein occlusion and neovascular glaucoma. The goal of treatment is to prevent further damage and possible loss of the eye itself.  There are new drugs being used in some patients to treat new blood vessels or leaking fluid.  Microscopic quantities of the drugs are injected into the eye.  The treatments are new so ask your eye doctor about the latest information.

## 2020-07-09 NOTE — TELEPHONE ENCOUNTER
Routing to ordering provider to review patient request. Please advise.    oxyCODONE (ROXICODONE) 5 MG tablet Patient is asking for 10 mg tabs for cost savings and is willing to cut tab in half.  thanks

## 2020-07-09 NOTE — TELEPHONE ENCOUNTER
oxyCODONE (ROXICODONE) 5 MG tablet Patient is asking for 10 mg tabs for cost savings and is willing to cut tab in half.  thanks

## 2020-07-24 NOTE — TELEPHONE ENCOUNTER
Another call from patient today in regards to his     Levothyroxine medication.     2 tabs on Monday and Friday and 1 all other days. (Currently)   Would like to have this changes back to:  125mc tab daily and 1.5 tabs on .      Is this a medication that can be decreased back to 125mc tab daily and 1.5 tabs on ?     Patient would like to skip the medication at this time until after his Monday procedure then go back to his normal dose. Is this something he is able to do?

## 2020-07-24 NOTE — TELEPHONE ENCOUNTER
Reason for Call:  Other call back    Detailed comments: The patient called and stated that Dr. Gan increased his medication for busPIRone (BUSPAR) 15 MG tablet on 7/22.  Since the increase, he has not been able to sleep at all and his body is feeling overall fatigued. He has a surgery on his retina on  Monday and was instructed by his opthamologist to go back to the original dosage until he is recovered from his surgery. He would like a call back to discuss if this is okay.       Phone Number Patient can be reached at: Home number on file [phone] (home)    Best Time: Any    Can we leave a detailed message on this number? YES    Call taken on 7/24/2020 at 11:20 AM by Lauren Stevenson

## 2020-07-24 NOTE — TELEPHONE ENCOUNTER
Reason for Call:  Other     Detailed comments: patient wanted this added to his notes, he feels he is loosing weight. People have noticed.  Could this be part of the problem.  Not trying to loose weight    Phone Number Patient can be reached at: Home number on file [phone] (home)    Best Time: today    Can we leave a detailed message on this number? YES    Call taken on 7/24/2020 at 1:52 PM by PORFIRIO MORRISON

## 2020-07-24 NOTE — TELEPHONE ENCOUNTER
Please see patient update/question below.     Okay to decrease Buspirone back down to BID instead of his current TID until recovered from surgery?

## 2020-07-24 NOTE — TELEPHONE ENCOUNTER
This is the second encounter for symptoms today.     This note was added to the first note and sent to the provider for review.

## 2020-07-24 NOTE — TELEPHONE ENCOUNTER
His last TSH level on 7/16/20 was still high showing under replacement of the thyroid hormone.  That was why Dr Lucia had him increase.  He should stay with doing the higher dose and he can talk with Dr Lucia next week

## 2020-07-24 NOTE — TELEPHONE ENCOUNTER
Spoke with patient today.     Provider message was shared with patient.     Does plan to decrease his Buspar to BID.     ____________________________________________________________________    Patient had other concerns he would like to talk about as well.     States there was another medication his eye doctor said should be decreased:  Levothyroxine  2 tabs on Monday and Friday and 1 all other days. (Currently)   Would like to have this changes back to:  125mc tab daily and 1.5 tabs on .     Is this a medication that can be decreased back to 125mc tab daily and 1.5 tabs on ?     Since his increase in his levothyroxine he has been feeling fatigued and sick.(unable to describe his sick symptoms). Little bit sick to stomach.  No nausea/vomiting, fever, body aches/pains     States he is eating and drinking well. There are no concerns for this.      Does not want to set up a video visit at this time. Having an eye procedure on Monday and only wants to deal with that at this time. Will call and update the clinic after his surgery on how he is feeling and to see if he should be coming in for an OV.     Another call placed to the clinic from patient in regards to weight loss. States he has been having some weight loss, unknown how much weight loss over the last several weeks, but he said multiple people have told him it looks like he has lost weight.

## 2020-07-24 NOTE — TELEPHONE ENCOUNTER
Spoke with patient today and provider message was given.     States he understands why the provider would give this recommendation.     He may or he may not follow this advice.     Will call back next week with updates.

## 2020-08-04 ENCOUNTER — TELEPHONE (OUTPATIENT)
Dept: FAMILY MEDICINE | Facility: CLINIC | Age: 75
End: 2020-08-04

## 2020-08-04 NOTE — TELEPHONE ENCOUNTER
RN called back to Addie at [facility].    Nephew found pt  in apartment  after not being able to get a hold of the pt.  It appears pt had been  for a few days.  Pt was brought to the ME due to some decomposition at the time of discovery.    ME was wanting to know the last time pt was seen in clinic.  ME is signing death certificate due to having to do a positive identification of patient.    Additionally, ME found syringe, wondering on that. Most likely testosterone. ME did not have testosterone on pt's medication list. Had all other medications on list barring testosterone and losartan.     RN advised on last communication and concerns pt had.   ME advised this aligns with what nephew had relayed to ME as well.     At this time, it appears that natural causes.  An autopsy will not be pursued.     Notification of  patient completed and submitted.

## 2020-08-04 NOTE — TELEPHONE ENCOUNTER
Reason for Call:  Other call back  Detailed comments: please call Addie from [facility] Examiner Investigator about patient found dead in the home on 8/3/2020 at 12:01 pm any one can help if Addie is not available  Phone Number Patient can be reached at: Other phone number:  [phone]  Best Time: asap  Can we leave a detailed message on this number? NO  Call taken on 8/4/2020 at 9:14 AM by ODETTE JOSHUA

## 2020-08-05 ENCOUNTER — TELEPHONE (OUTPATIENT)
Dept: ONCOLOGY | Facility: CLINIC | Age: 75
End: 2020-08-05

## 2020-08-05 NOTE — TELEPHONE ENCOUNTER
Jerardo Diamond is seen in consultation from Dr. Dangelo.  She is a 34 year old female being seen for primarily left aural fullness and hearing loss.  She reports her symptoms started in April and that she was seen at her local clinic and was diagnosed with a left effusion.  She said she had a throbbing sensation in her ear at that time as well.  She had a myringotomy done and was told a lot of fluid was suctioned from her ear.  The throbbing sensation went away but the fullness and hearing loss persisted.  She was seen again and a PE tube was attempted but was unable to be placed in clinic due to the shape of her ear canal so it was placed in the operating room.  However, no effusion was noted and her symptoms did not improve.  She does not think her hearing has worsened since this started.  She has not had any drainage from the PE tube.  She has some mild fullness on the right as well.  No otalgia or otorrhea on the right, maybe some hearing loss.    Past Medical History:   Diagnosis Date     Conductive hearing loss 4/23/17     Tinnitus 5/15/17?l       Past Surgical History:   Procedure Laterality Date     PE TUBES  5/19/17    only left ear     TONSILLECTOMY  1988?       Family History   Problem Relation Age of Onset     Dementia Maternal Grandmother      alzheimers     CANCER Maternal Grandfather      Throat - Tonsil?     CANCER Paternal Grandmother      b-cell?     Hypertension Mother      mild        Social History   Substance Use Topics     Smoking status: Former Smoker     Packs/day: 0.00     Years: 3.00     Types: Cigarettes     Start date: 1/1/2003     Quit date: 1/1/2005     Smokeless tobacco: Never Used     Alcohol use Yes       Patient Supplied Answers to Review of Systems  [facility] ENT ROS 6/7/2017   Constitutional Unexplained fatigue, Unexplained fever or night sweats   Neurology Headache   Ears, Nose, Throat Hearing loss, Ear pain, Ringing/noise in ears, Nasal congestion or drainage, Sore throat  Left message for pt to call and reschedule appt on 8/12 with Dr. Acuña at [facility] to next available with [place]. Acuña [place] the [facility]       Change due to provider changing clinic location and will no longer be seeing patients at [facility].      Cardiopulmonary -   Musculoskeletal Sore or stiff joints   Allergy/Immunology Allergies or hay fever   Endocrine Thirst, Frequent urination   The remainder of the 10 point review of systems is otherwise negative.    Physical examination:  Constitutional:  In no acute distress, appears stated age  Eyes:  Extraocular movements intact, no spontaneous nystagmus  Ears:  Both ears examined under the microscope.  Right TM intact with an aerated middle ear, no retractions noted.  Left TM with a PE tube in the posterior inferior quadrant with quite a bit of dried crusting on the canal as well as around the tube and possibly obstructing the tube, middle ear appears clear, no retractions noted.  Epstein to the left on both the forehead and teeth.  Rinne shows air greater than bone on the left.  Respiratory:  No increased work of breathing, wheezing or stridor  Musculoskeletal:  Good upper extremity strength  Skin:  No rashes on the head and neck  Neurologic:  House Brackman 1/6 bilaterally, ambulating normally  Psychiatric:  Alert, normal affect, answering questions appropriately    Audiogram:  Audiogram from 5/25 was reviewed and it showed a right mild upsloping to normal downsloping to normal/mild conductive hearing loss with 100% speech discrimination.  Left normal/mild upsloping to normal downsloping to mild/moderate conductive hearing loss with 96% speech discrimination.  Right normal tympanogram and left high volume flat tympanogram.  Absent right ipsilateral and contralateral reflexes.    CT:  CT temporal bones was reviewed and it shows fluid or soft tissue around the ossicular chain on the left without clear erosion although the long process of the incus is not well visualized.  However, the PE tube is adjacent to the opacified area.  Mastoid is well developed and aerated.  The right middle ear and mastoid are well developed and aerated with no anomalies noted.    Assessment and plan:  Left aural fullness and hearing  loss with no improvement despite myringotomy and subsequent PE tube placement.  She was very surprised to learn that her right hearing was worse than her left when tested last month.  However, her tuning fork testing today suggests that her left conductive loss is worse than the right.  We tried to obtain an audiogram today but she was unable to stay so will come back tomorrow for an audiogram.    We discussed that her fullness were unlikely secondary to eustachian tube dysfunction as they did not improve with myringotomy or PE tube placement and both of these are bypassing the eustachian tube altogether.  It is difficult to know if the opacification around the ossicular chain on the left is fluid, insippated mucus or tissue.  The PE tube is essentially adjacent to the ossicular chain and may be causing the opacification.    Her hearing loss on the right with absent reflexes is suspicious for otosclerosis.  Unfortunately, reflexes could not be done on the left as she had an open PE tube.  We discussed that if her new audiogram showed a significant worsening of the left conductive hearing loss then exploratory tympanotomy with removal of the PE tube would be indicated.  She understands that we do not know what the opacification is based on CT imaging.  She had questions regarding middle ear exploration even if the left hearing is relatively stable.  The risks and benefits were discussed of left tympanoplasty with removal of PE tube and middle ear exploration.  The risks include but are not limited to:  Worsened hearing which may require further surgery, profound and irreversible hearing loss, dizziness, damage to the taste nerve, damage to the facial nerve, tympanic membrane perforation requiring further surgery and infection.  Postoperative restrictions were discussed.  If cholesteatoma was identified, the need for revision surgery in 6 months was discussed as well as the possibility that her hearing may be worse in  the intervening period if ossicles required removal.      She had her questions answered and we will contact her with the audiogram results.

## 2020-08-14 DIAGNOSIS — F41.9 ANXIETY: ICD-10-CM

## 2020-08-14 RX ORDER — BUSPIRONE HYDROCHLORIDE 15 MG/1
15 TABLET ORAL 3 TIMES DAILY
Qty: 90 TABLET | Refills: 1 | OUTPATIENT
Start: 2020-08-14

## 2020-12-26 NOTE — TELEPHONE ENCOUNTER
Reason for Call:  Other     Detailed comments: would like to come in today for tests for pancreatitis and psa today.      Phone Number Patient can be reached at: Home number on file [phone] (home)    Best Time: asap    Can we leave a detailed message on this number? YES    Call taken on 3/12/2018 at 11:08 AM by PORFIRIO MORRISON       Subjective:      Patient ID: Melinda Byrne is a 40 y.o. female who presents today for:  Chief Complaint   Patient presents with    Wrist Pain     pt states right wrist pain started yesterday. pt states pain came on suddenly. pt states she has taken ibuprofen. pt states she was in tears from pain but ibuprofen relieved it alittle. pt states hurts to move wrist around.         HPI     Her right wrist started hurting yesterday  Today when she woke up it hurt so bad she was in tears  She did not have any injury to it or fall  No numbness or tingling the the fingers  Decreased ROM with the wrist and thumb opposition  She also has PMR which is an inflammatory dx of the muscles  She takes prednisone daily 5mg  No popping feeling  She is on prednisone 5 mg daily, this lower dose she she can take occassionally Motrin   She states she had cellulitis of the right elbow beofre and this is how it presented without any chages to the skin at first  She has broke this wrist before     Past Medical History:   Diagnosis Date    Acute midline thoracic back pain 9/18/2018    B12 deficiency     Family history of premature CAD 9/4/2013    Fatty liver     HPV (human papilloma virus) anogenital infection     Hyperlipidemia     Hypertension     Liver hemangioma 2009/2015    Obesity 3/11/13    BMI 43.42    Orthostatic hypotension     Ovarian cyst     PMR (polymyalgia rheumatica) ([address]     Rectal fissure     Tobacco abuse 9/3/2015    Tobacco abuse     Tremor     Uncontrolled diabetes mellitus with complications ([address]     Unspecified sleep apnea      Past Surgical History:   Procedure Laterality Date    CARDIAC CATHETERIZATION  4-07    COLONOSCOPY  2013    for diarrhea (-)    HYSTERECTOMY  12-10    LEEP  1-05    LA MUSCLE BIOPSY Left 9/21/2018    LEFT QUADRICEPS MUSCLE BIOPSY performed by Montana Solorio MD at [facility] UNI/BI CANNULATION N/A 9/18/2018  Ace Inhibitors Other (See Comments)     Dizziness and near syncope    Bactrim [Sulfamethoxazole-Trimethoprim] Itching    Nitroglycerin Other (See Comments)     Migraine    Percocet [Oxycodone-Acetaminophen] Nausea And Vomiting    Victoza [Liraglutide]      NAUSEA     Current Outpatient Medications   Medication Sig Dispense Refill    naproxen (NAPROSYN) 500 MG tablet Take 1 tablet by mouth 2 times daily (with meals) 30 tablet 0    cephALEXin (KEFLEX) 500 MG capsule Take 1 capsule by mouth 3 times daily for 7 days 21 capsule 0    ondansetron (ZOFRAN ODT) 4 MG disintegrating tablet Take 1 tablet by mouth every 8 hours as needed for Nausea or Vomiting 7 tablet 0    famotidine (PEPCID) 20 MG tablet Take 1 tablet by mouth 2 times daily for 10 days 20 tablet 0    metoprolol (LOPRESSOR) 100 MG tablet Take 1 tablet by mouth 2 times daily 180 tablet 0    omeprazole (PRILOSEC) 40 MG delayed release capsule Take 1 capsule by mouth every morning (before breakfast) 90 capsule 0    nystatin (MYCOSTATIN) 143303 UNIT/GM powder Apply 3 times daily. 1 Bottle 2    blood glucose test strips (PRODIGY NO CODING BLOOD GLUC) strip 1 each by In Vitro route 4 times daily As needed. 400 each 3    blood glucose test strips (PRODIGY NO CODING BLOOD GLUC) strip 1 each by In Vitro route 4 times daily As needed. 400 each 3    rosuvastatin (CRESTOR) 10 MG tablet TAKE ONE TABLET BY MOUTH NIGHTLY 90 tablet 0    clotrimazole-betamethasone (LOTRISONE) 1-0.05 % cream Apply topically 2 times daily.  1 Tube 3    venlafaxine (EFFEXOR XR) 75 MG extended release capsule TAKE TWO CAPSULES BY MOUTH EVERY MORNING AND TAKE ONE CAPSULE BY MOUTH EVERY EVENING 270 capsule 1    predniSONE (DELTASONE) 5 MG tablet Take 1 tablet by mouth daily 90 tablet 2    solifenacin (VESICARE) 10 MG tablet TAKE 1 TABLET BY MOUTH ONE TIME A DAY 90 tablet 3    baclofen (LIORESAL) 10 MG tablet Take 0.5 tablets by mouth 2 times daily 90 tablet 1  Insulin Glargine, 2 Unit Dial, (TOUJEO MAX SOLOSTAR) 300 UNIT/ML SOPN INJECT 240 UNITS SUBCUTANEOUSLY AT BEDTIME 135 mL 3    insulin lispro, 1 Unit Dial, (HUMALOG KWIKPEN) 100 UNIT/ML SOPN 80 units with each meals 90 pen 3    hydroCHLOROthiazide (HYDRODIURIL) 25 MG tablet Take 1 tablet by mouth daily 90 tablet 3    losartan (COZAAR) 50 MG tablet Take 1 tablet by mouth daily 90 tablet 3    metFORMIN (GLUCOPHAGE) 500 MG tablet Take 1 tablet by mouth 3 times daily 270 tablet 3    Blood Glucose Monitoring Suppl (PRODIGY AUTOCODE BLOOD GLUCOSE) w/Device KIT Use as directed to test up to 4 times daily 1 kit 0    PRODIGY LANCETS 28G MISC Use as directed to test up to 4 times daily 400 each 3    Insulin Pen Needle (PEN NEEDLES) 32G X 4 MM MISC Use as directed with insulin pens, 4 times daily 400 each 1    vitamin D (CHOLECALCIFEROL) 1000 UNIT TABS tablet Take 1,000 Units by mouth daily      calcium carbonate 600 MG TABS tablet Take 1 tablet by mouth daily      miconazole (MICOTIN) 2 % cream Apply topically 2 times daily. 141 g 3    Handicap Placard MISC Exp 5 years 1 each 0     No current facility-administered medications for this visit. Review of Systems   Constitutional: Negative for chills, fatigue and fever. HENT: Negative for congestion, rhinorrhea and sore throat. Respiratory: Negative for cough, shortness of breath and wheezing. Gastrointestinal: Negative for diarrhea, nausea and vomiting. Musculoskeletal: Positive for arthralgias. Negative for joint swelling and myalgias. Skin: Negative for color change and rash. Neurological: Negative for headaches. Objective:   /74   Pulse 78   Temp 97.3 °F (36.3 °C)   Ht 5' 6\" (1.676 m)   Wt 295 lb (133.8 kg)   LMP  (LMP Unknown)   SpO2 97%   BMI 47.61 kg/m²     Physical Exam  Vitals signs reviewed. Constitutional:       Appearance: Normal appearance. She is obese. HENT:      Head: Normocephalic and atraumatic. Nose: Nose normal.      Mouth/Throat:      Lips: Pink. Eyes:      General: Lids are normal.      Conjunctiva/sclera: Conjunctivae normal.   Neck:      Musculoskeletal: Normal range of motion. Cardiovascular:      Rate and Rhythm: Normal rate. Pulmonary:      Effort: Pulmonary effort is normal.   Musculoskeletal:      Right wrist: She exhibits decreased range of motion. She exhibits no swelling and no effusion. Right hand: Decreased strength noted. She exhibits thumb/finger opposition and wrist extension trouble. Hands:       Comments: It Is painful on palpation. There is no swelling present   Skin:     General: Skin is warm and dry. Neurological:      General: No focal deficit present. Mental Status: She is alert and oriented to person, place, and time. Psychiatric:         Mood and Affect: Mood normal.         Behavior: Behavior normal. Behavior is cooperative. Assessment:       Diagnosis Orders   1. Wrist pain, acute, right  naproxen (NAPROSYN) 500 MG tablet    cephALEXin (KEFLEX) 500 MG capsule    XR WRIST RIGHT (MIN 3 VIEWS)     No results found for this visit on 12/26/20. Plan:   Likely could be arthritic pain. It did feel better after the Motrin. Will give an antibiotic with watchful waiting incase a skin change develops. Encouraged her to ice it, Naprosyn for a few days. If not improvement Xray order placed. Assessment & Plan   Anh Harrison was seen today for wrist pain. Diagnoses and all orders for this visit:    Wrist pain, acute, right  -     naproxen (NAPROSYN) 500 MG tablet; Take 1 tablet by mouth 2 times daily (with meals)  -     cephALEXin (KEFLEX) 500 MG capsule; Take 1 capsule by mouth 3 times daily for 7 days  -     XR WRIST RIGHT (MIN 3 VIEWS);  Future      Orders Placed This Encounter   Procedures    XR WRIST RIGHT (MIN 3 VIEWS)     Standing Status:   Future     Standing Expiration Date:   12/26/2021     Order Specific Question:   Reason for exam: Answer:   Wrist pain and decreased ROM, also down in the thumb     Orders Placed This Encounter   Medications    naproxen (NAPROSYN) 500 MG tablet     Sig: Take 1 tablet by mouth 2 times daily (with meals)     Dispense:  30 tablet     Refill:  0    cephALEXin (KEFLEX) 500 MG capsule     Sig: Take 1 capsule by mouth 3 times daily for 7 days     Dispense:  21 capsule     Refill:  0     Medications Discontinued During This Encounter   Medication Reason    predniSONE (DELTASONE) 20 MG tablet Therapy completed     Return for Follow up with PCP. Reviewed with the patient/family: current clinical status & medications. Side effects of the medication prescribed today, as well as treatment plan/rationale and result expectations have been discussed with the patient/family who expresses understanding. Patient will be discharged home in stable condition. Follow up with PCP to evaluate treatment results or return if symptoms worsen or fail to improve. Discussed signs and symptoms which require immediate follow-up in ED/call to 911. Understanding verbalized. I have reviewed the patient's medical history in detail and updated the computerized patient record.     Agatha Kuhn, SANIA - CNP

## 2022-02-17 PROBLEM — Q60.0 RENAL AGENESIS, UNILATERAL: Status: ACTIVE | Noted: 2019-05-08

## 2022-07-21 NOTE — PROGRESS NOTES
"[facility]  Hospitalist Progress Note  Melani Boggs PA-C 02/10/2019    Reason for Stay (Diagnosis): abdominal pain, constipation         Assessment and Plan:      Summary of Stay: Edi Villafana is a 73 year old male with chronic shoulder pain, on oxycodone for the past couple months, who was admitted on 2/9/2019 with abdominal pain and constipation.       Problem List:   1. Abdominal pain due to constipation - likely due to narcotic use. one BM overnight after a pink lady enema in the ED. Miralax scheduled here, pt refused suppository, reordered pink lady enema, pt was declining enema throughout the day but finally had it done with good results. abd pain improved but not resolved. Continue scheduled Miralax, reassess in AM. Recommend regimen of Miralax 1-2 daily on discharge while on narcotics.   2. Urinary retention - likely due to narcotic use and constipation. Will remove dumont tonight given good results with second enema. PVRs.   3. Chronic pain - bilateral shoulder pain, plan for eventual surgery, likely this spring. Pt requesting dilaudid while here, resume oxycodone on discharge.     DVT Prophylaxis: Low Risk/Ambulatory with no VTE prophylaxis indicated  Code Status: Full Code  Discharge Dispo: home  Estimated Disch Date / # of Days until Disch: tomorrow. Pt does not feel safe to discharge home today.        Interval History (Subjective):      Continues to complain of abdominal pain, improved some. Had one BM last evening, just smears today. Requesting repeat enema instead of suppository. Dumont still in place.                   Physical Exam:      Last Vital Signs:  /74 (BP Location: Left arm)   Pulse 69   Temp 99  F (37.2  C) (Oral)   Resp 16   Ht 1.93 m (6' 4\")   Wt 93.2 kg (205 lb 6.4 oz)   SpO2 94%   BMI 25.00 kg/m        Intake/Output Summary (Last 24 hours) at 2/10/2019 1438  Last data filed at 2/10/2019 1417  Gross per 24 hour   Intake --   Output 2200 ml   Net -2200 " FORM: Plan of Care    Left By: [facility]     Instructions: Please sign, date and return.    To Be Returned By:    For Further Information, the patient may be contacted at:    Placed in doctors box.    Fax: [phone]    Phone: [phone]   ml       Constitutional: Awake, alert, cooperative, no apparent distress   Respiratory: Clear to auscultation bilaterally, no crackles or wheezing   Cardiovascular: Regular rate and rhythm, normal S1 and S2, and no murmur noted   Abdomen: Normal bowel sounds, soft, non-distended, mild lower abd tenderness   Skin: No rashes, no cyanosis, dry to touch   Neuro: Alert and oriented x3, no weakness, numbness, memory loss   Extremities: No edema, normal range of motion   Other(s):        All other systems: Negative          Medications:      All current medications were reviewed with changes reflected in problem list.         Data:      All new lab and imaging data was reviewed.   Labs:  Recent Labs   Lab 02/10/19  0628 02/09/19  1839   WBC 10.0 15.6*   HGB  --  16.2   HCT  --  47.0   MCV  --  86   PLT  --  274     Recent Labs   Lab 02/10/19  0628 02/09/19  1850 02/09/19  1839     --  133   POTASSIUM 4.1  --  3.7   CHLORIDE 105  --  99   CO2 24  --  22   ANIONGAP 7  --  12   GLC 93  --  108*   BUN 15  --  16   CR 0.95  --  0.90   GFRESTIMATED 79 83 83   GFRESTBLACK >90 >90 >90   CECY 7.9*  --  9.2   PROTTOTAL  --   --  7.7   ALBUMIN  --   --  4.3   BILITOTAL  --   --  1.3   ALKPHOS  --   --  95   AST  --   --  28   ALT  --   --  27     Recent Labs   Lab 02/09/19  1839   COLOR Yellow   APPEARANCE Clear   URINEGLC Negative   URINEBILI Negative   URINEKETONE 5*   SG 1.010   UBLD Negative   URINEPH 6.0   PROTEIN Negative   NITRITE Negative   LEUKEST Negative   RBCU <1   WBCU <1   lactic acid - 2.4  Imaging:   Recent Results (from the past 48 hour(s))   Abd/pelvis CT,  IV  contrast only TRAUMA / AAA    Narrative    CT ABDOMEN AND PELVIS WITH CONTRAST   2/9/2019 7:18 PM     HISTORY: Lower abdominal pain.    TECHNIQUE: 100 mL Isovue-370 IV were administered. After contrast  administration, volumetric helical sections were acquired from the  lung bases to the ischial tuberosities. Coronal images were also  reconstructed.  Radiation dose for this scan was reduced using  automated exposure control, adjustment of the mA and/or kV according  to patient size, or iterative reconstruction technique.    COMPARISON: None.     FINDINGS: Moderate to large amount of stool in the rectum. Moderate  amount of stool is noted in the remainder of the colon. Scattered  colonic diverticulosis. No convincing evidence for colitis or  diverticulitis. No free fluid in the pelvis. Griffith catheter in the  bladder. Mild atherosclerotic aortoiliac calcification. The liver,  gallbladder, spleen, adrenal glands, and pancreas are unremarkable.  Small cortical cyst in the upper pole of the right kidney. The right  kidney is otherwise unremarkable. No hydronephrosis. The left kidney  is absent, possibly on a congenital basis. The mid and distal portions  of the right ureter appear mildly dilated, with no definite cause  identified. Small hiatal hernia. Coronary artery calcification. The  visualized lung bases are clear. Degenerative changes are noted  throughout the visualized thoracolumbar spine. Right total hip  arthroplasty.      Impression    IMPRESSION:   1. Moderate to large amount of stool in the rectum, with a moderate  amount stool throughout the remainder of the colon.  Findings suggest  constipation.   2. 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  3. The mid and distal portion of the left ureter appears mildly  dilated, possibly chronic, with no definite cause identified.    MD Melani MINOR PA-C

## 2023-04-07 NOTE — ANESTHESIA PREPROCEDURE EVALUATION
Anesthesia Evaluation     . Pt has had prior anesthetic.     History of anesthetic complications    complications after spinal, patient unable to describe      ROS/MED HX    ENT/Pulmonary:      (-) sleep apnea   Neurologic:       Cardiovascular:     (+) Dyslipidemia, hypertension----. : . . . :. .       METS/Exercise Tolerance:     Hematologic:     (+) History of blood clots -      Musculoskeletal: Comment: L shoulder pain  (+) arthritis, , , -       GI/Hepatic:        (-) GERD   Renal/Genitourinary:     (+) Other Renal/ Genitourinary, single kidney - congenital      Endo:     (+) thyroid problem hypothyroidism, .      Psychiatric:     (+) psychiatric history anxiety      Infectious Disease:         Malignancy:         Other:    (+) H/O Chronic Pain,H/O chronic opiod use ,                    Physical Exam  Normal systems: cardiovascular and pulmonary    Airway   Mallampati: II  TM distance: >3 FB  Neck ROM: full    Dental   (+) caps    Cardiovascular       Pulmonary                     Anesthesia Plan      History & Physical Review  History and physical reviewed and following examination; no interval change.    ASA Status:  2 .    NPO Status:  > 8 hours    Plan for General and ETT with Intravenous and Propofol induction. Maintenance will be Balanced.    PONV prophylaxis:  Ondansetron (or other 5HT-3)       Postoperative Care  Postoperative pain management:  IV analgesics.      Consents  Anesthetic plan, risks, benefits and alternatives discussed with:  Patient..                          .  
Vaccine status unknown

## 2023-07-05 NOTE — TELEPHONE ENCOUNTER
Addended Olivia Wolf on: 7/5/2023 03:11 PM     Modules accepted: Orders Pt would like to increase his Lipitor dose to 80 mg tablet. He has been talking with a pharmacist who suggested dose increase as it would be more cost effective for him and have less side effects. Pt also notes he has discussed Lipitor dose increase with cardiologist who was agreeable with dose change.    Triage advice pt to schedule an appointment to discuss medication dose increase. Pt agreed to schedule a phone visit but would prefer to cancel appt if this is not needed for Rx change. Please advice on Rx dose change. If pt needs to keep appt, No further action needed.    AR  Re:vision concerns noted below, pt has seen ophthalmology/surgery for retinal problems. He has a future appt with opthalmology tomorrow.

## 2023-11-16 NOTE — PROGRESS NOTES
"Orthopedic Surgery  8/10/2018  POD #1    S: Patient voices no complaints today, moderate pain, discussed secondary to chronic opioid use.    O: Blood pressure 119/68, pulse 74, temperature 98.7  F (37.1  C), temperature source Axillary, resp. rate 18, height 1.854 m (6' 1\"), weight 88.5 kg (195 lb), SpO2 93 %.  Lab Results   Component Value Date    HGB 14.1 08/10/2018     Neurovascularly intact.  Calves are negative bilaterally, both soft and nontender.  The dressing is C/D/I.      A: Mr. Villafana is doing well status post Procedure(s):  ARTHROPLASTY HIP ANTERIOR.    P: Dressing change prior to d/c, use ABD and tape dressing.  No aquacel or island dressing.  Continue physical therapy. Anticipate discharge to home tomorrow.    Chester Connell  [phone]    " CT lung cancer screening negative recheck in 1 year.

## (undated) DEVICE — KIT PATIENT CARE HANA TABLE PROFX SUPINE 6855

## (undated) DEVICE — SOLUTION WOUND CLEANSING 3/4OZ 10% PVP EA-L3011FB-50

## (undated) DEVICE — SU WND CLOSURE VLOC 180 ABS 2-0 24" GS-21 VLOCL0335

## (undated) DEVICE — GLOVE PROTEXIS W/NEU-THERA 8.5  2D73TE85

## (undated) DEVICE — HOOD FLYTE W/PEELAWAY 408-800-100

## (undated) DEVICE — BLADE SAW SAGITTAL STRK 19.5X95X1.27MM 2108-109-000S15

## (undated) DEVICE — SOL NACL 0.9% IRRIG 1000ML BOTTLE 07138-09

## (undated) DEVICE — CATH TRAY FOLEY SURESTEP 16FR DRAIN BAG STATOCK A899916

## (undated) DEVICE — ESU GROUND PAD UNIVERSAL W/O CORD

## (undated) DEVICE — WIPES FOLEY CARE SURESTEP PROVON DFC100

## (undated) DEVICE — SUCTION TIP YANKAUER STR K87

## (undated) DEVICE — SOL WATER IRRIG 1000ML BOTTLE 2F7114

## (undated) DEVICE — BLADE CLIPPER 4412A

## (undated) DEVICE — GLOVE PROTEXIS W/NEU-THERA 6.5  2D73TE65

## (undated) DEVICE — MANIFOLD NEPTUNE 4 PORT 700-20

## (undated) DEVICE — PREP CHLORAPREP 26ML TINTED ORANGE  260815

## (undated) DEVICE — SU DERMABOND PRINEO 22CM CLR222US

## (undated) DEVICE — DRAPE IOBAN ISOLATION VERTICAL 320X21CM 6617

## (undated) DEVICE — SU VICRYL 0 CTX CR 8X18" J764D

## (undated) DEVICE — DRAPE CONVERTORS U-DRAPE 60X72" 8476

## (undated) DEVICE — SOL NACL 0.9% INJ 250ML BAG 2B1322Q

## (undated) DEVICE — DRAPE C-ARM 60X42" 1013

## (undated) DEVICE — BNDG COBAN 4"X5YDS STERILE

## (undated) DEVICE — SU MONOCRYL 3-0 PS-2 27" Y427H

## (undated) DEVICE — GLOVE PROTEXIS BLUE W/NEU-THERA 7.0  2D73EB70

## (undated) DEVICE — DRAPE IOBAN INCISE 23X17" 6650EZ

## (undated) DEVICE — SYR 50ML LL W/O NDL 309653

## (undated) DEVICE — LINEN TOWEL PACK X5 5464

## (undated) DEVICE — SUCTION IRR SYSTEM W/O TIP INTERPULSE HANDPIECE 0210-100-000

## (undated) DEVICE — Device

## (undated) DEVICE — BONE CLEANING TIP INTERPULSE  0210-010-000

## (undated) DEVICE — SU VICRYL 2-0 CP-1 27" UND J266H

## (undated) DEVICE — SOL BENZOIN 0.5OZ

## (undated) DEVICE — DRAPE SHEET REV FOLD 3/4 9349

## (undated) DEVICE — ESU BIPOLAR SEALER AQUAMANTYS 6MM 23-112-1

## (undated) DEVICE — DRAPE STERI U 1015

## (undated) DEVICE — SU ETHIBOND 2 V-37 4X30" MX69G

## (undated) DEVICE — SU WND CLOSURE VLOC 180 ABS 0 24" GS-25 VLOCL0436

## (undated) DEVICE — PACK TOTAL HIP W/U DRAPE SOP15HUFSC

## (undated) DEVICE — GLOVE PROTEXIS POWDER FREE 8.5 ORTHOPEDIC 2D73ET85

## (undated) DEVICE — ESU PENCIL W/SMOKE EVAC NEPTUNE STRYKER 0703-046-000

## (undated) DEVICE — NDL 22GA 1.5"

## (undated) RX ORDER — CEFAZOLIN SODIUM 2 G/100ML
INJECTION, SOLUTION INTRAVENOUS
Status: DISPENSED
Start: 2018-08-09

## (undated) RX ORDER — FENTANYL CITRATE 50 UG/ML
INJECTION, SOLUTION INTRAMUSCULAR; INTRAVENOUS
Status: DISPENSED
Start: 2018-08-09

## (undated) RX ORDER — ONDANSETRON 2 MG/ML
INJECTION INTRAMUSCULAR; INTRAVENOUS
Status: DISPENSED
Start: 2018-08-09

## (undated) RX ORDER — LIDOCAINE HYDROCHLORIDE 20 MG/ML
INJECTION, SOLUTION EPIDURAL; INFILTRATION; INTRACAUDAL; PERINEURAL
Status: DISPENSED
Start: 2018-08-09

## (undated) RX ORDER — HYDROMORPHONE HYDROCHLORIDE 1 MG/ML
INJECTION, SOLUTION INTRAMUSCULAR; INTRAVENOUS; SUBCUTANEOUS
Status: DISPENSED
Start: 2018-08-09

## (undated) RX ORDER — PREGABALIN 75 MG/1
CAPSULE ORAL
Status: DISPENSED
Start: 2018-08-09

## (undated) RX ORDER — GLYCOPYRROLATE 0.2 MG/ML
INJECTION, SOLUTION INTRAMUSCULAR; INTRAVENOUS
Status: DISPENSED
Start: 2018-08-09

## (undated) RX ORDER — BUPIVACAINE HYDROCHLORIDE AND EPINEPHRINE 5; 5 MG/ML; UG/ML
INJECTION, SOLUTION EPIDURAL; INTRACAUDAL; PERINEURAL
Status: DISPENSED
Start: 2018-08-09

## (undated) RX ORDER — PROPOFOL 10 MG/ML
INJECTION, EMULSION INTRAVENOUS
Status: DISPENSED
Start: 2018-08-09

## (undated) RX ORDER — ALBUMIN, HUMAN INJ 5% 5 %
SOLUTION INTRAVENOUS
Status: DISPENSED
Start: 2018-08-09

## (undated) RX ORDER — KETOROLAC TROMETHAMINE 30 MG/ML
INJECTION, SOLUTION INTRAMUSCULAR; INTRAVENOUS
Status: DISPENSED
Start: 2018-08-09

## (undated) RX ORDER — ACETAMINOPHEN 500 MG
TABLET ORAL
Status: DISPENSED
Start: 2018-08-09

## (undated) RX ORDER — NEOSTIGMINE METHYLSULFATE 1 MG/ML
VIAL (ML) INJECTION
Status: DISPENSED
Start: 2018-08-09